# Patient Record
Sex: FEMALE | Race: WHITE | NOT HISPANIC OR LATINO | Employment: FULL TIME | URBAN - METROPOLITAN AREA
[De-identification: names, ages, dates, MRNs, and addresses within clinical notes are randomized per-mention and may not be internally consistent; named-entity substitution may affect disease eponyms.]

---

## 2019-01-08 ENCOUNTER — TREATMENT (OUTPATIENT)
Dept: PHYSICAL THERAPY | Facility: CLINIC | Age: 34
End: 2019-01-08

## 2019-01-08 DIAGNOSIS — M35.7 FAMILIAL LIGAMENTOUS LAXITY: ICD-10-CM

## 2019-01-08 DIAGNOSIS — M53.3 SACROILIAC JOINT DYSFUNCTION: Primary | ICD-10-CM

## 2019-01-08 DIAGNOSIS — S39.012D STRAIN OF LUMBAR REGION, SUBSEQUENT ENCOUNTER: ICD-10-CM

## 2019-01-08 DIAGNOSIS — R29.3 POSTURE IMBALANCE: ICD-10-CM

## 2019-01-08 PROCEDURE — 97530 THERAPEUTIC ACTIVITIES: CPT | Performed by: PHYSICAL THERAPIST

## 2019-01-08 PROCEDURE — 97140 MANUAL THERAPY 1/> REGIONS: CPT | Performed by: PHYSICAL THERAPIST

## 2019-01-08 PROCEDURE — 99070 SPECIAL SUPPLIES PHYS/QHP: CPT | Performed by: PHYSICAL THERAPIST

## 2019-01-08 PROCEDURE — 97162 PT EVAL MOD COMPLEX 30 MIN: CPT | Performed by: PHYSICAL THERAPIST

## 2019-01-08 NOTE — PROGRESS NOTES
"Initial Physical Therapy Evaluation and Plan of Care    Chief Complaint:  Low Back Pain    Background History:   Tete reports original insidious onset of low back pain 10 years ago.  Subsequent to this she had 1-2 episodes of low back pain spasming taking her to the ER, every year.  Over the last couple of years her pain has increased, she is not able to attribute this to anything in particular.  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      Past Medical History:  1.  AA 2013  Treated with PT for left shoulder labral tear, no low back treatment   2.  Hx of two falls when mopping in socks, over the last 2 years  3.  Hx of regular, heavy, cramping menses  4.  T& A, 2006  5.  Allergic to Wellbutrin and mild allergy to contrast dye      Prior Level of Function:  Independent in all ADLs  Prior Therapy for Same Condition:  At [facility], x couple months, \"helped some\"       Social/ADL:  Pt is in between jobs as a .  She starts her new job as a  at [employer], based out of [place], next week.  She will be working out of her home.  She enjoys walking for exercise when she is able.  She does enjoy Yoga, but stopped it last year secondary to her schedule.        SUBJECTIVE: Pt reports pain at a level of 5/10 to 10/10 localized in the lower lumbar spine and SI joints.  She also has pain in both gr trochanters.  Her pain is constant, but varies in intensity based on her position/acitivty.  She describes her pain as stabbing, aching and sharp.  Pain increases with bending to feed dog, driving, stairs and walking.  Pain decreases with massage, ice, stretching and medication.  Her best and worst  times of day vary.  She sleeps in a variety of positions using a Temperpedic Neck pillow and a Snoogle body pillow.  She sleeps on a Beauty Rest Hybrid mattress.  She denies numbness or tingling.  " "    Functional Limitations:  Sitting, standing, walking, stairs, driving, working, sleeping, squatting, housework and changing positions.     Patient Goals for Physical Therapy: \"Pain relief\"      OBJECTIVE:    Postural Assessment:   Yes No   Forward head  x   Increased AO extension  x         Presentation:    Cervical Spine unremarkable    Thoracic spine Slight flattened     Lumbar Spine flattened          Right Left   Shoulder elevated x    Scapula position               Winging               Protracted sl sl             Anterior Tilt               Elevated     Rib Hump               Upper               Lower     Lateral Shift of Pelvis sl    Gluteal Fold Elevated x    Atrophy of Gluteus Max  x   Standing Hip Position             External Rotation x Sl             Internal Rotation     Knee              Valgus             Varum              Hyperextended     Foot/Ankle            Supinated            Pronated            Pes Planus            Increased Long Arch     Calcaneous angulation:            Valgus            Varum     Hallux Valgus     Hammertoe            Monthly Objective Measurement/Functional Activity    Date: 01/08/2019          Oswestry Pain Score 52/100                                AROM Lumbar Spine: Degrees   Degrees      Degrees      Degrees      Degrees      Degrees      Degrees    Degrees      Flexion 106 pain                       Extension 3 pain                        Right Lat. Flexion 29 pain right trunk                       Left Lat. Flexion 31                       Right Lat Shift Pelvis inc pain                        Left Lat Shift Pelvis  less inc pain                                AROM Hips:  (degrees) Right      Left Right Left Right  Left Right  Left Right  Left Right  Left Right  Left Right  Left      Flexion 110       110                       Abduction 37       41                       Int. Rotation 32       22                       Ext. Rotation  34       42                  "              Flexibility:   (degrees) Right    Left Right  Left Right  Left Right  Left Right  Left Right  Left Right  Left Right  Left      Hamstrings -32       -28                      Quadriceps 39       42                      HipExt/Rot(piriformis) 30       28             Hip Int/Rotators 27       9             Hip Flexors (iliopsoas) -       -             IT Band -       -                     Reflexes: Right      Left Right  Left Right  Left Right  Left Right  Left Right  Left Right  Left Right  Left      L4 (Quad) +1       +1             S1 (Achilles) +1       +1                     Root Level  - Motor Right      Left Right  Left Right  Left Right  Left Right  Left Right  Left Right  Left Right  Left     T12             Rectus Abdominus 4+/5             L1              Paraspinals 5/5         5/5             L2              Hip Flexion 5/5          5/5             L3             Quads 5/5          5/5             L4              Anterior Tibialis 5/5         5/5             L5             Gr. Toe Extension 5/5           5/5             S1            Gastroc/Sol/Peroneals 5/5          5/5                     Functional Strength:             Single LegStanceTime (seconds) R:30   L:30   R:      L: R:      L: R:     L: R:      L: R:      L: R:      L: R:      L:     Pelvic Floor Isolation (seconds) -             Inner Unit  Isolation (seconds) -                     Functional Activities:              Sit w/o pain? Pain increases (minutes) No/ 30 min             Stand w/o pain? No/ 30 min             Walk w/o pain? varies             Steps w/o pain? 1 fl, avoids them             Drive w/o pain? No/ 10-15 min             Work w/o pain? No/ 30 min             # times wakes w/ pain? 4-5x/night, now taking meds at night she is able to sleep.                    01/08/2019                    SI Joint Positioning  Right  Left Right  Left Right  Left Right  Left Right  Left Right  Left Right  Left Right  Left Right  Left  Right  Left Right  Left Right  Left Right  Left Right Left Right Left Right Left Right  Left Right  Left       Innominate                            Anterior    x                            Posterior                x                            Sacrum                               Unilateral rotation    x                                            SI Joint Testing  Right  Left Right  Left Right  Left Right  Left Right  Left Right  Left Right  Left Right  Left Right  Left Right  Left Right  Left Right  Left Right  Left Right Left Right Left Right Left Right  Left Right  Left           Distraction     +          +                            Joanna's     +          +                            Compression     +          +                            Prone Press Up           +                                          Special Tests  Right   Left Right  Left Right  Left Right  Left Right  Left Right  Left Right Left  Right  Left Right  Left Right  Left Right  Left Right  Left Right  Left Right Left Right Left Right Left Right Left  Right  Left      Straight Leg Raise                                     Active   -         -                              Passive   -         -                             Hip Scour Test   ++       Sl                                                                        Palpation:         Muscle/Bone Irritation  Date 01/08/2019                     Right  Left Right  Left Right  Left Right  Left Right  Left Right  Left Right  Left Right  Left Right  Left Right  Left Right  Left Right  Left Right  Left Right Left Right Left Right Left Right  Left Right  Left   Piriformis  ++        +                    Gr. Trochanter  +          +                    IT Band  +         sl                    Quadratus Lumborum  ++        ++                    Ischial Tuberosity  ++         sl                    Sacrococcygeal Ligs  ++          +                    Paraspinals  +           +                    Spinous  Processes                                        T-spine rotated to   -               -                           L-spine rotated to  L2-5                          Adductor Tony                             Iliopsoas  ++         +                    Quad Origin  sl          sl                    SI Joint  ++         +                    Pubic Symphysis         ++                                                  Rectus Diastasis   1/2 finger                    sternocostals   +        +                      All 4s Positioning: Pt not able to assume full lumbar extension in this position  Leg Length:  The right  lower extremity is equal to the left      PLAN OF CARE:    ASSESSMENT:  Problem List:   1. SI joint dysfunction  2. Lack of spinal stabilization  3. Postural dysfunction     Discussion:    Tete arrives with significant SI joint dysfunction with secondary postural dysfunction and a lack of spinal stabilization.  She presents with familial ligamentous laxity which plays into her lack of spinal stabilization.  She does have a slight lateral shift of her pelvis to the right suggesting the presence of a slight bulging lumbar disc.  Tete indicates that the lateral shift of the pelvis can be worse at times.  Following manual SI joint correction and lumbar facet manual work, her pain decreased significantly.  When orthotics were placed in her shoes to decrease the ground rxn forces into the SI ligaments along with an SI belt placed on her to decrease the gravitational forces on the SI ligaments, her pain decreased.      She was instructed in how to self correct the SI joint positioning and lumbar facet positioning.  She demonstrated a good understanding of these techniques.  She was also instructed in how to sit, how to use lumbar pillows and cervical pillow.  Self management instruction using heat and ice was also given.      I anticipate that Tete will do well and be able to meet the goals below.     Short Term  Goals: (4-6 weeks)                               Date Met:                1.   pt independent in postural correction  2.   pt independent in SI joint self-corrections  3.   pt independent in exer to decrease post. disc pressures  4.   pt able to sleep through night without pain  5.   pt able to sit 15 minutes without pain  6.   pt able to walk 10minutes without pain  7.   Reduce pt pain to no greater than 7/10  8.   Decrease Oswestry Pain Score to no greater than 45/100    Long Term Goals:(3-5 months)      Date Met:      1.  pt independent in self-management of symptoms       2.  pt independent in home program      3.  pt able to work 6 hours without pain      4.  pt able to sit 60 minutes without pain      5.  pt able to walk 45 min without pain    Treatment Plan:   1.  Ultrasound to increase extensibility, decrease pain, if needed  2.  Electrical stimulation for muscle relaxation, decrease pain, if needed, iontophoresis  3.  Manual therapy to increase function, decrease pain  4.  Therapeutic exercise to increase function, decrease pain  5.  Home programming and d/c planning to increase function and decrease pain    Frequency & Duration:  Pt will be seen on a once/week basis for 12 more visits    Patient Participated in and Agrees With This Plan of Care and Goals:  YES  Rehab Potential:  jarret Marcial, PT, MS  Physical Therapist  [place]# [id number]            TREATMENT TODAY:    Total Treatment Time: (time in clinic) 120 min  Timed Code Treatment Minutes: 120  Evaluation:  55054    Self Care Home Management Training/ Patient Education:    Purpose of RX  Proper Body Mechanics  Postural Instruction    Compliance w/HEP    Orthotic/Equipmt Usage - instructed in use of cervical roll & body pillow for sleeping positions and in use of lumbar support when sitting.      Supplies given:   Lumbar support - on trial               1/2 cylinder lumbar support - on trial    Cervical roll - on trial   SI Belt - purchased   Pair  of orthotics - purchased                 Manual Therapy:  (MA)  RX min: 30  Manual posterior rotation of right innominate    Positional Isometric contraction (PIC) of left iliopsoas    Positional Isometric contraction of (PIC) right gluteus minimus    Distraction of both SI jts in open pack hip position  Piriformis stretching, bilaterally    Quadratus lumborum stretching, bilaterally    Anterior/Inferior Mobilization of  right hip    Positional Isometric Contraction of multifidus    Therapeutic Activities:  (TA)  RX min: 45    Neuromuscular Reeducation: (NMR)  RX min:     Ultrasound:  RX min:          1.6 vance/cm2       Location:     Iontophoresis:  6 hr patch                                Location:                               Procedures:      Key:  i=instructed        r=review        c=corrected        a=adapted   Code Procedure/Instruction 01/08/2019                  TA         Sleeping Positions instructed                                TA Sternum-Up Posture instructed                  TA SI jt. self-correction instructed                  TA Lumbar Facet PIC instructed                  TA   Order of Self-Corrections instructed                  TA Use of lumbar pillow instructed                  TA Use of cervical roll instructed                  TA Use of orthotics instructed                  TA Use of SI belt instructed                  TA Use of Nada chair                   TA Use of Ice instructed                  TA Use of [employer]are/ heat instructed                  TA Use of Theraworx Relief instructed                  TA Use of TENS unit                   TA Use of iontophoresis                   TA Decreasing Disc Pressures                                       NMR Pelvic Floor Isolation                   NMR Transverse Abdominus                   NMR Multifidus Isolation                   NMR InnerUnit against Gravity                   NMR Sit-stand w/ inner unit                   NMR Roll w/ inner  unit                   NMR Walk w/ inner unit                    NMR Up/down steps w/ inner unit                   NMR Water Exer Instruction                   NMR Hip Abd w/o piriformis                   NMR Hip Add w/o iliop/ham                    NMR Lower abs in supine                   NMR Abd obliques in supine                   NMR Prone Knee Flexion                   NMR Prone glut amrita                   NMR Retro Step                   NMR Retro Walk                   NMR Retro walk on treadmill                   NMR Forward walk w/ inner unit                   NMR Balance Instruction                   NMR Stability Ball A/P & Lateral                   NMR Ball Catch/Throw /Supine                   NMR Ball Catch/Throw /Stand                   NMR StabilityBall All 4's Arm Lift                   NMR StabilityBall Prone Walk Out                   NMR StabilityBall Supine Oblique                   NMR Stability Ball sit-supine-sit                   NMR Walking Prog Progression                   MNR Home program sequencing                                       TA Hamstring stretch                   TA Hip Ext Rot Stretch                   TA Hip Int Rot Stretch                   TA Hip Flex/IT Stretch                   TA Hip Add Stretch                   TA Lats Dorsi Stretch                   TA Gastroc/soleus stretch                   TA QuadratusLumborm Stretch                      Supine                      Stance                   TA Quad Stretch                                       NMR Wall Push Ups                   NMR Planking                   NMR BOSU Ball Exercises                   NMR Lateral Bridge Drop                   NMR [place]                   NMR O'Feldt Lat Pull Down                   NMR O'Feldt Hip Ext                   NMR O'Feldt Trunk Ext                                       TA CervDiscExtSup/stnd                   TA Cerv Stretches                   TA Self PIC Cervical  Spine                   TA Neural Gliding                   TA Ant/Mid Scalene Strch                   TA Biceps stretch                   TA Rotator Cuff Stretch                   TA Anterior Chest Stretch                   TA Wrist Ext Stretch                                       NMR Prone Arm Lifts                   NMR Scapula Stabilization                   NMR Occulomotor Isometrics                   NMR Cervical Isometrics                                       TA Shld AROM w/bar                   TA Shld Capsular Stretching                   TA Self PIC Thor Spine                   TA Rot Cuff Therabd, beginner                   TA Rot Cuff Therabd, intermed                                                                                                                                                                                                                                                 Miracle Marcial, PT, MS  Physical Therapist  [place]# [id number]

## 2019-02-07 ENCOUNTER — TREATMENT (OUTPATIENT)
Dept: PHYSICAL THERAPY | Facility: CLINIC | Age: 34
End: 2019-02-07

## 2019-02-07 DIAGNOSIS — R29.3 POSTURE IMBALANCE: ICD-10-CM

## 2019-02-07 DIAGNOSIS — M53.3 SACROILIAC JOINT DYSFUNCTION: Primary | ICD-10-CM

## 2019-02-07 DIAGNOSIS — M35.7 FAMILIAL LIGAMENTOUS LAXITY: ICD-10-CM

## 2019-02-07 PROCEDURE — 97530 THERAPEUTIC ACTIVITIES: CPT | Performed by: PHYSICAL THERAPIST

## 2019-02-07 PROCEDURE — A9999 DME SUPPLY OR ACCESSORY, NOS: HCPCS | Performed by: PHYSICAL THERAPIST

## 2019-02-07 PROCEDURE — 97140 MANUAL THERAPY 1/> REGIONS: CPT | Performed by: PHYSICAL THERAPIST

## 2019-02-07 NOTE — PROGRESS NOTES
"Physical Therapy Note      SUBJECTIVE:   Changes since last seen:  Tete reports her low back pain flared up some since last seen as she tarted a new job and has been traveling a lot.   She did some of the SI joint self corrections, only once a day.  She notes she has been stretching her hamstrings a lot as she felt like she needed to do this.   She has gone to massage therapist several times since she was last here.   She is trying to work on sleep positions, got the MY pillow, but appears she hasn't been using the cervical roll in it correctly.   The orthotics feel good and she likes wearing the SI belt.   She is having to set up a home office and she went and bought a desk chair that felt good to her, it does not have adjustable arms nor lumbar support.     Current level of pain:    3-4/10, across sacrum but seems to go up to lumbar area   Lowest level of pain since last seen:  2/10  Highest level of pain since last seen\"  8/10      Past Medical History:  1.  AA 2013  Treated with PT for left shoulder labral tear, no low back treatment   2.  Hx of two falls when mopping in socks, over the last 2 years  3.  Hx of regular, heavy, cramping menses  4.  T& A, 2006  5.  Allergic to Wellbutrin and mild allergy to contrast dye      Functional Limitations:  Sitting, standing, walking, stairs, driving, working, sleeping, squatting, housework and changing positions.   Patient Goals for Physical Therapy: \"Pain relief\"      OBJECTIVE:      Observation:  Right lateral shift of pelvis today      01/08/2019 02/07/19                   SI Joint Positioning  Right  Left Right  Left Right  Left Right  Left Right  Left Right  Left Right  Left Right  Left Right  Left Right  Left Right  Left Right  Left Right  Left Right Left Right Left Right Left Right  Left Right  Left       Innominate                            Anterior    x   x                           Posterior                x               x                           Sacrum      "                          Unilateral rotation    x   x                                           SI Joint Testing  Right  Left Right  Left Right  Left Right  Left Right  Left Right  Left Right  Left Right  Left Right  Left Right  Left Right  Left Right  Left Right  Left Right Left Right Left Right Left Right  Left Right  Left           Distraction     +          +   +         +                           Joanna's     +          +   Sl         sl                           Compression     +          +   Sl          sl                           Prone Press Up           +         +                                        Special Tests  Right   Left Right  Left Right  Left Right  Left Right  Left Right  Left Right Left  Right  Left Right  Left Right  Left Right  Left Right  Left Right  Left Right Left Right Left Right Left Right Left  Right  Left      Straight Leg Raise                                     Active   -         -                              Passive   -         -                             Hip Scour Test   ++       Sl    ++       Sl                                                                       Palpation:         Muscle/Bone Irritation  Date 01/08/2019 02/07/19                    Right  Left Right  Left Right  Left Right  Left Right  Left Right  Left Right  Left Right  Left Right  Left Right  Left Right  Left Right  Left Right  Left Right Left Right Left Right Left Right  Left Right  Left   Piriformis  ++        +   ++          +                   Gr. Trochanter  +          +  +          sl                   IT Band  +         sl   Sl        sl                   Quadratus Lumborum  ++        ++   ++        +                   Ischial Tuberosity  ++         sl   -          -                   Sacrococcygeal Ligs  ++          +   Sl        sl                   Paraspinals  +           +                    Spinous Processes                                        T-spine rotated to   -               -                            L-spine rotated to  L2-5        L1-5                   Adductor Tony    +        +       +         +                   Iliopsoas  ++         +   ++        +                   Quad Origin  sl          sl   Sl          sl                   SI Joint  ++         +  ++        +                    Pubic Symphysis         ++          Not tested                                        Rectus Diastasis   1/2 finger                    sternocostals   +        +   +          +                   All 4s Positioning: Pt not able to assume full lumbar extension in this position  Leg Length:  The right  lower extremity is equal to the left        Monthly Objective Measurement/Functional Activity  Date: 01/08/2019          Oswestry Pain Score 52/100                                AROM Lumbar Spine: Degrees   Degrees      Degrees      Degrees      Degrees      Degrees      Degrees    Degrees      Flexion 106 pain                       Extension 3 pain                        Right Lat. Flexion 29 pain right trunk                       Left Lat. Flexion 31                       Right Lat Shift Pelvis inc pain                        Left Lat Shift Pelvis  less inc pain                                AROM Hips:  (degrees) Right      Left Right Left Right  Left Right  Left Right  Left Right  Left Right  Left Right  Left      Flexion 110       110                       Abduction 37       41                       Int. Rotation 32       22                       Ext. Rotation  34       42                               Flexibility:   (degrees) Right    Left Right  Left Right  Left Right  Left Right  Left Right  Left Right  Left Right  Left      Hamstrings -32       -28                      Quadriceps 39       42                      HipExt/Rot(piriformis) 30       28             Hip Int/Rotators 27       9             Hip Flexors (iliopsoas) -       -             IT Band -       -                     Reflexes: Right       Left Right  Left Right  Left Right  Left Right  Left Right  Left Right  Left Right  Left      L4 (Quad) +1       +1             S1 (Achilles) +1       +1                     Root Level  - Motor Right      Left Right  Left Right  Left Right  Left Right  Left Right  Left Right  Left Right  Left     T12             Rectus Abdominus 4+/5             L1              Paraspinals 5/5         5/5             L2              Hip Flexion 5/5          5/5             L3             Quads 5/5          5/5             L4              Anterior Tibialis 5/5         5/5             L5             Gr. Toe Extension 5/5           5/5             S1            Gastroc/Sol/Peroneals 5/5          5/5                     Functional Strength:             Single LegStanceTime (seconds) R:30   L:30   R:      L: R:      L: R:     L: R:      L: R:      L: R:      L: R:      L:     Pelvic Floor Isolation (seconds) -             Inner Unit  Isolation (seconds) -                     Functional Activities:              Sit w/o pain? Pain increases (minutes) No/ 30 min             Stand w/o pain? No/ 30 min             Walk w/o pain? varies             Steps w/o pain? 1 fl, avoids them             Drive w/o pain? No/ 10-15 min             Work w/o pain? No/ 30 min             # times wakes w/ pain? 4-5x/night, now taking meds at night she is able to sleep.               PLAN OF CARE:    ASSESSMENT:  Problem List:   1. SI joint dysfunction  2. Lack of spinal stabilization  3. Postural dysfunction     Discussion:    Gave handouts for ergonomic chairs and sit stand desks to check out.  She brought one photo of her new chair, question if this will work for her as it is not adjustable and will not be able to get the arms of chair under or over the desk surface. She is using one lap top and 2 screens... Went over the proper placement of the screens.  She will bring more photos of how she is setting this up on her next visit.   I am concerned that the  right hip labrum may have dysfunction in it.  Her Scour test is  + and I am not able to stretch the piriformis in supine without pinching the capsule or causing pain consistent with labral irritation. She can't even sit on edge of chair with the right hip in flexion to 90 degrees and the left hip in ext without causing the same pain.     Spent a great deal of time on fine tuning how to do the SI joint self corrections without over engaging the QLs.. She was also over engaging the QLs on standing mini back bends to minimize lumbar disc pressures.  Any movement of the right hip causes her to guard with her hip flexors and hamstrings and piriformis.  She has not purchased the medicine balls to do the lumbar facet positional isometric contraction techniques.  We went over this again today and she will purchase these or use books and actually do the self corrections as she has not been doing lumbar facet self corrections since she was last seen.     After manual corrections of the SI joints and lumbar facets she was able to get in all 4s and when cued, able to get into full lumbar extension without pain.  She was very fearful of this at first but was able to do when guided to relax the QLs and hamstrings.      Tete seemed to have a good understanding of today's instruction of using extension principles to minimize lumbar disc pressures.     Tete is open to getting a sit stand desk and a new mattress.  She will begin looking into these things.       Short Term Goals: (4-6 weeks)                               Date Met:                1.   pt independent in postural correction     02/07/19  2.   pt independent in SI joint self-corrections  3.   pt independent in exer to decrease post. disc pressures  4.   pt able to sleep through night without pain  5.   pt able to sit 15 minutes without pain  6.   pt able to walk 10minutes without pain  7.   Reduce pt pain to no greater than 7/10  8.   Decrease Oswestry Pain Score to no  greater than 45/100    Long Term Goals:(3-5 months)      Date Met:      1.  pt independent in self-management of symptoms       2.  pt independent in home program      3.  pt able to work 6 hours without pain      4.  pt able to sit 60 minutes without pain      5.  pt able to walk 45 min without pain    Treatment Plan:   1.  Ultrasound to increase extensibility, decrease pain, if needed  2.  Electrical stimulation for muscle relaxation, decrease pain, if needed, iontophoresis  3.  Manual therapy to increase function, decrease pain  4.  Therapeutic exercise to increase function, decrease pain  5.  Home programming and d/c planning to increase function and decrease pain    Frequency & Duration:  Pt will be seen on a once/week basis for 11 more visits    Patient Participated in and Agrees With This Plan of Care and Goals:  YES  Rehab Potential:  jarret Marcial, PT, MS  Physical Therapist  [place]# [id number]      TREATMENT TODAY:    Total Treatment Time: (time in clinic)    70  min  Timed Code Treatment Minutes:    65      Supplies given:   Lumbar support - on trial  - purchased              1/2 cylinder lumbar support - on trial - purchased today    Cervical roll - on trial - purchased today                Manual Therapy:  (MA)  RX min: 30  Manual posterior rotation of right innominate    Positional Isometric contraction (PIC) of left iliopsoas    Positional Isometric contraction of (PIC) right gluteus minimus    Distraction of both SI jts in open pack hip position  Piriformis stretching, bilaterally    Quadratus lumborum stretching, bilaterally    Anterior/Inferior Mobilization of  right hip    Positional Isometric Contraction of multifidus  Myofascial work trunk   Sternocostal and rib mobs     Therapeutic Activities:  (TA)  RX min:     35    Neuromuscular Reeducation: (NMR)  RX min:     Ultrasound:  RX min:          1.6 vance/cm2       Location:     Iontophoresis:  6 hr patch                                 Location:                               Procedures:      Key:  i=instructed        r=review        c=corrected        a=adapted   Code Procedure/Instruction 01/08/2019 02/07/19                 TA         Sleeping Positions instructed               reviewed,correct                 TA Sternum-Up Posture instructed reviewed                 TA SI jt. self-correction instructed corrected                 TA Lumbar Facet PIC instructed corrected                 TA   Order of Self-Corrections instructed                  TA Use of lumbar pillow instructed                  TA Use of cervical roll instructed corrected                 TA Use of orthotics instructed                  TA Use of SI belt instructed                  TA Use of Nada chair                   TA Use of Ice instructed                  TA Use of [employer]/ heat instructed                  TA Use of Theraworx Relief instructed                  TA Use of TENS unit                   TA Use of iontophoresis                   TA Decreasing Disc Pressures  instructed                                     NMR Pelvic Floor Isolation                   NMR Transverse Abdominus                   NMR Multifidus Isolation                   NMR InnerUnit against Gravity                   NMR Sit-stand w/ inner unit                   NMR Roll w/ inner unit                   NMR Walk w/ inner unit                    NMR Up/down steps w/ inner unit                   NMR Water Exer Instruction                   NMR Hip Abd w/o piriformis                   NMR Hip Add w/o iliop/ham                    NMR Lower abs in supine                   NMR Abd obliques in supine                   NMR Prone Knee Flexion                   NMR Prone glut amrita                   NMR Retro Step                   NMR Retro Walk                   NMR Retro walk on treadmill                   NMR Forward walk w/ inner unit                   NMR Balance Instruction                   NMR  Stability Ball A/P & Lateral                   NMR Ball Catch/Throw /Supine                   NMR Ball Catch/Throw /Stand                   NMR StabilityBall All 4's Arm Lift                   NMR StabilityBall Prone Walk Out                   NMR StabilityBall Supine Oblique                   NMR Stability Ball sit-supine-sit                   NMR Walking Prog Progression                   MNR Home program sequencing                                       TA Hamstring stretch                   TA Hip Ext Rot Stretch                   TA Hip Int Rot Stretch                   TA Hip Flex/IT Stretch                   TA Hip Add Stretch                   TA Lats Dorsi Stretch                   TA Gastroc/soleus stretch                   TA QuadratusLumborm Stretch                      Supine                      Stance                   TA Quad Stretch                                       NMR Wall Push Ups                   NMR Planking                   NMR BOSU Ball Exercises                   NMR Lateral Bridge Drop                   NMR Waiters [place]                   NMR O'Feldt Lat Pull Down                   NMR O'Feldt Hip Ext                   NMR O'Feldt Trunk Ext                                       TA CervDiscExtSup/stnd                   TA Cerv Stretches                   TA Self PIC Cervical Spine                   TA Neural Gliding                   TA Ant/Mid Scalene Strch                   TA Biceps stretch                   TA Rotator Cuff Stretch                   TA Anterior Chest Stretch                   TA Wrist Ext Stretch                                       NMR Prone Arm Lifts                   NMR Scapula Stabilization                   NMR Occulomotor Isometrics                   NMR Cervical Isometrics                                       TA Shld AROM w/bar                   TA Shld Capsular Stretching                   TA Self PIC Thor Spine                   TA Rot Cuff Therabd,  beginner                   HERNAN Lopez Cuff Therabd, [facility]                                                                                                                                                                                                                                                 Miracle Marcial, PT, MS  Physical Therapist  [place]# [id number]

## 2019-03-21 ENCOUNTER — TREATMENT (OUTPATIENT)
Dept: PHYSICAL THERAPY | Facility: CLINIC | Age: 34
End: 2019-03-21

## 2019-03-21 DIAGNOSIS — M53.3 SACROILIAC JOINT DYSFUNCTION: Primary | ICD-10-CM

## 2019-03-21 DIAGNOSIS — R29.3 POSTURE IMBALANCE: ICD-10-CM

## 2019-03-21 DIAGNOSIS — S39.012D STRAIN OF LUMBAR REGION, SUBSEQUENT ENCOUNTER: ICD-10-CM

## 2019-03-21 DIAGNOSIS — M35.7 FAMILIAL LIGAMENTOUS LAXITY: ICD-10-CM

## 2019-03-21 PROCEDURE — 97530 THERAPEUTIC ACTIVITIES: CPT | Performed by: PHYSICAL THERAPIST

## 2019-03-21 PROCEDURE — 97140 MANUAL THERAPY 1/> REGIONS: CPT | Performed by: PHYSICAL THERAPIST

## 2019-03-21 PROCEDURE — 97112 NEUROMUSCULAR REEDUCATION: CPT | Performed by: PHYSICAL THERAPIST

## 2019-03-21 NOTE — PROGRESS NOTES
"Physical Therapy Note      SUBJECTIVE:   Changes since last seen:  \"My back is not great, but been sitting a lot with new job; I know I need to move a lot more during the day.\"  She states her hips and low back has been really tight, but got a massage this past week which helped.   She indicates the SI joint self corrections do help some; she is just doing the hip adduction PIC and lumbar facet PIC... Getting some relief not getting great relief.  She's not correcting the ilium because has concerns with labrum.  She is using Theraworx Relielf at night and using the SI belt at night, even though she knows it is not made for use at night.  \"It just feels more secure to roll in bed at night with the SI belt on.\"    She is using ice, couple times a week    Current level of pain:    6/10, across sacrum but seems to go up to lumbar area    Hips aren't as bad today  Lowest level of pain since last seen:  4/10  Highest level of pain since last seen\"  7/10      Past Medical History:  1.  AA 2013  Treated with PT for left shoulder labral tear, no low back treatment   2.  Hx of two falls when mopping in socks, over the last 2 years  3.  Hx of regular, heavy, cramping menses  4.  T& A, 2006  5.  Allergic to Wellbutrin and mild allergy to contrast dye      Functional Limitations:  Sitting, standing, walking, stairs, driving, working, sleeping, squatting, housework and changing positions.   Patient Goals for Physical Therapy: \"Pain relief\"      OBJECTIVE:  Observation:  Slight ight lateral shift of pelvis.      01/08/2019 02/07/19 03/21/19                  SI Joint Positioning  Right  Left Right  Left Right  Left Right  Left Right  Left Right  Left Right  Left Right  Left Right  Left Right  Left Right  Left Right  Left Right  Left Right Left Right Left Right Left Right  Left Right  Left       Innominate                            Anterior    x   x    x                          Posterior                x               x        x   "                        Sacrum                               Unilateral rotation    x   x   x                                          SI Joint Testing  Right  Left Right  Left Right  Left Right  Left Right  Left Right  Left Right  Left Right  Left Right  Left Right  Left Right  Left Right  Left Right  Left Right Left Right Left Right Left Right  Left Right  Left           Distraction     +          +   +         +   +          +                          Joanna's     +          +   Sl         sl    +         -                          Compression     +          +   Sl          sl   Sl          Sl                           Prone Press Up           +         +         Sl                                        Special Tests  Right   Left Right  Left Right  Left Right  Left Right  Left Right  Left Right Left  Right  Left Right  Left Right  Left Right  Left Right  Left Right  Left Right Left Right Left Right Left Right Left  Right  Left      Straight Leg Raise                                     Active   -         -                              Passive   -         -                             Hip Scour Test   ++       Sl    ++       Sl    +           sl                                                                     Palpation:       Muscle/Bone Irritation  Date 01/08/2019 02/07/19 03/21/19                   Right  Left Right  Left Right  Left Right  Left Right  Left Right  Left Right  Left Right  Left Right  Left Right  Left Right  Left Right  Left Right  Left Right Left Right Left Right Left Right  Left Right  Left   Piriformis  ++        +   ++          +   ++       +                  Gr. Trochanter  +          +  +          sl    +           +                  IT Band  +         sl   Sl        sl     -          -                  Quadratus Lumborum  ++        ++   ++        +    +        ++                  Ischial Tuberosity  ++         sl   -          -    -           -                  Sacrococcygeal Ligs  ++           +   Sl        sl   +          +                  Paraspinals  +           +     +         sl                  Spinous Processes                                        T-spine rotated to   -               -                           L-spine rotated to  L2-5        L1-5  L4-5                  Adductor Tony    +        +       +         +     -         +                  Iliopsoas  ++         +   ++        +   ++         +                  Quad Origin  sl          sl   Sl          sl    -          -                  SI Joint  ++         +  ++        +    ++        +                  Pubic Symphysis         ++          Not tested         ++                                       Rectus Diastasis   1/2 finger                    sternocostals   +        +   +          +    +         -                   All 4s Positioning: Pt not able to assume full lumbar extension in this position  Leg Length:  The right  lower extremity is equal to the left        Monthly Objective Measurement/Functional Activity  Date: 01/08/2019 03/21/19         Oswestry Pain Score 52/100  46/100                               AROM Lumbar Spine: Degrees   Degrees      Degrees      Degrees      Degrees      Degrees      Degrees    Degrees      Flexion 106 pain          99             Extension 3 pain           17             Right Lat. Flexion 29 pain right trunk          24            Left Lat. Flexion 31         19            Right Lat Shift Pelvis inc pain   Inc pain Left SI jt            Left Lat Shift Pelvis  less inc pain       No change                          AROM Hips:  (degrees) Right      Left Right Left Right  Left Right  Left Right  Left Right  Left Right  Left Right  Left      Flexion 110       110  125    120             Abduction 37       41   45      45             Int. Rotation 32       22  30      25               Ext. Rotation  34       42  35      40                        Flexibility:   (degrees) Right    Left Right  Left  Right  Left Right  Left Right  Left Right  Left Right  Left Right  Left      Hamstrings -32       -28  -30     -25             Quadriceps 39       42 40        45              HipExt/Rot(piriformis) 30       28   32      30            Hip Int/Rotators 27       9   25      10            Hip Flexors (iliopsoas) -       -             IT Band -       -                     Reflexes: Right      Left Right  Left Right  Left Right  Left Right  Left Right  Left Right  Left Right  Left      L4 (Quad) +1       +1             S1 (Achilles) +1       +1                     Root Level  - Motor Right      Left Right  Left Right  Left Right  Left Right  Left Right  Left Right  Left Right  Left     T12             Rectus Abdominus 4+/5             L1              Paraspinals 5/5         5/5             L2              Hip Flexion 5/5          5/5             L3             Quads 5/5          5/5             L4              Anterior Tibialis 5/5         5/5             L5             Gr. Toe Extension 5/5           5/5             S1            Gastroc/Sol/Peroneals 5/5          5/5                     Functional Strength:             Single LegStanceTime (seconds) R:30   L:30   R:      L: R:      L: R:     L: R:      L: R:      L: R:      L: R:      L:     Pelvic Floor Isolation (seconds) -             Inner Unit  Isolation (seconds) -                     Functional Activities:              Sit w/o pain? Pain increases (minutes) No/ 30 min No/ 30  min            Stand w/o pain? No/ 30 min No/ 30 min            Walk w/o pain? varies No/ 1 mile            Steps w/o pain? 1 fl, avoids them 1 fl avoids             Drive w/o pain? No/ 10-15 min No/ 30-45 min            Work w/o pain? No/ 30 min No/ 30 min            # times wakes w/ pain? 4-5x/night, now taking meds at night she is able to sleep.  2x             PLAN OF CARE:    ASSESSMENT:  Problem List:   1. SI joint dysfunction  2. Lack of spinal stabilization  3. Postural dysfunction  "    Discussion:    Tete brought photos of her workstation at home.  She has a sit-stand desk on top of her stationary desk at this time and has her monitor raised as recommended on last visit.  She does need to lower her keyboard as her shoulders are flexed to about 40 degrees to reach the keyboard.  She is also in need of an ergonomic chair.  She was given several possible chairs to go try out.      She is having a hard time doing the self corrections for the ilium as it seems to tweak her right labrum.  She will hold off on doing this in standing thus her only option is to do this in sitting with her right foot up on a stool.    Reviewed how to so the PIC to the lumbar facets and the PIC for the hip adductors for self correction.   She was instructed in how do isolate the pelvic floor in supine, transverse abdominus in all 4s and multifidus in sitting.  She was able to isolate all of these but had the hardest time with the pelvic floor.  To improve proprioception of the pelvis she was instructed in doing the \"pelvic clock\" movement in sitting.       Short Term Goals: (4-6 weeks)                               Date Met:                1.   pt independent in postural correction     02/07/19  2.   pt independent in SI joint self-corrections    03/21/19  3.   pt independent in exer to decrease post. disc pressures  03/21/19  4.   pt able to sleep through night without pain  5.   pt able to sit 15 minutes without pain  6.   pt able to walk 10minutes without pain  7.   Reduce pt pain to no greater than 7/10    03/21/19  8.   Decrease Oswestry Pain Score to no greater than 45/100  9.  Reduce pt pain to no greater than 6/10  10.  Pt able to isolate the pelvic floor in supine x10, 10 sec  11.  Pt able to isolate transverse abdom in all 4s, x10 sec, x10      12.  Pt able to isolate the multifidus in sitting x10 sec, x10                                                                                                            "                                                                                                                                                                                                                                                                                                                                                                                                                                            Long Term Goals:(3-5 months)      Date Met:      1.  pt independent in self-management of symptoms       2.  pt independent in home program      3.  pt able to work 6 hours without pain      4.  pt able to sit 60 minutes without pain      5.  pt able to walk 45 min without pain    Treatment Plan:   1.  Ultrasound to increase extensibility, decrease pain, if needed  2.  Electrical stimulation for muscle relaxation, decrease pain, if needed, iontophoresis  3.  Manual therapy to increase function, decrease pain  4.  Therapeutic exercise to increase function, decrease pain  5.  Home programming and d/c planning to increase function and decrease pain    Frequency & Duration:  Pt will be seen on a once/week basis for 10 more visits    Patient Participated in and Agrees With This Plan of Care and Goals:  YES  Rehab Potential:  jarret Marcial, PT, MS  Physical Therapist  [place]# [id number]      TREATMENT TODAY:    Total Treatment Time: (time in clinic)    60  min  Timed Code Treatment Minutes:    60      Supplies given:      Manual Therapy:  (MA)  RX min: 15  Manual posterior rotation of right innominate    Positional Isometric contraction (PIC) of left iliopsoas    Positional Isometric contraction of (PIC) right gluteus minimus    Distraction of both SI jts in open pack hip position  Piriformis stretching, bilaterally    Quadratus lumborum stretching, bilaterally    Anterior/Inferior Mobilization of  right hip    Positional Isometric Contraction of multifidus  Myofascial work trunk   Sternocostal and  rib mobs     Therapeutic Activities:  (TA)  RX min:     15    Neuromuscular Reeducation: (NMR)  RX min: 30    Ultrasound:  RX min:          1.6 vance/cm2       Location:     Iontophoresis:  6 hr patch                                Location:                               Procedures:      Key:  i=instructed        r=review        c=corrected        a=adapted   Code Procedure/Instruction 01/08/2019 02/07/19 03/21/19                TA         Sleeping Positions instructed               reviewed,correct                 TA Sternum-Up Posture instructed reviewed                 TA SI jt. self-correction instructed corrected corrected                TA Lumbar Facet PIC instructed corrected corrected                TA   Order of Self-Corrections instructed                  TA Use of lumbar pillow instructed                  TA Use of cervical roll instructed corrected                 TA Use of orthotics instructed                  TA Use of SI belt instructed                  TA Use of Nada chair                   TA Use of Ice instructed                  TA Use of [employer]/ heat instructed                  TA Use of Theraworx Relief instructed                  TA Use of TENS unit                   TA Use of iontophoresis                   TA Decreasing Disc Pressures  instructed                                     NMR Pelvic Floor Isolation   instructed                NMR Transverse Abdominus   instructed                NMR Multifidus Isolation   instructed                NMR Diaphragmtic breathe supine                   NMR  Diaphragmtic breath sit                   NMR Pelvic Clock in sitting   instructed                                    NMR InnerUnit against Gravity                   NMR Sit-stand w/ inner unit                   NMR Roll w/ inner unit                   NMR Walk w/ inner unit                    NMR Up/down steps w/ inner unit                   NMR Water Exer Instruction                   NMR Hip Abd  w/o piriformis                   NMR Hip Add w/o iliop/ham                    NMR Lower abs in supine                   NMR Abd obliques in supine                   NMR Prone Knee Flexion                   NMR Prone glut amrita                   NMR Retro Step                   NMR Retro Walk                   NMR Retro walk on treadmill                   NMR Forward walk w/ inner unit                   NMR Balance Instruction                   NMR Stability Ball A/P & Lateral                   NMR Ball Catch/Throw /Supine                   NMR Ball Catch/Throw /Stand                   NMR StabilityBall All 4's Arm Lift                   NMR StabilityBall Prone Walk Out                   NMR StabilityBall Supine Oblique                   NMR Stability Ball sit-supine-sit                   NMR Walking Prog Progression                   MNR Home program sequencing                                       TA Hamstring stretch                   TA Hip Ext Rot Stretch                   TA Hip Int Rot Stretch                   TA Hip Flex/IT Stretch                   TA Hip Add Stretch                   TA Lats Dorsi Stretch                   TA Gastroc/soleus stretch                   TA QuadratusLumborm Stretch                      Supine                      Stance                   TA Quad Stretch                                       NMR Wall Push Ups                   NMR Planking                   NMR BOSU Ball Exercises                   NMR Lateral Bridge Drop                   NMR Waiters [place]                   NMR O'Feldt Lat Pull Down                   NMR O'Feldt Hip Ext                   NMR O'Feldt Trunk Ext                                       TA CervDiscExtSup/stnd                   TA Cerv Stretches                   TA Self PIC Cervical Spine                   TA Neural Gliding                   TA Ant/Mid Scalene Strch                   TA Biceps stretch                   TA Rotator Cuff Stretch                    TA Anterior Chest Stretch                   TA Wrist Ext Stretch                                       NMR Prone Arm Lifts                   NMR Scapula Stabilization                   NMR Occulomotor Isometrics                   NMR Cervical Isometrics                                       TA Shld AROM w/bar                   TA Shld Capsular Stretching                   TA Self PIC Thor Spine                   TA Rot Cuff Therabd, beginner                   TA Rot Cuff Therabd, ruy Marcial, PT, MS  Physical Therapist  [place]# [id number]

## 2019-03-28 ENCOUNTER — TREATMENT (OUTPATIENT)
Dept: PHYSICAL THERAPY | Facility: CLINIC | Age: 34
End: 2019-03-28

## 2019-03-28 DIAGNOSIS — S39.012D STRAIN OF LUMBAR REGION, SUBSEQUENT ENCOUNTER: ICD-10-CM

## 2019-03-28 DIAGNOSIS — M53.3 SACROILIAC JOINT DYSFUNCTION: Primary | ICD-10-CM

## 2019-03-28 DIAGNOSIS — M35.7 FAMILIAL LIGAMENTOUS LAXITY: ICD-10-CM

## 2019-03-28 DIAGNOSIS — R29.3 POSTURE IMBALANCE: ICD-10-CM

## 2019-03-28 PROCEDURE — 97140 MANUAL THERAPY 1/> REGIONS: CPT | Performed by: PHYSICAL THERAPIST

## 2019-03-28 PROCEDURE — 97112 NEUROMUSCULAR REEDUCATION: CPT | Performed by: PHYSICAL THERAPIST

## 2019-03-28 NOTE — PROGRESS NOTES
"Physical Therapy Note      SUBJECTIVE:   Changes since last seen:  \"I'm better off and on but constant soreness in both quadratus lumborums.\"   \"The self corrections are a little better, but still having some trouble with the PIC for lumbar facets.\"  She indicates that its been reallly hard to do the \"pelvic clock\" exercise.   She thinks she is doing well with the inner unit and says she's getting a little stronger.    Current level of pain:    5/10 in QLs   Lowest level of pain since last seen:  4/10  Highest level of pain since last seen\"  6.5/10      Past Medical History:  1.  AA 2013  Treated with PT for left shoulder labral tear, no low back treatment   2.  Hx of two falls when mopping in socks, over the last 2 years  3.  Hx of regular, heavy, cramping menses  4.  T& A, 2006  5.  Allergic to Wellbutrin and mild allergy to contrast dye      Functional Limitations:  Sitting, standing, walking, stairs, driving, working, sleeping, squatting, housework and changing positions.   Patient Goals for Physical Therapy: \"Pain relief\"      OBJECTIVE:  Observation:  Slight ight lateral shift of pelvis.      01/08/2019 02/07/19 03/21/19 03/28/19                 SI Joint Positioning  Right  Left Right  Left Right  Left Right  Left Right  Left Right  Left Right  Left Right  Left Right  Left Right  Left Right  Left Right  Left Right  Left Right Left Right Left Right Left Right  Left Right  Left       Innominate                            Anterior    x   x    x  x                        Posterior                x               x        x              x                      Sacrum                               Unilateral rotation    x   x   x    x                                         SI Joint Testing  Right  Left Right  Left Right  Left Right  Left Right  Left Right  Left Right  Left Right  Left Right  Left Right  Left Right  Left Right  Left Right  Left Right Left Right Left Right Left Right  Left Right  Left           " Distraction     +          +   +         +   +          +    +        +                         Joanna's     +          +   Sl         sl    +         -    Sl         -                         Compression     +          +   Sl          sl   Sl          Sl     Sl        Sl                          Prone Press Up           +         +         Sl          -                                      Special Tests  Right   Left Right  Left Right  Left Right  Left Right  Left Right  Left Right Left  Right  Left Right  Left Right  Left Right  Left Right  Left Right  Left Right Left Right Left Right Left Right Left  Right  Left      Straight Leg Raise                                     Active   -         -                              Passive   -         -                             Hip Scour Test   ++       Sl    ++       Sl    +           sl   Sl         Sl                                                                Palpation:       Muscle/Bone Irritation  Date 01/08/2019 02/07/19 03/21/19 03/28/19                  Right  Left Right  Left Right  Left Right  Left Right  Left Right  Left Right  Left Right  Left Right  Left Right  Left Right  Left Right  Left Right  Left Right Left Right Left Right Left Right  Left Right  Left   Piriformis  ++        +   ++          +   ++       +   ++        +                  Gr. Trochanter  +          +  +          sl    +           +    +       ++                 IT Band  +         sl   Sl        sl     -          -   -          -                 Quadratus Lumborum  ++        ++   ++        +    +        ++   ++        ++                 Ischial Tuberosity  ++         sl   -          -    -           -     -           -                 Sacrococcygeal Ligs  ++          +   Sl        sl   +          +    +       +                  Paraspinals  +           +     +         sl    +                  Spinous Processes                                        T-spine rotated to   -               -                            L-spine rotated to  L2-5        L1-5  L4-5  L35                 Adductor Tony    +        +       +         +     -         +   -           Sl                  Iliopsoas  ++         +   ++        +   ++         +    +         ++                 Quad Origin  sl          sl   Sl          sl    -          -   -           -                 SI Joint  ++         +  ++        +    ++        +   +          +                 Pubic Symphysis         ++          Not tested         ++      ++                                      Rectus Diastasis   1/2 finger                    sternocostals   +        +   +          +    +         -    +           +                 All 4s Positioning: Pt not able to assume full lumbar extension in this position  Leg Length:  The right  lower extremity is equal to the left        Monthly Objective Measurement/Functional Activity  Date: 01/08/2019 03/21/19         Oswestry Pain Score 52/100  46/100                               AROM Lumbar Spine: Degrees   Degrees      Degrees      Degrees      Degrees      Degrees      Degrees    Degrees      Flexion 106 pain          99             Extension 3 pain           17             Right Lat. Flexion 29 pain right trunk          24            Left Lat. Flexion 31         19            Right Lat Shift Pelvis inc pain   Inc pain Left SI jt            Left Lat Shift Pelvis  less inc pain       No change                          AROM Hips:  (degrees) Right      Left Right Left Right  Left Right  Left Right  Left Right  Left Right  Left Right  Left      Flexion 110       110  125    120             Abduction 37       41   45      45             Int. Rotation 32       22  30      25               Ext. Rotation  34       42  35      40                        Flexibility:   (degrees) Right    Left Right  Left Right  Left Right  Left Right  Left Right  Left Right  Left Right  Left      Hamstrings -32       -28  -30     -25              Quadriceps 39       42 40        45              HipExt/Rot(piriformis) 30       28   32      30            Hip Int/Rotators 27       9   25      10            Hip Flexors (iliopsoas) -       -             IT Band -       -                     Reflexes: Right      Left Right  Left Right  Left Right  Left Right  Left Right  Left Right  Left Right  Left      L4 (Quad) +1       +1             S1 (Achilles) +1       +1                     Root Level  - Motor Right      Left Right  Left Right  Left Right  Left Right  Left Right  Left Right  Left Right  Left     T12             Rectus Abdominus 4+/5             L1              Paraspinals 5/5         5/5             L2              Hip Flexion 5/5          5/5             L3             Quads 5/5          5/5             L4              Anterior Tibialis 5/5         5/5             L5             Gr. Toe Extension 5/5           5/5             S1            Gastroc/Sol/Peroneals 5/5          5/5                     Functional Strength:             Single LegStanceTime (seconds) R:30   L:30   R:      L: R:      L: R:     L: R:      L: R:      L: R:      L: R:      L:     Pelvic Floor Isolation (seconds) -             Inner Unit  Isolation (seconds) -                     Functional Activities:              Sit w/o pain? Pain increases (minutes) No/ 30 min No/ 30  min            Stand w/o pain? No/ 30 min No/ 30 min            Walk w/o pain? varies No/ 1 mile            Steps w/o pain? 1 fl, avoids them 1 fl avoids             Drive w/o pain? No/ 10-15 min No/ 30-45 min            Work w/o pain? No/ 30 min No/ 30 min            # times wakes w/ pain? 4-5x/night, now taking meds at night she is able to sleep.  2x             PLAN OF CARE:    ASSESSMENT:  Problem List:   1. SI joint dysfunction  2. Lack of spinal stabilization  3. Postural dysfunction     Discussion:    Tete needed correction on how to do the transverse abdominus in all 4s.  She was not relaxing into full  lumbar extension.  Once she did this she was able to do the exercise without pain.  She was tending to overflow into her gluts and hamstrings on isolating the multifidus.   She was able to turn on the inner unit and move from sit to stand but this was difficult.  She also had trouble walking with the inner unit on and NOT inappropriately engaging the hamstrings and piriformis.  She understands and can discern when she does this correctly and incorrectly and thus will be able to work on this over the next week.  Progress to outer unit on next visit     Did do trial application of KT tape on both QLs. In effort to minimize soreness in the QLs.     Short Term Goals: (4-6 weeks)                                 Date Met:                1.   pt independent in postural correction       02/07/19  2.   pt independent in SI joint self-corrections      03/21/19  3.   pt independent in exer to decrease post. disc pressures    03/21/19  4.   pt able to sleep through night without pain  5.   pt able to sit 15 minutes without pain  6.   pt able to walk 10minutes without pain  7.   Reduce pt pain to no greater than 7/10      03/21/19  8.   Decrease Oswestry Pain Score to no greater than 45/100  9.  Reduce pt pain to no greater than 6/10  10.  Pt able to isolate the pelvic floor in supine x10, 10 sec    03/28/19  11.  Pt able to isolate transverse abdom in all 4s, x10 sec, x10       03/28/19  12.  Pt able to isolate the multifidus in sitting x10 sec, x10                          03/28/19  13.  Pt able to engage inner unit, move from sit to stand &back to sit                                                                                                                                                                                                                                                                                                                                                       14.  Pt able to engage inner unit and  walk 4 steps without overflow to hamstrings                                                                                                                                                        Long Term Goals:(3-5 months)      Date Met:      1.  pt independent in self-management of symptoms       2.  pt independent in home program      3.  pt able to work 6 hours without pain      4.  pt able to sit 60 minutes without pain      5.  pt able to walk 45 min without pain    Treatment Plan:   1.  Ultrasound to increase extensibility, decrease pain, if needed  2.  Electrical stimulation for muscle relaxation, decrease pain, if needed, iontophoresis  3.  Manual therapy to increase function, decrease pain  4.  Therapeutic exercise to increase function, decrease pain  5.  Home programming and d/c planning to increase function and decrease pain    Frequency & Duration:  Pt will be seen on a once/week basis for 9 more visits    Patient Participated in and Agrees With This Plan of Care and Goals:  YES  Rehab Potential:  jarret Marcial, PT, MS  Physical Therapist  [place]# [id number]      TREATMENT TODAY:    Total Treatment Time: (time in clinic)    60  min  Timed Code Treatment Minutes:    60      Supplies given:      Manual Therapy:  (MA)  RX min:   20  Manual posterior rotation of right innominate    Positional Isometric contraction (PIC) of left iliopsoas    Positional Isometric contraction of (PIC) right gluteus minimus    Distraction of both SI jts in open pack hip position  Piriformis stretching, bilaterally    Quadratus lumborum stretching, bilaterally    Anterior/Inferior Mobilization of  right hip    Positional Isometric Contraction of multifidus  Myofascial work trunk   Sternocostal and rib mobs     Therapeutic Activities:  (TA)  RX min:         Neuromuscular Reeducation: (NMR)  RX min:  40    Ultrasound:  RX min:          1.6 vance/cm2       Location:     Iontophoresis:  6 hr patch                                 Location:                               Procedures:      Key:  i=instructed        r=review        c=corrected        a=adapted   Code Procedure/Instruction 01/08/2019 02/07/19 03/21/19 03/28/19               TA         Sleeping Positions instructed               reviewed,correct                 TA Sternum-Up Posture instructed reviewed                 TA SI jt. self-correction instructed corrected corrected                TA Lumbar Facet PIC instructed corrected corrected                TA   Order of Self-Corrections instructed                  TA Use of lumbar pillow instructed                  TA Use of cervical roll instructed corrected                 TA Use of orthotics instructed                  TA Use of SI belt instructed                  TA Use of Nada chair                   TA Use of Ice instructed                  TA Use of [employer]/ heat instructed                  TA Use of Theraworx Relief instructed                  TA Use of TENS unit                   TA Use of iontophoresis                   TA Decreasing Disc Pressures  instructed                                     NMR Pelvic Floor Isolation   instructed corrected               NMR Transverse Abdominus   instructed corrected               NMR Multifidus Isolation   instructed                NMR Diaphragmtic breathe supine    instructed               NMR  Diaphragmtic breath sit                   NMR Pelvic Clock in sitting   instructed                NMR Kinesiotape    Applied to both QL               NMR InnerUnit against Gravity    instructed               NMR Sit-stand w/ inner unit    instructed               NMR Roll w/ inner unit    instructed               NMR Walk w/ inner unit     instructed               NMR Up/down steps w/ inner unit    instructed               NMR Water Exer Instruction                   NMR Hip Abd w/o piriformis                   NMR Hip Add w/o iliop/ham                    NMR Lower abs in supine                    NMR Abd obliques in supine                   NMR Prone Knee Flexion                   NMR Prone glut amrita                   NMR Retro Step                   NMR Retro Walk                   NMR Retro walk on treadmill                   NMR Forward walk w/ inner unit                   NMR Balance Instruction                   NMR Stability Ball A/P & Lateral                   NMR Ball Catch/Throw /Supine                   NMR Ball Catch/Throw /Stand                   NMR StabilityBall All 4's Arm Lift                   NMR StabilityBall Prone Walk Out                   NMR StabilityBall Supine Oblique                   NMR Stability Ball sit-supine-sit                   NMR Walking Prog Progression                   MNR Home program sequencing                                       TA Hamstring stretch                   TA Hip Ext Rot Stretch                   TA Hip Int Rot Stretch                   TA Hip Flex/IT Stretch                   TA Hip Add Stretch                   TA Lats Dorsi Stretch                   TA Gastroc/soleus stretch                   TA QuadratusLumborm Stretch                      Supine                      Stance                   TA Quad Stretch                                       NMR Wall Push Ups                   NMR Planking                   NMR BOSU Ball Exercises                   NMR Lateral Bridge Drop                   NMR [place]ers [place]                   NMR O'Feldt Lat Pull Down                   NMR O'Feldt Hip Ext                   NMR O'Feldt Trunk Ext                                       TA CervDiscExtSup/stnd                   TA Cerv Stretches                   TA Self PIC Cervical Spine                   TA Neural Gliding                   TA Ant/Mid Scalene Strch                   TA Biceps stretch                   TA Rotator Cuff Stretch                   TA Anterior Chest Stretch                   TA Wrist Ext Stretch                                        NMR Prone Arm Lifts                   NMR Scapula Stabilization                   NMR Occulomotor Isometrics                   NMR Cervical Isometrics                                       TA Shld AROM w/bar                   TA Shld Capsular Stretching                   TA Self PIC Thor Spine                   TA Rot Cuff Therabd, beginner                   TA Rot Cuff Therabd, ruy Marcial, PT, MS  Physical Therapist  [place]# [id number]

## 2019-04-04 ENCOUNTER — TREATMENT (OUTPATIENT)
Dept: PHYSICAL THERAPY | Facility: CLINIC | Age: 34
End: 2019-04-04

## 2019-04-04 DIAGNOSIS — M35.7 FAMILIAL LIGAMENTOUS LAXITY: ICD-10-CM

## 2019-04-04 DIAGNOSIS — R29.3 POSTURE IMBALANCE: ICD-10-CM

## 2019-04-04 DIAGNOSIS — M53.3 SACROILIAC JOINT DYSFUNCTION: Primary | ICD-10-CM

## 2019-04-04 DIAGNOSIS — S39.012D STRAIN OF LUMBAR REGION, SUBSEQUENT ENCOUNTER: ICD-10-CM

## 2019-04-04 PROCEDURE — 97140 MANUAL THERAPY 1/> REGIONS: CPT | Performed by: PHYSICAL THERAPIST

## 2019-04-04 PROCEDURE — 97530 THERAPEUTIC ACTIVITIES: CPT | Performed by: PHYSICAL THERAPIST

## 2019-04-04 PROCEDURE — 97112 NEUROMUSCULAR REEDUCATION: CPT | Performed by: PHYSICAL THERAPIST

## 2019-04-04 NOTE — PROGRESS NOTES
"Physical Therapy Note      SUBJECTIVE:   Changes since last seen:  Tete started her menses this week.  She had severe heavy flow, which she indicates is typical for her ever since she went off birth control a couple years ago.  She notes she has had ultasounds of her uterus and told everything is ok.  She notes that it has been hard to do the inner unit exercises this week as she has been in fetal position in pain all week.   She was pleased that she had low back pain relief for 3 days after her last physical therapy visit and then her period started and she had significant pain.     Current level of pain:    5-6/10 in SI jts now but before menses was in QLs   Lowest level of pain since last seen:  3/10  Highest level of pain since last seen\"  6.5/10      Past Medical History:  1.  AA 2013  Treated with PT for left shoulder labral tear, no low back treatment   2.  Hx of two falls when mopping in socks, over the last 2 years  3.  Hx of regular, heavy, cramping menses  4.  T& A, 2006  5.  Allergic to Wellbutrin and mild allergy to contrast dye      Functional Limitations:  Sitting, standing, walking, stairs, driving, working, sleeping, squatting, housework and changing positions.   Patient Goals for Physical Therapy: \"Pain relief\"      OBJECTIVE:  Observation:  Slight ight lateral shift of pelvis.      01/08/2019 02/07/19 03/21/19 03/28/19 04/04/19                SI Joint Positioning  Right  Left Right  Left Right  Left Right  Left Right  Left Right  Left Right  Left Right  Left Right  Left Right  Left Right  Left Right  Left Right  Left Right Left Right Left Right Left Right  Left Right  Left       Innominate                            Anterior    x   x    x  x                x                       Posterior                x               x        x              x     x                   Sacrum                               Unilateral rotation    x   x   x    x    x                                        SI Joint " Testing  Right  Left Right  Left Right  Left Right  Left Right  Left Right  Left Right  Left Right  Left Right  Left Right  Left Right  Left Right  Left Right  Left Right Left Right Left Right Left Right  Left Right  Left           Distraction     +          +   +         +   +          +    +        +  +           +                        Joanna's     +          +   Sl         sl    +         -    Sl         -   Sl         -                        Compression     +          +   Sl          sl   Sl          Sl     Sl        Sl    -          -                        Prone Press Up           +         +         Sl          -         -                                     Special Tests  Right   Left Right  Left Right  Left Right  Left Right  Left Right  Left Right Left  Right  Left Right  Left Right  Left Right  Left Right  Left Right  Left Right Left Right Left Right Left Right Left  Right  Left      Straight Leg Raise                                     Active   -         -                              Passive   -         -                             Hip Scour Test   ++       Sl    ++       Sl    +           sl   Sl         Sl    Sl         sl                                     Bakers Cyst       +         -                     Palpation:       Muscle/Bone Irritation  Date 01/08/2019 02/07/19 03/21/19 03/28/19 04/04/18                 Right  Left Right  Left Right  Left Right  Left Right  Left Right  Left Right  Left Right  Left Right  Left Right  Left Right  Left Right  Left Right  Left Right Left Right Left Right Left Right  Left Right  Left   Piriformis  ++        +   ++          +   ++       +   ++        +    ++        Sl                 Gr. Trochanter  +          +  +          sl    +           +    +       ++    +          -                IT Band  +         sl   Sl        sl     -          -   -          -    Sl         -                Quadratus Lumborum  ++        ++   ++        +    +        ++   ++         ++     +       ++                Ischial Tuberosity  ++         sl   -          -    -           -     -           -     -          -                Sacrococcygeal Ligs  ++          +   Sl        sl   +          +    +       +    Sl        Sl                 Paraspinals  +           +     +         sl    +    +          -                Spinous Processes                                        T-spine rotated to   -               -                           L-spine rotated to  L2-5        L1-5  L4-5  L3-5  L2-5                Adductor Tony    +        +       +         +     -         +   -           Sl     -        sl                Iliopsoas  ++         +   ++        +   ++         +    +         ++  sl        Sl                 Quad Origin  sl          sl   Sl          sl    -          -   -           -    -         -                SI Joint  ++         +  ++        +    ++        +   +          +   +         +                 Pubic Symphysis         ++          Not tested         ++      ++        ++                                     Rectus Diastasis   1/2 finger                    sternocostals   +        +   +          +    +         -    +           +   Sl        Sl                 All 4s Positioning: Pt not able to assume full lumbar extension in this position  Leg Length:  The right  lower extremity is equal to the left        Monthly Objective Measurement/Functional Activity  Date: 01/08/2019 03/21/19         Oswestry Pain Score 52/100  46/100                               AROM Lumbar Spine: Degrees   Degrees      Degrees      Degrees      Degrees      Degrees      Degrees    Degrees      Flexion 106 pain          99             Extension 3 pain           17             Right Lat. Flexion 29 pain right trunk          24            Left Lat. Flexion 31         19            Right Lat Shift Pelvis inc pain   Inc pain Left SI jt            Left Lat Shift Pelvis  less inc pain       No change                           AROM Hips:  (degrees) Right      Left Right Left Right  Left Right  Left Right  Left Right  Left Right  Left Right  Left      Flexion 110       110  125    120             Abduction 37       41   45      45             Int. Rotation 32       22  30      25               Ext. Rotation  34       42  35      40                        Flexibility:   (degrees) Right    Left Right  Left Right  Left Right  Left Right  Left Right  Left Right  Left Right  Left      Hamstrings -32       -28  -30     -25             Quadriceps 39       42 40        45              HipExt/Rot(piriformis) 30       28   32      30            Hip Int/Rotators 27       9   25      10            Hip Flexors (iliopsoas) -       -             IT Band -       -                     Reflexes: Right      Left Right  Left Right  Left Right  Left Right  Left Right  Left Right  Left Right  Left      L4 (Quad) +1       +1             S1 (Achilles) +1       +1                     Root Level  - Motor Right      Left Right  Left Right  Left Right  Left Right  Left Right  Left Right  Left Right  Left     T12             Rectus Abdominus 4+/5             L1              Paraspinals 5/5         5/5             L2              Hip Flexion 5/5          5/5             L3             Quads 5/5          5/5             L4              Anterior Tibialis 5/5         5/5             L5             Gr. Toe Extension 5/5           5/5             S1            Gastroc/Sol/Peroneals 5/5          5/5                     Functional Strength:             Single LegStanceTime (seconds) R:30   L:30   R:      L: R:      L: R:     L: R:      L: R:      L: R:      L: R:      L:     Pelvic Floor Isolation (seconds) -             Inner Unit  Isolation (seconds) -                     Functional Activities:              Sit w/o pain? Pain increases (minutes) No/ 30 min No/ 30  min            Stand w/o pain? No/ 30 min No/ 30 min            Walk w/o pain? varies No/ 1 mile             Steps w/o pain? 1 fl, avoids them 1 fl avoids             Drive w/o pain? No/ 10-15 min No/ 30-45 min            Work w/o pain? No/ 30 min No/ 30 min            # times wakes w/ pain? 4-5x/night, now taking meds at night she is able to sleep.  2x             PLAN OF CARE:    ASSESSMENT:  Problem List:   1. SI joint dysfunction  2. Lack of spinal stabilization  3. Postural dysfunction     Discussion:    The KT tape trial was hard to assess with the onset of her period and the increased pain.  However, she did have 3 days of decreased pain with the Tape on before her period started.  We will do another trial of the taping in the future.   Tete needed a review of the self correction techniques and how to determine which side to do the corrections on.  She is isolating the inner unit well thus progressed to the outer unit today.  Instructed in hip abduction which was very hard to do without engaging the piriformis.  She understands how to do this and can discern when she is doing this correctly and incorrectly.  She will work on this over the next week.   Attempted to instruct her in hip adduction however, she immediately substituted with the hamstrings.  So put her in prone and worked on terminal knee extension in prone using the quads which was very hard for her to do.  She has good body awareness and can recognize when she is inappropriately engaging the hamstrings.  Once she is able to do this correctly on her own over the next week, she will progress to doing the inner leg lift on her side.     Short Term Goals: (4-6 weeks)                                 Date Met:                1.   pt independent in postural correction       02/07/19  2.   pt independent in SI joint self-corrections      03/21/19  3.   pt independent in exer to decrease post. disc pressures    03/21/19  4.   pt able to sleep through night without pain  5.   pt able to sit 15 minutes without pain  6.   pt able to walk 10minutes without  pain  7.   Reduce pt pain to no greater than 7/10      03/21/19  8.   Decrease Oswestry Pain Score to no greater than 45/100  9.  Reduce pt pain to no greater than 6/10  10.  Pt able to isolate the pelvic floor in supine x10, 10 sec    03/28/19  11.  Pt able to isolate transverse abdom in all 4s, x10 sec, x10       03/28/19  12.  Pt able to isolate the multifidus in sitting x10 sec, x10                          03/28/19  13.  Pt able to engage inner unit, move from sit to stand &back to sit    04/04/19                                                                                                                                                                                                                                                                                                          14.  Pt able to engage inner unit and walk 4 steps without overflow to hamstrings  15.  Pt able to hip abduct x6 without engaging the piriformis  16.  Pt able to perform prone knee extension without engaging hamstrings x6                                                                                                                                                        Long Term Goals:(3-5 months)      Date Met:      1.  pt independent in self-management of symptoms       2.  pt independent in home program      3.  pt able to work 6 hours without pain      4.  pt able to sit 60 minutes without pain      5.  pt able to walk 45 min without pain    Treatment Plan:   1.  Ultrasound to increase extensibility, decrease pain, if needed  2.  Electrical stimulation for muscle relaxation, decrease pain, if needed, iontophoresis  3.  Manual therapy to increase function, decrease pain  4.  Therapeutic exercise to increase function, decrease pain  5.  Home programming and d/c planning to increase function and decrease pain    Frequency & Duration:  Pt will be seen on a once/week basis for 8 more visits    Patient Participated in and  Agrees With This Plan of Care and Goals:  YES  Rehab Potential:  jarret Marcial, PT, MS  Physical Therapist  [place]# [id number]      TREATMENT TODAY:    Total Treatment Time: (time in clinic)    60  min  Timed Code Treatment Minutes:    60      Supplies given:      Manual Therapy:  (MA)  RX min:   20  Manual posterior rotation of right innominate    Positional Isometric contraction (PIC) of left iliopsoas    Positional Isometric contraction of (PIC) right gluteus minimus    Distraction of both SI jts in open pack hip position  Piriformis stretching, bilaterally    Quadratus lumborum stretching, bilaterally    Anterior/Inferior Mobilization of  right hip    Positional Isometric Contraction of multifidus  Myofascial work trunk   Sternocostal and rib mobs     Therapeutic Activities:  (TA)  RX min:     10    Neuromuscular Reeducation: (NMR)  RX min:  30    Ultrasound:  RX min:          1.6 vance/cm2       Location:     Iontophoresis:  6 hr patch                                Location:                               Procedures:      Key:  i=instructed        r=review        c=corrected        a=adapted   Code Procedure/Instruction 01/08/2019 02/07/19 03/21/19 03/28/19 04/04/19              TA         Sleeping Positions instructed               reviewed,correct                 TA Sternum-Up Posture instructed reviewed                 TA SI jt. self-correction instructed corrected corrected  reviewed              TA Lumbar Facet PIC instructed corrected corrected  reviewed              TA   Order of Self-Corrections instructed    reviewed              TA Use of lumbar pillow instructed                  TA Use of cervical roll instructed corrected                 TA Use of orthotics instructed                  TA Use of SI belt instructed                  TA Use of Nada chair                   TA Use of Ice instructed                  TA Use of [employer]/ heat instructed                  TA Use of Theraworx Relief  instructed                  TA Use of TENS unit                   TA Use of iontophoresis                   TA Decreasing Disc Pressures  instructed                                     NMR Pelvic Floor Isolation   instructed corrected               NMR Transverse Abdominus   instructed corrected               NMR Multifidus Isolation   instructed                NMR Diaphragmtic breathe supine    instructed               NMR  Diaphragmtic breath sit                   NMR Pelvic Clock in sitting   instructed                NMR Kinesiotape    Applied to both QL               NMR InnerUnit against Gravity    instructed               NMR Sit-stand w/ inner unit    instructed               NMR Roll w/ inner unit    instructed               NMR Walk w/ inner unit     instructed               NMR Up/down steps w/ inner unit    instructed               NMR Water Exer Instruction                   NMR Hip Abd w/o piriformis     instructed              NMR Hip Add w/o iliop/ham      instructed              NMR Lower abs in supine                   NMR Abd obliques in supine                   NMR Prone Knee Flexion                   NMR Prone glut amrita                   NMR Retro Step                   NMR Retro Walk                   NMR Retro walk on treadmill                   NMR Forward walk w/ inner unit                   NMR Balance Instruction                   NMR Stability Ball A/P & Lateral                   NMR Ball Catch/Throw /Supine                   NMR Ball Catch/Throw /Stand                   NMR StabilityBall All 4's Arm Lift                   NMR StabilityBall Prone Walk Out                   NMR StabilityBall Supine Oblique                   NMR Stability Ball sit-supine-sit                   NMR Walking Prog Progression                   MNR Home program sequencing                                       TA Hamstring stretch                   TA Hip Ext Rot Stretch                   TA Hip Int Rot Stretch                    TA Hip Flex/IT Stretch                   TA Hip Add Stretch                   TA Lats Dorsi Stretch                   TA Gastroc/soleus stretch                   TA QuadratusLumborm Stretch                      Supine                      Stance                   TA Quad Stretch                                       NMR Wall Push Ups                   NMR Planking                   NMR BOSU Ball Exercises                   NMR Lateral Bridge Drop                   NMR [place]                   NMR O'Feldt Lat Pull Down                   NMR O'Feldt Hip Ext                   NMR O'Feldt Trunk Ext                                       TA CervDiscExtSup/stnd                   TA Cerv Stretches                   TA Self PIC Cervical Spine                   TA Neural Gliding                   TA Ant/Mid Scalene Strch                   TA Biceps stretch                   TA Rotator Cuff Stretch                   TA Anterior Chest Stretch                   TA Wrist Ext Stretch                                       NMR Prone Arm Lifts                   NMR Scapula Stabilization                   NMR Occulomotor Isometrics                   NMR Cervical Isometrics                                       TA Shld AROM w/bar                   TA Shld Capsular Stretching                   TA Self PIC Thor Spine                   TA Rot Cuff Therabd, beginner                   TA Rot Cuff Therabd, intermed                                                                                                                                                                                                                                                 Miracle Marcial, PT, MS  Physical Therapist  [place]# [id number]

## 2019-04-11 ENCOUNTER — TREATMENT (OUTPATIENT)
Dept: PHYSICAL THERAPY | Facility: CLINIC | Age: 34
End: 2019-04-11

## 2019-04-11 DIAGNOSIS — R29.3 POSTURE IMBALANCE: ICD-10-CM

## 2019-04-11 DIAGNOSIS — S39.012D STRAIN OF LUMBAR REGION, SUBSEQUENT ENCOUNTER: ICD-10-CM

## 2019-04-11 DIAGNOSIS — M35.7 FAMILIAL LIGAMENTOUS LAXITY: ICD-10-CM

## 2019-04-11 DIAGNOSIS — M53.3 SACROILIAC JOINT DYSFUNCTION: Primary | ICD-10-CM

## 2019-04-11 PROCEDURE — 97112 NEUROMUSCULAR REEDUCATION: CPT | Performed by: PHYSICAL THERAPIST

## 2019-04-11 PROCEDURE — 97140 MANUAL THERAPY 1/> REGIONS: CPT | Performed by: PHYSICAL THERAPIST

## 2019-04-11 NOTE — PROGRESS NOTES
"Physical Therapy Note      SUBJECTIVE:   Changes since last seen:  \"I got frustrated doing this past week's exercises; I couldn't do either the hip abduction nor the hip adduction without engaging the piriformis and causing pain!\"   She notes her period ended on 04/05/19 so doesn't feel quite as lax.   She has gotten her workspace improved, ergonomically, and has noticed improvement in her pain since using the sit/stand up desk =.  Still hasn't gotten a new mattress, yet and notes she has excessive pain when rolling in bed and when getting up in the am.     Current level of pain:    4/10  Lowest level of pain since last seen:  4/10  Highest level of pain since last seen\"  4-5/10      Past Medical History:  1.  AA 2013  Treated with PT for left shoulder labral tear, no low back treatment   2.  Hx of two falls when mopping in socks, over the last 2 years  3.  Hx of regular, heavy, cramping menses  4.  T& A, 2006  5.  Allergic to Wellbutrin and mild allergy to contrast dye      Functional Limitations:  Sitting, standing, walking, stairs, driving, working, sleeping, squatting, housework and changing positions.   Patient Goals for Physical Therapy: \"Pain relief\"      OBJECTIVE:  Observation:  Slight ight lateral shift of pelvis.      01/08/2019 02/07/19 03/21/19 03/28/19 04/04/19 04/11/19               SI Joint Positioning  Right  Left Right  Left Right  Left Right  Left Right  Left Right  Left Right  Left Right  Left Right  Left Right  Left Right  Left Right  Left Right  Left Right Left Right Left Right Left Right  Left Right  Left       Innominate                            Anterior    x   x    x  x                x                 x                      Posterior                x               x        x              x     x     x                  Sacrum                               Unilateral rotation    x   x   x    x    x     x                                       SI Joint Testing  Right  Left Right  Left Right  " Left Right  Left Right  Left Right  Left Right  Left Right  Left Right  Left Right  Left Right  Left Right  Left Right  Left Right Left Right Left Right Left Right  Left Right  Left           Distraction     +          +   +         +   +          +    +        +  +           +   +          +                       Joanna's     +          +   Sl         sl    +         -    Sl         -   Sl         -   Sl         -                       Compression     +          +   Sl          sl   Sl          Sl     Sl        Sl    -          -   -         -                       Prone Press Up           +         +         Sl          -         -        -                                    Special Tests  Right   Left Right  Left Right  Left Right  Left Right  Left Right  Left Right Left  Right  Left Right  Left Right  Left Right  Left Right  Left Right  Left Right Left Right Left Right Left Right Left  Right  Left      Straight Leg Raise                                     Active   -         -                              Passive   -         -                             Hip Scour Test   ++       Sl    ++       Sl    +           sl   Sl         Sl    Sl         sl Sl          Sl                                     Bakers Cyst       +         -                     Palpation:       Muscle/Bone Irritation  Date 01/08/2019 02/07/19 03/21/19 03/28/19 04/04/18 04/11/19                Right  Left Right  Left Right  Left Right  Left Right  Left Right  Left Right  Left Right  Left Right  Left Right  Left Right  Left Right  Left Right  Left Right Left Right Left Right Left Right  Left Right  Left   Piriformis  ++        +   ++          +   ++       +   ++        +    ++        Sl    ++       Sl                Gr. Trochanter  +          +  +          sl    +           +    +       ++    +          -   +         Sl                IT Band  +         sl   Sl        sl     -          -   -          -    Sl         -   Sl         -                Quadratus Lumborum  ++        ++   ++        +    +        ++   ++        ++     +       ++   +          +               Ischial Tuberosity  ++         sl   -          -    -           -     -           -     -          -   -          -               Sacrococcygeal Ligs  ++          +   Sl        sl   +          +    +       +    Sl        Sl    Sl       Sl                Paraspinals  +           +     +         sl    +    +          -   Sl         +               Spinous Processes                                        T-spine rotated to   -               -                           L-spine rotated to  L2-5        L1-5  L4-5  L3-5  L2-5            L3-5               Adductor Tony    +        +       +         +     -         +   -           Sl     -        sl   +          sl               Iliopsoas  ++         +   ++        +   ++         +    +         ++  sl        Sl    Sl       Sl                Quad Origin  sl          sl   Sl          sl    -          -   -           -    -         -   -          -               SI Joint  ++         +  ++        +    ++        +   +          +   +         +   +          ++               Pubic Symphysis         ++          Not tested         ++      ++        ++                                     Rectus Diastasis   1/2 finger                    sternocostals   +        +   +          +    +         -    +           +   Sl        Sl    +         Sl                All 4s Positioning: Pt not able to assume full lumbar extension in this position  Leg Length:  The right  lower extremity is equal to the left        Monthly Objective Measurement/Functional Activity  Date: 01/08/2019 03/21/19         Oswestry Pain Score 52/100  46/100                               AROM Lumbar Spine: Degrees   Degrees      Degrees      Degrees      Degrees      Degrees      Degrees    Degrees      Flexion 106 pain          99             Extension 3 pain           17             Right Lat. Flexion  29 pain right trunk          24            Left Lat. Flexion 31         19            Right Lat Shift Pelvis inc pain   Inc pain Left SI jt            Left Lat Shift Pelvis  less inc pain       No change                          AROM Hips:  (degrees) Right      Left Right Left Right  Left Right  Left Right  Left Right  Left Right  Left Right  Left      Flexion 110       110  125    120             Abduction 37       41   45      45             Int. Rotation 32       22  30      25               Ext. Rotation  34       42  35      40                        Flexibility:   (degrees) Right    Left Right  Left Right  Left Right  Left Right  Left Right  Left Right  Left Right  Left      Hamstrings -32       -28  -30     -25             Quadriceps 39       42 40        45              HipExt/Rot(piriformis) 30       28   32      30            Hip Int/Rotators 27       9   25      10            Hip Flexors (iliopsoas) -       -             IT Band -       -                     Reflexes: Right      Left Right  Left Right  Left Right  Left Right  Left Right  Left Right  Left Right  Left      L4 (Quad) +1       +1             S1 (Achilles) +1       +1                     Root Level  - Motor Right      Left Right  Left Right  Left Right  Left Right  Left Right  Left Right  Left Right  Left     T12             Rectus Abdominus 4+/5             L1              Paraspinals 5/5         5/5             L2              Hip Flexion 5/5          5/5             L3             Quads 5/5          5/5             L4              Anterior Tibialis 5/5         5/5             L5             Gr. Toe Extension 5/5           5/5             S1            Gastroc/Sol/Peroneals 5/5          5/5                     Functional Strength:             Single LegStanceTime (seconds) R:30   L:30   R:      L: R:      L: R:     L: R:      L: R:      L: R:      L: R:      L:     Pelvic Floor Isolation (seconds) -             Inner Unit  Isolation  "(seconds) -                     Functional Activities:              Sit w/o pain? Pain increases (minutes) No/ 30 min No/ 30  min            Stand w/o pain? No/ 30 min No/ 30 min            Walk w/o pain? varies No/ 1 mile            Steps w/o pain? 1 fl, avoids them 1 fl avoids             Drive w/o pain? No/ 10-15 min No/ 30-45 min            Work w/o pain? No/ 30 min No/ 30 min            # times wakes w/ pain? 4-5x/night, now taking meds at night she is able to sleep.  2x             PLAN OF CARE:    ASSESSMENT:  Problem List:   1. SI joint dysfunction  2. Lack of spinal stabilization  3. Postural dysfunction     Discussion:    Attempted to progress to lower abs and bent knee fall out for abdominal oblique work but she was not able to perform either one without over engagement of QLs and piriformis.    The good thing is Tete is now able to discern that she is immediately engaging the piriformis and QLs instead of stabilizing her spine with the inner unit.  The lever arm on the hip add and abd is just too much for her right now so stopped these two exercises and attempted to move on to the lower abs and the abdominal obliques.   Tete was not able to do either of these without over engaging the piriformis and QLs to adapted to remedial positions.  She was able to do the \"scissor arms\" with the inner unit on and not engage the piriformis or QLs.  She was able to initiate hip flexion in supine with the inner unit on without pain but she was not able to clear a foot off the floor without pain.  She will work on the initiation with the inner unit and hopefully will be able to progress on next visit.    We discussed at length the need for a new mattress.  Recommended the C2 Sleep Number Bed for its adjustability.  She is in need of a much firmer mattress as it appears she is re-aggravating any progress made in the day sleeping on her current, not firm mattress.  She understands and will begin looking at mattresses.  " "    There was not time at the end of today's visit to apply KT tape again as she needed to leave.  Will try the KT tape again on a future visit.       Short Term Goals: (4-6 weeks)                                 Date Met:                1.   pt independent in postural correction       02/07/19  2.   pt independent in SI joint self-corrections      03/21/19  3.   pt independent in exer to decrease post. disc pressures    03/21/19  4.   pt able to sleep through night without pain  5.   pt able to sit 15 minutes without pain  6.   pt able to walk 10minutes without pain  7.   Reduce pt pain to no greater than 7/10      03/21/19  8.   Decrease Oswestry Pain Score to no greater than 45/100  9.  Reduce pt pain to no greater than 6/10  10.  Pt able to isolate the pelvic floor in supine x10, 10 sec    03/28/19  11.  Pt able to isolate transverse abdom in all 4s, x10 sec, x10       03/28/19  12.  Pt able to isolate the multifidus in sitting x10 sec, x10                          03/28/19  13.  Pt able to engage inner unit, move from sit to stand &back to sit    04/04/19                                                                                                                                                                                                                                                                                                          14.  Pt able to engage inner unit and walk 4 steps without overflow to hamstrings  15.  Pt able to hip abduct x6 without engaging the piriformis  16.  Pt able to perform prone knee extension without engaging hamstrings x6              17.  Pt able to perform remedial lower abs with scissor arm work x6 w/o pain    18.  Pt able to perform \"Pre-cursor\" lower abs leg lift initiation x6 without pain                                                                                                                                              Long Term Goals:(3-5 " months)      Date Met:      1.  pt independent in self-management of symptoms       2.  pt independent in home program      3.  pt able to work 6 hours without pain      4.  pt able to sit 60 minutes without pain      5.  pt able to walk 45 min without pain    Progress Towards Goals:  As expected to a little slower than expected    Treatment Plan:   1.  Ultrasound to increase extensibility, decrease pain, if needed  2.  Electrical stimulation for muscle relaxation, decrease pain, if needed, iontophoresis  3.  Manual therapy to increase function, decrease pain  4.  Therapeutic exercise to increase function, decrease pain  5.  Home programming and d/c planning to increase function and decrease pain    Frequency & Duration:  Pt will be seen on a once/week basis for 7 more visits    Patient Participated in and Agrees With This Plan of Care and Goals:  YES  Rehab Potential:  jarret Marcial, PT, MS  Physical Therapist  [place]# [id number]      TREATMENT TODAY:    Total Treatment Time: (time in clinic)    50  min  Timed Code Treatment Minutes:    50      Supplies given:      Manual Therapy:  (MA)  RX min:   20  Manual posterior rotation of left innominate    Positional Isometric contraction (PIC) of right iliopsoas    Positional Isometric contraction of (PIC) right gluteus minimus    Distraction of both SI jts in open pack hip position  Piriformis stretching, bilaterally    Quadratus lumborum stretching, bilaterally    Anterior/Inferior Mobilization of  right hip    Positional Isometric Contraction of multifidus  Myofascial work trunk   Sternocostal and rib mobs   Manual spinal distraction    Therapeutic Activities:  (TA)  RX min:         Neuromuscular Reeducation: (NMR)  RX min:  30    Ultrasound:  RX min:          1.6 vance/cm2       Location:     Iontophoresis:  6 hr patch                                Location:                               Procedures:      Key:  i=instructed        r=review        c=corrected         a=adapted   Code Procedure/Instruction 01/08/2019 02/07/19 03/21/19 03/28/19 04/04/19 04/11/19             TA         Sleeping Positions instructed               reviewed,correct                 TA Sternum-Up Posture instructed reviewed                 TA SI jt. self-correction instructed corrected corrected  reviewed              TA Lumbar Facet PIC instructed corrected corrected  reviewed              TA   Order of Self-Corrections instructed    reviewed              TA Use of lumbar pillow instructed                  TA Use of cervical roll instructed corrected                 TA Use of orthotics instructed                  TA Use of SI belt instructed                  TA Use of Nada chair                   TA Use of Ice instructed                  TA Use of [employer]/ heat instructed                  TA Use of Theraworx Relief instructed                  TA Use of TENS unit                   TA Use of iontophoresis                   TA Decreasing Disc Pressures  instructed                                     NMR Pelvic Floor Isolation   instructed corrected               NMR Transverse Abdominus   instructed corrected               NMR Multifidus Isolation   instructed                NMR Diaphragmtic breathe supine    instructed               NMR  Diaphragmtic breath sit                   NMR Pelvic Clock in sitting   instructed                NMR Kinesiotape    Applied to both QL               NMR InnerUnit against Gravity    instructed               NMR Sit-stand w/ inner unit    instructed               NMR Roll w/ inner unit    instructed               NMR Walk w/ inner unit     instructed               NMR Up/down steps w/ inner unit    instructed               NMR Water Exer Instruction                   NMR Hip Abd w/o piriformis     instructed stopped             NMR Hip Add w/o iliop/ham      instructed stopped             NMR Lower abs in supine      Adapted to pre cursor, initiation             NMR  Inner unit challenge with scissor arm work       instructed             NMR Abd obliques in supine      attempted             NMR Prone Knee Flexion     instructed              NMR Prone glut amrita                   NMR Retro Step                   NMR Retro Walk                   NMR Retro walk on treadmill                   NMR Forward walk w/ inner unit                   NMR Balance Instruction                   NMR Stability Ball A/P & Lateral                   NMR Ball Catch/Throw /Supine                   NMR Ball Catch/Throw /Stand                   NMR StabilityBall All 4's Arm Lift                   NMR StabilityBall Prone Walk Out                   NMR StabilityBall Supine Oblique                   NMR Stability Ball sit-supine-sit                   NMR Walking Prog Progression                   MNR Home program sequencing                                       TA Hamstring stretch                   TA Hip Ext Rot Stretch                   TA Hip Int Rot Stretch                   TA Hip Flex/IT Stretch                   TA Hip Add Stretch                   TA Lats Dorsi Stretch                   TA Gastroc/soleus stretch                   TA QuadratusLumborm Stretch                      Supine                      Stance                   TA Quad Stretch                                       NMR Wall Push Ups                   NMR Planking                   NMR BOSU Ball Exercises                   NMR Lateral Bridge Drop                   NMR [place]                   NMR O'Feldt Lat Pull Down                   NMR O'Feldt Hip Ext                   NMR O'Feldt Trunk Ext                                       TA CervDiscExtSup/stnd                   TA Cerv Stretches                   TA Self PIC Cervical Spine                   TA Neural Gliding                   TA Ant/Mid Scalene Strch                   TA Biceps stretch                   TA Rotator Cuff Stretch                   TA Anterior Chest  Stretch                   TA Wrist Ext Stretch                                       NMR Prone Arm Lifts                   NMR Scapula Stabilization                   NMR Occulomotor Isometrics                   NMR Cervical Isometrics                                       TA Shld AROM w/bar                   TA Shld Capsular Stretching                   TA Self PIC Thor Spine                   TA Rot Cuff Therabd, beginner                   TA Rot Cuff Therabd, gibraned                                                                                                                                                                                                                                                 Miracle Marcial, PT, MS  Physical Therapist  [place]# [id number]

## 2019-04-18 ENCOUNTER — TREATMENT (OUTPATIENT)
Dept: PHYSICAL THERAPY | Facility: CLINIC | Age: 34
End: 2019-04-18

## 2019-04-18 DIAGNOSIS — S39.012D STRAIN OF LUMBAR REGION, SUBSEQUENT ENCOUNTER: ICD-10-CM

## 2019-04-18 DIAGNOSIS — R29.3 POSTURE IMBALANCE: ICD-10-CM

## 2019-04-18 DIAGNOSIS — M35.7 FAMILIAL LIGAMENTOUS LAXITY: ICD-10-CM

## 2019-04-18 DIAGNOSIS — M53.3 SACROILIAC JOINT DYSFUNCTION: Primary | ICD-10-CM

## 2019-04-18 PROCEDURE — 97112 NEUROMUSCULAR REEDUCATION: CPT | Performed by: PHYSICAL THERAPIST

## 2019-04-18 PROCEDURE — 97140 MANUAL THERAPY 1/> REGIONS: CPT | Performed by: PHYSICAL THERAPIST

## 2019-04-18 NOTE — PROGRESS NOTES
"Physical Therapy Note      SUBJECTIVE:   Changes since last seen:  \"I fell on my tail bone on Sunday when sat on folding chair in my back yard; I couldn't be in supine for a couple days.\"  She used ice and is now a little better.  \"It didn't send me into spasms.\"  \"I started to feel my abs on my ribs after doing the new abs exercises, it is the good feeling of having worked the muscles that haven't been used in a while.\"   She states she really liked doing the \"arm scissors\" exercise with her abs engaged, \"it felt good!\"  She notes she had a period for about 3 days, starting last Friday. She says a couple times a year,  when she's not on birth control, she has a break through bleed like this.  She returns to her GYN in June.       Current level of pain:    4/10  Lowest level of pain since last seen:  3.5 /10 last Thursday   Highest level of pain since last seen\"  6.5/10 after falling       Past Medical History:  1.  AA 2013  Treated with PT for left shoulder labral tear, no low back treatment   2.  Hx of two falls when mopping in socks, over the last 2 years  3.  Hx of regular, heavy, cramping menses  4.  T& A, 2006  5.  Allergic to Wellbutrin and mild allergy to contrast dye      Functional Limitations:  Sitting, standing, walking, stairs, driving, working, sleeping, squatting, housework and changing positions.   Patient Goals for Physical Therapy: \"Pain relief\"      OBJECTIVE:  Observation:  Slight ight lateral shift of pelvis.      01/08/2019 02/07/19 03/21/19 03/28/19 04/04/19 04/11/19 04/18/19              SI Joint Positioning  Right  Left Right  Left Right  Left Right  Left Right  Left Right  Left Right  Left Right  Left Right  Left Right  Left Right  Left Right  Left Right  Left Right Left Right Left Right Left Right  Left Right  Left       Innominate                            Anterior    x   x    x  x                x                 x              X                      Posterior                x           "     x        x              x     x     x    x                 Sacrum                               Unilateral rotation    x   x   x    x    x     x    x                                      SI Joint Testing  Right  Left Right  Left Right  Left Right  Left Right  Left Right  Left Right  Left Right  Left Right  Left Right  Left Right  Left Right  Left Right  Left Right Left Right Left Right Left Right  Left Right  Left           Distraction     +          +   +         +   +          +    +        +  +           +   +          +   +          +                      Joanna's     +          +   Sl         sl    +         -    Sl         -   Sl         -   Sl         -   -           -                      Compression     +          +   Sl          sl   Sl          Sl     Sl        Sl    -          -   -         -   -           -                      Prone Press Up           +         +         Sl          -         -        -         -                                   Special Tests  Right   Left Right  Left Right  Left Right  Left Right  Left Right  Left Right Left  Right  Left Right  Left Right  Left Right  Left Right  Left Right  Left Right Left Right Left Right Left Right Left  Right  Left      Straight Leg Raise                                     Active   -         -          -           -                      Passive   -         -             -             -                  Hip Scour Test   ++       Sl    ++       Sl    +           sl   Sl         Sl    Sl         sl Sl          Sl    Sl        sl                                   Bakers Cyst       +         -     +        -                  Palpation:       Muscle/Bone Irritation  Date 01/08/2019 02/07/19 03/21/19 03/28/19 04/04/18 04/11/19 04/18/19               Right  Left Right  Left Right  Left Right  Left Right  Left Right  Left Right  Left Right  Left Right  Left Right  Left Right  Left Right  Left Right  Left Right Left Right Left Right Left Right  Left  Right  Left   Piriformis  ++        +   ++          +   ++       +   ++        +    ++        Sl    ++       Sl   ++       +              Gr. Trochanter  +          +  +          sl    +           +    +       ++    +          -   +         Sl    +         sl              IT Band  +         sl   Sl        sl     -          -   -          -    Sl         -   Sl         -   Sl        -              Quadratus Lumborum  ++        ++   ++        +    +        ++   ++        ++     +       ++   +          +  ++        ++              Ischial Tuberosity  ++         sl   -          -    -           -     -           -     -          -   -          -   -          -              Sacrococcygeal Ligs  ++          +   Sl        sl   +          +    +       +    Sl        Sl    Sl       Sl    +         sl              Paraspinals  +           +     +         sl    +    +          -   Sl         +   +         sl              Spinous Processes                                        T-spine rotated to   -               -                           L-spine rotated to  L2-5        L1-5  L4-5  L3-5  L2-5            L3-5  L1-5              Adductor Tony    +        +       +         +     -         +   -           Sl     -        sl   +          sl   -          -              Iliopsoas  ++         +   ++        +   ++         +    +         ++  sl        Sl    Sl       Sl    Sl       Sl               Quad Origin  sl          sl   Sl          sl    -          -   -           -    -         -   -          -   -         -              SI Joint  ++         +  ++        +    ++        +   +          +   +         +   +          ++   +         sl              Pubic Symphysis         ++          Not tested         ++      ++        ++                                     Rectus Diastasis   1/2 finger                    sternocostals   +        +   +          +    +         -    +           +   Sl        Sl    +         Sl  Not tested                All 4s Positioning: Pt not able to assume full lumbar extension in this position  Leg Length:  The right  lower extremity is equal to the left        Monthly Objective Measurement/Functional Activity  Date: 01/08/2019 03/21/19         Oswestry Pain Score 52/100  46/100                               AROM Lumbar Spine: Degrees   Degrees      Degrees      Degrees      Degrees      Degrees      Degrees    Degrees      Flexion 106 pain          99             Extension 3 pain           17             Right Lat. Flexion 29 pain right trunk          24            Left Lat. Flexion 31         19            Right Lat Shift Pelvis inc pain   Inc pain Left SI jt            Left Lat Shift Pelvis  less inc pain       No change                          AROM Hips:  (degrees) Right      Left Right Left Right  Left Right  Left Right  Left Right  Left Right  Left Right  Left      Flexion 110       110  125    120             Abduction 37       41   45      45             Int. Rotation 32       22  30      25               Ext. Rotation  34       42  35      40                        Flexibility:   (degrees) Right    Left Right  Left Right  Left Right  Left Right  Left Right  Left Right  Left Right  Left      Hamstrings -32       -28  -30     -25             Quadriceps 39       42 40        45              HipExt/Rot(piriformis) 30       28   32      30            Hip Int/Rotators 27       9   25      10            Hip Flexors (iliopsoas) -       -             IT Band -       -                     Reflexes: Right      Left Right  Left Right  Left Right  Left Right  Left Right  Left Right  Left Right  Left      L4 (Quad) +1       +1             S1 (Achilles) +1       +1                     Root Level  - Motor Right      Left Right  Left Right  Left Right  Left Right  Left Right  Left Right  Left Right  Left     T12             Rectus Abdominus 4+/5     4+/5            L1              Paraspinals 5/5         5/5             L2        "       Hip Flexion 5/5          5/5             L3             Quads 5/5          5/5             L4              Anterior Tibialis 5/5         5/5             L5             Gr. Toe Extension 5/5           5/5             S1            Gastroc/Sol/Peroneals 5/5          5/5                     Functional Strength:             Single LegStanceTime (seconds) R:30   L:30   R:      L: R:      L: R:     L: R:      L: R:      L: R:      L: R:      L:     Pelvic Floor Isolation (seconds) -             Inner Unit  Isolation (seconds) -                     Functional Activities:              Sit w/o pain? Pain increases (minutes) No/ 30 min No/ 30  min            Stand w/o pain? No/ 30 min No/ 30 min            Walk w/o pain? varies No/ 1 mile            Steps w/o pain? 1 fl, avoids them 1 fl avoids             Drive w/o pain? No/ 10-15 min No/ 30-45 min            Work w/o pain? No/ 30 min No/ 30 min            # times wakes w/ pain? 4-5x/night, now taking meds at night she is able to sleep.  2x             PLAN OF CARE:    ASSESSMENT:  Problem List:   1. SI joint dysfunction  2. Lack of spinal stabilization  3. Postural dysfunction     Discussion:    The asymmetry in the innominates was easily corrected with manual techniques.  Reviewed how she does this at home, reminding her to do the PIC to the right iliopsoas.    Reviewed the \"arm scissors\" movement in supine with the abs engaged and she has improved significantly in this.  She is able to do without pain and properly engages the abs now.  Progressed this to touch the opposite knee, which was difficulty but she was able to do without pain.   Was not able to progress the lower abs knee lift.  She will continue to work on this.  She c/o neck tightness after doing this secondary to inappropriately engaging the upper quarter to stabilize her trunk instead of the inner unit.  She is able to correct this when brought to her attention.   Began neuro motor retraining of the " hamstrings in prone.  This was hard as she substituted the inner unit with the hamstrings and then inappropriately used the gastroc as a knee flexor.  When brought to her attention she was able to correct.   Progressed to engaging the glut max in supine.  She can only do this in bridging if she maintains a ball between her knees.  Attempted progressing this to retro step.  She was able to do this with the right LE, but not with the left LE as she weight shifted to the right LE and engaged the piriformis instead of glut medius.  She has a good understanding of how to do this correctly and will work on this over the next week.     Very pleased with Tete's progress.  She has good body awareness and is able to sense when she is correctly and incorrectly stabilizing her spine.     Ran out of time again on this visit and not able to apply the KT tape again, hopefully will be able to do on next visit.     Short Term Goals: (4-6 weeks)                                 Date Met:                1.   pt independent in postural correction       02/07/19  2.   pt independent in SI joint self-corrections      03/21/19  3.   pt independent in exer to decrease post. disc pressures    03/21/19  4.   pt able to sleep through night without pain  5.   pt able to sit 15 minutes without pain  6.   pt able to walk 10minutes without pain  7.   Reduce pt pain to no greater than 7/10      03/21/19  8.   Decrease Oswestry Pain Score to no greater than 45/100  9.  Reduce pt pain to no greater than 6/10  10.  Pt able to isolate the pelvic floor in supine x10, 10 sec    03/28/19  11.  Pt able to isolate transverse abdom in all 4s, x10 sec, x10       03/28/19  12.  Pt able to isolate the multifidus in sitting x10 sec, x10                          03/28/19  13.  Pt able to engage inner unit, move from sit to stand &back to sit    04/04/19                                                                                                                  "                                                                                                                                                                                         14.  Pt able to engage inner unit and walk 4 steps without overflow to hamstrings   15.  Pt able to hip abduct x6 without engaging the piriformis  16.  Pt able to perform prone knee extension without engaging hamstrings x6              17.  Pt able to perform remedial lower abs with scissor arm work x6 w/o pain  04/18/19    18.  Pt able to perform \"Pre-cursor\" lower abs leg lift initiation x6 without pain        04/18/19   19.  Pt able to perform retro step without engaging piriformis x6, bilaterally    20.  Pt able to perform bridging x6 without engaging piriformis                                                                                                                                           Long Term Goals:(3-5 months)      Date Met:      1.  pt independent in self-management of symptoms       2.  pt independent in home program      3.  pt able to work 6 hours without pain      4.  pt able to sit 60 minutes without pain      5.  pt able to walk 45 min without pain    Progress Towards Goals:  As expected to a little slower than expected    Treatment Plan:   1.  Ultrasound to increase extensibility, decrease pain, if needed  2.  Electrical stimulation for muscle relaxation, decrease pain, if needed, iontophoresis  3.  Manual therapy to increase function, decrease pain  4.  Therapeutic exercise to increase function, decrease pain  5.  Home programming and d/c planning to increase function and decrease pain    Frequency & Duration:  Pt will be seen on a once/week basis for 6 more visits    Patient Participated in and Agrees With This Plan of Care and Goals:  YES  Rehab Potential:  jarret Marcial, PT, MS  Physical Therapist  [place]# [id number]      TREATMENT TODAY:    Total Treatment Time: (time in clinic)    60  min  Timed " Code Treatment Minutes:    60      Supplies given:      Manual Therapy:  (MA)  RX min:    20  Manual posterior rotation of left innominate    Positional Isometric contraction (PIC) of right iliopsoas    Positional Isometric contraction of (PIC) right gluteus minimus    Distraction of both SI jts in open pack hip position  Piriformis stretching, bilaterally    Quadratus lumborum stretching, bilaterally    Anterior/Inferior Mobilization of  right hip    Positional Isometric Contraction of multifidus  Myofascial work trunk   Sternocostal and rib mobs   Manual spinal distraction    Therapeutic Activities:  (TA)  RX min:         Neuromuscular Reeducation: (NMR)  RX min:   40     Ultrasound:  RX min:          1.6 vance/cm2       Location:     Iontophoresis:  6 hr patch                                Location:                               Procedures:      Key:  i=instructed        r=review        c=corrected        a=adapted   Code Procedure/Instruction 01/08/2019 02/07/19 03/21/19 03/28/19 04/04/19 04/11/19 04/18/19            TA         Sleeping Positions instructed               reviewed,correct                 TA Sternum-Up Posture instructed reviewed                 TA SI jt. self-correction instructed corrected corrected  reviewed              TA Lumbar Facet PIC instructed corrected corrected  reviewed              TA   Order of Self-Corrections instructed    reviewed              TA Use of lumbar pillow instructed                  TA Use of cervical roll instructed corrected                 TA Use of orthotics instructed                  TA Use of SI belt instructed                  TA Use of Nada chair                   TA Use of Ice instructed                  TA Use of [employer]/ heat instructed                  TA Use of Theraworx Relief instructed                  TA Use of TENS unit                   TA Use of iontophoresis                   TA Decreasing Disc Pressures  instructed                                      NMR Pelvic Floor Isolation   instructed corrected               NMR Transverse Abdominus   instructed corrected               NMR Multifidus Isolation   instructed                NMR Diaphragmtic breathe supine    instructed               NMR  Diaphragmtic breath sit                   NMR Pelvic Clock in sitting   instructed                NMR Kinesiotape    Applied to both QL               NMR InnerUnit against Gravity    instructed               NMR Sit-stand w/ inner unit    instructed               NMR Roll w/ inner unit    instructed               NMR Walk w/ inner unit     instructed               NMR Up/down steps w/ inner unit    instructed               NMR Water Exer Instruction                   NMR Hip Abd w/o piriformis     instructed stopped             NMR Hip Add w/o iliop/ham      instructed stopped             NMR Lower abs in supine      Adapted to pre cursor, initiation Cont, no change             NMR Inner unit challenge with scissor arm work       instructed Added legs             NMR Abd obliques in supine      attempted             NMR Prone Knee Flexion     instructed  instructed            NMR Supine glute w/ ball btwn knees        instructed             NMR Prone glut amrita                   NMR Retro Step       instructed            NMR Retro Walk                   NMR Retro walk on treadmill                   NMR Forward walk w/ inner unit                   NMR Balance Instruction                   NMR Stability Ball A/P & Lateral                   NMR Ball Catch/Throw /Supine                   NMR Ball Catch/Throw /Stand                   NMR StabilityBall All 4's Arm Lift                   NMR StabilityBall Prone Walk Out                   NMR StabilityBall Supine Oblique                   NMR Stability Ball sit-supine-sit                   NMR Walking Prog Progression                   MNR Home program sequencing                                       TA Hamstring stretch                    TA Hip Ext Rot Stretch                   TA Hip Int Rot Stretch                   TA Hip Flex/IT Stretch                   TA Hip Add Stretch                   TA Lats Dorsi Stretch                   TA Gastroc/soleus stretch                   TA QuadratusLumborm Stretch                      Supine                      Stance                   TA Quad Stretch                                       NMR Wall Push Ups                   NMR Planking                   NMR BOSU Ball Exercises                   NMR Lateral Bridge Drop                   NMR Waiters Treynor                   NMR O'Feldt Lat Pull Down                   NMR O'Feldt Hip Ext                   NMR O'Feldt Trunk Ext                                       TA CervDiscExtSup/stnd                   TA Cerv Stretches                   TA Self PIC Cervical Spine                   TA Neural Gliding                   TA Ant/Mid Scalene Strch                   TA Biceps stretch                   TA Rotator Cuff Stretch                   TA Anterior Chest Stretch                   TA Wrist Ext Stretch                                       NMR Prone Arm Lifts                   NMR Scapula Stabilization                   NMR Occulomotor Isometrics                   NMR Cervical Isometrics                                       TA Shld AROM w/bar                   TA Shld Capsular Stretching                   TA Self PIC Thor Spine                   TA Rot Cuff Therabd, beginner                   TA Rot Cuff Therabd, intermed                                                                                                                                                                                                                                                 Miracle Marcial, PT, MS  Physical Therapist  [place]# [id number]

## 2019-04-25 ENCOUNTER — TREATMENT (OUTPATIENT)
Dept: PHYSICAL THERAPY | Facility: CLINIC | Age: 34
End: 2019-04-25

## 2019-04-25 DIAGNOSIS — M53.3 SACROILIAC JOINT DYSFUNCTION: Primary | ICD-10-CM

## 2019-04-25 DIAGNOSIS — M35.7 FAMILIAL LIGAMENTOUS LAXITY: ICD-10-CM

## 2019-04-25 DIAGNOSIS — S39.012D STRAIN OF LUMBAR REGION, SUBSEQUENT ENCOUNTER: ICD-10-CM

## 2019-04-25 DIAGNOSIS — R29.3 POSTURE IMBALANCE: ICD-10-CM

## 2019-04-25 PROCEDURE — 97112 NEUROMUSCULAR REEDUCATION: CPT | Performed by: PHYSICAL THERAPIST

## 2019-04-25 PROCEDURE — 97140 MANUAL THERAPY 1/> REGIONS: CPT | Performed by: PHYSICAL THERAPIST

## 2019-04-25 NOTE — PROGRESS NOTES
"Physical Therapy Note      SUBJECTIVE:   Changes since last seen:  Tete states its bee a frustrating week trying to do new exercises, \"I can tell when I'm substituting the wrong muscles and having hard time stopping it.\"  She states that she is still spotting, it hasn't stopped since her last period and she is scheduled to start period again in 4 days.   She indicates that she always wakes up stiff in the am, but she has not been able to purchase a new mattress yet.    She c/o  a stiff neck this am.  She likes the cervical stretches and they do help her.  She is pleased that she's had no spasms in her low back since starting physical therapy.     Current level of pain:    5.5/10  Lowest level of pain since last seen:  4 /10 last Thursday   Highest level of pain since last seen\"  5.5 /10 after falling       Past Medical History:  1.  AA 2013  Treated with PT for left shoulder labral tear, no low back treatment   2.  Hx of two falls when mopping in socks, over the last 2 years  3.  Hx of regular, heavy, cramping menses  4.  T& A, 2006  5.  Allergic to Wellbutrin and mild allergy to contrast dye      Functional Limitations:  Sitting, standing, walking, stairs, driving, working, sleeping, squatting, housework and changing positions.   Patient Goals for Physical Therapy: \"Pain relief\"      OBJECTIVE:  Observation:  Slight ight lateral shift of pelvis.      01/08/2019 02/07/19 03/21/19 03/28/19 04/04/19 04/11/19 04/18/19 04/25/19             SI Joint Positioning  Right  Left Right  Left Right  Left Right  Left Right  Left Right  Left Right  Left Right  Left Right  Left Right  Left Right  Left Right  Left Right  Left Right Left Right Left Right Left Right  Left Right  Left       Innominate                            Anterior    x   x    x  x                x                 x              X     x                    Posterior                x               x        x              x     x     x    x               X           "       Sacrum                               Unilateral rotation    x   x   x    x    x     x    x    x                                     SI Joint Testing  Right  Left Right  Left Right  Left Right  Left Right  Left Right  Left Right  Left Right  Left Right  Left Right  Left Right  Left Right  Left Right  Left Right Left Right Left Right Left Right  Left Right  Left           Distraction     +          +   +         +   +          +    +        +  +           +   +          +   +          +   +          +                     Joanna's     +          +   Sl         sl    +         -    Sl         -   Sl         -   Sl         -   -           -                      Compression     +          +   Sl          sl   Sl          Sl     Sl        Sl    -          -   -         -   -           -                      Prone Press Up           +         +         Sl          -         -        -         -                                   Special Tests  Right   Left Right  Left Right  Left Right  Left Right  Left Right  Left Right Left  Right  Left Right  Left Right  Left Right  Left Right  Left Right  Left Right Left Right Left Right Left Right Left  Right  Left      Straight Leg Raise                                     Active   -         -          -           -                      Passive   -         -             -             -                  Hip Scour Test   ++       Sl    ++       Sl    +           sl   Sl         Sl    Sl         sl Sl          Sl    Sl        sl   Sl       Sl                                   Bakers Cyst       +         -     +        -                  Palpation:       Muscle/Bone Irritation  Date 01/08/2019 02/07/19 03/21/19 03/28/19 04/04/18 04/11/19 04/18/19 04/25/19              Right  Left Right  Left Right  Left Right  Left Right  Left Right  Left Right  Left Right  Left Right  Left Right  Left Right  Left Right  Left Right  Left Right Left Right Left Right Left Right  Left Right  Left    Piriformis  ++        +   ++          +   ++       +   ++        +    ++        Sl    ++       Sl   ++       +    ++      +              Gr. Trochanter  +          +  +          sl    +           +    +       ++    +          -   +         Sl    +         sl   +         sl             IT Band  +         sl   Sl        sl     -          -   -          -    Sl         -   Sl         -   Sl        -   Sl         -             Quadratus Lumborum  ++        ++   ++        +    +        ++   ++        ++     +       ++   +          +  ++        ++   Sl        Sl              Ischial Tuberosity  ++         sl   -          -    -           -     -           -     -          -   -          -   -          -   -          -             Sacrococcygeal Ligs  ++          +   Sl        sl   +          +    +       +    Sl        Sl    Sl       Sl    +         sl  ++      +             Paraspinals  +           +     +         sl    +    +          -   Sl         +   +         sl   +        sl             Spinous Processes                                        T-spine rotated to   -               -                           L-spine rotated to  L2-5        L1-5  L4-5  L3-5  L2-5            L3-5  L1-5  L2-5             Adductor Tony    +        +       +         +     -         +   -           Sl     -        sl   +          sl   -          -   -           -             Iliopsoas  ++         +   ++        +   ++         +    +         ++  sl        Sl    Sl       Sl    Sl       Sl    Sl         -             Quad Origin  sl          sl   Sl          sl    -          -   -           -    -         -   -          -   -         -   -          -             SI Joint  ++         +  ++        +    ++        +   +          +   +         +   +          ++   +         sl    +         sl             Pubic Symphysis         ++          Not tested         ++      ++        ++            +             Obturator externus          -          +              Rectus Diastasis   1/2 finger                    sternocostals   +        +   +          +    +         -    +           +   Sl        Sl    +         Sl  Not tested               All 4s Positioning: Pt not able to assume full lumbar extension in this position  Leg Length:  The right  lower extremity is equal to the left        Monthly Objective Measurement/Functional Activity  Date: 01/08/2019 03/21/19 04/25/19        Oswestry Pain Score 52/100  46/100    48/100                              AROM Lumbar Spine: Degrees   Degrees      Degrees      Degrees      Degrees      Degrees      Degrees    Degrees      Flexion 106 pain          99          83           Extension 3 pain           17           19            Right Lat. Flexion 29 pain right trunk          24        25           Left Lat. Flexion 31         19         23           Right Lat Shift Pelvis inc pain   Inc pain Left SI jt       Slight increase           Left Lat Shift Pelvis  less inc pain       No change       No change/ slight cielo                    AROM Hips:  (degrees) Right      Left Right Left Right  Left Right  Left Right  Left Right  Left Right  Left Right  Left      Flexion 110       110  125    120    128      125           Abduction 37       41   45      45      42      44           Int. Rotation 32       22  30      25      31         28           Ext. Rotation  34       42  35      40       38        41                   Flexibility:   (degrees) Right    Left Right  Left Right  Left Right  Left Right  Left Right  Left Right  Left Right  Left      Hamstrings -32       -28  -30     -25   -25       -25           Quadriceps 39       42 40        45    not tested           HipExt/Rot(piriformis) 30       28   32      30  33         32           Hip Int/Rotators 27       9   25      10   28        15           Hip Flexors (iliopsoas) -       -             IT Band -       -                     Reflexes: Right      Left Right  Left Right   Left Right  Left Right  Left Right  Left Right  Left Right  Left      L4 (Quad) +1       +1             S1 (Achilles) +1       +1                     Root Level  - Motor Right      Left Right  Left Right  Left Right  Left Right  Left Right  Left Right  Left Right  Left     T12             Rectus Abdominus 4+/5     4+/5         5/5           L1              Paraspinals 5/5         5/5             L2              Hip Flexion 5/5          5/5             L3             Quads 5/5          5/5             L4              Anterior Tibialis 5/5         5/5             L5             Gr. Toe Extension 5/5           5/5             S1            Gastroc/Sol/Peroneals 5/5          5/5                     Functional Strength:             Single LegStanceTime (seconds) R:30   L:30   R:      L: R:      L: R:     L: R:      L: R:      L: R:      L: R:      L:     Pelvic Floor Isolation (seconds) -    10 sec           Inner Unit  Isolation (seconds) -    10 sec                   Functional Activities:              Sit w/o pain? Pain increases (minutes) No/ 30 min No/ 30  min No/ 30 min            Stand w/o pain? No/ 30 min No/ 30 min No/ 10 min            Walk w/o pain? varies No/ 1 mile No/ 1/2 mile           Steps w/o pain? 1 fl, avoids them 1 fl avoids  1 fl avoids            Drive w/o pain? No/ 10-15 min No/ 30-45 min No/ 30-45  Min            Work w/o pain? No/ 30 min No/ 30 min No/ 30            # times wakes w/ pain? 4-5x/night, now taking meds at night she is able to sleep.  2x   2-3x             PLAN OF CARE:    ASSESSMENT:  Problem List:   1. SI joint dysfunction  2. Lack of spinal stabilization  3. Postural dysfunction     Discussion:    Tete is over engaging both piriformis when recruiting the pelvic floor and multifidus.  Thus needed to adapt the bridge with the ball between her knees to only clear the buttocks from the floor. She was able to sense if she did this correctly and if did incorrectly could inhibit her  "piriformis with hip add on the ball between her knees followed by hip abduction in supine against belt around her thighs. .   Had to stop the \"scissor\" movement touching the opposite knee.  Worked on remedial work doing \"scissors\"  X only 2 reps to not engage inner unit, and to do with ball between knees and belt around thighs.  She will stop retro step this week until she has better neuro motor control of the glut max and glut medius.   Corrected the prone knee flexion to just lifting the ankle off a bolster with the inner unit on to NOT engage the piriformis.     Tete was so frustrated at over engaging the piriformis on all exercises, spent most of the visit on the neuro motor retraining of this.  Once again, not time to apply the KT tape.     'Her Oswestry Pain Score is a little elevated this week as she is about to start her period and thus she has more laxity and more pain.     Short Term Goals: (4-6 weeks)                                 Date Met:                1.   pt independent in postural correction       02/07/19  2.   pt independent in SI joint self-corrections      03/21/19  3.   pt independent in exer to decrease post. disc pressures    03/21/19  4.   pt able to sleep through night without pain  5.   pt able to sit 15 minutes without pain  6.   pt able to walk 10minutes without pain  7.   Reduce pt pain to no greater than 7/10      03/21/19  8.   Decrease Oswestry Pain Score to no greater than 45/100  9.  Reduce pt pain to no greater than 6/10  10.  Pt able to isolate the pelvic floor in supine x10, 10 sec    03/28/19  11.  Pt able to isolate transverse abdom in all 4s, x10 sec, x10       03/28/19  12.  Pt able to isolate the multifidus in sitting x10 sec, x10                          03/28/19  13.  Pt able to engage inner unit, move from sit to stand &back to sit    04/04/19                                                                                                                                 " "                                                                                                                                                                         14.  Pt able to engage inner unit and walk 4 steps without overflow to hamstrings   15.  Pt able to hip abduct x6 without engaging the piriformis  16.  Pt able to perform prone knee extension without engaging hamstrings x6              17.  Pt able to perform remedial lower abs with scissor arm work x6 w/o pain  04/18/19    18.  Pt able to perform \"Pre-cursor\" lower abs leg lift initiation x6 without pain        04/18/19   19.  Pt able to perform retro step without engaging piriformis x6, bilaterally    20.  Pt able to perform bridging x6 without engaging piriformis    04/25/19    21.  Pt able to perform \"scissors\" arm movement with inner unit w/o piriformis x6                                                                                                                                           Long Term Goals:(3-5 months)      Date Met:      1.  pt independent in self-management of symptoms       2.  pt independent in home program      3.  pt able to work 6 hours without pain      4.  pt able to sit 60 minutes without pain      5.  pt able to walk 45 min without pain    Progress Towards Goals:  As expected to a little slower than expected    Treatment Plan:   1.  Ultrasound to increase extensibility, decrease pain, if needed  2.  Electrical stimulation for muscle relaxation, decrease pain, if needed, iontophoresis  3.  Manual therapy to increase function, decrease pain  4.  Therapeutic exercise to increase function, decrease pain  5.  Home programming and d/c planning to increase function and decrease pain    Frequency & Duration:  Pt will be seen on a once/week basis for 5 more visits    Patient Participated in and Agrees With This Plan of Care and Goals:  YES  Rehab Potential:  jarret Marcial, PT, MS  Physical Therapist  [place]# " [id number]      TREATMENT TODAY:    Total Treatment Time: (time in clinic)   65   min  Timed Code Treatment Minutes:    65      Supplies given:      Manual Therapy:  (MA)  RX min:    20  Manual posterior rotation of left innominate    Positional Isometric contraction (PIC) of right iliopsoas    Positional Isometric contraction of (PIC) right gluteus minimus    Distraction of both SI jts in open pack hip position  Piriformis stretching, bilaterally    Quadratus lumborum stretching, bilaterally    Anterior/Inferior Mobilization of  right hip    Positional Isometric Contraction of multifidus  Myofascial work trunk   Sternocostal and rib mobs   Manual spinal distraction    Therapeutic Activities:  (TA)  RX min:         Neuromuscular Reeducation: (NMR)  RX min:   45    Ultrasound:  RX min:          1.6 vance/cm2       Location:     Iontophoresis:  6 hr patch                                Location:                               Procedures:      Key:  i=instructed        r=review        c=corrected        a=adapted   Code Procedure/Instruction 01/08/2019 02/07/19 03/21/19 03/28/19 04/04/19 04/11/19 04/18/19 04/25/19           TA         Sleeping Positions instructed               reviewed,correct                 TA Sternum-Up Posture instructed reviewed                 TA SI jt. self-correction instructed corrected corrected  reviewed              TA Lumbar Facet PIC instructed corrected corrected  reviewed              TA   Order of Self-Corrections instructed    reviewed              TA Use of lumbar pillow instructed                  TA Use of cervical roll instructed corrected                 TA Use of orthotics instructed                  TA Use of SI belt instructed                  TA Use of Nada chair                   TA Use of Ice instructed                  TA Use of [employer]/ heat instructed                  TA Use of Theraworx Relief instructed                  TA Use of TENS unit                   TA Use of  iontophoresis                   TA Decreasing Disc Pressures  instructed                                     NMR Pelvic Floor Isolation   instructed corrected               NMR Transverse Abdominus   instructed corrected               NMR Multifidus Isolation   instructed                NMR Diaphragmtic breathe supine    instructed               NMR  Diaphragmtic breath sit                   NMR Pelvic Clock in sitting   instructed                NMR Kinesiotape    Applied to both QL               NMR InnerUnit against Gravity    instructed               NMR Sit-stand w/ inner unit    instructed               NMR Roll w/ inner unit    instructed               NMR Walk w/ inner unit     instructed               NMR Up/down steps w/ inner unit    instructed               NMR Water Exer Instruction                   NMR Hip Abd w/o piriformis     instructed stopped             NMR Hip Add w/o iliop/ham      instructed stopped             NMR Lower abs in supine      Adapted to pre cursor, initiation Cont, no change             NMR Inner unit challenge with scissor arm work       instructed Added legs  Adapted w/ ball & belt           NMR Abd obliques in supine      attempted             NMR Prone Knee Flexion     instructed  instructed corrected           NMR Supine glute w/ ball btwn knees        instructed  Adapted to do w/ belt for abd           NMR Prone glut amrita                   NMR Retro Step       instructed stopped           NMR Retro Walk                   NMR Retro walk on treadmill                   NMR Forward walk w/ inner unit                   NMR Balance Instruction                   NMR Stability Ball A/P & Lateral                   NMR Ball Catch/Throw /Supine                   NMR Ball Catch/Throw /Stand                   NMR StabilityBall All 4's Arm Lift                   NMR StabilityBall Prone Walk Out                   NMR StabilityBall Supine Oblique                   NMR Stability Ball  sit-supine-sit                   NMR Walking Prog Progression                   MNR Home program sequencing                                       TA Hamstring stretch                   TA Hip Ext Rot Stretch                   TA Hip Int Rot Stretch                   TA Hip Flex/IT Stretch                   TA Hip Add Stretch                   TA Lats Dorsi Stretch                   TA Gastroc/soleus stretch                   TA QuadratusLumborm Stretch                      Supine                      Stance                   TA Quad Stretch                                       NMR Wall Push Ups                   NMR Planking                   NMR BOSU Ball Exercises                   NMR Lateral Bridge Drop                   NMR Waiters Independence                   NMR O'Feldt Lat Pull Down                   NMR O'Feldt Hip Ext                   NMR O'Feldt Trunk Ext                                       TA CervDiscExtSup/stnd                   TA Cerv Stretches                   TA Self PIC Cervical Spine                   TA Neural Gliding                   TA Ant/Mid Scalene Strch                   TA Biceps stretch                   TA Rotator Cuff Stretch                   TA Anterior Chest Stretch                   TA Wrist Ext Stretch                                       NMR Prone Arm Lifts                   NMR Scapula Stabilization                   NMR Occulomotor Isometrics                   NMR Cervical Isometrics                                       TA Shld AROM w/bar                   TA Shld Capsular Stretching                   TA Self PIC Thor Spine                   TA Rot Cuff Therabd, beginner                   TA Rot Cuff Therabd, intermed                                                                                                                                                                                                                                                 Miracle Marcial, PT,  MS  Physical Therapist  [place]# [id number]

## 2019-05-02 ENCOUNTER — TREATMENT (OUTPATIENT)
Dept: PHYSICAL THERAPY | Facility: CLINIC | Age: 34
End: 2019-05-02

## 2019-05-02 DIAGNOSIS — S39.012D STRAIN OF LUMBAR REGION, SUBSEQUENT ENCOUNTER: ICD-10-CM

## 2019-05-02 DIAGNOSIS — R29.3 POSTURE IMBALANCE: ICD-10-CM

## 2019-05-02 DIAGNOSIS — M35.7 FAMILIAL LIGAMENTOUS LAXITY: ICD-10-CM

## 2019-05-02 DIAGNOSIS — M53.3 SACROILIAC JOINT DYSFUNCTION: Primary | ICD-10-CM

## 2019-05-02 PROCEDURE — 97112 NEUROMUSCULAR REEDUCATION: CPT | Performed by: PHYSICAL THERAPIST

## 2019-05-02 PROCEDURE — 97140 MANUAL THERAPY 1/> REGIONS: CPT | Performed by: PHYSICAL THERAPIST

## 2019-05-02 NOTE — PROGRESS NOTES
"Physical Therapy Note      SUBJECTIVE:   Changes since last seen:  Tete states she's had a long and heavy menses; just starting to subside.  She's had increased piriformis pain this past week.   She slept on an inflatable air mattress; it has really made her back feel better in the am. She's been sleeping on it for one week now and realizes she needs a new mattress.   She feels like she has recovered from fall in patio chair last week.   She likes the \"arm scissor\" exercise with belt around knees, it helps to not use the piriformis  She is not sure if she is using the piriformis on bridging exercise.  She states the hardest exercise to do this past week has been the prone knee... to not use piriformis on knee flexion.   Thursday night she went to do the innominate self correction with the right knee up and it felt like the labrum got caught.  She had a lot of QL pain this week  She's having trouble doing the lumbar PIC for facets effectively    Current level of pain:    5.5/10  Lowest level of pain since last seen:  5/10 last Thursday   Highest level of pain since last seen:   7 /10        Past Medical History:  1.  AA 2013  Treated with PT for left shoulder labral tear, no low back treatment   2.  Hx of two falls when mopping in socks, over the last 2 years  3.  Hx of regular, heavy, cramping menses  4.  T& A, 2006  5.  Allergic to Wellbutrin and mild allergy to contrast dye      Functional Limitations:  Sitting, standing, walking, stairs, driving, working, sleeping, squatting, housework and changing positions.   Patient Goals for Physical Therapy: \"Pain relief\"      OBJECTIVE:  Observation:  Slight ight lateral shift of pelvis.      01/08/2019 02/07/19 03/21/19 03/28/19 04/04/19 04/11/19 04/18/19 04/25/19 05/02/19            SI Joint Positioning  Right  Left Right  Left Right  Left Right  Left Right  Left Right  Left Right  Left Right  Left Right  Left Right  Left Right  Left Right  Left Right  Left Right Left " Right Left Right Left Right  Left Right  Left       Innominate                            Anterior    x   x    x  x                x                 x              X     x    x                   Posterior                x               x        x              x     x     x    x               X                x               Sacrum                               Unilateral rotation    x   x   x    x    x     x    x    x   x                                    SI Joint Testing  Right  Left Right  Left Right  Left Right  Left Right  Left Right  Left Right  Left Right  Left Right  Left Right  Left Right  Left Right  Left Right  Left Right Left Right Left Right Left Right  Left Right  Left           Distraction     +          +   +         +   +          +    +        +  +           +   +          +   +          +   +          +     +        +                    Joanna's     +          +   Sl         sl    +         -    Sl         -   Sl         -   Sl         -   -           -                      Compression     +          +   Sl          sl   Sl          Sl     Sl        Sl    -          -   -         -   -           -                      Prone Press Up           +         +         Sl          -         -        -         -                                   Special Tests  Right   Left Right  Left Right  Left Right  Left Right  Left Right  Left Right Left  Right  Left Right  Left Right  Left Right  Left Right  Left Right  Left Right Left Right Left Right Left Right Left  Right  Left      Straight Leg Raise                                     Active   -         -          -           -                      Passive   -         -             -             -                  Hip Scour Test   ++       Sl    ++       Sl    +           sl   Sl         Sl    Sl         sl Sl          Sl    Sl        sl   Sl       Sl    Sl         Sl                                  Bakers Cyst       +         -     +        -    Sl        -                 Palpation:       Muscle/Bone Irritation  Date 01/08/2019 02/07/19 03/21/19 03/28/19 04/04/18 04/11/19 04/18/19 04/25/19 05/02/19             Right  Left Right  Left Right  Left Right  Left Right  Left Right  Left Right  Left Right  Left Right  Left Right  Left Right  Left Right  Left Right  Left Right Left Right Left Right Left Right  Left Right  Left   Piriformis  ++        +   ++          +   ++       +   ++        +    ++        Sl    ++       Sl   ++       +    ++      +   ++        +            Gr. Trochanter  +          +  +          sl    +           +    +       ++    +          -   +         Sl    +         sl   +         sl   Sl        Sl             IT Band  +         sl   Sl        sl     -          -   -          -    Sl         -   Sl         -   Sl        -   Sl         -  sl          -            Quadratus Lumborum  ++        ++   ++        +    +        ++   ++        ++     +       ++   +          +  ++        ++   Sl        Sl    +       ++            Ischial Tuberosity  ++         sl   -          -    -           -     -           -     -          -   -          -   -          -   -          -   -          -            Sacrococcygeal Ligs  ++          +   Sl        sl   +          +    +       +    Sl        Sl    Sl       Sl    +         sl  ++      +  +        Sl             Paraspinals  +           +     +         sl    +    +          -   Sl         +   +         sl   +        sl  +         -            Spinous Processes                                        T-spine rotated to   -               -                           L-spine rotated to  L2-5        L1-5  L4-5  L3-5  L2-5            L3-5  L1-5  L2-5  L2-5               Adductor Tony    +        +       +         +     -         +   -           Sl     -        sl   +          sl   -          -   -           -   -          -            Iliopsoas  ++         +   ++        +   ++         +    +         ++  sl        Sl    Sl        Sl    Sl       Sl    Sl         -   Sl        -            Quad Origin  sl          sl   Sl          sl    -          -   -           -    -         -   -          -   -         -   -          -   -          -            SI Joint  ++         +  ++        +    ++        +   +          +   +         +   +          ++   +         sl    +         sl   +        +            Pubic Symphysis         ++          Not tested         ++      ++        ++            + Not tested            Obturator externus          -          +   -          -            Rectus Diastasis   1/2 finger                    sternocostals   +        +   +          +    +         -    +           +   Sl        Sl    +         Sl  Not tested     +         +            All 4s Positioning: Pt not able to assume full lumbar extension in this position  Leg Length:  The right  lower extremity is equal to the left        Monthly Objective Measurement/Functional Activity  Date: 01/08/2019 03/21/19 04/25/19        Oswestry Pain Score 52/100  46/100    48/100                              AROM Lumbar Spine: Degrees   Degrees      Degrees      Degrees      Degrees      Degrees      Degrees    Degrees      Flexion 106 pain          99          83           Extension 3 pain           17           19            Right Lat. Flexion 29 pain right trunk          24        25           Left Lat. Flexion 31         19         23           Right Lat Shift Pelvis inc pain   Inc pain Left SI jt       Slight increase           Left Lat Shift Pelvis  less inc pain       No change       No change/ slight cielo                    AROM Hips:  (degrees) Right      Left Right Left Right  Left Right  Left Right  Left Right  Left Right  Left Right  Left      Flexion 110       110  125    120    128      125           Abduction 37       41   45      45      42      44           Int. Rotation 32       22  30      25      31         28           Ext. Rotation  34       42  35      40        38        41                   Flexibility:   (degrees) Right    Left Right  Left Right  Left Right  Left Right  Left Right  Left Right  Left Right  Left      Hamstrings -32       -28  -30     -25   -25       -25           Quadriceps 39       42 40        45    not tested           HipExt/Rot(piriformis) 30       28   32      30  33         32           Hip Int/Rotators 27       9   25      10   28        15           Hip Flexors (iliopsoas) -       -             IT Band -       -                     Reflexes: Right      Left Right  Left Right  Left Right  Left Right  Left Right  Left Right  Left Right  Left      L4 (Quad) +1       +1             S1 (Achilles) +1       +1                     Root Level  - Motor Right      Left Right  Left Right  Left Right  Left Right  Left Right  Left Right  Left Right  Left     T12             Rectus Abdominus 4+/5     4+/5         5/5           L1              Paraspinals 5/5         5/5             L2              Hip Flexion 5/5          5/5             L3             Quads 5/5          5/5             L4              Anterior Tibialis 5/5         5/5             L5             Gr. Toe Extension 5/5           5/5             S1            Gastroc/Sol/Peroneals 5/5          5/5                     Functional Strength:             Single LegStanceTime (seconds) R:30   L:30   R:      L: R:      L: R:     L: R:      L: R:      L: R:      L: R:      L:     Pelvic Floor Isolation (seconds) -    10 sec           Inner Unit  Isolation (seconds) -    10 sec                   Functional Activities:              Sit w/o pain? Pain increases (minutes) No/ 30 min No/ 30  min No/ 30 min            Stand w/o pain? No/ 30 min No/ 30 min No/ 10 min            Walk w/o pain? varies No/ 1 mile No/ 1/2 mile           Steps w/o pain? 1 fl, avoids them 1 fl avoids  1 fl avoids            Drive w/o pain? No/ 10-15 min No/ 30-45 min No/ 30-45  Min            Work w/o pain? No/ 30 min No/ 30 min  "No/ 30            # times wakes w/ pain? 4-5x/night, now taking meds at night she is able to sleep.  2x   2-3x             PLAN OF CARE:    ASSESSMENT:  Problem List:   1. SI joint dysfunction  2. Lack of spinal stabilization  3. Postural dysfunction     Discussion:    Tete needed fine tuning of hip adduction for PIC to NOT engage the piriformis and hamstrings.  If she places the belt more proximally around the thighs she is able to do without engaging piriformis.   Added 1 lbs weight to \"scissor\" arm exer with the transverse abdominus and rectus abdominus on.... She cannot engage the entire inner unit when doing this as she substitutes with the piriformis!    On the prone knee flexion exercise, she needed to make bolster taller under ankles to not engage piriformis on knee flexion.  She understood the need for all of these adaptations and will do on her home program.   I am optimistic that her improvement will move at a faster pace as she will no longer be sleeping on the soft mattress.     Short Term Goals: (4-6 weeks)                                 Date Met:                1.   pt independent in postural correction       02/07/19  2.   pt independent in SI joint self-corrections      03/21/19  3.   pt independent in exer to decrease post. disc pressures    03/21/19  4.   pt able to sleep through night without pain  5.   pt able to sit 15 minutes without pain  6.   pt able to walk 10minutes without pain  7.   Reduce pt pain to no greater than 7/10      03/21/19  8.   Decrease Oswestry Pain Score to no greater than 45/100  9.  Reduce pt pain to no greater than 6/10  10.  Pt able to isolate the pelvic floor in supine x10, 10 sec    03/28/19  11.  Pt able to isolate transverse abdom in all 4s, x10 sec, x10       03/28/19  12.  Pt able to isolate the multifidus in sitting x10 sec, x10                          03/28/19  13.  Pt able to engage inner unit, move from sit to stand &back to sit    04/04/19                  " "                                                                                                                                                                                                                                                                                        14.  Pt able to engage inner unit and walk 4 steps without overflow to hamstrings   15.  Pt able to hip abduct x6 without engaging the piriformis  16.  Pt able to perform prone knee flexion without engaging hamstrings x6               17.  Pt able to perform remedial lower abs with scissor arm work x6 w/o pain  04/18/19    18.  Pt able to perform \"Pre-cursor\" lower abs leg lift initiation x6 without pain        04/18/19   19.  Pt able to perform retro step without engaging piriformis x6, bilaterally    20.  Pt able to perform bridging x6 without engaging piriformis    04/25/19    21.  Pt able to perform \"scissors\" arm movement with inner unit w/o piriformis x6 05/02/19  22.  Pt able to perform PIC hip adductors without engaging the piriformis  x6  23.  Pt able to perform \"Scissors\" arm motion w/ inner unit with 1lb wts x6 w/o pain                                                                                                                                             Long Term Goals:(3-5 months)      Date Met:      1.  pt independent in self-management of symptoms       2.  pt independent in home program      3.  pt able to work 6 hours without pain      4.  pt able to sit 60 minutes without pain      5.  pt able to walk 45 min without pain    Progress Towards Goals:  As expected to a little slower than expected    Treatment Plan:   1.  Ultrasound to increase extensibility, decrease pain, if needed  2.  Electrical stimulation for muscle relaxation, decrease pain, if needed, iontophoresis  3.  Manual therapy to increase function, decrease pain  4.  Therapeutic exercise to increase function, decrease pain  5.  Home programming and d/c " planning to increase function and decrease pain    Frequency & Duration:  Pt will be seen on a once/week basis for 4 more visits    Patient Participated in and Agrees With This Plan of Care and Goals:  YES  Rehab Potential:  jarret Marcial, PT, MS  Physical Therapist  [place]# [id number]      TREATMENT TODAY:    Total Treatment Time: (time in clinic)    65  min  Timed Code Treatment Minutes:    60      Supplies given:      Manual Therapy:  (MA)  RX min:    20  Manual posterior rotation of left innominate    Positional Isometric contraction (PIC) of right iliopsoas    Positional Isometric contraction of (PIC) right gluteus minimus    Distraction of both SI jts in open pack hip position  Piriformis stretching, bilaterally    Quadratus lumborum stretching, bilaterally    Anterior/Inferior Mobilization of  right hip    Positional Isometric Contraction of multifidus  Myofascial work trunk   Sternocostal and rib mobs   Manual spinal distraction    Therapeutic Activities:  (TA)  RX min:         Neuromuscular Reeducation: (NMR)  RX min:   40    Ultrasound:  RX min:          1.6 vance/cm2       Location:     Iontophoresis:  6 hr patch                                Location:                               Procedures:      Key:  i=instructed        r=review        c=corrected        a=adapted   Code Procedure/Instruction 01/08/2019 02/07/19 03/21/19 03/28/19 04/04/19 04/11/19 04/18/19 04/25/19 05/02/19          TA         Sleeping Positions instructed               reviewed,correct                 TA Sternum-Up Posture instructed reviewed                 TA SI jt. self-correction instructed corrected corrected  reviewed              TA Lumbar Facet PIC instructed corrected corrected  reviewed              TA   Order of Self-Corrections instructed    reviewed              TA Use of lumbar pillow instructed                  TA Use of cervical roll instructed corrected                 TA Use of orthotics instructed                   TA Use of SI belt instructed                  TA Use of Nada chair                   TA Use of Ice instructed                  TA Use of [employer]/ heat instructed                  TA Use of Theraworx Relief instructed                  TA Use of TENS unit                   TA Use of iontophoresis                   TA Decreasing Disc Pressures  instructed                                     NMR Pelvic Floor Isolation   instructed corrected               NMR Transverse Abdominus   instructed corrected               NMR Multifidus Isolation   instructed                NMR Diaphragmtic breathe supine    instructed               NMR  Diaphragmtic breath sit                   NMR Pelvic Clock in sitting   instructed                NMR Kinesiotape    Applied to both QL               NMR InnerUnit against Gravity    instructed               NMR Sit-stand w/ inner unit    instructed               NMR Roll w/ inner unit    instructed               NMR Walk w/ inner unit     instructed               NMR Up/down steps w/ inner unit    instructed               NMR Water Exer Instruction                   NMR Hip Abd w/o piriformis     instructed stopped             NMR Hip Add w/o iliop/ham      instructed stopped             NMR Lower abs in supine      Adapted to pre cursor, initiation Cont, no change   Still not able to lift foot w/o engaging piriformis          NMR Inner unit challenge with scissor arm work       instructed Added legs  Adapted w/ ball & belt progressed to 1lb weights          NMR Abd obliques in supine      attempted             NMR Prone Knee Flexion     instructed  instructed corrected Made bolster higher.          NMR Supine glute w/ ball btwn knees        instructed  Adapted to do w/ belt for abd Moved belt more proximal on thighs          NMR Prone glut amrita                   NMR Retro Step       instructed stopped           NMR Retro Walk                   NMR Retro walk on treadmill                    NMR Forward walk w/ inner unit                   NMR Balance Instruction                   NMR Stability Ball A/P & Lateral                   NMR Ball Catch/Throw /Supine                   NMR Ball Catch/Throw /Stand                   NMR StabilityBall All 4's Arm Lift                   NMR StabilityBall Prone Walk Out                   NMR StabilityBall Supine Oblique                   NMR Stability Ball sit-supine-sit                   NMR Walking Prog Progression                   MNR Home program sequencing                                       TA Hamstring stretch                   TA Hip Ext Rot Stretch                   TA Hip Int Rot Stretch                   TA Hip Flex/IT Stretch                   TA Hip Add Stretch                   TA Lats Dorsi Stretch                   TA Gastroc/soleus stretch                   TA QuadratusLumborm Stretch                      Supine                      Stance                   TA Quad Stretch                                       NMR Wall Push Ups                   NMR Planking                   NMR BOSU Ball Exercises                   NMR Lateral Bridge Drop                   NMR [place]                   NMR O'Feldt Lat Pull Down                   NMR O'Feldt Hip Ext                   NMR O'Feldt Trunk Ext                                       TA CervDiscExtSup/stnd                   TA Cerv Stretches                   TA Self PIC Cervical Spine                   TA Neural Gliding                   TA Ant/Mid Scalene Strch                   TA Biceps stretch                   TA Rotator Cuff Stretch                   TA Anterior Chest Stretch                   TA Wrist Ext Stretch                                       NMR Prone Arm Lifts                   NMR Scapula Stabilization                   NMR Occulomotor Isometrics                   NMR Cervical Isometrics                                       TA Shld AROM w/bar                   TA Shld  Capsular Stretching                   TA Self PIC Thor Spine                   TA Rot Cuff Therabd, beginner                   TA Rot Cuff Therabd, ruy Marcial, PT, MS  Physical Therapist  [place]# [id number]

## 2019-05-07 ENCOUNTER — TREATMENT (OUTPATIENT)
Dept: PHYSICAL THERAPY | Facility: CLINIC | Age: 34
End: 2019-05-07

## 2019-05-07 DIAGNOSIS — M53.3 SACROILIAC JOINT DYSFUNCTION: Primary | ICD-10-CM

## 2019-05-07 DIAGNOSIS — S39.012D STRAIN OF LUMBAR REGION, SUBSEQUENT ENCOUNTER: ICD-10-CM

## 2019-05-07 DIAGNOSIS — R29.3 POSTURE IMBALANCE: ICD-10-CM

## 2019-05-07 DIAGNOSIS — M35.7 FAMILIAL LIGAMENTOUS LAXITY: ICD-10-CM

## 2019-05-07 PROCEDURE — 97140 MANUAL THERAPY 1/> REGIONS: CPT | Performed by: PHYSICAL THERAPIST

## 2019-05-07 PROCEDURE — 97112 NEUROMUSCULAR REEDUCATION: CPT | Performed by: PHYSICAL THERAPIST

## 2019-05-07 NOTE — PROGRESS NOTES
"Physical Therapy Note      SUBJECTIVE:   Changes since last seen:  Her menses finally stopped, last Friday.   She was in a lot of pain Thursday night, has progressively improved since then.  When went to bed last night her hips were hurting. She is alternating mattress, has found couch is a little better than the air mattress.  She is not sleeping on her old mattress, except occasionally.   \"The exercises are doing ok.... Adding weight to scissors arms is going well.\"  She's still not sure she's doing the prone hamstrings correctly.  \"Bridging is going ok.\"    Self corrections good for SI joints but hard to do the lumbar facet self corrections   Tete finds the PIC to the left iliopsoas gives her the most relief.    She's been releasing the iliopsoas at home with tennis ball, it seems to be helping.         Current level of pain:    4/10  Pain is constantly changing, jumping around from QLs, piriformis' gr trochanters.   Lowest level of pain since last seen:  4/10   Highest level of pain since last seen:   7 /10        Past Medical History:  1.  AA 2013  Treated with PT for left shoulder labral tear, no low back treatment   2.  Hx of two falls when mopping in socks, over the last 2 years  3.  Hx of regular, heavy, cramping menses  4.  T& A, 2006  5.  Allergic to Wellbutrin and mild allergy to contrast dye      Functional Limitations:  Sitting, standing, walking, stairs, driving, working, sleeping, squatting, housework and changing positions.   Patient Goals for Physical Therapy: \"Pain relief\"      OBJECTIVE:  Observation:  Slight ight lateral shift of pelvis.      01/08/2019 02/07/19 03/21/19 03/28/19 04/04/19 04/11/19 04/18/19 04/25/19 05/02/19 05/07/19           SI Joint Positioning  Right  Left Right  Left Right  Left Right  Left Right  Left Right  Left Right  Left Right  Left Right  Left Right  Left Right  Left Right  Left Right  Left Right Left Right Left Right Left Right  Left Right  Left       Innominate       "                      Anterior    x   x    x  x                x                 x              X     x    x   x                  Posterior                x               x        x              x     x     x    x               X                x               x              Sacrum                               Unilateral rotation    x   x   x    x    x     x    x    x   x   x                                   SI Joint Testing  Right  Left Right  Left Right  Left Right  Left Right  Left Right  Left Right  Left Right  Left Right  Left Right  Left Right  Left Right  Left Right  Left Right Left Right Left Right Left Right  Left Right  Left           Distraction     +          +   +         +   +          +    +        +  +           +   +          +   +          +   +          +     +        +   +         +                   Joanna's     +          +   Sl         sl    +         -    Sl         -   Sl         -   Sl         -   -           -                      Compression     +          +   Sl          sl   Sl          Sl     Sl        Sl    -          -   -         -   -           -                      Prone Press Up           +         +         Sl          -         -        -         -                                   Special Tests  Right   Left Right  Left Right  Left Right  Left Right  Left Right  Left Right Left  Right  Left Right  Left Right  Left Right  Left Right  Left Right  Left Right Left Right Left Right Left Right Left  Right  Left      Straight Leg Raise                                     Active   -         -          -           -                      Passive   -         -             -             -                  Hip Scour Test   ++       Sl    ++       Sl    +           sl   Sl         Sl    Sl         sl Sl          Sl    Sl        sl   Sl       Sl    Sl         Sl    Sl         Sl                                  Bakers Cyst       +         -     +        -    Sl        -   Sl         -                  Palpation:       Muscle/Bone Irritation  Date 01/08/2019 02/07/19 03/21/19 03/28/19 04/04/18 04/11/19 04/18/19 04/25/19 05/02/19 05/07/19            Right  Left Right  Left Right  Left Right  Left Right  Left Right  Left Right  Left Right  Left Right  Left Right  Left Right  Left Right  Left Right  Left Right Left Right Left Right Left Right  Left Right  Left   Piriformis  ++        +   ++          +   ++       +   ++        +    ++        Sl    ++       Sl   ++       +    ++      +   ++        +   +           +           Gr. Trochanter  +          +  +          sl    +           +    +       ++    +          -   +         Sl    +         sl   +         sl   Sl        Sl    +          +           IT Band  +         sl   Sl        sl     -          -   -          -    Sl         -   Sl         -   Sl        -   Sl         -  sl          -   Sl          Sl            Quadratus Lumborum  ++        ++   ++        +    +        ++   ++        ++     +       ++   +          +  ++        ++   Sl        Sl    +       ++   +          ++           Ischial Tuberosity  ++         sl   -          -    -           -     -           -     -          -   -          -   -          -   -          -   -          -  -            -            Sacrococcygeal Ligs  ++          +   Sl        sl   +          +    +       +    Sl        Sl    Sl       Sl    +         sl  ++      +  +        Sl               Paraspinals  +           +     +         sl    +    +          -   Sl         +   +         sl   +        sl  +         -   Sl          +           Spinous Processes                                        T-spine rotated to   -               -                           L-spine rotated to  L2-5        L1-5  L4-5  L3-5  L2-5            L3-5  L1-5  L2-5  L2-5              L1-5           Adductor Tony    +        +       +         +     -         +   -           Sl     -        sl   +          sl   -          -   -           -   -           -   -           -           Iliopsoas  ++         +   ++        +   ++         +    +         ++  sl        Sl    Sl       Sl    Sl       Sl    Sl         -   Sl        -   Sl         +           Quad Origin  sl          sl   Sl          sl    -          -   -           -    -         -   -          -   -         -   -          -   -          -   -           -           SI Joint  ++         +  ++        +    ++        +   +          +   +         +   +          ++   +         sl    +         sl   +        +  +          sl           Pubic Symphysis         ++          Not tested         ++      ++        ++            + Not tested Not tested           Obturator externus          -          +   -          -   -          -           Rectus Diastasis   1/2 finger                    sternocostals   +        +   +          +    +         -    +           +   Sl        Sl    +         Sl  Not tested     +         +            All 4s Positioning: Pt not able to assume full lumbar extension in this position  Leg Length:  The right  lower extremity is equal to the left        Monthly Objective Measurement/Functional Activity  Date: 01/08/2019 03/21/19 04/25/19        Oswestry Pain Score 52/100  46/100    48/100                              AROM Lumbar Spine: Degrees   Degrees      Degrees      Degrees      Degrees      Degrees      Degrees    Degrees      Flexion 106 pain          99          83           Extension 3 pain           17           19            Right Lat. Flexion 29 pain right trunk          24        25           Left Lat. Flexion 31         19         23           Right Lat Shift Pelvis inc pain   Inc pain Left SI jt       Slight increase           Left Lat Shift Pelvis  less inc pain       No change       No change/ slight cielo                    AROM Hips:  (degrees) Right      Left Right Left Right  Left Right  Left Right  Left Right  Left Right  Left Right  Left      Flexion 110       110  125     120    128      125           Abduction 37       41   45      45      42      44           Int. Rotation 32       22  30      25      31         28           Ext. Rotation  34       42  35      40       38        41                   Flexibility:   (degrees) Right    Left Right  Left Right  Left Right  Left Right  Left Right  Left Right  Left Right  Left      Hamstrings -32       -28  -30     -25   -25       -25           Quadriceps 39       42 40        45    not tested           HipExt/Rot(piriformis) 30       28   32      30  33         32           Hip Int/Rotators 27       9   25      10   28        15           Hip Flexors (iliopsoas) -       -             IT Band -       -                     Reflexes: Right      Left Right  Left Right  Left Right  Left Right  Left Right  Left Right  Left Right  Left      L4 (Quad) +1       +1             S1 (Achilles) +1       +1                     Root Level  - Motor Right      Left Right  Left Right  Left Right  Left Right  Left Right  Left Right  Left Right  Left     T12             Rectus Abdominus 4+/5     4+/5         5/5           L1              Paraspinals 5/5         5/5             L2              Hip Flexion 5/5          5/5             L3             Quads 5/5          5/5             L4              Anterior Tibialis 5/5         5/5             L5             Gr. Toe Extension 5/5           5/5             S1            Gastroc/Sol/Peroneals 5/5          5/5                     Functional Strength:             Single LegStanceTime (seconds) R:30   L:30   R:      L: R:      L: R:     L: R:      L: R:      L: R:      L: R:      L:     Pelvic Floor Isolation (seconds) -    10 sec           Inner Unit  Isolation (seconds) -    10 sec                   Functional Activities:              Sit w/o pain? Pain increases (minutes) No/ 30 min No/ 30  min No/ 30 min            Stand w/o pain? No/ 30 min No/ 30 min No/ 10 min            Walk w/o pain? varies No/ 1 mile No/  "1/2 mile           Steps w/o pain? 1 fl, avoids them 1 fl avoids  1 fl avoids            Drive w/o pain? No/ 10-15 min No/ 30-45 min No/ 30-45  Min            Work w/o pain? No/ 30 min No/ 30 min No/ 30            # times wakes w/ pain? 4-5x/night, now taking meds at night she is able to sleep.  2x   2-3x             PLAN OF CARE:    ASSESSMENT:  Problem List:   1. SI joint dysfunction  2. Lack of spinal stabilization  3. Postural dysfunction     Discussion:    Tete is definitely improved with the end of her period.  She will address the impact of her hormonal changes on her pain when she sees her GYN next month.   Was not able to increase the weights in her hands on the \"arm scissor\" exercise, she will increase the reps and still use the 1 lb weights.   When doing the prone knee flexion, she will dorsiflex the left foot more to put her in more knee flexion, this allowed her to engage the hamstrings to bend the knee and not engage the piriformis.   She did well with the prone glut max over the stability ball, but.... She had to concentrate.     Short Term Goals: (4-6 weeks)                                 Date Met:                1.   pt independent in postural correction       02/07/19  2.   pt independent in SI joint self-corrections      03/21/19  3.   pt independent in exer to decrease post. disc pressures    03/21/19  4.   pt able to sleep through night without pain  5.   pt able to sit 15 minutes without pain  6.   pt able to walk 10minutes without pain  7.   Reduce pt pain to no greater than 7/10      03/21/19  8.   Decrease Oswestry Pain Score to no greater than 45/100  9.  Reduce pt pain to no greater than 6/10  10.  Pt able to isolate the pelvic floor in supine x10, 10 sec    03/28/19  11.  Pt able to isolate transverse abdom in all 4s, x10 sec, x10       03/28/19  12.  Pt able to isolate the multifidus in sitting x10 sec, x10                          03/28/19  13.  Pt able to engage inner unit, move from " "sit to stand &back to sit    04/04/19                                                                                                                                                                                                                                                                                                          14.  Pt able to engage inner unit and walk 4 steps without overflow to hamstrings   15.  Pt able to hip abduct x6 without engaging the piriformis  16.  Pt able to perform prone knee flexion without engaging piriformis x6        05/07/19       17.  Pt able to perform remedial lower abs with scissor arm work x6 w/o pain  04/18/19    18.  Pt able to perform \"Pre-cursor\" lower abs leg lift initiation x6 without pain        04/18/19   19.  Pt able to perform retro step without engaging piriformis x6, bilaterally    20.  Pt able to perform bridging x6 without engaging piriformis    04/25/19    21.  Pt able to perform \"scissors\" arm movement with inner unit w/o piriformis x6 05/02/19  22.  Pt able to perform PIC hip adductors without engaging the piriformis  x6  05/07/19  23.  Pt able to perform \"Scissors\" arm motion w/ inner unit with 1lb wts x6 w/o pain 05/07/19  24.  Pt able to perform prone glut max over stability ball x6 w/o engaging piriformis  25.  Pt able to reduce pain w/ self PIC to left psoas  26.                                                                                                                                               Long Term Goals:(3-5 months)      Date Met:      1.  pt independent in self-management of symptoms       2.  pt independent in home program      3.  pt able to work 6 hours without pain      4.  pt able to sit 60 minutes without pain      5.  pt able to walk 45 min without pain    Progress Towards Goals:  As expected to a little slower than expected    Treatment Plan:   1.  Ultrasound to increase extensibility, decrease pain, if needed  2.  Electrical " stimulation for muscle relaxation, decrease pain, if needed, iontophoresis  3.  Manual therapy to increase function, decrease pain  4.  Therapeutic exercise to increase function, decrease pain  5.  Home programming and d/c planning to increase function and decrease pain    Frequency & Duration:  Pt will be seen on a once/week basis for 3) more visits    Patient Participated in and Agrees With This Plan of Care and Goals:  YES  Rehab Potential:  jarret Marcial, PT, MS  Physical Therapist  [place]# [id number]      TREATMENT TODAY:    Total Treatment Time: (time in clinic)    60  min  Timed Code Treatment Minutes:    60      Supplies given:      Manual Therapy:  (MA)  RX min:    25  Manual posterior rotation of left innominate    Positional Isometric contraction (PIC) of right iliopsoas    Positional Isometric contraction of (PIC) right gluteus minimus    Distraction of both SI jts in open pack hip position  Piriformis stretching, bilaterally    Quadratus lumborum stretching, bilaterally    Anterior/Inferior Mobilization of  right hip    Positional Isometric Contraction of multifidus  Myofascial work trunk   Sternocostal and rib mobs   Manual spinal distraction    Therapeutic Activities:  (TA)  RX min:         Neuromuscular Reeducation: (NMR)  RX min:   35    Ultrasound:  RX min:          1.6 vance/cm2       Location:     Iontophoresis:  6 hr patch                                Location:                               Procedures:      Key:  i=instructed        r=review        c=corrected        a=adapted   Code Procedure/Instruction 01/08/2019 02/07/19 03/21/19 03/28/19 04/04/19 04/11/19 04/18/19 04/25/19 05/02/19 05/07/19         TA         Sleeping Positions instructed               reviewed,correct                 TA Sternum-Up Posture instructed reviewed                 TA SI jt. self-correction instructed corrected corrected  reviewed              TA Lumbar Facet PIC instructed corrected corrected  reviewed               TA   Order of Self-Corrections instructed    reviewed              TA Use of lumbar pillow instructed                  TA Use of cervical roll instructed corrected                 TA Use of orthotics instructed                  TA Use of SI belt instructed                  TA Use of Nada chair                   TA Use of Ice instructed                  TA Use of [employer]/ heat instructed                  TA Use of Theraworx Relief instructed                  TA Use of TENS unit                   TA Use of iontophoresis                   TA Decreasing Disc Pressures  instructed                 TA Use of sacro wedge          attempted didnt work                             NMR Pelvic Floor Isolation   instructed corrected               NMR Transverse Abdominus   instructed corrected               NMR Multifidus Isolation   instructed                NMR Diaphragmtic breathe supine    instructed               NMR  Diaphragmtic breath sit                   NMR Pelvic Clock in sitting   instructed                NMR Kinesiotape    Applied to both QL               NMR InnerUnit against Gravity    instructed               NMR Sit-stand w/ inner unit    instructed               NMR Roll w/ inner unit    instructed               NMR Walk w/ inner unit     instructed               NMR Up/down steps w/ inner unit    instructed               NMR Water Exer Instruction                   NMR Hip Abd w/o piriformis     instructed stopped             NMR Hip Add w/o iliop/ham      instructed stopped             NMR Lower abs in supine      Adapted to pre cursor, initiation Cont, no change   Still not able to lift foot w/o engaging piriformis          NMR Inner unit challenge with scissor arm work       instructed Added legs  Adapted w/ ball & belt progressed to 1lb weights Inc reps to 10, still use 1lb         NMR Abd obliques in supine      attempted             NMR Prone Knee Flexion     instructed  instructed corrected  Made bolster higher. Dorsiflex left foot more         NMR Supine glute w/ ball btwn knees        instructed  Adapted to do w/ belt for abd Moved belt more proximal on thighs She will not bridge as high          NMR Prone glut amrita          instructed         NMR Retro Step       instructed stopped           NMR Retro Walk                   NMR Retro walk on treadmill                   NMR Forward walk w/ inner unit                   NMR Balance Instruction                   NMR Stability Ball A/P & Lateral                   NMR Ball Catch/Throw /Supine                   NMR Ball Catch/Throw /Stand                   NMR StabilityBall All 4's Arm Lift                   NMR StabilityBall Prone Walk Out                   NMR StabilityBall Supine Oblique                   NMR Stability Ball sit-supine-sit                   NMR Walking Prog Progression                   MNR Home program sequencing                                       TA Hamstring stretch                   TA Hip Ext Rot Stretch                   TA Hip Int Rot Stretch                   TA Hip Flex/IT Stretch                   TA Hip Add Stretch                   TA Lats Dorsi Stretch                   TA Gastroc/soleus stretch                   TA QuadratusLumborm Stretch                      Supine                      Stance                   TA Quad Stretch                                       NMR Wall Push Ups                   NMR Planking                   NMR BOSU Ball Exercises                   NMR Lateral Bridge Drop                   NMR [place]                   NMR O'Feldt Lat Pull Down                   NMR O'Feldt Hip Ext                   NMR O'Feldt Trunk Ext                                       TA CervDiscExtSup/stnd                   TA Cerv Stretches                   TA Self PIC Cervical Spine                   TA Neural Gliding                   TA Ant/Mid Scalene Strch                   TA Biceps stretch                    TA Rotator Cuff Stretch                   TA Anterior Chest Stretch                   TA Wrist Ext Stretch                                       NMR Prone Arm Lifts                   NMR Scapula Stabilization                   NMR Occulomotor Isometrics                   NMR Cervical Isometrics                                       TA Shld AROM w/bar                   TA Shld Capsular Stretching                   TA Self PIC Thor Spine                   TA Rot Cuff Therabd, beginner                   TA Rot Cuff Therabd, gibraned                                                                                                                                                                                                                                                 Miracle Marcial, PT, MS  Physical Therapist  [place]# [id number]

## 2019-05-14 ENCOUNTER — TREATMENT (OUTPATIENT)
Dept: PHYSICAL THERAPY | Facility: CLINIC | Age: 34
End: 2019-05-14

## 2019-05-14 DIAGNOSIS — S39.012D STRAIN OF LUMBAR REGION, SUBSEQUENT ENCOUNTER: ICD-10-CM

## 2019-05-14 DIAGNOSIS — M35.7 FAMILIAL LIGAMENTOUS LAXITY: ICD-10-CM

## 2019-05-14 DIAGNOSIS — M53.3 SACROILIAC JOINT DYSFUNCTION: Primary | ICD-10-CM

## 2019-05-14 DIAGNOSIS — R29.3 POSTURE IMBALANCE: ICD-10-CM

## 2019-05-14 PROCEDURE — 97112 NEUROMUSCULAR REEDUCATION: CPT | Performed by: PHYSICAL THERAPIST

## 2019-05-14 PROCEDURE — 97140 MANUAL THERAPY 1/> REGIONS: CPT | Performed by: PHYSICAL THERAPIST

## 2019-05-14 NOTE — PROGRESS NOTES
"Physical Therapy Note      SUBJECTIVE:   Changes since last seen:  She got a new puppy so she's bending over and picking it up which has bothered her back a little but not as bad as she thought it would.  She needed to put SI belt on last night and it gave her some relief.   Wed and Thursday, last week, noticed she was clenching her teeth and piriformis in an attempt to stabilize her trunk... She had not noticed this before and was surprised to realize she is doing this!  Subsequent to this she had a dull ache in upper back.  She indicates her right hip \"popped\" once in a good way and popped in lower left back when doing PIC to right psoas, which also felt good.    She is still spotting and is suppose to start period again in 10 days. She is anxious to see her GYN, Dr. Griffin on June 10th to discuss how the hormonal fluctuations are impacting her pain.   She hasn't done as much on the stability ball because she just got the ball.   She used the 2 lb wts on scissor arms at home, tried the 3lb wts but they were too much for her back.    Bridging \"went ok\", she worries she is kicking on piriforms.  Prone knee flexion is still hard, right side has better control.  She is sleeping on air mattress now instead of her mattress which has really helped to decrease her pain.       Current level of pain:    4/10  Across upper sacrum .   Lowest level of pain since last seen:  4/10   Highest level of pain since last seen:   7/10        Past Medical History:  1.  AA 2013  Treated with PT for left shoulder labral tear, no low back treatment   2.  Hx of two falls when mopping in socks, over the last 2 years  3.  Hx of regular, heavy, cramping menses  4.  T& A, 2006  5.  Allergic to Wellbutrin and mild allergy to contrast dye      Functional Limitations:  Sitting, standing, walking, stairs, driving, working, sleeping, squatting, housework and changing positions.   Patient Goals for Physical Therapy: \"Pain relief\"    "   OBJECTIVE:  Observation:  Slight ight lateral shift of pelvis.      01/08/2019 02/07/19 03/21/19 03/28/19 04/04/19 04/11/19 04/18/19 04/25/19 05/02/19 05/07/19 05/14/19          SI Joint Positioning  Right  Left Right  Left Right  Left Right  Left Right  Left Right  Left Right  Left Right  Left Right  Left Right  Left Right  Left Right  Left Right  Left Right Left Right Left Right Left Right  Left Right  Left       Innominate                            Anterior    x   x    x  x                x                 x              X     x    x   x   x                           Posterior                x               x        x              x     x     x    x               X                x               x              x             Sacrum                               Unilateral rotation    x   x   x    x    x     x    x    x   x   x    x                                  SI Joint Testing  Right  Left Right  Left Right  Left Right  Left Right  Left Right  Left Right  Left Right  Left Right  Left Right  Left Right  Left Right  Left Right  Left Right Left Right Left Right Left Right  Left Right  Left           Distraction     +          +   +         +   +          +    +        +  +           +   +          +   +          +   +          +     +        +   +         +   +         +                  Joanna's     +          +   Sl         sl    +         -    Sl         -   Sl         -   Sl         -   -           -                      Compression     +          +   Sl          sl   Sl          Sl     Sl        Sl    -          -   -         -   -           -                      Prone Press Up           +         +         Sl          -         -        -         -                                   Special Tests  Right   Left Right  Left Right  Left Right  Left Right  Left Right  Left Right Left  Right  Left Right  Left Right  Left Right  Left Right  Left Right  Left Right Left Right Left Right Left Right Left  Right   Left      Straight Leg Raise                                     Active   -         -          -           -                      Passive   -         -             -             -                  Hip Scour Test   ++       Sl    ++       Sl    +           sl   Sl         Sl    Sl         sl Sl          Sl    Sl        sl   Sl       Sl    Sl         Sl    Sl         Sl     Sl         sl                               Bakers Cyst       +         -     +        -    Sl        -   Sl         -     Sl        -              Palpation:       Muscle/Bone Irritation  Date 01/08/2019 02/07/19 03/21/19 03/28/19 04/04/18 04/11/19 04/18/19 04/25/19 05/02/19 05/07/19 05/14/19           Right  Left Right  Left Right  Left Right  Left Right  Left Right  Left Right  Left Right  Left Right  Left Right  Left Right  Left Right  Left Right  Left Right Left Right Left Right Left Right  Left Right  Left   Piriformis  ++        +   ++          +   ++       +   ++        +    ++        Sl    ++       Sl   ++       +    ++      +   ++        +   +           +    +        Sl           Gr. Trochanter  +          +  +          sl    +           +    +       ++    +          -   +         Sl    +         sl   +         sl   Sl        Sl    +          +    Sl        sl          IT Band  +         sl   Sl        sl     -          -   -          -    Sl         -   Sl         -   Sl        -   Sl         -  sl          -   Sl          Sl      -          -            Quadratus Lumborum  ++        ++   ++        +    +        ++   ++        ++     +       ++   +          +  ++        ++   Sl        Sl    +       ++   +          ++    Sl       +          Ischial Tuberosity  ++         sl   -          -    -           -     -           -     -          -   -          -   -          -   -          -   -          -  -            -     -           -          Sacrococcygeal Ligs  ++          +   Sl        sl   +          +    +       +    Sl        Sl    Sl        Sl    +         sl  ++      +  +        Sl       Sl         +          Paraspinals  +           +     +         sl    +    +          -   Sl         +   +         sl   +        sl  +         -   Sl          +   Sl          +          Spinous Processes                                        T-spine rotated to   -               -                           L-spine rotated to  L2-5        L1-5  L4-5  L3-5  L2-5            L3-5  L1-5  L2-5  L2-5              L1-5           L2-5          Adductor Tony    +        +       +         +     -         +   -           Sl     -        sl   +          sl   -          -   -           -   -          -   -           -   -          -          Iliopsoas  ++         +   ++        +   ++         +    +         ++  sl        Sl    Sl       Sl    Sl       Sl    Sl         -   Sl        -   Sl         +   +         S;           Quad Origin  sl          sl   Sl          sl    -          -   -           -    -         -   -          -   -         -   -          -   -          -   -           -    -         -          SI Joint  ++         +  ++        +    ++        +   +          +   +         +   +          ++   +         sl    +         sl   +        +  +          sl  +        Sl           Pubic Symphysis         ++          Not tested         ++      ++        ++            + Not tested Not tested           Obturator externus          -          +   -          -   -          -           Rectus Diastasis   1/2 finger                    sternocostals   +        +   +          +    +         -    +           +   Sl        Sl    +         Sl  Not tested     +         +            All 4s Positioning: Pt not able to assume full lumbar extension in this position  Leg Length:  The right  lower extremity is equal to the left        Monthly Objective Measurement/Functional Activity  Date: 01/08/2019 03/21/19 04/25/19        Oswestry Pain Score 52/100  46/100    48/100                               AROM Lumbar Spine: Degrees   Degrees      Degrees      Degrees      Degrees      Degrees      Degrees    Degrees      Flexion 106 pain          99          83           Extension 3 pain           17           19            Right Lat. Flexion 29 pain right trunk          24        25           Left Lat. Flexion 31         19         23           Right Lat Shift Pelvis inc pain   Inc pain Left SI jt       Slight increase           Left Lat Shift Pelvis  less inc pain       No change       No change/ slight cielo                    AROM Hips:  (degrees) Right      Left Right Left Right  Left Right  Left Right  Left Right  Left Right  Left Right  Left      Flexion 110       110  125    120    128      125           Abduction 37       41   45      45      42      44           Int. Rotation 32       22  30      25      31         28           Ext. Rotation  34       42  35      40       38        41                   Flexibility:   (degrees) Right    Left Right  Left Right  Left Right  Left Right  Left Right  Left Right  Left Right  Left      Hamstrings -32       -28  -30     -25   -25       -25           Quadriceps 39       42 40        45    not tested           HipExt/Rot(piriformis) 30       28   32      30  33         32           Hip Int/Rotators 27       9   25      10   28        15           Hip Flexors (iliopsoas) -       -             IT Band -       -                     Reflexes: Right      Left Right  Left Right  Left Right  Left Right  Left Right  Left Right  Left Right  Left      L4 (Quad) +1       +1             S1 (Achilles) +1       +1                     Root Level  - Motor Right      Left Right  Left Right  Left Right  Left Right  Left Right  Left Right  Left Right  Left     T12             Rectus Abdominus 4+/5     4+/5         5/5           L1              Paraspinals 5/5         5/5             L2              Hip Flexion 5/5          5/5             L3             Quads 5/5          5/5        "      L4              Anterior Tibialis 5/5         5/5             L5             Gr. Toe Extension 5/5           5/5             S1            Gastroc/Sol/Peroneals 5/5          5/5                     Functional Strength:             Single LegStanceTime (seconds) R:30   L:30   R:      L: R:      L: R:     L: R:      L: R:      L: R:      L: R:      L:     Pelvic Floor Isolation (seconds) -    10 sec           Inner Unit  Isolation (seconds) -    10 sec                   Functional Activities:              Sit w/o pain? Pain increases (minutes) No/ 30 min No/ 30  min No/ 30 min            Stand w/o pain? No/ 30 min No/ 30 min No/ 10 min            Walk w/o pain? varies No/ 1 mile No/ 1/2 mile           Steps w/o pain? 1 fl, avoids them 1 fl avoids  1 fl avoids            Drive w/o pain? No/ 10-15 min No/ 30-45 min No/ 30-45  Min            Work w/o pain? No/ 30 min No/ 30 min No/ 30            # times wakes w/ pain? 4-5x/night, now taking meds at night she is able to sleep.  2x   2-3x             PLAN OF CARE:    ASSESSMENT:  Problem List:   1. SI joint dysfunction  2. Lack of spinal stabilization  3. Postural dysfunction     Discussion:    Reviewed last week's exercises.... She need to use the rectus abdominus more to pull the ribs down when doing the \"scissor arm\" exercise.  She notes she like doing this one because she knows she can do it without substituting muscles.  She can also tell she is getting stronger with this and likes how her trunk is more stable from it.     The prone knee flexion exercise is going well... She can do the right leg without having to dorsiflex the foot, but she must continue to dorsiflex on the left in order to not inappropriately engage the gastroc.      Tete presents today with the least amount of pain since starting P.T.  She is beginning to sense how the trunk stability impacts her overall posture/ positioning and pain.    Continue progressing the trunk stability work on the " "stability ball on next visit.     Short Term Goals: (4-6 weeks)                                 Date Met:                1.   pt independent in postural correction       02/07/19  2.   pt independent in SI joint self-corrections      03/21/19  3.   pt independent in exer to decrease post. disc pressures    03/21/19  4.   pt able to sleep through night without pain  5.   pt able to sit 15 minutes without pain  6.   pt able to walk 10minutes without pain  7.   Reduce pt pain to no greater than 7/10      03/21/19  8.   Decrease Oswestry Pain Score to no greater than 45/100  9.  Reduce pt pain to no greater than 6/10  10.  Pt able to isolate the pelvic floor in supine x10, 10 sec    03/28/19  11.  Pt able to isolate transverse abdom in all 4s, x10 sec, x10       03/28/19  12.  Pt able to isolate the multifidus in sitting x10 sec, x10                          03/28/19  13.  Pt able to engage inner unit, move from sit to stand &back to sit    04/04/19                                                                                                                                                                                                                                                                                                          14.  Pt able to engage inner unit and walk 4 steps without overflow to hamstrings   15.  Pt able to hip abduct x6 without engaging the piriformis  16.  Pt able to perform prone knee flexion without engaging piriformis x6        05/07/19       17.  Pt able to perform remedial lower abs with scissor arm work x6 w/o pain  04/18/19    18.  Pt able to perform \"Pre-cursor\" lower abs leg lift initiation x6 without pain        04/18/19   19.  Pt able to perform retro step without engaging piriformis x6, bilaterally    20.  Pt able to perform bridging x6 without engaging piriformis    04/25/19    21.  Pt able to perform \"scissors\" arm movement with inner unit w/o piriformis x6 05/02/19  22.  " "Pt able to perform PIC hip adductors without engaging the piriformis  x6  05/07/19  23.  Pt able to perform \"Scissors\" arm motion w/ inner unit with 1lb wts x6 w/o pain 05/07/19  24.  Pt able to perform prone glut max over stability ball x6 w/o engaging piriformis   25.  Pt able to reduce pain w/ self PIC to left psoas     05/14/19  26.  Pt able to move laterally on stability ball in sitting without pain x6  27.  Pt able to move A/P movement sitting on stability ball without pain x6                                                                                                                                           Long Term Goals:(3-5 months)      Date Met:      1.  pt independent in self-management of symptoms       2.  pt independent in home program      3.  pt able to work 6 hours without pain      4.  pt able to sit 60 minutes without pain      5.  pt able to walk 45 min without pain    Progress Towards Goals:  As expected to a little slower than expected    Treatment Plan:   1.  Ultrasound to increase extensibility, decrease pain, if needed  2.  Electrical stimulation for muscle relaxation, decrease pain, if needed, iontophoresis  3.  Manual therapy to increase function, decrease pain  4.  Therapeutic exercise to increase function, decrease pain  5.  Home programming and d/c planning to increase function and decrease pain    Frequency & Duration:  Pt will be seen on a once/week basis for 2 more visits    Patient Participated in and Agrees With This Plan of Care and Goals:  YES  Rehab Potential:  jarret Marcial, PT, MS  Physical Therapist  [place]# [id number]      TREATMENT TODAY:    Total Treatment Time: (time in clinic)    65  min  Timed Code Treatment Minutes:      60      Supplies given:      Manual Therapy:  (MA)  RX min:     25 min  Manual posterior rotation of left innominate    Positional Isometric contraction (PIC) of right iliopsoas    Positional Isometric contraction of (PIC) right gluteus " minimus    Distraction of both SI jts in open pack hip position  Piriformis stretching, bilaterally    Quadratus lumborum stretching, bilaterally    Anterior/Inferior Mobilization of  right hip    Positional Isometric Contraction of multifidus  Myofascial work trunk   Sternocostal and rib mobs   Manual spinal distraction    Therapeutic Activities:  (TA)  RX min:         Neuromuscular Reeducation: (NMR)  RX min:   35    Ultrasound:  RX min:          1.6 vance/cm2       Location:     Iontophoresis:  6 hr patch                                Location:                               Procedures:      Key:  i=instructed        r=review        c=corrected        a=adapted   Code Procedure/Instruction 01/08/2019 02/07/19 03/21/19 03/28/19 04/04/19 04/11/19 04/18/19 04/25/19 05/02/19 05/07/19 05/14/19        TA         Sleeping Positions instructed               reviewed,correct                 TA Sternum-Up Posture instructed reviewed                 TA SI jt. self-correction instructed corrected corrected  reviewed              TA Lumbar Facet PIC instructed corrected corrected  reviewed              TA   Order of Self-Corrections instructed    reviewed              TA Use of lumbar pillow instructed                  TA Use of cervical roll instructed corrected                 TA Use of orthotics instructed                  TA Use of SI belt instructed                  TA Use of Nada chair                   TA Use of Ice instructed                  TA Use of [employer]/ heat instructed                  TA Use of Theraworx Relief instructed                  TA Use of TENS unit                   TA Use of iontophoresis                   TA Decreasing Disc Pressures  instructed                 TA Use of sacro wedge          attempted didnt work                             NMR Pelvic Floor Isolation   instructed corrected               NMR Transverse Abdominus   instructed corrected               NMR Multifidus Isolation    instructed                NMR Diaphragmtic breathe supine    instructed               NMR  Diaphragmtic breath sit                   NMR Pelvic Clock in sitting   instructed                NMR Kinesiotape    Applied to both QL               NMR InnerUnit against Gravity    instructed               NMR Sit-stand w/ inner unit    instructed               NMR Roll w/ inner unit    instructed               NMR Walk w/ inner unit     instructed               NMR Up/down steps w/ inner unit    instructed               NMR Water Exer Instruction                   NMR Hip Abd w/o piriformis     instructed stopped             NMR Hip Add w/o iliop/ham      instructed stopped             NMR Lower abs in supine      Adapted to pre cursor, initiation Cont, no change   Still not able to lift foot w/o engaging piriformis   adapted with bolster         NMR Inner unit challenge with scissor arm work       instructed Added legs  Adapted w/ ball & belt progressed to 1lb weights Inc reps to 10, still use 1lb Added rectus abdom to this        NMR Abd obliques in supine      attempted             NMR Prone Knee Flexion     instructed  instructed corrected Made bolster higher. Dorsiflex left foot more reviewedstopped dorsiflex on Right        NMR Supine glute w/ ball btwn knees        instructed  Adapted to do w/ belt for abd Moved belt more proximal on thighs She will not bridge as high  continue        NMR Prone glut amrita          instructed         NMR Retro Step       instructed stopped           NMR Retro Walk                   NMR Retro walk on treadmill                   NMR Forward walk w/ inner unit                   NMR Balance Instruction                   NMR Stability Ball A/P & Lateral           instructed        NMR Ball Catch/Throw /Supine                   NMR Ball Catch/Throw /Stand                   NMR StabilityBall All 4's Arm Lift                   NMR StabilityBall Prone Walk Out                   NMR  StabilityBall Supine Oblique                   NMR Stability Ball sit-supine-sit                   NMR Walking Prog Progression                   MNR Home program sequencing                                       TA Hamstring stretch                   TA Hip Ext Rot Stretch                   TA Hip Int Rot Stretch                   TA Hip Flex/IT Stretch                   TA Hip Add Stretch                   TA Lats Dorsi Stretch                   TA Gastroc/soleus stretch                   TA QuadratusLumborm Stretch                      Supine                      Stance                   TA Quad Stretch                                       NMR Wall Push Ups                   NMR Planking                   NMR BOSU Ball Exercises                   NMR Lateral Bridge Drop                   NMR [place]                   NMR O'Feldt Lat Pull Down                   NMR O'Feldt Hip Ext                   NMR O'Feldt Trunk Ext                                       TA CervDiscExtSup/stnd                   TA Cerv Stretches                   TA Self PIC Cervical Spine                   TA Neural Gliding                   TA Ant/Mid Scalene Strch                   TA Biceps stretch                   TA Rotator Cuff Stretch                   TA Anterior Chest Stretch                   TA Wrist Ext Stretch                                       NMR Prone Arm Lifts                   NMR Scapula Stabilization                   NMR Occulomotor Isometrics                   NMR Cervical Isometrics                                       TA Shld AROM w/bar                   TA Shld Capsular Stretching                   TA Self PIC Thor Spine                   TA Rot Cuff Therabd, beginner                   TA Rot Cuff Therabd, intermed                                                                                                                                                                                                                                                  Miracle Marcial, PT, MS  Physical Therapist  [place]# [id number]

## 2019-06-05 ENCOUNTER — TREATMENT (OUTPATIENT)
Dept: PHYSICAL THERAPY | Facility: CLINIC | Age: 34
End: 2019-06-05

## 2019-06-05 DIAGNOSIS — M53.3 SACROILIAC JOINT DYSFUNCTION: Primary | ICD-10-CM

## 2019-06-05 DIAGNOSIS — R29.3 POSTURE IMBALANCE: ICD-10-CM

## 2019-06-05 DIAGNOSIS — M35.7 FAMILIAL LIGAMENTOUS LAXITY: ICD-10-CM

## 2019-06-05 DIAGNOSIS — S39.012D STRAIN OF LUMBAR REGION, SUBSEQUENT ENCOUNTER: ICD-10-CM

## 2019-06-05 PROCEDURE — 97140 MANUAL THERAPY 1/> REGIONS: CPT | Performed by: PHYSICAL THERAPIST

## 2019-06-05 PROCEDURE — 97112 NEUROMUSCULAR REEDUCATION: CPT | Performed by: PHYSICAL THERAPIST

## 2019-06-11 ENCOUNTER — TREATMENT (OUTPATIENT)
Dept: PHYSICAL THERAPY | Facility: CLINIC | Age: 34
End: 2019-06-11

## 2019-06-11 DIAGNOSIS — M53.3 SACROILIAC JOINT DYSFUNCTION: Primary | ICD-10-CM

## 2019-06-11 DIAGNOSIS — M35.7 FAMILIAL LIGAMENTOUS LAXITY: ICD-10-CM

## 2019-06-11 DIAGNOSIS — R29.3 POSTURE IMBALANCE: ICD-10-CM

## 2019-06-11 DIAGNOSIS — S39.012D STRAIN OF LUMBAR REGION, SUBSEQUENT ENCOUNTER: ICD-10-CM

## 2019-06-11 PROCEDURE — 97140 MANUAL THERAPY 1/> REGIONS: CPT | Performed by: PHYSICAL THERAPIST

## 2019-06-11 PROCEDURE — 97530 THERAPEUTIC ACTIVITIES: CPT | Performed by: PHYSICAL THERAPIST

## 2019-06-11 PROCEDURE — 97035 APP MDLTY 1+ULTRASOUND EA 15: CPT | Performed by: PHYSICAL THERAPIST

## 2019-06-11 NOTE — PROGRESS NOTES
"Physical Therapy Note      SUBJECTIVE:   Changes since last seen:  Tete saw GYN, called in a script for new birth control pills and ordered an ultarasound of uterus and ovary to be done the 25th of June.  \"Not great since last week.\"  She has pain in bilateral QL insertions; \"I had pins and needles yesterday, in this area; this is a new symptom but it went away quickly.\"  She is currently alternating sleeping on an air mattress and back in her soft mattress.   She did the new stability ball work and was a little sore, wasn't able to get in the pool yet.       Current level of pain:    7/10  Across upper sacrum .   Lowest level of pain since last seen:  5/10   Highest level of pain since last seen:   7/10        Past Medical History:  1.  AA 2013  Treated with PT for left shoulder labral tear, no low back treatment   2.  Hx of two falls when mopping in socks, over the last 2 years  3.  Hx of regular, heavy, cramping menses  4.  T& A, 2006  5.  Allergic to Wellbutrin and mild allergy to contrast dye  6.  Chronic yeast infections  7.  Uterine fibroids removed, 2 yrs ago       Functional Limitations:  Sitting, standing, walking, stairs, driving, working, sleeping, squatting, housework and changing positions.   Patient Goals for Physical Therapy: \"Pain relief\"      OBJECTIVE:  Observation:  Slight ight lateral shift of pelvis.      01/08/2019 02/07/19 03/21/19 03/28/19 04/04/19 04/11/19 04/18/19 04/25/19 05/02/19 05/07/19 05/14/19 06/05/19 06/11/19        SI Joint Positioning  Right  Left Right  Left Right  Left Right  Left Right  Left Right  Left Right  Left Right  Left Right  Left Right  Left Right  Left Right  Left Right  Left Right Left Right Left Right Left Right  Left Right  Left       Innominate                            Anterior    x   x    x  x                x                 x              X     x    x   x   x               x   X                    Posterior                x               x        x           "    x     x     x    x               X                x               x              x                x               x           Sacrum                               Unilateral rotation    x   x   x    x    x     x    x    x   x   x    x    x   x                                SI Joint Testing  Right  Left Right  Left Right  Left Right  Left Right  Left Right  Left Right  Left Right  Left Right  Left Right  Left Right  Left Right  Left Right  Left Right Left Right Left Right Left Right  Left Right  Left           Distraction     +          +   +         +   +          +    +        +  +           +   +          +   +          +   +          +     +        +   +         +   +         +   +          +   +           +                Joanna's     +          +   Sl         sl    +         -    Sl         -   Sl         -   Sl         -   -           -                      Compression     +          +   Sl          sl   Sl          Sl     Sl        Sl    -          -   -         -   -           -                      Prone Press Up           +         +         Sl          -         -        -         -                                   Special Tests  Right   Left Right  Left Right  Left Right  Left Right  Left Right  Left Right Left  Right  Left Right  Left Right  Left Right  Left Right  Left Right  Left Right Left Right Left Right Left Right Left  Right  Left      Straight Leg Raise                                     Active   -         -          -           -                      Passive   -         -             -             -                  Hip Scour Test   ++       Sl    ++       Sl    +           sl   Sl         Sl    Sl         sl Sl          Sl    Sl        sl   Sl       Sl    Sl         Sl    Sl         Sl     Sl         sl   Sl        Sl    Sl        Sl                              Bakers Cyst       +         -     +        -    Sl        -   Sl         -     Sl        -   Sl        Sl   sl          sl             Palpation:       Muscle/Bone Irritation  Date 01/08/2019 02/07/19 03/21/19 03/28/19 04/04/18 04/11/19 04/18/19 04/25/19 05/02/19 05/07/19 05/14/19 06/05/19 06/11/19         Right  Left Right  Left Right  Left Right  Left Right  Left Right  Left Right  Left Right  Left Right  Left Right  Left Right  Left Right  Left Right  Left Right Left Right Left Right Left Right  Left Right  Left   Piriformis  ++        +   ++          +   ++       +   ++        +    ++        Sl    ++       Sl   ++       +    ++      +   ++        +   +           +    +        Sl      +         ++   +           ++        Gr. Trochanter  +          +  +          sl    +           +    +       ++    +          -   +         Sl    +         sl   +         sl   Sl        Sl    +          +    Sl        sl    Sl         +   Sl          sl        IT Band  +         sl   Sl        sl     -          -   -          -    Sl         -   Sl         -   Sl        -   Sl         -  sl          -   Sl          Sl      -          -     -           -   -            -        Quadratus Lumborum  ++        ++   ++        +    +        ++   ++        ++     +       ++   +          +  ++        ++   Sl        Sl    +       ++   +          ++    Sl       +     +         +   +          ++        Ischial Tuberosity  ++         sl   -          -    -           -     -           -     -          -   -          -   -          -   -          -   -          -  -            -     -           -   -          -   -             -        Sacrococcygeal Ligs  ++          +   Sl        sl   +          +    +       +    Sl        Sl    Sl       Sl    +         sl  ++      +  +        Sl       Sl         +   Sl         Sl    Sl         Sl         Paraspinals  +           +     +         sl    +    +          -   Sl         +   +         sl   +        sl  +         -   Sl          +   Sl          +   +          sl   ++         sl        Spinous Processes                                         T-spine rotated to   -               -                           L-spine rotated to  L2-5        L1-5  L4-5  L3-5  L2-5            L3-5  L1-5  L2-5  L2-5              L1-5           L2-5 L2-5   L1-5        Adductor Tony    +        +       +         +     -         +   -           Sl     -        sl   +          sl   -          -   -           -   -          -   -           -   -          -   -           sl  ++        +        Iliopsoas  ++         +   ++        +   ++         +    +         ++  sl        Sl    Sl       Sl    Sl       Sl    Sl         -   Sl        -   Sl         +   +         Sl    +          sl  +          ++        Quad Origin  sl          sl   Sl          sl    -          -   -           -    -         -   -          -   -         -   -          -   -          -   -           -    -         -   -            -   -         -        SI Joint  ++         +  ++        +    ++        +   +          +   +         +   +          ++   +         sl    +         sl   +        +  +          sl  +        Sl     +        Sl   +          ++        Pubic Symphysis         ++          Not tested         ++      ++        ++            + Not tested Not tested          +         Obturator externus          -          +   -          -   -          -    -           -   -          -        Rectus Diastasis   1/2 finger                    sternocostals   +        +   +          +    +         -    +           +   Sl        Sl    +         Sl  Not tested     +         +     +         +   +         +        All 4s Positioning: Pt not able to assume full lumbar extension in this position  Leg Length:  The right  lower extremity is equal to the left        Monthly Objective Measurement/Functional Activity  Date: 01/08/2019 03/21/19 04/25/19 06/05/19       Oswestry Pain Score 52/100  46/100    48/100      48/100                             AROM Lumbar Spine: Degrees   Degrees      Degrees      Degrees       Degrees      Degrees      Degrees    Degrees      Flexion 106 pain          99          83       75 pain          Extension 3 pain           17           19       11 pain          Right Lat. Flexion 29 pain right trunk          24        25      25          Left Lat. Flexion 31         19         23     14          Right Lat Shift Pelvis inc pain   Inc pain Left SI jt       Slight increase No change but can't do far          Left Lat Shift Pelvis  less inc pain       No change       No change/ slight cielo  No change but can't go far                   AROM Hips:  (degrees) Right      Left Right Left Right  Left Right  Left Right  Left Right  Left Right  Left Right  Left      Flexion 110       110  125    120    128      125 130       130          Abduction 37       41   45      45      42      44  43          45          Int. Rotation 32       22  30      25      31         28  32          30          Ext. Rotation  34       42  35      40       38        41   35         40                  Flexibility:   (degrees) Right    Left Right  Left Right  Left Right  Left Right  Left Right  Left Right  Left Right  Left      Hamstrings -32       -28  -30     -25   -25       -25   -28      -27          Quadriceps 39       42 40        45    not tested  40          42          HipExt/Rot(piriformis) 30       28   32      30  33         32   35         33          Hip Int/Rotators 27       9   25      10   28        15   27         16          Hip Flexors (iliopsoas) -       -             IT Band -       -                     Reflexes: Right      Left Right  Left Right  Left Right  Left Right  Left Right  Left Right  Left Right  Left      L4 (Quad) +1       +1             S1 (Achilles) +1       +1                     Root Level  - Motor Right      Left Right  Left Right  Left Right  Left Right  Left Right  Left Right  Left Right  Left     T12             Rectus Abdominus 4+/5     4+/5         5/5        5/5          L1               Paraspinals 5/5         5/5             L2              Hip Flexion 5/5          5/5             L3             Quads 5/5          5/5             L4              Anterior Tibialis 5/5         5/5             L5             Gr. Toe Extension 5/5           5/5             S1            Gastroc/Sol/Peroneals 5/5          5/5                     Functional Strength:             Single LegStanceTime (seconds) R:30   L:30   R:      L: R:      L: R: 30 pain     L:30 pain  R:      L: R:      L: R:      L: R:      L:     Pelvic Floor Isolation (seconds) -    10 sec 10 sec          Inner Unit  Isolation (seconds) -    10 sec 10 sec                   Functional Activities:              Sit w/o pain? Pain increases (minutes) No/ 30 min No/ 30  min No/ 30 min  No/  30 min          Stand w/o pain? No/ 30 min No/ 30 min No/ 10 min  No/ 10 min          Walk w/o pain? varies No/ 1 mile No/ 1/2 mile No/ 1 mile          Steps w/o pain? 1 fl, avoids them 1 fl avoids  1 fl avoids  No/ avoids          Drive w/o pain? No/ 10-15 min No/ 30-45 min No/ 30-45  Min  No/ 30-45 min          Work w/o pain? No/ 30 min No/ 30 min No/ 30  No/ 30 min          # times wakes w/ pain? 4-5x/night, now taking meds at night she is able to sleep.  2x   2-3x  2x           PLAN OF CARE:    ASSESSMENT:  Problem List:   1. SI joint dysfunction  2. Lack of spinal stabilization  3. Postural dysfunction     Discussion:  Spent a great deal of time today determining which ilium was anteriorly rotated and then had a hard time bringing it to symmetry.  Finally after much posterior rotation of left ilium and PIC to right psoas then stretching the right piriformis was able to get pain relief and symmetry of the innominates.  She was taught to self stretch the piriformis in supine and manually posteriorly rotate the right ilium in supine.     Following this, applied ice to L4-5 and both SI joints and then ultrasound to L4-5.  By the end of today's visit, Tete's pain  "was 1/10.     Will progress as she is able while she is adjusting to new hormones and their impact on ligamentous laxity.       Short Term Goals: (4-6 weeks)                                   Date Met:                1.   pt independent in postural correction         02/07/19  2.   pt independent in SI joint self-corrections        03/21/19  3.   pt independent in exer to decrease post. disc pressures      03/21/19  4.   pt able to sleep through night without pain  5.   pt able to sit 15 minutes without pain  6.   pt able to walk 10minutes without pain  7.   Reduce pt pain to no greater than 7/10        03/21/19  8.   Decrease Oswestry Pain Score to no greater than 45/100  9.  Reduce pt pain to no greater than 6/10  10.  Pt able to isolate the pelvic floor in supine x10, 10 sec      03/28/19  11.  Pt able to isolate transverse abdom in all 4s, x10 sec, x10         03/28/19  12.  Pt able to isolate the multifidus in sitting x10 sec, x10                            03/28/19  13.  Pt able to engage inner unit, move from sit to stand &back to sit      04/04/19                                                                                                                                                                                                                                           14.  Pt able to engage inner unit and walk 4 steps without overflow to hamstrings   15.  Pt able to hip abduct x6 without engaging the piriformis  16.  Pt able to perform prone knee flexion without engaging piriformis x6          05/07/19       17.  Pt able to perform remedial lower abs with scissor arm work x6 w/o pain    04/18/19    18.  Pt able to perform \"Pre-cursor\" lower abs leg lift initiation x6 without pain          04/18/19   19.  Pt able to perform retro step without engaging piriformis x6, bilaterally    20.  Pt able to perform bridging x6 without engaging piriformis      04/25/19    21.  Pt able to perform \"scissors\" arm " "movement with inner unit w/o piriformis x6   05/02/19  22.  Pt able to perform PIC hip adductors without engaging the piriformis  x6    05/07/19  23.  Pt able to perform \"Scissors\" arm motion w/ inner unit with 1lb wts x6 w/o pain   05/07/19  24.  Pt able to perform prone glut max over stability ball x6 w/o engaging piriformis   25.  Pt able to reduce pain w/ self PIC to left psoas       05/14/19  26.  Pt able to move laterally on stability ball in sitting without pain x6  27.  Pt able to move A/P movement sitting on stability ball without pain x6  28.  Pt independent in pool exercises, to do without increasing pain  29.  Pt able to throw and catch stability ball in supine with inner unit on x6    06/11/19  30.  Pt able to hold medicine ball in stance and move away from trunk with IU on and w/o pain x6 06/11/19                                                                                                                                           Long Term Goals:(3-5 months)      Date Met:      1.  pt independent in self-management of symptoms       2.  pt independent in home program      3.  pt able to work 6 hours without pain      4.  pt able to sit 60 minutes without pain      5.  pt able to walk 45 min without pain    Progress Towards Goals:  As expected to a little slower than expected    Treatment Plan:   1.  Ultrasound to increase extensibility, decrease pain, if needed  2.  Electrical stimulation for muscle relaxation, decrease pain, if needed, iontophoresis  3.  Manual therapy to increase function, decrease pain  4.  Therapeutic exercise to increase function, decrease pain  5.  Home programming and d/c planning to increase function and decrease pain    Frequency & Duration:  As noted above in \"Discussionn\", pt will be seen on a once/week basis for  3 more visits then she will attempt one month of self management then return for 2 more visits to complete instruction and advanced spinal stabilization. "     Patient Participated in and Agrees With This Plan of Care and Goals:  YES  Rehab Potential:  jarret Marcial, PT, MS  Physical Therapist  [place]# [id number]      TREATMENT TODAY:    Total Treatment Time: (time in clinic)   60  min  Timed Code Treatment Minutes:      60      Supplies given:      Manual Therapy:  (MA)  RX min:      Min  35  Manual posterior rotation of right innominate    Positional Isometric contraction (PIC) of left iliopsoas    Positional Isometric contraction of (PIC) right gluteus minimus    Distraction of both SI jts in open pack hip position  Piriformis stretching, bilaterally    Quadratus lumborum stretching, bilaterally    Anterior/Inferior Mobilization of  right hip    Positional Isometric Contraction of multifidus  Myofascial work trunk   Sternocostal and rib mobs   Manual spinal distraction    Therapeutic Activities:  (TA)  RX min:     15    Neuromuscular Reeducation: (NMR)  RX min:       Ultrasound:  RX min:  10        1.6 vance/cm2       Location:   L1-5    Iontophoresis:  6 hr patch                                Location:                               Procedures:      Key:  i=instructed        r=review        c=corrected        a=adapted   Code Procedure/Instruction 01/08/2019 02/07/19 03/21/19 03/28/19 04/04/19 04/11/19 04/18/19 04/25/19 05/02/19 05/07/19 05/14/19 06/05/19 06/11/19      TA         Sleeping Positions instructed               reviewed,correct                 TA Sternum-Up Posture instructed reviewed                 TA SI jt. self-correction instructed corrected corrected  reviewed        Adapted and corrected      TA Lumbar Facet PIC instructed corrected corrected  reviewed        reviewed      TA   Order of Self-Corrections instructed    reviewed              TA Use of lumbar pillow instructed                  TA Use of cervical roll instructed corrected                 TA Use of orthotics instructed                  TA Use of SI belt instructed                   TA Use of Nada chair                   TA Use of Ice instructed                  TA Use of [employer]/ heat instructed                  TA Use of Theraworx Relief instructed                  TA Use of TENS unit                   TA Use of iontophoresis                   TA Decreasing Disc Pressures  instructed                 TA Use of sacro wedge          attempted didnt work                             NMR Pelvic Floor Isolation   instructed corrected               NMR Transverse Abdominus   instructed corrected               NMR Multifidus Isolation   instructed                NMR Diaphragmtic breathe supine    instructed               NMR  Diaphragmtic breath sit                   NMR Pelvic Clock in sitting   instructed                NMR Kinesiotape    Applied to both QL               NMR InnerUnit against Gravity    instructed               NMR Sit-stand w/ inner unit    instructed               NMR Roll w/ inner unit    instructed               NMR Walk w/ inner unit     instructed               NMR Up/down steps w/ inner unit    instructed               NMR Water Exer Instruction            Instructed        NMR Hip Abd w/o piriformis     instructed stopped             NMR Hip Add w/o iliop/ham      instructed stopped             NMR Lower abs in supine      Adapted to pre cursor, initiation Cont, no change   Still not able to lift foot w/o engaging piriformis   adapted with bolster         NMR Inner unit challenge with scissor arm work       instructed Added legs  Adapted w/ ball & belt progressed to 1lb weights Inc reps to 10, still use 1lb Added rectus abdom to this        NMR Abd obliques in supine      attempted             NMR Prone Knee Flexion     instructed  instructed corrected Made bolster higher. Dorsiflex left foot more reviewedstopped dorsiflex on Right        NMR Supine glute w/ ball btwn knees        instructed  Adapted to do w/ belt for abd Moved belt more proximal on thighs She will not  bridge as high  continue        NMR Prone glut amrita          instructed         NMR Retro Step       instructed stopped           NMR Retro Walk                   NMR Retro walk on treadmill                   NMR Forward walk w/ inner unit                   NMR Balance Instruction                   NMR Stability Ball A/P & Lateral           instructed        NMR Ball Catch/Throw /Supine            instructed       NMR Ball movemt in /Stance            instructed       NMR StabilityBall All 4's Arm Lift                   NMR StabilityBall Prone Walk Out                   NMR StabilityBall Supine Oblique                   NMR Stability Ball sit-supine-sit                   NMR Walking Prog Progression                   MNR Home program sequencing                                       TA Hamstring stretch                   TA Hip Ext Rot Stretch             instructed      TA Hip Int Rot Stretch                   TA Hip Flex/IT Stretch                   TA Hip Add Stretch                   TA Lats Dorsi Stretch                   TA Gastroc/soleus stretch                   TA QuadratusLumborm Stretch                      Supine                      Stance                   TA Quad Stretch                                       NMR Wall Push Ups                   NMR Planking                   NMR BOSU Ball Exercises                   NMR Lateral Bridge Drop                   NMR Waiters [place]                   NMR O'Feldt Lat Pull Down                   NMR O'Feldt Hip Ext                   NMR O'Feldt Trunk Ext                                       TA CervDiscExtSup/stnd                   TA Cerv Stretches                   TA Self PIC Cervical Spine                   TA Neural Gliding                   TA Ant/Mid Scalene Strch                   TA Biceps stretch                   TA Rotator Cuff Stretch                   TA Anterior Chest Stretch                   TA Wrist Ext Stretch                                        NMR Prone Arm Lifts                   NMR Scapula Stabilization                   NMR Occulomotor Isometrics                   NMR Cervical Isometrics                                       TA Shld AROM w/bar                   TA Shld Capsular Stretching                   TA Self PIC Thor Spine                   TA Rot Cuff Therabd, beginner                   TA Rot Cuff Therabd, ruy Marcial, PT, MS  Physical Therapist  [place]# [id number]

## 2019-06-20 ENCOUNTER — TREATMENT (OUTPATIENT)
Dept: PHYSICAL THERAPY | Facility: CLINIC | Age: 34
End: 2019-06-20

## 2019-06-20 DIAGNOSIS — R29.3 POSTURE IMBALANCE: ICD-10-CM

## 2019-06-20 DIAGNOSIS — S39.012D STRAIN OF LUMBAR REGION, SUBSEQUENT ENCOUNTER: ICD-10-CM

## 2019-06-20 DIAGNOSIS — M53.3 SACROILIAC JOINT DYSFUNCTION: Primary | ICD-10-CM

## 2019-06-20 DIAGNOSIS — M35.7 FAMILIAL LIGAMENTOUS LAXITY: ICD-10-CM

## 2019-06-20 PROCEDURE — 97140 MANUAL THERAPY 1/> REGIONS: CPT | Performed by: PHYSICAL THERAPIST

## 2019-06-20 PROCEDURE — 97112 NEUROMUSCULAR REEDUCATION: CPT | Performed by: PHYSICAL THERAPIST

## 2019-06-20 NOTE — PROGRESS NOTES
"Physical Therapy Note      SUBJECTIVE:   Changes since last seen:  Tete states she was \"ok\" after last treatment during the day, but that night the pain got so bad she took ibuprofen then woke up the next morning without pain, so she's been taking ibuprofen every night since and waking with much less pain.  She hasn't been on the air mattress at all this past week, sleeping on her soft mattress.  She is realizing she needs to get a new firmer mattress.   Had one episode of pins and needles sensation in low back on Friday.  She states she got a second puppy and now the puppies are playing together, not bothering Elda and thus she's not bending as much.   She has not been doing many exercises this past week, \"I just wanted to give my body a rest.\"   She has done the self corrections, though. \"My go to correction is the PIC for the lumbar facets.\"  She is using the inner unit in all ADLs now.   She expects her period to start next week and then she will start the new birth control pills.       Current level of pain:    4/10  Across upper sacrum .   Lowest level of pain since last seen:  1-2/10   Highest level of pain since last seen:   8-9/10        Past Medical History:  1.  AA 2013  Treated with PT for left shoulder labral tear, no low back treatment   2.  Hx of two falls when mopping in socks, over the last 2 years  3.  Hx of regular, heavy, cramping menses  4.  T& A, 2006  5.  Allergic to Wellbutrin and mild allergy to contrast dye  6.  Chronic yeast infections  7.  Uterine fibroids removed, 2 yrs ago       Functional Limitations:  Sitting, standing, walking, stairs, driving, working, sleeping, squatting, housework and changing positions.   Patient Goals for Physical Therapy: \"Pain relief\"      OBJECTIVE:  Observation:  Slight ight lateral shift of pelvis.      01/08/2019 02/07/19 03/21/19 03/28/19 04/04/19 04/11/19 04/18/19 04/25/19 05/02/19 05/07/19 05/14/19 06/05/19 06/11/19 06/20/19       SI Joint Positioning  " Right  Left Right  Left Right  Left Right  Left Right  Left Right  Left Right  Left Right  Left Right  Left Right  Left Right  Left Right  Left Right  Left Right Left Right Left Right Left Right  Left Right  Left       Innominate                            Anterior    x   x    x  x                x                 x              X     x    x   x   x               x   X                    sl              Posterior                x               x        x              x     x     x    x               X                x               x              x                x               x   sl          Sacrum                               Unilateral rotation    x   x   x    x    x     x    x    x   x   x    x    x   x   sl                               SI Joint Testing  Right  Left Right  Left Right  Left Right  Left Right  Left Right  Left Right  Left Right  Left Right  Left Right  Left Right  Left Right  Left Right  Left Right Left Right Left Right Left Right  Left Right  Left           Distraction     +          +   +         +   +          +    +        +  +           +   +          +   +          +   +          +     +        +   +         +   +         +   +          +   +           + +           +               Joanna's     +          +   Sl         sl    +         -    Sl         -   Sl         -   Sl         -   -           -                      Compression     +          +   Sl          sl   Sl          Sl     Sl        Sl    -          -   -         -   -           -                      Prone Press Up           +         +         Sl          -         -        -         -                                   Special Tests  Right   Left Right  Left Right  Left Right  Left Right  Left Right  Left Right Left  Right  Left Right  Left Right  Left Right  Left Right  Left Right  Left Right Left Right Left Right Left Right Left  Right  Left      Straight Leg Raise                                     Active   -         -           -           -                      Passive   -         -             -             -                  Hip Scour Test   ++       Sl    ++       Sl    +           sl   Sl         Sl    Sl         sl Sl          Sl    Sl        sl   Sl       Sl    Sl         Sl    Sl         Sl     Sl         sl   Sl        Sl    Sl        Sl     -            -                            Bakers Cyst       +         -     +        -    Sl        -   Sl         -     Sl        -   Sl        Sl   sl          sl   Sl         -           Palpation:       Muscle/Bone Irritation  Date 01/08/2019 02/07/19 03/21/19 03/28/19 04/04/18 04/11/19 04/18/19 04/25/19 05/02/19 05/07/19 05/14/19 06/05/19 06/11/19 06/20/19        Right  Left Right  Left Right  Left Right  Left Right  Left Right  Left Right  Left Right  Left Right  Left Right  Left Right  Left Right  Left Right  Left Right Left Right Left Right Left Right  Left Right  Left   Piriformis  ++        +   ++          +   ++       +   ++        +    ++        Sl    ++       Sl   ++       +    ++      +   ++        +   +           +    +        Sl      +         ++   +           ++    +       -       Gr. Trochanter  +          +  +          sl    +           +    +       ++    +          -   +         Sl    +         sl   +         sl   Sl        Sl    +          +    Sl        sl    Sl         +   Sl          sl     +       -       IT Band  +         sl   Sl        sl     -          -   -          -    Sl         -   Sl         -   Sl        -   Sl         -  sl          -   Sl          Sl      -          -     -           -   -            -    Sl        Sl        Quadratus Lumborum  ++        ++   ++        +    +        ++   ++        ++     +       ++   +          +  ++        ++   Sl        Sl    +       ++   +          ++    Sl       +     +         +   +          ++     Sl       +       Ischial Tuberosity  ++         sl   -          -    -           -     -           -     -           -   -          -   -          -   -          -   -          -  -            -     -           -   -          -   -             -     Sl       -       Sacrococcygeal Ligs  ++          +   Sl        sl   +          +    +       +    Sl        Sl    Sl       Sl    +         sl  ++      +  +        Sl       Sl         +   Sl         Sl    Sl         Sl      -        Sl        Paraspinals  +           +     +         sl    +    +          -   Sl         +   +         sl   +        sl  +         -   Sl          +   Sl          +   +          sl   ++         sl   +          -       Spinous Processes                                        T-spine rotated to   -               -                           L-spine rotated to  L2-5        L1-5  L4-5  L3-5  L2-5            L3-5  L1-5  L2-5  L2-5              L1-5           L2-5 L2-5   L1-5 L3-5       Adductor Tony    +        +       +         +     -         +   -           Sl     -        sl   +          sl   -          -   -           -   -          -   -           -   -          -   -           sl  ++        +   +         sl       Iliopsoas  ++         +   ++        +   ++         +    +         ++  sl        Sl    Sl       Sl    Sl       Sl    Sl         -   Sl        -   Sl         +   +         Sl    +          sl  +          ++   +          +       Quad Origin  sl          sl   Sl          sl    -          -   -           -    -         -   -          -   -         -   -          -   -          -   -           -    -         -   -            -   -         -   -         -       SI Joint  ++         +  ++        +    ++        +   +          +   +         +   +          ++   +         sl    +         sl   +        +  +          sl  +        Sl     +        Sl   +          ++   Sl       sl       Pubic Symphysis         ++          Not tested         ++      ++        ++            + Not tested Not tested          +         Obturator externus          -          +   -           -   -          -    -           -   -          -   -          -       Rectus Diastasis   1/2 finger                    sternocostals   +        +   +          +    +         -    +           +   Sl        Sl    +         Sl  Not tested     +         +     +         +   +         +   Sl      sl       All 4s Positioning: Pt not able to assume full lumbar extension in this position  Leg Length:  The right  lower extremity is equal to the left        Monthly Objective Measurement/Functional Activity  Date: 01/08/2019 03/21/19 04/25/19 06/05/19       Oswestry Pain Score 52/100  46/100    48/100      48/100                             AROM Lumbar Spine: Degrees   Degrees      Degrees      Degrees      Degrees      Degrees      Degrees    Degrees      Flexion 106 pain          99          83       75 pain          Extension 3 pain           17           19       11 pain          Right Lat. Flexion 29 pain right trunk          24        25      25          Left Lat. Flexion 31         19         23     14          Right Lat Shift Pelvis inc pain   Inc pain Left SI jt       Slight increase No change but can't do far          Left Lat Shift Pelvis  less inc pain       No change       No change/ slight cielo  No change but can't go far                   AROM Hips:  (degrees) Right      Left Right Left Right  Left Right  Left Right  Left Right  Left Right  Left Right  Left      Flexion 110       110  125    120    128      125 130       130          Abduction 37       41   45      45      42      44  43          45          Int. Rotation 32       22  30      25      31         28  32          30          Ext. Rotation  34       42  35      40       38        41   35         40                  Flexibility:   (degrees) Right    Left Right  Left Right  Left Right  Left Right  Left Right  Left Right  Left Right  Left      Hamstrings -32       -28  -30     -25   -25       -25   -28      -27          Quadriceps 39       42 40         45    not tested  40          42          HipExt/Rot(piriformis) 30       28   32      30  33         32   35         33          Hip Int/Rotators 27       9   25      10   28        15   27         16          Hip Flexors (iliopsoas) -       -             IT Band -       -                     Reflexes: Right      Left Right  Left Right  Left Right  Left Right  Left Right  Left Right  Left Right  Left      L4 (Quad) +1       +1             S1 (Achilles) +1       +1                     Root Level  - Motor Right      Left Right  Left Right  Left Right  Left Right  Left Right  Left Right  Left Right  Left     T12             Rectus Abdominus 4+/5     4+/5         5/5        5/5          L1              Paraspinals 5/5         5/5             L2              Hip Flexion 5/5          5/5             L3             Quads 5/5          5/5             L4              Anterior Tibialis 5/5         5/5             L5             Gr. Toe Extension 5/5           5/5             S1            Gastroc/Sol/Peroneals 5/5          5/5                     Functional Strength:             Single LegStanceTime (seconds) R:30   L:30   R:      L: R:      L: R: 30 pain     L:30 pain  R:      L: R:      L: R:      L: R:      L:     Pelvic Floor Isolation (seconds) -    10 sec 10 sec          Inner Unit  Isolation (seconds) -    10 sec 10 sec                   Functional Activities:              Sit w/o pain? Pain increases (minutes) No/ 30 min No/ 30  min No/ 30 min  No/  30 min          Stand w/o pain? No/ 30 min No/ 30 min No/ 10 min  No/ 10 min          Walk w/o pain? varies No/ 1 mile No/ 1/2 mile No/ 1 mile          Steps w/o pain? 1 fl, avoids them 1 fl avoids  1 fl avoids  No/ avoids          Drive w/o pain? No/ 10-15 min No/ 30-45 min No/ 30-45  Min  No/ 30-45 min          Work w/o pain? No/ 30 min No/ 30 min No/ 30  No/ 30 min          # times wakes w/ pain? 4-5x/night, now taking meds at night she is able to sleep.  2x   2-3x   2x           PLAN OF CARE:    ASSESSMENT:  Problem List:   1. SI joint dysfunction  2. Lack of spinal stabilization  3. Postural dysfunction     Discussion:  Tete's range and flexibility has improved with exception of hip int rotation, she was instructed in how to focus on this.  Functionally she has less pain this week, possibly due to not bending with puppy as much, she is not in the middle of a period, and her spinal stabilization has improved.  She is getting more adept at using the inner unit in ADLs and doing the self corrections more often.       At first, Tete was hesitant to do the treadmill work and exercises today as she was feeling so much better when she arrived and didn't want to flare up her pain.  She started to guard with the QLs and both piriformis after walking on the treadmill.  She was instructed in how to do quick stretches in standing when this happens.  She was pleased she was able to turn off both muscles and avoid pain with these stretches.    She was able to be on the treadmill 7 min without pain, just tightness.     Will progress the spinal stabilization on the stability ball on next visit.       Short Term Goals: (4-6 weeks)                                   Date Met:                1.   pt independent in postural correction         02/07/19  2.   pt independent in SI joint self-corrections        03/21/19  3.   pt independent in exer to decrease post. disc pressures      03/21/19  4.   pt able to sleep through night without pain  5.   pt able to sit 15 minutes without pain  6.   pt able to walk 10 minutes without pain        06/20/19    7.   Reduce pt pain to no greater than 7/10        03/21/19  8.   Decrease Oswestry Pain Score to no greater than 45/100  9.  Reduce pt pain to no greater than 6/10  10.  Pt able to isolate the pelvic floor in supine x10, 10 sec      03/28/19  11.  Pt able to isolate transverse abdom in all 4s, x10 sec, x10         03/28/19  12.  Pt able to isolate the  "multifidus in sitting x10 sec, x10                            03/28/19  13.  Pt able to engage inner unit, move from sit to stand &back to sit      04/04/19                                                                                                                                                                                                                                           14.  Pt able to engage inner unit and walk 4 steps without overflow to hamstrings   06/20/19  15.  Pt able to hip abduct x6 without engaging the piriformis      06/20/19     16.  Pt able to perform prone knee flexion without engaging piriformis x6          05/07/19       17.  Pt able to perform remedial lower abs with scissor arm work x6 w/o pain    04/18/19    18.  Pt able to perform \"Pre-cursor\" lower abs leg lift initiation x6 without pain          04/18/19   19.  Pt able to perform retro step without engaging piriformis x6, bilaterally    20.  Pt able to perform bridging x6 without engaging piriformis      04/25/19    21.  Pt able to perform \"scissors\" arm movement with inner unit w/o piriformis x6   05/02/19  22.  Pt able to perform PIC hip adductors without engaging the piriformis  x6    05/07/19  23.  Pt able to perform \"Scissors\" arm motion w/ inner unit with 1lb wts x6 w/o pain   05/07/19  24.  Pt able to perform prone glut max over stability ball x6 w/o engaging piriformis   25.  Pt able to reduce pain w/ self PIC to left psoas       05/14/19  26.  Pt able to move laterally on stability ball in sitting without pain x6  27.  Pt able to move A/P movement sitting on stability ball without pain x6  28.  Pt independent in pool exercises, to do without increasing pain  29.  Pt able to throw and catch stability ball in supine with inner unit on x6    06/11/19  30.  Pt able to hold medicine ball in stance and move away from trunk with IU on and w/o pain x6 06/11/19                                                                    " "                                                                       Long Term Goals:(3-5 months)      Date Met:      1.  pt independent in self-management of symptoms       2.  pt independent in home program      3.  pt able to work 6 hours without pain      4.  pt able to sit 60 minutes without pain      5.  pt able to walk 45 min without pain    Progress Towards Goals:  As expected to a little slower than expected    Treatment Plan:   1.  Ultrasound to increase extensibility, decrease pain, if needed  2.  Electrical stimulation for muscle relaxation, decrease pain, if needed, iontophoresis  3.  Manual therapy to increase function, decrease pain  4.  Therapeutic exercise to increase function, decrease pain  5.  Home programming and d/c planning to increase function and decrease pain    Frequency & Duration:  As noted above in \"Discussionn\", pt will be seen on a once/week basis for  2) more visits then she will attempt one month of self management then return for 2 more visits to complete instruction and advanced spinal stabilization.     Patient Participated in and Agrees With This Plan of Care and Goals:  YES  Rehab Potential:  jarret Marcial, PT, MS  Physical Therapist  [place]# [id number]      TREATMENT TODAY:    Total Treatment Time: (time in clinic)   75  min  Timed Code Treatment Minutes:     70      Supplies given:      Manual Therapy:  (MA)  RX min:      30  Manual posterior rotation of left innominate    Positional Isometric contraction (PIC) of right iliopsoas    Positional Isometric contraction of (PIC) right gluteus minimus    Distraction of both SI jts in open pack hip position  Piriformis stretching, bilaterally    Quadratus lumborum stretching, bilaterally    Anterior/Inferior Mobilization of  right hip    Positional Isometric Contraction of multifidus  Myofascial work trunk   Sternocostal and rib mobs   Manual spinal distraction    Therapeutic Activities:  (TA)  RX min:     5    Neuromuscular " Reeducation: (NMR)  RX min:   35    Ultrasound:  RX min:         1.6 vance/cm2       Location:      Iontophoresis:  6 hr patch                                Location:                               Procedures:      Key:  i=instructed        r=review        c=corrected        a=adapted   Code Procedure/Instruction 01/08/2019 02/07/19 03/21/19 03/28/19 04/04/19 04/11/19 04/18/19 04/25/19 05/02/19 05/07/19 05/14/19 06/05/19 06/11/19 06/20/19     TA         Sleeping Positions instructed               reviewed,correct                 TA Sternum-Up Posture instructed reviewed                 TA SI jt. self-correction instructed corrected corrected  reviewed        Adapted and corrected      TA Lumbar Facet PIC instructed corrected corrected  reviewed        reviewed      TA   Order of Self-Corrections instructed    reviewed              TA Use of lumbar pillow instructed                  TA Use of cervical roll instructed corrected                 TA Use of orthotics instructed                  TA Use of SI belt instructed                  TA Use of Nada chair                   TA Use of Ice instructed                  TA Use of [employer]/ heat instructed                  TA Use of Theraworx Relief instructed                  TA Use of TENS unit                   TA Use of iontophoresis                   TA Decreasing Disc Pressures  instructed                 TA Use of sacro wedge          attempted didnt work                             NMR Pelvic Floor Isolation   instructed corrected               NMR Transverse Abdominus   instructed corrected               NMR Multifidus Isolation   instructed                NMR Diaphragmtic breathe supine    instructed               NMR  Diaphragmtic breath sit                   NMR Pelvic Clock in sitting   instructed                NMR Kinesiotape    Applied to both QL               NMR InnerUnit against Gravity    instructed               NMR Sit-stand w/ inner unit     instructed               NMR Roll w/ inner unit    instructed               NMR Walk w/ inner unit     instructed               NMR Up/down steps w/ inner unit    instructed               NMR Water Exer Instruction            Instructed        NMR Hip Abd w/o piriformis     instructed stopped             NMR Hip Add w/o iliop/ham      instructed stopped             NMR Lower abs in supine      Adapted to pre cursor, initiation Cont, no change   Still not able to lift foot w/o engaging piriformis   adapted with bolster         NMR Inner unit challenge with scissor arm work       instructed Added legs  Adapted w/ ball & belt progressed to 1lb weights Inc reps to 10, still use 1lb Added rectus abdom to this        NMR Abd obliques in supine      attempted             NMR Prone Knee Flexion     instructed  instructed corrected Made bolster higher. Dorsiflex left foot more reviewedstopped dorsiflex on Right        NMR Supine glute w/ ball btwn knees        instructed  Adapted to do w/ belt for abd Moved belt more proximal on thighs She will not bridge as high  continue        NMR Prone glut amrita          instructed         NMR Retro Step       instructed stopped           NMR Retro Walk                   NMR Retro walk on treadmill              instructed     NMR Forward walk w/ inner unit              instructed     NMR Stability ball multifidus bounce              instructed     NMR Stability Ball A/P & Lateral           instructed        NMR Ball Catch/Throw /Supine            instructed       NMR Ball movemt in /Stance            instructed       NMR StabilityBall All 4's Arm Lift                   NMR StabilityBall Prone Walk Out                   NMR StabilityBall Supine Oblique              Adapted to do w/ belt around knees     NMR Stability Ball sit-supine-sit                   NMR Walking Prog Progression                   MNR Home program sequencing                                       TA Hamstring stretch                    TA Hip Ext Rot Stretch              instructed     TA Hip Int Rot Stretch              Adapted to do in stance     TA Hip Flex/IT Stretch                   TA Hip Add Stretch                   TA Lats Dorsi Stretch                   TA Gastroc/soleus stretch                   TA QuadratusLumborm Stretch                      Supine                      Stance              instructed     TA Quad Stretch                                       NMR Wall Push Ups                   NMR Planking                   NMR BOSU Ball Exercises                   NMR Lateral Bridge Drop                   NMR [place]                   NMR O'Feldt Lat Pull Down                   NMR O'Feldt Hip Ext                   NMR O'Feldt Trunk Ext                                       TA CervDiscExtSup/stnd                   TA Cerv Stretches                   TA Self PIC Cervical Spine                   TA Neural Gliding                   TA Ant/Mid Scalene Strch                   TA Biceps stretch                   TA Rotator Cuff Stretch                   TA Anterior Chest Stretch                   TA Wrist Ext Stretch                                       NMR Prone Arm Lifts                   NMR Scapula Stabilization                   NMR Occulomotor Isometrics                   NMR Cervical Isometrics                                       TA Shld AROM w/bar                   TA Shld Capsular Stretching                   TA Self PIC Thor Spine                   TA Rot Cuff Therabd, beginner                   TA Rot Cuff Therabd, ruy Marcial, PT, MS  Physical Therapist  [place]# [id number]

## 2019-06-25 ENCOUNTER — TREATMENT (OUTPATIENT)
Dept: PHYSICAL THERAPY | Facility: CLINIC | Age: 34
End: 2019-06-25

## 2019-06-25 DIAGNOSIS — R29.3 POSTURE IMBALANCE: ICD-10-CM

## 2019-06-25 DIAGNOSIS — S39.012D STRAIN OF LUMBAR REGION, SUBSEQUENT ENCOUNTER: ICD-10-CM

## 2019-06-25 DIAGNOSIS — M53.3 SACROILIAC JOINT DYSFUNCTION: Primary | ICD-10-CM

## 2019-06-25 DIAGNOSIS — M35.7 FAMILIAL LIGAMENTOUS LAXITY: ICD-10-CM

## 2019-06-25 PROCEDURE — 97140 MANUAL THERAPY 1/> REGIONS: CPT | Performed by: PHYSICAL THERAPIST

## 2019-06-25 NOTE — PROGRESS NOTES
"Physical Therapy Note      SUBJECTIVE:   Changes since last seen:  Started period on Sunday, starting feeling bad, increased pain, on Sat.  She indicates she is suppose to have an ultrasound of the pelvis today, however she notes this menses is \"super heavy\".    She hurts all over.  She feels like she's been llimited in what she can do since here on Thurs because the flare up of pain is so much       Current level of pain:    6/10  Across upper sacrum .   Lowest level of pain since last seen:  3-4/10 Thursday   Highest level of pain since last seen:   7.5/10        Past Medical History:  1.  AA 2013  Treated with PT for left shoulder labral tear, no low back treatment   2.  Hx of two falls when mopping in socks, over the last 2 years  3.  Hx of regular, heavy, cramping menses  4.  T& A, 2006  5.  Allergic to Wellbutrin and mild allergy to contrast dye  6.  Chronic yeast infections  7.  Uterine fibroids removed, 2 yrs ago       Functional Limitations:  Sitting, standing, walking, stairs, driving, working, sleeping, squatting, housework and changing positions.   Patient Goals for Physical Therapy: \"Pain relief\"      OBJECTIVE:  Observation:  Slight ight lateral shift of pelvis.      01/08/2019 02/07/19 03/21/19 03/28/19 04/04/19 04/11/19 04/18/19 04/25/19 05/02/19 05/07/19 05/14/19 06/05/19 06/11/19 06/20/19 06/25/19      SI Joint Positioning  Right  Left Right  Left Right  Left Right  Left Right  Left Right  Left Right  Left Right  Left Right  Left Right  Left Right  Left Right  Left Right  Left Right Left Right Left Right Left Right  Left Right  Left       Innominate                            Anterior    x   x    x  x                x                 x              X     x    x   x   x               x   X                    sl    x             Posterior                x               x        x              x     x     x    x               X                x               x              x                x           "     x   sl               x         Sacrum                               Unilateral rotation    x   x   x    x    x     x    x    x   x   x    x    x   x   sl   x                              SI Joint Testing  Right  Left Right  Left Right  Left Right  Left Right  Left Right  Left Right  Left Right  Left Right  Left Right  Left Right  Left Right  Left Right  Left Right Left Right Left Right Left Right  Left Right  Left           Distraction     +          +   +         +   +          +    +        +  +           +   +          +   +          +   +          +     +        +   +         +   +         +   +          +   +           + +           +   +          +              Joanna's     +          +   Sl         sl    +         -    Sl         -   Sl         -   Sl         -   -           -                      Compression     +          +   Sl          sl   Sl          Sl     Sl        Sl    -          -   -         -   -           -                      Prone Press Up           +         +         Sl          -         -        -         -                                   Special Tests  Right   Left Right  Left Right  Left Right  Left Right  Left Right  Left Right Left  Right  Left Right  Left Right  Left Right  Left Right  Left Right  Left Right Left Right Left Right Left Right Left  Right  Left      Straight Leg Raise                                     Active   -         -          -           -                      Passive   -         -             -             -                  Hip Scour Test   ++       Sl    ++       Sl    +           sl   Sl         Sl    Sl         sl Sl          Sl    Sl        sl   Sl       Sl    Sl         Sl    Sl         Sl     Sl         sl   Sl        Sl    Sl        Sl     -            -   Sl         sl                           Bakers Cyst       +         -     +        -    Sl        -   Sl         -     Sl        -   Sl        Sl   sl          sl   Sl         -   -           -           Palpation:       Muscle/Bone Irritation  Date 01/08/2019 02/07/19 03/21/19 03/28/19 04/04/18 04/11/19 04/18/19 04/25/19 05/02/19 05/07/19 05/14/19 06/05/19 06/11/19 06/20/19 06/25/19       Right  Left Right  Left Right  Left Right  Left Right  Left Right  Left Right  Left Right  Left Right  Left Right  Left Right  Left Right  Left Right  Left Right Left Right Left Right Left Right  Left Right  Left   Piriformis  ++        +   ++          +   ++       +   ++        +    ++        Sl    ++       Sl   ++       +    ++      +   ++        +   +           +    +        Sl      +         ++   +           ++    +       -   ++      ++      Gr. Trochanter  +          +  +          sl    +           +    +       ++    +          -   +         Sl    +         sl   +         sl   Sl        Sl    +          +    Sl        sl    Sl         +   Sl          sl     +       -   +         +      IT Band  +         sl   Sl        sl     -          -   -          -    Sl         -   Sl         -   Sl        -   Sl         -  sl          -   Sl          Sl      -          -     -           -   -            -    Sl        Sl    Sl       Sl       Quadratus Lumborum  ++        ++   ++        +    +        ++   ++        ++     +       ++   +          +  ++        ++   Sl        Sl    +       ++   +          ++    Sl       +     +         +   +          ++     Sl       +   ++       +      Ischial Tuberosity  ++         sl   -          -    -           -     -           -     -          -   -          -   -          -   -          -   -          -  -            -     -           -   -          -   -             -     Sl       -   Sl        Sl       Sacrococcygeal Ligs  ++          +   Sl        sl   +          +    +       +    Sl        Sl    Sl       Sl    +         sl  ++      +  +        Sl       Sl         +   Sl         Sl    Sl         Sl      -        Sl   not  tested      Paraspinals  +           +     +         sl    +    +           -   Sl         +   +         sl   +        sl  +         -   Sl          +   Sl          +   +          sl   ++         sl   +          -   +         Sl       Spinous Processes                                        C-spine rotated to                           C3-5            T-spine rotated to   -               -              T8-9             L-spine rotated to  L2-5        L1-5  L4-5  L3-5  L2-5            L3-5  L1-5  L2-5  L2-5              L1-5           L2-5 L2-5   L1-5 L3-5 L3-5      Adductor Tony    +        +       +         +     -         +   -           Sl     -        sl   +          sl   -          -   -           -   -          -   -           -   -          -   -           sl  ++        +   +         sl   +         +      Iliopsoas  ++         +   ++        +   ++         +    +         ++  sl        Sl    Sl       Sl    Sl       Sl    Sl         -   Sl        -   Sl         +   +         Sl    +          sl  +          ++   +          +   +          +      Quad Origin  sl          sl   Sl          sl    -          -   -           -    -         -   -          -   -         -   -          -   -          -   -           -    -         -   -            -   -         -   -         -   -         -      SI Joint  ++         +  ++        +    ++        +   +          +   +         +   +          ++   +         sl    +         sl   +        +  +          sl  +        Sl     +        Sl   +          ++   Sl       sl   +        +      Pubic Symphysis         ++          Not tested         ++      ++        ++            + Not tested Not tested          +         Obturator externus          -          +   -          -   -          -    -           -   -          -   -          -   -         -       Rectus Diastasis   1/2 finger                    sternocostals   +        +   +          +    +         -    +           +   Sl        Sl    +         Sl  Not tested     +         +     +         +   +          +   Sl      sl   +          +      All 4s Positioning: Pt not able to assume full lumbar extension in this position  Leg Length:  The right  lower extremity is equal to the left        Monthly Objective Measurement/Functional Activity  Date: 01/08/2019 03/21/19 04/25/19 06/05/19       Oswestry Pain Score 52/100  46/100    48/100      48/100                             AROM Lumbar Spine: Degrees   Degrees      Degrees      Degrees      Degrees      Degrees      Degrees    Degrees      Flexion 106 pain          99          83       75 pain          Extension 3 pain           17           19       11 pain          Right Lat. Flexion 29 pain right trunk          24        25      25          Left Lat. Flexion 31         19         23     14          Right Lat Shift Pelvis inc pain   Inc pain Left SI jt       Slight increase No change but can't do far          Left Lat Shift Pelvis  less inc pain       No change       No change/ slight cielo  No change but can't go far                   AROM Hips:  (degrees) Right      Left Right Left Right  Left Right  Left Right  Left Right  Left Right  Left Right  Left      Flexion 110       110  125    120    128      125 130       130          Abduction 37       41   45      45      42      44  43          45          Int. Rotation 32       22  30      25      31         28  32          30          Ext. Rotation  34       42  35      40       38        41   35         40                  Flexibility:   (degrees) Right    Left Right  Left Right  Left Right  Left Right  Left Right  Left Right  Left Right  Left      Hamstrings -32       -28  -30     -25   -25       -25   -28      -27          Quadriceps 39       42 40        45    not tested  40          42          HipExt/Rot(piriformis) 30       28   32      30  33         32   35         33          Hip Int/Rotators 27       9   25      10   28        15   27         16          Hip Flexors (iliopsoas) -       -             IT Band -        -                     Reflexes: Right      Left Right  Left Right  Left Right  Left Right  Left Right  Left Right  Left Right  Left      L4 (Quad) +1       +1             S1 (Achilles) +1       +1                     Root Level  - Motor Right      Left Right  Left Right  Left Right  Left Right  Left Right  Left Right  Left Right  Left     T12             Rectus Abdominus 4+/5     4+/5         5/5        5/5          L1              Paraspinals 5/5         5/5             L2              Hip Flexion 5/5          5/5             L3             Quads 5/5          5/5             L4              Anterior Tibialis 5/5         5/5             L5             Gr. Toe Extension 5/5           5/5             S1            Gastroc/Sol/Peroneals 5/5          5/5                     Functional Strength:             Single LegStanceTime (seconds) R:30   L:30   R:      L: R:      L: R: 30 pain     L:30 pain  R:      L: R:      L: R:      L: R:      L:     Pelvic Floor Isolation (seconds) -    10 sec 10 sec          Inner Unit  Isolation (seconds) -    10 sec 10 sec                   Functional Activities:              Sit w/o pain? Pain increases (minutes) No/ 30 min No/ 30  min No/ 30 min  No/  30 min          Stand w/o pain? No/ 30 min No/ 30 min No/ 10 min  No/ 10 min          Walk w/o pain? varies No/ 1 mile No/ 1/2 mile No/ 1 mile          Steps w/o pain? 1 fl, avoids them 1 fl avoids  1 fl avoids  No/ avoids          Drive w/o pain? No/ 10-15 min No/ 30-45 min No/ 30-45  Min  No/ 30-45 min          Work w/o pain? No/ 30 min No/ 30 min No/ 30  No/ 30 min          # times wakes w/ pain? 4-5x/night, now taking meds at night she is able to sleep.  2x   2-3x  2x           PLAN OF CARE:    ASSESSMENT:  Problem List:   1. SI joint dysfunction  2. Lack of spinal stabilization  3. Postural dysfunction     Discussion:  Spent entire visit on manual work for pain relief.  Tete is frustrated as she is correlating the increased pain  with her periods. She feels like it takes her 2 weeks to get over the pain correlating the the menses, has one week of minimal pain and then the pain begins to ramp up again.  She is to see her GYN after the pelvic ultrasound today.    I was not able to progress her today, secondary to the flare up of pain.  The manual work was able to decrease her pain, followed this up with ice application and was able to bring the pain down to 3/10.      Will proceed with spinal stabilization progression on next visit.     Short Term Goals: (4-6 weeks)                                   Date Met:                1.   pt independent in postural correction         02/07/19  2.   pt independent in SI joint self-corrections        03/21/19  3.   pt independent in exer to decrease post. disc pressures      03/21/19  4.   pt able to sleep through night without pain  5.   pt able to sit 15 minutes without pain  6.   pt able to walk 10 minutes without pain        06/20/19    7.   Reduce pt pain to no greater than 7/10        03/21/19  8.   Decrease Oswestry Pain Score to no greater than 45/100  9.  Reduce pt pain to no greater than 6/10  10.  Pt able to isolate the pelvic floor in supine x10, 10 sec      03/28/19  11.  Pt able to isolate transverse abdom in all 4s, x10 sec, x10         03/28/19  12.  Pt able to isolate the multifidus in sitting x10 sec, x10                            03/28/19  13.  Pt able to engage inner unit, move from sit to stand &back to sit      04/04/19                                                                                                                                                                                                                                           14.  Pt able to engage inner unit and walk 4 steps without overflow to hamstrings   06/20/19  15.  Pt able to hip abduct x6 without engaging the piriformis      06/20/19     16.  Pt able to perform prone knee flexion without engaging  "piriformis x6          05/07/19       17.  Pt able to perform remedial lower abs with scissor arm work x6 w/o pain    04/18/19    18.  Pt able to perform \"Pre-cursor\" lower abs leg lift initiation x6 without pain          04/18/19   19.  Pt able to perform retro step without engaging piriformis x6, bilaterally    20.  Pt able to perform bridging x6 without engaging piriformis      04/25/19    21.  Pt able to perform \"scissors\" arm movement with inner unit w/o piriformis x6   05/02/19  22.  Pt able to perform PIC hip adductors without engaging the piriformis  x6    05/07/19  23.  Pt able to perform \"Scissors\" arm motion w/ inner unit with 1lb wts x6 w/o pain   05/07/19  24.  Pt able to perform prone glut max over stability ball x6 w/o engaging piriformis   25.  Pt able to reduce pain w/ self PIC to left psoas       05/14/19  26.  Pt able to move laterally on stability ball in sitting without pain x6  27.  Pt able to move A/P movement sitting on stability ball without pain x6  28.  Pt independent in pool exercises, to do without increasing pain  29.  Pt able to throw and catch stability ball in supine with inner unit on x6    06/11/19  30.  Pt able to hold medicine ball in stance and move away from trunk with IU on and w/o pain x6 06/11/19                                                                                                                                           Long Term Goals:(3-5 months)      Date Met:      1.  pt independent in self-management of symptoms       2.  pt independent in home program      3.  pt able to work 6 hours without pain      4.  pt able to sit 60 minutes without pain      5.  pt able to walk 45 min without pain    Progress Towards Goals:  As expected to a little slower than expected    Treatment Plan:   1.  Ultrasound to increase extensibility, decrease pain, if needed  2.  Electrical stimulation for muscle relaxation, decrease pain, if needed, iontophoresis  3.  Manual therapy to " "increase function, decrease pain  4.  Therapeutic exercise to increase function, decrease pain  5.  Home programming and d/c planning to increase function and decrease pain    Frequency & Duration:  As noted above in \"Discussionn\", pt will be seen on a once/week basis for  1  more visits then she will attempt one month of self management then return for 2 more visits to complete instruction and advanced spinal stabilization.     Patient Participated in and Agrees With This Plan of Care and Goals:  YES  Rehab Potential:  jarret Marcial, PT, MS  Physical Therapist  [place]# [id number]      TREATMENT TODAY:    Total Treatment Time: (time in clinic)   65  min  Timed Code Treatment Minutes:     60      Supplies given:      Manual Therapy:  (MA)  RX min:      60  Manual posterior rotation of right innominate    Positional Isometric contraction (PIC) of left iliopsoas    Positional Isometric contraction of (PIC) right gluteus minimus    Distraction of both SI jts in open pack hip position  Piriformis stretching, bilaterally    Quadratus lumborum stretching, bilaterally    Anterior/Inferior Mobilization of  right hip    Positional Isometric Contraction of multifidus  Myofascial work trunk   Sternocostal and rib mobs   Manual spinal distraction      Therapeutic Activities:  (TA)  RX min:        Neuromuscular Reeducation: (NMR)  RX min:       Ultrasound:  RX min:         1.6 vance/cm2       Location:      Iontophoresis:  6 hr patch                                Location:                           Ice:  Lumbar spine/SI joints    Procedures:      Key:  i=instructed        r=review        c=corrected        a=adapted   Code Procedure/Instruction 01/08/2019 02/07/19 03/21/19 03/28/19 04/04/19 04/11/19 04/18/19 04/25/19 05/02/19 05/07/19 05/14/19 06/05/19 06/11/19 06/20/19 06/25/19       TA         Sleeping Positions instructed               reviewed,correct                    TA Sternum-Up Posture instructed reviewed            "         TA SI jt. self-correction instructed corrected corrected  reviewed        Adapted and corrected         TA Lumbar Facet PIC instructed corrected corrected  reviewed        reviewed         TA   Order of Self-Corrections instructed    reviewed                 TA Use of lumbar pillow instructed                     TA Use of cervical roll instructed corrected                    TA Use of orthotics instructed                     TA Use of SI belt instructed                     TA Use of Nada chair                      TA Use of Ice instructed                     TA Use of [employer]/ heat instructed                     TA Use of Theraworx Relief instructed                     TA Use of TENS unit                      TA Use of iontophoresis                      TA Decreasing Disc Pressures  instructed                    TA Use of sacro wedge          attempted didnt work                                   NMR Pelvic Floor Isolation   instructed corrected                  NMR Transverse Abdominus   instructed corrected                  NMR Multifidus Isolation   instructed                   NMR Diaphragmtic breathe supine    instructed                  NMR  Diaphragmtic breath sit                      NMR Pelvic Clock in sitting   instructed                   NMR Kinesiotape    Applied to both QL                  NMR InnerUnit against Gravity    instructed                  NMR Sit-stand w/ inner unit    instructed                  NMR Roll w/ inner unit    instructed                  NMR Walk w/ inner unit     instructed                  NMR Up/down steps w/ inner unit    instructed                  NMR Water Exer Instruction            Instructed           NMR Hip Abd w/o piriformis     instructed stopped                NMR Hip Add w/o iliop/ham      instructed stopped                NMR Lower abs in supine      Adapted to pre cursor, initiation Cont, no change   Still not able to lift foot w/o engaging piriformis    adapted with bolster            NMR Inner unit challenge with scissor arm work       instructed Added legs  Adapted w/ ball & belt progressed to 1lb weights Inc reps to 10, still use 1lb Added rectus abdom to this           NMR Abd obliques in supine      attempted                NMR Prone Knee Flexion     instructed  instructed corrected Made bolster higher. Dorsiflex left foot more reviewedstopped dorsiflex on Right           NMR Supine glute w/ ball btwn knees        instructed  Adapted to do w/ belt for abd Moved belt more proximal on thighs She will not bridge as high  continue           NMR Prone glut amrita          instructed            NMR Retro Step       instructed stopped              NMR Retro Walk                      NMR Retro walk on treadmill              instructed        NMR Forward walk w/ inner unit              instructed        NMR Stability ball multifidus bounce              instructed        NMR Stability Ball A/P & Lateral           instructed           NMR Ball Catch/Throw /Supine            instructed          NMR Ball movemt in /Stance            instructed          NMR StabilityBall All 4's Arm Lift                      NMR StabilityBall Prone Walk Out                      NMR StabilityBall Supine Oblique              Adapted to do w/ belt around knees        NMR Stability Ball sit-supine-sit                      NMR Walking Prog Progression                      MNR Home program sequencing                                             TA Hamstring stretch                      TA Hip Ext Rot Stretch              instructed        TA Hip Int Rot Stretch              Adapted to do in stance        TA Hip Flex/IT Stretch                      TA Hip Add Stretch                      TA Lats Dorsi Stretch                      TA Gastroc/soleus stretch                      TA QuadratusLumborm Stretch                         Supine                         Stance              instructed         TA Quad Stretch                                             NMR Wall Push Ups                      NMR Planking                      NMR BOSU Ball Exercises                      NMR Lateral Bridge Drop                      NMR Waiters [place]                      NMR O'Feldt Lat Pull Down                      NMR O'Feldt Hip Ext                      NMR O'Feldt Trunk Ext                                             TA CervDiscExtSup/stnd                      TA Cerv Stretches                      TA Self PIC Cervical Spine                      TA Neural Gliding                      TA Ant/Mid Scalene Strch                      TA Biceps stretch                      TA Rotator Cuff Stretch                      TA Anterior Chest Stretch                      TA Wrist Ext Stretch                                             NMR Prone Arm Lifts                      NMR Scapula Stabilization                      NMR Occulomotor Isometrics                      NMR Cervical Isometrics                                             TA Shld AROM w/bar                      TA Shld Capsular Stretching                      TA Self PIC Thor Spine                      TA Rot Cuff Therabd, beginner                      TA Rot Cuff Therabd, intermed                                                                                                                                                                                                                                                                                     Miracle Marcial, PT, MS  Physical Therapist  [place]# [id number]

## 2019-07-09 ENCOUNTER — TREATMENT (OUTPATIENT)
Dept: PHYSICAL THERAPY | Facility: CLINIC | Age: 34
End: 2019-07-09

## 2019-07-09 DIAGNOSIS — R29.3 POSTURE IMBALANCE: ICD-10-CM

## 2019-07-09 DIAGNOSIS — M35.7 FAMILIAL LIGAMENTOUS LAXITY: ICD-10-CM

## 2019-07-09 DIAGNOSIS — M53.3 SACROILIAC JOINT DYSFUNCTION: Primary | ICD-10-CM

## 2019-07-09 DIAGNOSIS — S39.012D STRAIN OF LUMBAR REGION, SUBSEQUENT ENCOUNTER: ICD-10-CM

## 2019-07-09 PROCEDURE — 97530 THERAPEUTIC ACTIVITIES: CPT | Performed by: PHYSICAL THERAPIST

## 2019-07-09 PROCEDURE — 97140 MANUAL THERAPY 1/> REGIONS: CPT | Performed by: PHYSICAL THERAPIST

## 2019-07-09 NOTE — PROGRESS NOTES
"Physical Therapy Note      SUBJECTIVE:   Changes since last seen:  She had the abdominal ultrasound 2, weeks ago.  Tete indicates that they found same uterine fibroid as seen previously, maybe slightly larger.  She says they couldn't confirm endometriosis but her doctor is hoping the new birth control pills will control it.  She started on the new birth control pills a few weeks ago. Her next period should be in 2 weeks.   \"I feel ok today.\"  She just returned from a beach vacation, last night.  She states the flight to [place] significantly increased her pain.   However over the week of the vacation she had minimal pain, not sure if this improvement from sleeping on a firmer mattress, no stress, the new hormones, no bending over for puppies or no computer work.      The only time she had any pain on vacation was after squatting on beach looking for shark teeth. She did stretching and was able to get rid of her pain.   She used a better lumbar support on the flight back and her pain was less than to trip to the beach. .   This morning she woke up with her back is hurting again.     Current level of pain:    5/10   Lowest level of pain since last seen:  2-3 /10  Highest level of pain since last seen:   7/10  Day flight to [place]       Past Medical History:  1.  AA 2013  Treated with PT for left shoulder labral tear, no low back treatment   2.  Hx of two falls when mopping in socks, over the last 2 years  3.  Hx of regular, heavy, cramping menses  4.  T& A, 2006  5.  Allergic to Wellbutrin and mild allergy to contrast dye  6.  Chronic yeast infections  7.  Uterine fibroids removed, 2 yrs ago       Functional Limitations:  Sitting, standing, walking, stairs, driving, working, sleeping, squatting, housework and changing positions.   Patient Goals for Physical Therapy: \"Pain relief\"      OBJECTIVE:  Observation:  Slight ight lateral shift of pelvis.      01/08/2019 02/07/19 03/21/19 03/28/19 04/04/19 04/11/19 04/18/19 04/25/19 " 05/02/19 05/07/19 05/14/19 06/05/19 06/11/19 06/20/19 06/25/19 07/09/19     SI Joint Positioning  Right  Left Right  Left Right  Left Right  Left Right  Left Right  Left Right  Left Right  Left Right  Left Right  Left Right  Left Right  Left Right  Left Right Left Right Left Right Left Right  Left Right  Left       Innominate                            Anterior    x   x    x  x                x                 x              X     x    x   x   x               x   X                    sl    x                x            Posterior                x               x        x              x     x     x    x               X                x               x              x                x               x   sl               x    x        Sacrum                               Unilateral rotation    x   x   x    x    x     x    x    x   x   x    x    x   x   sl   x    x                             SI Joint Testing  Right  Left Right  Left Right  Left Right  Left Right  Left Right  Left Right  Left Right  Left Right  Left Right  Left Right  Left Right  Left Right  Left Right Left Right Left Right Left Right  Left Right  Left           Distraction     +          +   +         +   +          +    +        +  +           +   +          +   +          +   +          +     +        +   +         +   +         +   +          +   +           + +           +   +          +    +          +             Joanna's     +          +   Sl         sl    +         -    Sl         -   Sl         -   Sl         -   -           -                      Compression     +          +   Sl          sl   Sl          Sl     Sl        Sl    -          -   -         -   -           -                      Prone Press Up           +         +         Sl          -         -        -         -                                   Special Tests  Right   Left Right  Left Right  Left Right  Left Right  Left Right  Left Right Left  Right  Left Right  Left Right  Left  Right  Left Right  Left Right  Left Right Left Right Left Right Left Right Left  Right  Left      Straight Leg Raise                                     Active   -         -          -           -                      Passive   -         -             -             -                  Hip Scour Test   ++       Sl    ++       Sl    +           sl   Sl         Sl    Sl         sl Sl          Sl    Sl        sl   Sl       Sl    Sl         Sl    Sl         Sl     Sl         sl   Sl        Sl    Sl        Sl     -            -   Sl         sl   -           -                          Bakers Cyst       +         -     +        -    Sl        -   Sl         -     Sl        -   Sl        Sl   sl          sl   Sl         -   -           -   -          -         Palpation:       Muscle/Bone Irritation  Date 01/08/2019 02/07/19 03/21/19 03/28/19 04/04/18 04/11/19 04/18/19 04/25/19 05/02/19 05/07/19 05/14/19 06/05/19 06/11/19 06/20/19 06/25/19 07/09/19      Right  Left Right  Left Right  Left Right  Left Right  Left Right  Left Right  Left Right  Left Right  Left Right  Left Right  Left Right  Left Right  Left Right Left Right Left Right Left Right  Left Right  Left   Piriformis  ++        +   ++          +   ++       +   ++        +    ++        Sl    ++       Sl   ++       +    ++      +   ++        +   +           +    +        Sl      +         ++   +           ++    +       -   ++      ++   +          +     Gr. Trochanter  +          +  +          sl    +           +    +       ++    +          -   +         Sl    +         sl   +         sl   Sl        Sl    +          +    Sl        sl    Sl         +   Sl          sl     +       -   +         +    +         +     IT Band  +         sl   Sl        sl     -          -   -          -    Sl         -   Sl         -   Sl        -   Sl         -  sl          -   Sl          Sl      -          -     -           -   -            -    Sl        Sl    Sl       Sl     Sl       sl      Quadratus Lumborum  ++        ++   ++        +    +        ++   ++        ++     +       ++   +          +  ++        ++   Sl        Sl    +       ++   +          ++    Sl       +     +         +   +          ++     Sl       +   ++       +    ++       +     Ischial Tuberosity  ++         sl   -          -    -           -     -           -     -          -   -          -   -          -   -          -   -          -  -            -     -           -   -          -   -             -     Sl       -   Sl        Sl     -          -     Sacrococcygeal Ligs  ++          +   Sl        sl   +          +    +       +    Sl        Sl    Sl       Sl    +         sl  ++      +  +        Sl       Sl         +   Sl         Sl    Sl         Sl      -        Sl   not  tested    +         +     Paraspinals  +           +     +         sl    +    +          -   Sl         +   +         sl   +        sl  +         -   Sl          +   Sl          +   +          sl   ++         sl   +          -   +         Sl    ++        +     Spinous Processes                                        C-spine rotated to                           C3-5            T-spine rotated to   -               -              T8-9  T4-5            L-spine rotated to  L2-5        L1-5  L4-5  L3-5  L2-5            L3-5  L1-5  L2-5  L2-5              L1-5           L2-5 L2-5   L1-5 L3-5 L3-5  L2-5     Adductor Tony    +        +       +         +     -         +   -           Sl     -        sl   +          sl   -          -   -           -   -          -   -           -   -          -   -           sl  ++        +   +         sl   +         +   +         ++     Iliopsoas  ++         +   ++        +   ++         +    +         ++  sl        Sl    Sl       Sl    Sl       Sl    Sl         -   Sl        -   Sl         +   +         Sl    +          sl  +          ++   +          +   +          +   +         ++     Quad Origin  sl          sl   Sl          sl    -           -   -           -    -         -   -          -   -         -   -          -   -          -   -           -    -         -   -            -   -         -   -         -   -         -   -            -     SI Joint  ++         +  ++        +    ++        +   +          +   +         +   +          ++   +         sl    +         sl   +        +  +          sl  +        Sl     +        Sl   +          ++   Sl       sl   +        +    + +        +     Pubic Symphysis         ++          Not tested         ++      ++        ++            + Not tested Not tested          +             ++     Obturator externus          -          +   -          -   -          -    -           -   -          -   -          -   -         -    -            -     Rectus Diastasis   1/2 finger                    sternocostals   +        +   +          +    +         -    +           +   Sl        Sl    +         Sl  Not tested     +         +     +         +   +         +   Sl      sl   +          +    +        +     All 4s Positioning: Pt not able to assume full lumbar extension in this position  Leg Length:  The right  lower extremity is equal to the left        Monthly Objective Measurement/Functional Activity  Date: 01/08/2019 03/21/19 04/25/19 06/05/19 07/09/19      Oswestry Pain Score 52/100  46/100    48/100      48/100       50/100                            AROM Lumbar Spine: Degrees   Degrees      Degrees      Degrees      Degrees      Degrees      Degrees    Degrees      Flexion 106 pain          99          83       75 pain          63 onset of pain          Extension 3 pain           17           19       11 pain           11  Pinches          Right Lat. Flexion 29 pain right trunk          24        25      25    23 pinch          Left Lat. Flexion 31         19         23     14  15 pinch          Right Lat Shift Pelvis inc pain   Inc pain Left SI jt       Slight increase No change but can't do far Feels caught          Left Lat  Shift Pelvis  less inc pain       No change       No change/ slight cielo  No change but can't go far  Easier but not natural                  AROM Hips:  (degrees) Right      Left Right Left Right  Left Right  Left Right  Left Right  Left Right  Left Right  Left      Flexion 110       110  125    120    128      125 130       130 130      130         Abduction 37       41   45      45      42      44  43          45   45       45         Int. Rotation 32       22  30      25      31         28  32          30   34        32         Ext. Rotation  34       42  35      40       38        41   35         40   40        42                 Flexibility:   (degrees) Right    Left Right  Left Right  Left Right  Left Right  Left Right  Left Right  Left Right  Left      Hamstrings -32       -28  -30     -25   -25       -25   -28      -27  -30      -26         Quadriceps 39       42 40        45    not tested  40          42  45        47         HipExt/Rot(piriformis) 30       28   32      30  33         32   35         33   37        35         Hip Int/Rotators 27       9   25      10   28        15   27         16   25         18         Hip Flexors (iliopsoas) -       -             IT Band -       -                     Reflexes: Right      Left Right  Left Right  Left Right  Left Right  Left Right  Left Right  Left Right  Left      L4 (Quad) +1       +1             S1 (Achilles) +1       +1                     Root Level  - Motor Right      Left Right  Left Right  Left Right  Left Right  Left Right  Left Right  Left Right  Left     T12             Rectus Abdominus 4+/5     4+/5         5/5        5/5          L1              Paraspinals 5/5         5/5             L2              Hip Flexion 5/5          5/5             L3             Quads 5/5          5/5             L4              Anterior Tibialis 5/5         5/5             L5             Gr. Toe Extension 5/5           5/5             S1             Gastroc/Sol/Peroneals 5/5          5/5                     Functional Strength:             Single LegStanceTime (seconds) R:30   L:30   R:      L: R:      L: R: 30 pain     L:30 pain  R:      L: R:      L: R:      L: R:      L:     Pelvic Floor Isolation (seconds) -    10 sec 10 sec          Inner Unit  Isolation (seconds) -    10 sec 10 sec                   Functional Activities:              Sit w/o pain? Pain increases (minutes) No/ 30 min No/ 30  min No/ 30 min  No/  30 min No/ 30 min          Stand w/o pain? No/ 30 min No/ 30 min No/ 10 min  No/ 10 min No/ 10 min          Walk w/o pain? varies No/ 1 mile No/ 1/2 mile No/ 1 mile No/ 1mile, piriformis, B         Steps w/o pain? 1 fl, avoids them 1 fl avoids  1 fl avoids  No/ avoids No/ doesn't do         Drive w/o pain? No/ 10-15 min No/ 30-45 min No/ 30-45  Min  No/ 30-45 min No/ 35-45         Work w/o pain? No/ 30 min No/ 30 min No/ 30  No/ 30 min No/30 mi           # times wakes w/ pain? 4-5x/night, now taking meds at night she is able to sleep.  2x   2-3x  2x         1x           PLAN OF CARE:    ASSESSMENT:  Problem List:   1. SI joint dysfunction  2. Lack of spinal stabilization  3. Postural dysfunction     Discussion:  It was encouraging for Tete to experience minimal pain while on vacation.  Discussed the possible reasons for this, the firmer mattress (which she is looking into getting a new mattress for home), the new hormones, her improvement in spinal stabilization and self management of the SI joints, not bending over for her puppies and not sitting at computer for extended periods of time.  Having been home less than 24 hrs and waking with pain this morning, it seems to suggest the mattress may be the biggest culprit contributing to her pain.      Was able to progress to instruction in the LE stretches with the inner unit on as needed.  She had a good understanding of today's instruction.    She is in need of completing the instruction on the  "stability ball, progressing the walking program and advanced spinal stabilization.          Short Term Goals: (4-6 weeks)                                   Date Met:                1.   pt independent in postural correction         02/07/19  2.   pt independent in SI joint self-corrections        03/21/19  3.   pt independent in exer to decrease post. disc pressures      03/21/19  4.   pt able to sleep through night without pain  5.   pt able to sit 15 minutes without pain  6.   pt able to walk 10 minutes without pain        06/20/19    7.   Reduce pt pain to no greater than 7/10        03/21/19  8.   Decrease Oswestry Pain Score to no greater than 45/100  9.  Reduce pt pain to no greater than 6/10  10.  Pt able to isolate the pelvic floor in supine x10, 10 sec      03/28/19  11.  Pt able to isolate transverse abdom in all 4s, x10 sec, x10         03/28/19  12.  Pt able to isolate the multifidus in sitting x10 sec, x10                            03/28/19  13.  Pt able to engage inner unit, move from sit to stand &back to sit      04/04/19                                                                                                                                                                                                                                           14.  Pt able to engage inner unit and walk 4 steps without overflow to hamstrings   06/20/19  15.  Pt able to hip abduct x6 without engaging the piriformis      06/20/19     16.  Pt able to perform prone knee flexion without engaging piriformis x6          05/07/19       17.  Pt able to perform remedial lower abs with scissor arm work x6 w/o pain    04/18/19    18.  Pt able to perform \"Pre-cursor\" lower abs leg lift initiation x6 without pain          04/18/19   19.  Pt able to perform retro step without engaging piriformis x6, bilaterally    20.  Pt able to perform bridging x6 without engaging piriformis      04/25/19    21.  Pt able to perform " "\"scissors\" arm movement with inner unit w/o piriformis x6   05/02/19  22.  Pt able to perform PIC hip adductors without engaging the piriformis  x6    05/07/19  23.  Pt able to perform \"Scissors\" arm motion w/ inner unit with 1lb wts x6 w/o pain   05/07/19  24.  Pt able to perform prone glut max over stability ball x6 w/o engaging piriformis   25.  Pt able to reduce pain w/ self PIC to left psoas       05/14/19  26.  Pt able to move laterally on stability ball in sitting without pain x6  27.  Pt able to move A/P movement sitting on stability ball without pain x6  28.  Pt independent in pool exercises, to do without increasing pain     07/09/19  29.  Pt able to throw and catch stability ball in supine with inner unit on x6    06/11/19  30.  Pt able to hold medicine ball in stance and move away from trunk with IU on and w/o pain x6 06/11/19  31.  Pt independent in supine hamstring stretch, able to do 2x without pain.  32.  Pt independent in hip adductor stretching, supine and stance  33.  Pt independent in quad stretching, in stance, without pain.  34. Pt independent in hip internal rotator stretch in supine, without pain                                                                                                                                              Long Term Goals:(3-5 months)      Date Met:      1.  pt independent in self-management of symptoms       2.  pt independent in home program      3.  pt able to work 6 hours without pain      4.  pt able to sit 60 minutes without pain      5.  pt able to walk 45 min without pain    Progress Towards Goals:  As expected to a little slower than expected    Treatment Plan:   1.  Ultrasound to increase extensibility, decrease pain, if needed  2.  Electrical stimulation for muscle relaxation, decrease pain, if needed, iontophoresis  3.  Manual therapy to increase function, decrease pain  4.  Therapeutic exercise to increase function, decrease pain  5.  Home programming " and d/c planning to increase function and decrease pain,     Frequency & Duration:  The progression of Tete's instruction has been slow secondary to the flare ups of pain associated with her menses.  Therefore, she is in need of 4-6  more visits, once/wk to once every other week to complete the spinal stabilization instruction and be independent in her home program.      Patient Participated in and Agrees With This Plan of Care and Goals:  YES  Rehab Potential:  jarret Marcial, PT, MS  Physical Therapist  [place]# [id number]      TREATMENT TODAY:    Total Treatment Time: (time in clinic)   60  min  Timed Code Treatment Minutes:     60      Supplies given:      Manual Therapy:  (MA)  RX min:      35  Manual posterior rotation of left innominate    Positional Isometric contraction (PIC) of rightt iliopsoas    Positional Isometric contraction of (PIC) right gluteus minimus    Distraction of both SI jts in open pack hip position  Piriformis stretching, bilaterally    Quadratus lumborum stretching, bilaterally    Anterior/Inferior Mobilization of  right hip    Positional Isometric Contraction of multifidus  Myofascial work trunk   Sternocostal and rib mobs   Manual spinal distraction      Therapeutic Activities:  (TA)  RX min:    25    Neuromuscular Reeducation: (NMR)  RX min:       Ultrasound:  RX min:         1.6 vance/cm2       Location:      Iontophoresis:  6 hr patch                                Location:                           Ice:  Lumbar spine/SI joints    Procedures:      Key:  i=instructed        r=review        c=corrected        a=adapted   Code Procedure/Instruction 01/08/2019 02/07/19 03/21/19 03/28/19 04/04/19 04/11/19 04/18/19 04/25/19 05/02/19 05/07/19 05/14/19 06/05/19 06/11/19 06/20/19 06/25/19 07/09/19      TA         Sleeping Positions instructed               reviewed,correct                    TA Sternum-Up Posture instructed reviewed                    TA SI jt. self-correction instructed  corrected corrected  reviewed        Adapted and corrected         TA Lumbar Facet PIC instructed corrected corrected  reviewed        reviewed         TA   Order of Self-Corrections instructed    reviewed                 TA Use of lumbar pillow instructed                     TA Use of cervical roll instructed corrected                    TA Use of orthotics instructed                     TA Use of SI belt instructed                     TA Use of Nada chair                      TA Use of Ice instructed                     TA Use of [employer]/ heat instructed                     TA Use of Theraworx Relief instructed                     TA Use of TENS unit                      TA Use of iontophoresis                      TA Decreasing Disc Pressures  instructed                    TA Use of sacro wedge          attempted didnt work                                   NMR Pelvic Floor Isolation   instructed corrected                  NMR Transverse Abdominus   instructed corrected                  NMR Multifidus Isolation   instructed                   NMR Diaphragmtic breathe supine    instructed                  NMR  Diaphragmtic breath sit                      NMR Pelvic Clock in sitting   instructed                   NMR Kinesiotape    Applied to both QL                  NMR InnerUnit against Gravity    instructed                  NMR Sit-stand w/ inner unit    instructed                  NMR Roll w/ inner unit    instructed                  NMR Walk w/ inner unit     instructed                  NMR Up/down steps w/ inner unit    instructed                  NMR Water Exer Instruction            Instructed           NMR Hip Abd w/o piriformis     instructed stopped                NMR Hip Add w/o iliop/ham      instructed stopped                NMR Lower abs in supine      Adapted to pre cursor, initiation Cont, no change   Still not able to lift foot w/o engaging piriformis   adapted with bhavana            NMR Inner  unit challenge with scissor arm work       instructed Added legs  Adapted w/ ball & belt progressed to 1lb weights Inc reps to 10, still use 1lb Added rectus abdom to this           NMR Abd obliques in supine      attempted                NMR Prone Knee Flexion     instructed  instructed corrected Made bolster higher. Dorsiflex left foot more reviewedstopped dorsiflex on Right           NMR Supine glute w/ ball btwn knees        instructed  Adapted to do w/ belt for abd Moved belt more proximal on thighs She will not bridge as high  continue           NMR Prone glut amrita          instructed            NMR Retro Step       instructed stopped              NMR Retro Walk                      NMR Retro walk on treadmill              instructed        NMR Forward walk w/ inner unit              instructed        NMR Stability ball multifidus bounce              instructed        NMR Stability Ball A/P & Lateral           instructed           NMR Ball Catch/Throw /Supine            instructed          NMR Ball movemt in /Stance            instructed          NMR StabilityBall All 4's Arm Lift                      NMR StabilityBall Prone Walk Out                      NMR StabilityBall Supine Oblique              Adapted to do w/ belt around knees        NMR Stability Ball sit-supine-sit                      NMR Walking Prog Progression                      MNR Home program sequencing                                             TA Hamstring stretch                instructed      TA Hip Ext Rot Stretch              Instructed  Adapted to do in stance  instructed in supine      TA Hip Int Rot Stretch                instructed      TA Hip Flex/IT Stretch                      TA Hip Add Stretch                instructed      TA Lats Dorsi Stretch                attempted      TA Gastroc/soleus stretch                instructed      TA QuadratusLumborm Stretch                         Supine                         Stance               instructed        TA Quad Stretch                instructed                             NMR Wall Push Ups                      NMR Planking                      NMR BOSU Ball Exercises                      NMR Lateral Bridge Drop                      NMR [place]                      NMR O'Feldt Lat Pull Down                      NMR O'Feldt Hip Ext                      NMR O'Feldt Trunk Ext                                             TA CervDiscExtSup/stnd                      TA Cerv Stretches                      TA Self PIC Cervical Spine                      TA Neural Gliding                      TA Ant/Mid Scalene Strch                      TA Biceps stretch                      TA Rotator Cuff Stretch                      TA Anterior Chest Stretch                      TA Wrist Ext Stretch                                             NMR Prone Arm Lifts                      NMR Scapula Stabilization                      NMR Occulomotor Isometrics                      NMR Cervical Isometrics                                             TA Shld AROM w/bar                      TA Shld Capsular Stretching                      TA Self PIC Thor Spine                      TA Rot Cuff Therabd, beginner                      TA Rot Cuff Therabd, intermed                                                                                                                                                                                                                                                                                     Miracle Marcial, PT, MS  Physical Therapist  [place]# [id number]

## 2019-07-16 ENCOUNTER — TREATMENT (OUTPATIENT)
Dept: PHYSICAL THERAPY | Facility: CLINIC | Age: 34
End: 2019-07-16

## 2019-07-16 DIAGNOSIS — R29.3 POSTURE IMBALANCE: ICD-10-CM

## 2019-07-16 DIAGNOSIS — S39.012D STRAIN OF LUMBAR REGION, SUBSEQUENT ENCOUNTER: ICD-10-CM

## 2019-07-16 DIAGNOSIS — M53.3 SACROILIAC JOINT DYSFUNCTION: Primary | ICD-10-CM

## 2019-07-16 DIAGNOSIS — M35.7 FAMILIAL LIGAMENTOUS LAXITY: ICD-10-CM

## 2019-07-16 PROCEDURE — 97140 MANUAL THERAPY 1/> REGIONS: CPT | Performed by: PHYSICAL THERAPIST

## 2019-07-16 PROCEDURE — 97112 NEUROMUSCULAR REEDUCATION: CPT | Performed by: PHYSICAL THERAPIST

## 2019-07-16 NOTE — PROGRESS NOTES
"Physical Therapy Note      SUBJECTIVE:   Changes since last seen:  She started the new birth control pills, but talked with her doctor yesterday, will probably change to a different pill as it was negatively impacting her mood.   She liked the piriformis stretch the most and the hip adductor stretch from last visit.   She is c/o feeling tight in her entire spine, like she is guarding with her neck, upper back and low back.     Current level of pain:    5-6/10   Lowest level of pain since last seen:  3.5/10  Highest level of pain since last seen:   6/10       Past Medical History:  1.  AA 2013  Treated with PT for left shoulder labral tear, no low back treatment   2.  Hx of two falls when mopping in socks, over the last 2 years  3.  Hx of regular, heavy, cramping menses  4.  T& A, 2006  5.  Allergic to Wellbutrin and mild allergy to contrast dye  6.  Chronic yeast infections  7.  Uterine fibroids removed, 2 yrs ago       Functional Limitations:  Sitting, standing, walking, stairs, driving, working, sleeping, squatting, housework and changing positions.   Patient Goals for Physical Therapy: \"Pain relief\"      OBJECTIVE:  Observation:  Slight ight lateral shift of pelvis.      01/08/2019 02/07/19 03/21/19 03/28/19 04/04/19 04/11/19 04/18/19 04/25/19 05/02/19 05/07/19 05/14/19 06/05/19 06/11/19 06/20/19 06/25/19 07/09/19 07/16/19    SI Joint Positioning  Right  Left Right  Left Right  Left Right  Left Right  Left Right  Left Right  Left Right  Left Right  Left Right  Left Right  Left Right  Left Right  Left Right Left Right Left Right Left Right  Left Right  Left       Innominate                            Anterior    x   x    x  x                x                 x              X     x    x   x   x               x   X                    sl    x                x               x           Posterior                x               x        x              x     x     x    x               X                x               x   "            x                x               x   sl               x    x    x       Sacrum                               Unilateral rotation    x   x   x    x    x     x    x    x   x   x    x    x   x   sl   x    x                             SI Joint Testing  Right  Left Right  Left Right  Left Right  Left Right  Left Right  Left Right  Left Right  Left Right  Left Right  Left Right  Left Right  Left Right  Left Right Left Right Left Right Left Right  Left Right  Left           Distraction     +          +   +         +   +          +    +        +  +           +   +          +   +          +   +          +     +        +   +         +   +         +   +          +   +           + +           +   +          +    +          +   +          +            Joanna's     +          +   Sl         sl    +         -    Sl         -   Sl         -   Sl         -   -           -                      Compression     +          +   Sl          sl   Sl          Sl     Sl        Sl    -          -   -         -   -           -                      Prone Press Up           +         +         Sl          -         -        -         -                                   Special Tests  Right   Left Right  Left Right  Left Right  Left Right  Left Right  Left Right Left  Right  Left Right  Left Right  Left Right  Left Right  Left Right  Left Right Left Right Left Right Left Right Left  Right  Left      Straight Leg Raise                                     Active   -         -          -           -                      Passive   -         -             -             -                  Hip Scour Test   ++       Sl    ++       Sl    +           sl   Sl         Sl    Sl         sl Sl          Sl    Sl        sl   Sl       Sl    Sl         Sl    Sl         Sl     Sl         sl   Sl        Sl    Sl        Sl     -            -   Sl         sl   -           -   -          -                         Bakers Cyst       +         -     +        -     Sl        -   Sl         -     Sl        -   Sl        Sl   sl          sl   Sl         -   -           -   -          -   +           -        Palpation:       Muscle/Bone Irritation  Date 01/08/2019 02/07/19 03/21/19 03/28/19 04/04/18 04/11/19 04/18/19 04/25/19 05/02/19 05/07/19 05/14/19 06/05/19 06/11/19 06/20/19 06/25/19 07/09/19 07/16/19     Right  Left Right  Left Right  Left Right  Left Right  Left Right  Left Right  Left Right  Left Right  Left Right  Left Right  Left Right  Left Right  Left Right Left Right Left Right Left Right  Left Right  Left   Piriformis  ++        +   ++          +   ++       +   ++        +    ++        Sl    ++       Sl   ++       +    ++      +   ++        +   +           +    +        Sl      +         ++   +           ++    +       -   ++      ++   +          +   ++        +    Gr. Trochanter  +          +  +          sl    +           +    +       ++    +          -   +         Sl    +         sl   +         sl   Sl        Sl    +          +    Sl        sl    Sl         +   Sl          sl     +       -   +         +    +         +   +         sl    IT Band  +         sl   Sl        sl     -          -   -          -    Sl         -   Sl         -   Sl        -   Sl         -  sl          -   Sl          Sl      -          -     -           -   -            -    Sl        Sl    Sl       Sl     Sl       sl   +          Sl     Quadratus Lumborum  ++        ++   ++        +    +        ++   ++        ++     +       ++   +          +  ++        ++   Sl        Sl    +       ++   +          ++    Sl       +     +         +   +          ++     Sl       +   ++       +    ++       +    +         +    Ischial Tuberosity  ++         sl   -          -    -           -     -           -     -          -   -          -   -          -   -          -   -          -  -            -     -           -   -          -   -             -     Sl       -   Sl        Sl     -          -     -         -     Sacrococcygeal Ligs  ++          +   Sl        sl   +          +    +       +    Sl        Sl    Sl       Sl    +         sl  ++      +  +        Sl       Sl         +   Sl         Sl    Sl         Sl      -        Sl   not  tested    +         +    Sl       Sl     Paraspinals  +           +     +         sl    +    +          -   Sl         +   +         sl   +        sl  +         -   Sl          +   Sl          +   +          sl   ++         sl   +          -   +         Sl    ++        +     +       S;     Spinous Processes                                        C-spine rotated to                           C3-5  C2-5          T-spine rotated to   -               -              T8-9  T4-5              L-spine rotated to  L2-5        L1-5  L4-5  L3-5  L2-5            L3-5  L1-5  L2-5  L2-5              L1-5           L2-5 L2-5   L1-5 L3-5 L3-5  L2-5 L4-5    Adductor Tony    +        +       +         +     -         +   -           Sl     -        sl   +          sl   -          -   -           -   -          -   -           -   -          -   -           sl  ++        +   +         sl   +         +   +         ++   ++        +    Iliopsoas  ++         +   ++        +   ++         +    +         ++  sl        Sl    Sl       Sl    Sl       Sl    Sl         -   Sl        -   Sl         +   +         Sl    +          sl  +          ++   +          +   +          +   +         ++   ++        +    Quad Origin  sl          sl   Sl          sl    -          -   -           -    -         -   -          -   -         -   -          -   -          -   -           -    -         -   -            -   -         -   -         -   -         -   -            -   -          -    SI Joint  ++         +  ++        +    ++        +   +          +   +         +   +          ++   +         sl    +         sl   +        +  +          sl  +        Sl     +        Sl   +          ++   Sl       sl   +        +    + +        +  ++           +    Pubic Symphysis         ++          Not tested         ++      ++        ++            + Not tested Not tested          +             ++         +    Obturator externus          -          +   -          -   -          -    -           -   -          -   -          -   -         -    -            -   -          -    Rectus Diastasis   1/2 finger                                         Ant/middle scalenes                   ++        +    Upper traps                  ++         +    Levator scap                   ++          +    sternocleidomastoid                   Sl        Sl                          sternocostals   +        +   +          +    +         -    +           +   Sl        Sl    +         Sl  Not tested     +         +     +         +   +         +   Sl      sl   +          +    +        +   +          +    All 4s Positioning: Pt not able to assume full lumbar extension in this position  Leg Length:  The right  lower extremity is equal to the left        Monthly Objective Measurement/Functional Activity  Date: 01/08/2019 03/21/19 04/25/19 06/05/19 07/09/19      Oswestry Pain Score 52/100  46/100    48/100      48/100       50/100                            AROM Lumbar Spine: Degrees   Degrees      Degrees      Degrees      Degrees      Degrees      Degrees    Degrees      Flexion 106 pain          99          83       75 pain          63 onset of pain          Extension 3 pain           17           19       11 pain           11  Pinches          Right Lat. Flexion 29 pain right trunk          24        25      25    23 pinch          Left Lat. Flexion 31         19         23     14  15 pinch          Right Lat Shift Pelvis inc pain   Inc pain Left SI jt       Slight increase No change but can't do far Feels caught          Left Lat Shift Pelvis  less inc pain       No change       No change/ slight cielo  No change but can't go far  Easier but not natural                  AROM Hips:   (degrees) Right      Left Right Left Right  Left Right  Left Right  Left Right  Left Right  Left Right  Left      Flexion 110       110  125    120    128      125 130       130 130      130         Abduction 37       41   45      45      42      44  43          45   45       45         Int. Rotation 32       22  30      25      31         28  32          30   34        32         Ext. Rotation  34       42  35      40       38        41   35         40   40        42                 Flexibility:   (degrees) Right    Left Right  Left Right  Left Right  Left Right  Left Right  Left Right  Left Right  Left      Hamstrings -32       -28  -30     -25   -25       -25   -28      -27  -30      -26         Quadriceps 39       42 40        45    not tested  40          42  45        47         HipExt/Rot(piriformis) 30       28   32      30  33         32   35         33   37        35         Hip Int/Rotators 27       9   25      10   28        15   27         16   25         18         Hip Flexors (iliopsoas) -       -             IT Band -       -                     Reflexes: Right      Left Right  Left Right  Left Right  Left Right  Left Right  Left Right  Left Right  Left      L4 (Quad) +1       +1             S1 (Achilles) +1       +1                     Root Level  - Motor Right      Left Right  Left Right  Left Right  Left Right  Left Right  Left Right  Left Right  Left     T12             Rectus Abdominus 4+/5     4+/5         5/5        5/5          L1              Paraspinals 5/5         5/5             L2              Hip Flexion 5/5          5/5             L3             Quads 5/5          5/5             L4              Anterior Tibialis 5/5         5/5             L5             Gr. Toe Extension 5/5           5/5             S1            Gastroc/Sol/Peroneals 5/5          5/5                     Functional Strength:             Single LegStanceTime (seconds) R:30   L:30   R:      L: R:      L: R: 30 pain      L:30 pain  R:      L: R:      L: R:      L: R:      L:     Pelvic Floor Isolation (seconds) -    10 sec 10 sec          Inner Unit  Isolation (seconds) -    10 sec 10 sec                   Functional Activities:              Sit w/o pain? Pain increases (minutes) No/ 30 min No/ 30  min No/ 30 min  No/  30 min No/ 30 min          Stand w/o pain? No/ 30 min No/ 30 min No/ 10 min  No/ 10 min No/ 10 min          Walk w/o pain? varies No/ 1 mile No/ 1/2 mile No/ 1 mile No/ 1mile, piriformis, B         Steps w/o pain? 1 fl, avoids them 1 fl avoids  1 fl avoids  No/ avoids No/ doesn't do         Drive w/o pain? No/ 10-15 min No/ 30-45 min No/ 30-45  Min  No/ 30-45 min No/ 35-45         Work w/o pain? No/ 30 min No/ 30 min No/ 30  No/ 30 min No/30 mi           # times wakes w/ pain? 4-5x/night, now taking meds at night she is able to sleep.  2x   2-3x  2x         1x           PLAN OF CARE:    ASSESSMENT:  Problem List:   1. SI joint dysfunction  2. Lack of spinal stabilization  3. Postural dysfunction     Discussion:  Tete arrived with some guarding in the quadratus lumborums, not as much as usually, but more guarding than typically seen in the upper quarter musculature.  Manual work was done on the SI joints, lumbar spine, thoracic spine and cervical spine. However she continued to have guarding of the upper quarter. Thus used cover roll and leukotape for stabilizing both scapula.  Once this was on, Tete was surprised at how she was able to relax the QLs in upright.  She will leave this on for 2-3 days or less, taking it off if she begins to get any skin irritation form the cover roll tape.  This should position her well at the computer if she is attentive to previous instruction for positioning of the pelvis in her chair.  She will continue with her exercises at home with the cover roll and leukotape on facilitating functional spinal strengthening in sternum up position.     This was a little detour from the planned  treatment today.  Will assess effectiveness of the taping and proceed with completion of stability ball stabilization exercises, progression of walking program and advanced spinal stabilization.      Tete notes she did talk with Julissa Mathew, PT, pelvic  and is holding off on treatment with her until she gets her hormones straightened out with her GYN.       Short Term Goals: (4-6 weeks)                                   Date Met:                1.   pt independent in postural correction         02/07/19  2.   pt independent in SI joint self-corrections        03/21/19  3.   pt independent in exer to decrease post. disc pressures      03/21/19  4.   pt able to sleep through night without pain  5.   pt able to sit 15 minutes without pain  6.   pt able to walk 10 minutes without pain        06/20/19    7.   Reduce pt pain to no greater than 7/10        03/21/19  8.   Decrease Oswestry Pain Score to no greater than 45/100  9.  Reduce pt pain to no greater than 6/10        07/16/19  10.  Pt able to isolate the pelvic floor in supine x10, 10 sec      03/28/19  11.  Pt able to isolate transverse abdom in all 4s, x10 sec, x10         03/28/19  12.  Pt able to isolate the multifidus in sitting x10 sec, x10                            03/28/19  13.  Pt able to engage inner unit, move from sit to stand &back to sit      04/04/19                                                                                                                                                                                                                                           14.  Pt able to engage inner unit and walk 4 steps without overflow to hamstrings   06/20/19  15.  Pt able to hip abduct x6 without engaging the piriformis      06/20/19     16.  Pt able to perform prone knee flexion without engaging piriformis x6          05/07/19       17.  Pt able to perform remedial lower abs with scissor arm work x6 w/o  "pain    04/18/19    18.  Pt able to perform \"Pre-cursor\" lower abs leg lift initiation x6 without pain          04/18/19   19.  Pt able to perform retro step without engaging piriformis x6, bilaterally    20.  Pt able to perform bridging x6 without engaging piriformis      04/25/19    21.  Pt able to perform \"scissors\" arm movement with inner unit w/o piriformis x6   05/02/19  22.  Pt able to perform PIC hip adductors without engaging the piriformis  x6    05/07/19  23.  Pt able to perform \"Scissors\" arm motion w/ inner unit with 1lb wts x6 w/o pain   05/07/19  24.  Pt able to perform prone glut max over stability ball x6 w/o engaging piriformis   25.  Pt able to reduce pain w/ self PIC to left psoas       05/14/19  26.  Pt able to move laterally on stability ball in sitting without pain x6  27.  Pt able to move A/P movement sitting on stability ball without pain x6  28.  Pt independent in pool exercises, to do without increasing pain     07/09/19  29.  Pt able to throw and catch stability ball in supine with inner unit on x6    06/11/19  30.  Pt able to hold medicine ball in stance and move away from trunk with IU on and w/o pain x6 06/11/19  31.  Pt independent in supine hamstring stretch, able to do 2x without pain.    07/16/19  32.  Pt independent in hip adductor stretching, supine and stance     07/16/19  33.  Pt independent in quad stretching, in stance, without pain.  34. Pt independent in hip internal rotator stretch in supine, without pain   35.  Pt able to tolerate ADLs with leukotape on scapula for enhanced spinal stabilization x 1 day                                                                                                                                             Long Term Goals:(3-5 months)      Date Met:      1.  pt independent in self-management of symptoms       2.  pt independent in home program      3.  pt able to work 6 hours without pain      4.  pt able to sit 60 minutes without pain    "   5.  pt able to walk 45 min without pain    Progress Towards Goals:  As expected to a little slower than expected    Treatment Plan:   1.  Ultrasound to increase extensibility, decrease pain, if needed  2.  Electrical stimulation for muscle relaxation, decrease pain, if needed, iontophoresis  3.  Manual therapy to increase function, decrease pain  4.  Therapeutic exercise to increase function, decrease pain  5.  Home programming and d/c planning to increase function and decrease pain,     Frequency & Duration:  The progression of Tete's instruction has been slow secondary to the flare ups of pain associated with her menses.  Therefore, she is in need of 5 more visits, once/wk to once every other week to complete the spinal stabilization instruction and be independent in her home program.      Patient Participated in and Agrees With This Plan of Care and Goals:  YES  Rehab Potential:  jarret Marcial, PT, MS  Physical Therapist  [place]# [id number]      TREATMENT TODAY:    Total Treatment Time: (time in clinic)   60  min  Timed Code Treatment Minutes:     60      Supplies given:      Manual Therapy:  (MA)  RX min:       40  Manual posterior rotation of left innominate    Positional Isometric contraction (PIC) of rightt iliopsoas    Positional Isometric contraction of (PIC) right gluteus minimus    Distraction of both SI jts in open pack hip position  Piriformis stretching, bilaterally    Quadratus lumborum stretching, bilaterally    Anterior/Inferior Mobilization of  right hip    Positional Isometric Contraction of multifidus  Myofascial work trunk   Sternocostal and rib mobs   Manual spinal distraction  PIC C-spine  PIC T-spine  Stretching scalenes, upper traps, levator scap      Therapeutic Activities:  (TA)  RX min:        Neuromuscular Reeducation: (NMR)  RX min:   20    Ultrasound:  RX min:         1.6 vance/cm2       Location:      Iontophoresis:  6 hr patch                                Location:                            Ice:  Lumbar spine/SI joints    Procedures:      Key:  i=instructed        r=review        c=corrected        a=adapted   Code Procedure/Instruction 01/08/2019 02/07/19 03/21/19 03/28/19 04/04/19 04/11/19 04/18/19 04/25/19 05/02/19 05/07/19 05/14/19 06/05/19 06/11/19 06/20/19 06/25/19 07/09/19 07/16/19     TA         Sleeping Positions instructed               reviewed,correct                    TA Sternum-Up Posture instructed reviewed                    TA SI jt. self-correction instructed corrected corrected  reviewed        Adapted and corrected         TA Lumbar Facet PIC instructed corrected corrected  reviewed        reviewed         TA   Order of Self-Corrections instructed    reviewed                 TA Use of lumbar pillow instructed                     TA Use of cervical roll instructed corrected                    TA Use of orthotics instructed                     TA Use of SI belt instructed                     TA Use of Nada chair                      TA Use of Ice instructed                     TA Use of [employer]/ heat instructed                     TA Use of Theraworx Relief instructed                     TA Use of TENS unit                      TA Use of iontophoresis                      TA Decreasing Disc Pressures  instructed                    TA Use of sacro wedge          attempted didnt work            NMR Leukotaping upper quarter                 instructed&  applied     NMR Pelvic Floor Isolation   instructed corrected                  NMR Transverse Abdominus   instructed corrected                  NMR Multifidus Isolation   instructed                   NMR Diaphragmtic breathe supine    instructed                  NMR  Diaphragmtic breath sit                      NMR Pelvic Clock in sitting   instructed                   NMR Kinesiotape    Applied to both QL                  NMR InnerUnit against Gravity    instructed                  NMR Sit-stand w/ inner unit     instructed                  NMR Roll w/ inner unit    instructed                  NMR Walk w/ inner unit     instructed                  NMR Up/down steps w/ inner unit    instructed                  NMR Water Exer Instruction            Instructed           NMR Hip Abd w/o piriformis     instructed stopped                NMR Hip Add w/o iliop/ham      instructed stopped                NMR Lower abs in supine      Adapted to pre cursor, initiation Cont, no change   Still not able to lift foot w/o engaging piriformis   adapted with bolster            NMR Inner unit challenge with scissor arm work       instructed Added legs  Adapted w/ ball & belt progressed to 1lb weights Inc reps to 10, still use 1lb Added rectus abdom to this           NMR Abd obliques in supine      attempted                NMR Prone Knee Flexion     instructed  instructed corrected Made bolster higher. Dorsiflex left foot more reviewedstopped dorsiflex on Right           NMR Supine glute w/ ball btwn knees        instructed  Adapted to do w/ belt for abd Moved belt more proximal on thighs She will not bridge as high  continue           NMR Prone glut amrita          instructed            NMR Retro Step       instructed stopped              NMR Retro Walk                      NMR Retro walk on treadmill              instructed        NMR Forward walk w/ inner unit              instructed        NMR Stability ball multifidus bounce              instructed        NMR Stability Ball A/P & Lateral           instructed           NMR Ball Catch/Throw /Supine            instructed          NMR Ball movemt in /Stance            instructed          NMR StabilityBall All 4's Arm Lift                      NMR StabilityBall Prone Walk Out                      NMR StabilityBall Supine Oblique              Adapted to do w/ belt around knees        NMR Stability Ball sit-supine-sit                      NMR Walking Prog Progression                      MNR Home  program sequencing                                             TA Hamstring stretch                instructed      TA Hip Ext Rot Stretch              Instructed  Adapted to do in stance  instructed in supine      TA Hip Int Rot Stretch                instructed      TA Hip Flex/IT Stretch                      TA Hip Add Stretch                instructed      TA Lats Dorsi Stretch                attempted      TA Gastroc/soleus stretch                instructed      TA QuadratusLumborm Stretch                         Supine                         Stance              instructed        TA Quad Stretch                instructed                             NMR Wall Push Ups                      NMR Planking                      NMR BOSU Ball Exercises                      NMR Lateral Bridge Drop                      NMR [place]ers [place]                      NMR O'Feldt Lat Pull Down                      NMR O'Feldt Hip Ext                      NMR O'Feldt Trunk Ext                                             TA CervDiscExtSup/stnd                      TA Cerv Stretches                      TA Self PIC Cervical Spine                      TA Neural Gliding                      TA Ant/Mid Scalene Strch                      TA Biceps stretch                      TA Rotator Cuff Stretch                      TA Anterior Chest Stretch                      TA Wrist Ext Stretch                                             NMR Prone Arm Lifts                      NMR Scapula Stabilization                      NMR Occulomotor Isometrics                      NMR Cervical Isometrics                                             TA Shld AROM w/bar                      TA Shld Capsular Stretching                      TA Self PIC Thor Spine                      TA Rot Cuff Therabd, beginner                      TA Rot Cuff Therabd, intermed                                                                                                                                                                                                                                                                                      Miracle Marcial, PT, MS  Physical Therapist  [place]# [id number]

## 2019-07-25 ENCOUNTER — TREATMENT (OUTPATIENT)
Dept: PHYSICAL THERAPY | Facility: CLINIC | Age: 34
End: 2019-07-25

## 2019-07-25 DIAGNOSIS — R29.3 POSTURE IMBALANCE: ICD-10-CM

## 2019-07-25 DIAGNOSIS — M35.7 FAMILIAL LIGAMENTOUS LAXITY: ICD-10-CM

## 2019-07-25 DIAGNOSIS — S39.012D STRAIN OF LUMBAR REGION, SUBSEQUENT ENCOUNTER: ICD-10-CM

## 2019-07-25 DIAGNOSIS — M53.3 SACROILIAC JOINT DYSFUNCTION: Primary | ICD-10-CM

## 2019-07-25 PROCEDURE — 97112 NEUROMUSCULAR REEDUCATION: CPT | Performed by: PHYSICAL THERAPIST

## 2019-07-25 PROCEDURE — 97140 MANUAL THERAPY 1/> REGIONS: CPT | Performed by: PHYSICAL THERAPIST

## 2019-07-25 NOTE — PROGRESS NOTES
"Physical Therapy Note      SUBJECTIVE:   Changes since last seen:  The leukotape felt good for the rest of the day after last visit, but she had to take it off in order to sleep as it was pulling too much on her skin.  The last few days she's had pain in both piriformis tendons.   Her GYN called in another new birth control script so she's at end of first week of pack.     Current level of pain:    5/10   Lowest level of pain since last seen:  5/10  Highest level of pain since last seen:   7/10 couldn't sleep because of lateral piriformis pain       Past Medical History:  1.  AA 2013  Treated with PT for left shoulder labral tear, no low back treatment   2.  Hx of two falls when mopping in socks, over the last 2 years  3.  Hx of regular, heavy, cramping menses  4.  T& A, 2006  5.  Allergic to Wellbutrin and mild allergy to contrast dye  6.  Chronic yeast infections  7.  Uterine fibroids removed, 2 yrs ago       Functional Limitations:  Sitting, standing, walking, stairs, driving, working, sleeping, squatting, housework and changing positions.   Patient Goals for Physical Therapy: \"Pain relief\"      OBJECTIVE:  Observation:  Slight ight lateral shift of pelvis.      01/08/2019 02/07/19 03/21/19 03/28/19 04/04/19 04/11/19 04/18/19 04/25/19 05/02/19 05/07/19 05/14/19 06/05/19 06/11/19 06/20/19 06/25/19 07/09/19 07/16/19 07/23/19   SI Joint Positioning  Right  Left Right  Left Right  Left Right  Left Right  Left Right  Left Right  Left Right  Left Right  Left Right  Left Right  Left Right  Left Right  Left Right Left Right Left Right Left Right  Left Right  Left       Innominate                            Anterior    x   x    x  x                x                 x              X     x    x   x   x               x   X                    sl    x                x               x                x          Posterior                x               x        x              x     x     x    x               X                x    "            x              x                x               x   sl               x    x    x   x      Sacrum                               Unilateral rotation    x   x   x    x    x     x    x    x   x   x    x    x   x   sl   x    x     x                           SI Joint Testing  Right  Left Right  Left Right  Left Right  Left Right  Left Right  Left Right  Left Right  Left Right  Left Right  Left Right  Left Right  Left Right  Left Right Left Right Left Right Left Right  Left Right  Left           Distraction     +          +   +         +   +          +    +        +  +           +   +          +   +          +   +          +     +        +   +         +   +         +   +          +   +           + +           +   +          +    +          +   +          +   +           +           Joanna's     +          +   Sl         sl    +         -    Sl         -   Sl         -   Sl         -   -           -                      Compression     +          +   Sl          sl   Sl          Sl     Sl        Sl    -          -   -         -   -           -                      Prone Press Up           +         +         Sl          -         -        -         -                                   Special Tests  Right   Left Right  Left Right  Left Right  Left Right  Left Right  Left Right Left  Right  Left Right  Left Right  Left Right  Left Right  Left Right  Left Right Left Right Left Right Left Right Left  Right  Left      Straight Leg Raise                                     Active   -         -          -           -                      Passive   -         -             -             -                  Hip Scour Test   ++       Sl    ++       Sl    +           sl   Sl         Sl    Sl         sl Sl          Sl    Sl        sl   Sl       Sl    Sl         Sl    Sl         Sl     Sl         sl   Sl        Sl    Sl        Sl     -            -   Sl         sl   -           -   -          -   -            -                         Bakers Cyst       +         -     +        -    Sl        -   Sl         -     Sl        -   Sl        Sl   sl          sl   Sl         -   -           -   -          -   +           -   Sl      sl       Palpation:       Muscle/Bone Irritation  Date 01/08/2019 02/07/19 03/21/19 03/28/19 04/04/18 04/11/19 04/18/19 04/25/19 05/02/19 05/07/19 05/14/19 06/05/19 06/11/19 06/20/19 06/25/19 07/09/19 07/16/19 07/23/19    Right  Left Right  Left Right  Left Right  Left Right  Left Right  Left Right  Left Right  Left Right  Left Right  Left Right  Left Right  Left Right  Left Right Left Right Left Right Left Right  Left Right  Left   Piriformis  ++        +   ++          +   ++       +   ++        +    ++        Sl    ++       Sl   ++       +    ++      +   ++        +   +           +    +        Sl      +         ++   +           ++    +       -   ++      ++   +          +   ++        +   ++         ++   Gr. Trochanter  +          +  +          sl    +           +    +       ++    +          -   +         Sl    +         sl   +         sl   Sl        Sl    +          +    Sl        sl    Sl         +   Sl          sl     +       -   +         +    +         +   +         sl   ++           +   IT Band  +         sl   Sl        sl     -          -   -          -    Sl         -   Sl         -   Sl        -   Sl         -  sl          -   Sl          Sl      -          -     -           -   -            -    Sl        Sl    Sl       Sl     Sl       sl   +          Sl     +          ++   Quadratus Lumborum  ++        ++   ++        +    +        ++   ++        ++     +       ++   +          +  ++        ++   Sl        Sl    +       ++   +          ++    Sl       +     +         +   +          ++     Sl       +   ++       +    ++       +    +         +    ++       +   Ischial Tuberosity  ++         sl   -          -    -           -     -           -     -          -   -          -   -          -   -          -   -           -  -            -     -           -   -          -   -             -     Sl       -   Sl        Sl     -          -     -         -    Sl        Sl    Sacrococcygeal Ligs  ++          +   Sl        sl   +          +    +       +    Sl        Sl    Sl       Sl    +         sl  ++      +  +        Sl       Sl         +   Sl         Sl    Sl         Sl      -        Sl   not  tested    +         +    Sl       Sl    +          +   Paraspinals  +           +     +         sl    +    +          -   Sl         +   +         sl   +        sl  +         -   Sl          +   Sl          +   +          sl   ++         sl   +          -   +         Sl    ++        +     +       Sl    +         sl   Spinous Processes                                        C-spine rotated to                           C3-5  C2-5 C2-6         T-spine rotated to   -               -              T8-9  T4-5    T3-8          L-spine rotated to  L2-5        L1-5  L4-5  L3-5  L2-5            L3-5  L1-5  L2-5  L2-5              L1-5           L2-5 L2-5   L1-5 L3-5 L3-5  L2-5 L4-5 L1-5   Adductor Tony    +        +       +         +     -         +   -           Sl     -        sl   +          sl   -          -   -           -   -          -   -           -   -          -   -           sl  ++        +   +         sl   +         +   +         ++   ++        +  +            +   Iliopsoas  ++         +   ++        +   ++         +    +         ++  sl        Sl    Sl       Sl    Sl       Sl    Sl         -   Sl        -   Sl         +   +         Sl    +          sl  +          ++   +          +   +          +   +         ++   ++        +   ++         +   Quad Origin  sl          sl   Sl          sl    -          -   -           -    -         -   -          -   -         -   -          -   -          -   -           -    -         -   -            -   -         -   -         -   -         -   -            -   -          -   -         -   SI Joint  ++          +  ++        +    ++        +   +          +   +         +   +          ++   +         sl    +         sl   +        +  +          sl  +        Sl     +        Sl   +          ++   Sl       sl   +        +    + +        +  ++          +    ++        ++   Pubic Symphysis         ++          Not tested         ++      ++        ++            + Not tested Not tested          +             ++         +          +   Obturator externus          -          +   -          -   -          -    -           -   -          -   -          -   -         -    -            -   -          -   -            -   Rectus Diastasis   1/2 finger                                         Ant/middle scalenes                   ++        +   ++       +   Upper traps                  ++         +   +          +   Levator scap                   ++          +     +           sternocleidomastoid                   Sl        Sl      -         -                        sternocostals   +        +   +          +    +         -    +           +   Sl        Sl    +         Sl  Not tested     +         +     +         +   +         +   Sl      sl   +          +    +        +   +          +   +          +   All 4s Positioning: Pt not able to assume full lumbar extension in this position  Leg Length:  The right  lower extremity is equal to the left        Monthly Objective Measurement/Functional Activity  Date: 01/08/2019 03/21/19 04/25/19 06/05/19 07/09/19      Oswestry Pain Score 52/100  46/100    48/100      48/100       50/100                            AROM Lumbar Spine: Degrees   Degrees      Degrees      Degrees      Degrees      Degrees      Degrees    Degrees      Flexion 106 pain          99          83       75 pain          63 onset of pain          Extension 3 pain           17           19       11 pain           11  Pinches          Right Lat. Flexion 29 pain right trunk          24        25      25    23 pinch          Left Lat. Flexion 31          19         23     14  15 pinch          Right Lat Shift Pelvis inc pain   Inc pain Left SI jt       Slight increase No change but can't do far Feels caught          Left Lat Shift Pelvis  less inc pain       No change       No change/ slight cielo  No change but can't go far  Easier but not natural                  AROM Hips:  (degrees) Right      Left Right Left Right  Left Right  Left Right  Left Right  Left Right  Left Right  Left      Flexion 110       110  125    120    128      125 130       130 130      130         Abduction 37       41   45      45      42      44  43          45   45       45         Int. Rotation 32       22  30      25      31         28  32          30   34        32         Ext. Rotation  34       42  35      40       38        41   35         40   40        42                 Flexibility:   (degrees) Right    Left Right  Left Right  Left Right  Left Right  Left Right  Left Right  Left Right  Left      Hamstrings -32       -28  -30     -25   -25       -25   -28      -27  -30      -26         Quadriceps 39       42 40        45    not tested  40          42  45        47         HipExt/Rot(piriformis) 30       28   32      30  33         32   35         33   37        35         Hip Int/Rotators 27       9   25      10   28        15   27         16   25         18         Hip Flexors (iliopsoas) -       -             IT Band -       -                     Reflexes: Right      Left Right  Left Right  Left Right  Left Right  Left Right  Left Right  Left Right  Left      L4 (Quad) +1       +1             S1 (Achilles) +1       +1                     Root Level  - Motor Right      Left Right  Left Right  Left Right  Left Right  Left Right  Left Right  Left Right  Left     T12             Rectus Abdominus 4+/5     4+/5         5/5        5/5          L1              Paraspinals 5/5         5/5             L2              Hip Flexion 5/5          5/5             L3             Quads 5/5           5/5             L4              Anterior Tibialis 5/5         5/5             L5             Gr. Toe Extension 5/5           5/5             S1            Gastroc/Sol/Peroneals 5/5          5/5                     Functional Strength:             Single LegStanceTime (seconds) R:30   L:30   R:      L: R:      L: R: 30 pain     L:30 pain  R:      L: R:      L: R:      L: R:      L:     Pelvic Floor Isolation (seconds) -    10 sec 10 sec          Inner Unit  Isolation (seconds) -    10 sec 10 sec                   Functional Activities:              Sit w/o pain? Pain increases (minutes) No/ 30 min No/ 30  min No/ 30 min  No/  30 min No/ 30 min          Stand w/o pain? No/ 30 min No/ 30 min No/ 10 min  No/ 10 min No/ 10 min          Walk w/o pain? varies No/ 1 mile No/ 1/2 mile No/ 1 mile No/ 1mile, piriformis, B         Steps w/o pain? 1 fl, avoids them 1 fl avoids  1 fl avoids  No/ avoids No/ doesn't do         Drive w/o pain? No/ 10-15 min No/ 30-45 min No/ 30-45  Min  No/ 30-45 min No/ 35-45         Work w/o pain? No/ 30 min No/ 30 min No/ 30  No/ 30 min No/30 mi           # times wakes w/ pain? 4-5x/night, now taking meds at night she is able to sleep.  2x   2-3x  2x         1x           PLAN OF CARE:    ASSESSMENT:  Problem List:   1. SI joint dysfunction  2. Lack of spinal stabilization  3. Postural dysfunction     Discussion:  Tete was surprised how the Leukotape of the scapula helped her posturing and thus decreased her low back pain.  She was not able to tolerate the tape more than one day.  So applied KT tape today to both QLs and both piriformis. Began scapula stabilization with the prone arm lift, phase 1.  She understood this instruction although very hard at first to not inappropriately engage the upper traps.    Was able to progress stability ball exercises to supine obliques and prone walk out.  She was able to do both of these although again, she had to concentrate to make sure and stabilize the  trunk with the inner unit.    She was not able to do the all 4s arm lift on the stability ball because she kept inappropriately engaging the piriformis in all 4s with her arms on the ball.  She could keep the piriformis off in a regular all 4s position.  Will return to this later when her inner unit is stronger.      Tete is exhibiting significant ligamentous laxity today.  Question if this is related to her hormonal changes/ birth control pill changes.  She will record her pain symptoms on a daily basis to be able to see if correlation with hormone fluctuation.       Short Term Goals: (4-6 weeks)                                   Date Met:                1.   pt independent in postural correction         02/07/19  2.   pt independent in SI joint self-corrections        03/21/19  3.   pt independent in exer to decrease post. disc pressures      03/21/19  4.   pt able to sleep through night without pain  5.   pt able to sit 15 minutes without pain  6.   pt able to walk 10 minutes without pain        06/20/19    7.   Reduce pt pain to no greater than 7/10        03/21/19  8.   Decrease Oswestry Pain Score to no greater than 45/100  9.  Reduce pt pain to no greater than 6/10        07/16/19  10.  Pt able to isolate the pelvic floor in supine x10, 10 sec      03/28/19  11.  Pt able to isolate transverse abdom in all 4s, x10 sec, x10         03/28/19  12.  Pt able to isolate the multifidus in sitting x10 sec, x10                            03/28/19  13.  Pt able to engage inner unit, move from sit to stand &back to sit      04/04/19                                                                                                                                                                                                                                           14.  Pt able to engage inner unit and walk 4 steps without overflow to hamstrings   06/20/19  15.  Pt able to hip abduct x6 without engaging the  "piriformis      06/20/19     16.  Pt able to perform prone knee flexion without engaging piriformis x6          05/07/19       17.  Pt able to perform remedial lower abs with scissor arm work x6 w/o pain    04/18/19    18.  Pt able to perform \"Pre-cursor\" lower abs leg lift initiation x6 without pain          04/18/19   19.  Pt able to perform retro step without engaging piriformis x6, bilaterally    20.  Pt able to perform bridging x6 without engaging piriformis      04/25/19    21.  Pt able to perform \"scissors\" arm movement with inner unit w/o piriformis x6   05/02/19  22.  Pt able to perform PIC hip adductors without engaging the piriformis  x6    05/07/19  23.  Pt able to perform \"Scissors\" arm motion w/ inner unit with 1lb wts x6 w/o pain   05/07/19  24.  Pt able to perform prone glut max over stability ball x6 w/o engaging piriformis   25.  Pt able to reduce pain w/ self PIC to left psoas       05/14/19  26.  Pt able to move laterally on stability ball in sitting without pain x6  27.  Pt able to move A/P movement sitting on stability ball without pain x6  28.  Pt independent in pool exercises, to do without increasing pain     07/09/19  29.  Pt able to throw and catch stability ball in supine with inner unit on x6    06/11/19  30.  Pt able to hold medicine ball in stance and move away from trunk with IU on and w/o pain x6 06/11/19  31.  Pt independent in supine hamstring stretch, able to do 2x without pain.    07/16/19  32.  Pt independent in hip adductor stretching, supine and stance     07/16/19  33.  Pt independent in quad stretching, in stance, without pain.  34. Pt independent in hip internal rotator stretch in supine, without pain   35.  Pt able to tolerate ADLs with leukotape on scapula for enhanced spinal stabilization x 1 day 07/25/19  36.  Pt able to perform prone walk out x6 without losing inner unit engagement  37. Pt able to perform prone arm lift x6, phase 1, without engaging the upper trap  38. "  Pt able to perform supine obliques over stability ball x6 without losing inner unit engagement                                                                                                                                             Long Term Goals:(3-5 months)      Date Met:      1.  pt independent in self-management of symptoms       2.  pt independent in home program      3.  pt able to work 6 hours without pain      4.  pt able to sit 60 minutes without pain      5.  pt able to walk 45 min without pain    Progress Towards Goals:  As expected to a little slower than expected    Treatment Plan:   1.  Ultrasound to increase extensibility, decrease pain, if needed  2.  Electrical stimulation for muscle relaxation, decrease pain, if needed, iontophoresis  3.  Manual therapy to increase function, decrease pain  4.  Therapeutic exercise to increase function, decrease pain  5.  Home programming and d/c planning to increase function and decrease pain,     Frequency & Duration:  The progression of Tete's instruction has been slow secondary to the flare ups of pain associated with her menses.  Therefore, she is in need of 4 more visits, once/wk to once every other week to complete the spinal stabilization instruction and be independent in her home program.      Patient Participated in and Agrees With This Plan of Care and Goals:  YES  Rehab Potential:  jarret Marcial, PT, MS  Physical Therapist  [place]# [id number]      TREATMENT TODAY:    Total Treatment Time: (time in clinic)   70  min  Timed Code Treatment Minutes:     70      Supplies given:      Manual Therapy:  (MA)  RX min:       30  Manual posterior rotation of left innominate    Positional Isometric contraction (PIC) of rightt iliopsoas    Positional Isometric contraction of (PIC) right gluteus minimus    Distraction of both SI jts in open pack hip position  Piriformis stretching, bilaterally    Quadratus lumborum stretching, bilaterally    Anterior/Inferior  Mobilization of  right hip    Positional Isometric Contraction of multifidus  Myofascial work trunk   Sternocostal and rib mobs   Manual spinal distraction  PIC C-spine  PIC T-spine  Stretching scalenes, upper traps, levator scap      Therapeutic Activities:  (TA)  RX min:        Neuromuscular Reeducation: (NMR)  RX min:   40    Ultrasound:  RX min:         1.6 vance/cm2       Location:      Iontophoresis:  6 hr patch                                Location:                           Ice:  Lumbar spine/SI joints    Procedures:      Key:  i=instructed        r=review        c=corrected        a=adapted   Code Procedure/Instruction 01/08/2019 02/07/19 03/21/19 03/28/19 04/04/19 04/11/19 04/18/19 04/25/19 05/02/19 05/07/19 05/14/19 06/05/19 06/11/19 06/20/19 06/25/19 07/09/19 07/16/19 07/25/19    TA         Sleeping Positions instructed               reviewed,correct                    TA Sternum-Up Posture instructed reviewed                    TA SI jt. self-correction instructed corrected corrected  reviewed        Adapted and corrected         TA Lumbar Facet PIC instructed corrected corrected  reviewed        reviewed         TA   Order of Self-Corrections instructed    reviewed                 TA Use of lumbar pillow instructed                     TA Use of cervical roll instructed corrected                    TA Use of orthotics instructed                     TA Use of SI belt instructed                     TA Use of Nada chair                      TA Use of Ice instructed                     TA Use of [employer]/ heat instructed                     TA Use of Theraworx Relief instructed                     TA Use of TENS unit                      TA Use of iontophoresis                      TA Decreasing Disc Pressures  instructed                    TA Use of sacro wedge          attempted didnt work            NMR Leukotaping upper quarter                 instructed&  applied     NMR Pelvic Floor Isolation    instructed corrected                  NMR Transverse Abdominus   instructed corrected                  NMR Multifidus Isolation   instructed                   NMR Diaphragmtic breathe supine    instructed                  NMR  Diaphragmtic breath sit                      NMR Pelvic Clock in sitting   instructed                   NMR Kinesiotape    Applied to both QL              Applied to both piriformis & QLs for inhibition    NMR InnerUnit against Gravity    instructed                  NMR Sit-stand w/ inner unit    instructed                  NMR Roll w/ inner unit    instructed                  NMR Walk w/ inner unit     instructed                  NMR Up/down steps w/ inner unit    instructed                  NMR Water Exer Instruction            Instructed           NMR Hip Abd w/o piriformis     instructed stopped                NMR Hip Add w/o iliop/ham      instructed stopped                NMR Lower abs in supine      Adapted to pre cursor, initiation Cont, no change   Still not able to lift foot w/o engaging piriformis   adapted with bolster            NMR Inner unit challenge with scissor arm work       instructed Added legs  Adapted w/ ball & belt progressed to 1lb weights Inc reps to 10, still use 1lb Added rectus abdom to this           NMR Abd obliques in supine      attempted                NMR Prone Knee Flexion     instructed  instructed corrected Made bolster higher. Dorsiflex left foot more reviewedstopped dorsiflex on Right           NMR Supine glute w/ ball btwn knees        instructed  Adapted to do w/ belt for abd Moved belt more proximal on thighs She will not bridge as high  continue           NMR Prone glut amrita          instructed            NMR Retro Step       instructed stopped              NMR Retro Walk                      NMR Retro walk on treadmill              instructed        NMR Forward walk w/ inner unit              instructed        NMR Stability ball multifidus bounce               instructed        NMR Stability Ball A/P & Lateral           instructed           NMR Ball Catch/Throw /Supine            instructed          NMR Ball movemt in /Stance            instructed          NMR StabilityBall All 4's Arm Lift                  attempted    NMR StabilityBall Prone Walk Out                   instructed    NMR StabilityBall Supine Oblique              Adapted to do w/ belt around knees    instructed    NMR Stability Ball sit-supine-sit                      NMR Walking Prog Progression                      [facility] Home program sequencing                                             TA Hamstring stretch                instructed      TA Hip Ext Rot Stretch              Instructed  Adapted to do in stance  instructed in supine      TA Hip Int Rot Stretch                instructed      TA Hip Flex/IT Stretch                      TA Hip Add Stretch                instructed      TA Lats Dorsi Stretch                attempted      TA Gastroc/soleus stretch                instructed      TA QuadratusLumborm Stretch                         Supine                         Stance              instructed        TA Quad Stretch                instructed                             NMR Wall Push Ups                      NMR Planking                      NMR BOSU Ball Exercises                      NMR Lateral Bridge Drop                      NMR [place]                      NMR O'Feldt Lat Pull Down                      NMR O'Feldt Hip Ext                      NMR O'Feldt Trunk Ext                                             TA CervDiscExtSup/stnd                      TA Cerv Stretches                      TA Self PIC Cervical Spine                      TA Neural Gliding                      TA Ant/Mid Scalene Strch                      TA Biceps stretch                      TA Rotator Cuff Stretch                      TA Anterior Chest Stretch                      TA Wrist Ext Stretch                                              NMR Prone Arm Lifts                  instructed    NMR Scapula Stabilization                      NMR Occulomotor Isometrics                      NMR Cervical Isometrics                                             TA Shld AROM w/bar                      TA Shld Capsular Stretching                      TA Self PIC Thor Spine                      TA Rot Cuff Therabd, beginner                      TA Rot Cuff Therabd, ruy Marcial, PT, MS  Physical Therapist  [place]# [id number]

## 2019-08-06 ENCOUNTER — TREATMENT (OUTPATIENT)
Dept: PHYSICAL THERAPY | Facility: CLINIC | Age: 34
End: 2019-08-06

## 2019-08-06 DIAGNOSIS — R29.3 POSTURE IMBALANCE: ICD-10-CM

## 2019-08-06 DIAGNOSIS — M35.7 FAMILIAL LIGAMENTOUS LAXITY: ICD-10-CM

## 2019-08-06 DIAGNOSIS — M53.3 SACROILIAC JOINT DYSFUNCTION: Primary | ICD-10-CM

## 2019-08-06 DIAGNOSIS — S39.012D STRAIN OF LUMBAR REGION, SUBSEQUENT ENCOUNTER: ICD-10-CM

## 2019-08-06 PROCEDURE — 97112 NEUROMUSCULAR REEDUCATION: CPT | Performed by: PHYSICAL THERAPIST

## 2019-08-06 PROCEDURE — 97530 THERAPEUTIC ACTIVITIES: CPT | Performed by: PHYSICAL THERAPIST

## 2019-08-06 PROCEDURE — 97140 MANUAL THERAPY 1/> REGIONS: CPT | Performed by: PHYSICAL THERAPIST

## 2019-08-06 NOTE — PROGRESS NOTES
"Physical Therapy Note      SUBJECTIVE:   Changes since last seen:  Tete got new shoes, [employer]; \"they feel really good!\"   She is still feeling stiff, especially in am, waking with soreness and stiffness.  The new prone walk out exercise seems to irritate her low back.  The new prone arm lift also seems to tighten up her low back.  The supine obliques on stability ball was ok, but she's not sure she's doing it correctly.   She c/o a lot of soreness in both piriformis, \"they're tight\", wakes her in middle of night.  No new mattress yet.   \"The KT tape wasn't as good as the Leukotape.\"  She's pn a different birth control but still spotting every day. Its hard for her to tell if she feels any tighter in terms of ligament laxity.   She's struggling to engage the inner unit without engaging both piriformis.   She's self correcting \"almost daily\", but she notes she may go a couple days without correcting.       Current level of pain:    6/10   Lowest level of pain since last seen:  4-5/10  Highest level of pain since last seen:   7/10 because still interfering with sleep       Past Medical History:  1.  AA 2013  Treated with PT for left shoulder labral tear, no low back treatment   2.  Hx of two falls when mopping in socks, over the last 2 years  3.  Hx of regular, heavy, cramping menses  4.  T& A, 2006  5.  Allergic to Wellbutrin and mild allergy to contrast dye  6.  Chronic yeast infections  7.  Uterine fibroids removed, 2 yrs ago       Functional Limitations:  Sitting, standing, walking, stairs, driving, working, sleeping, squatting, housework and changing positions.   Patient Goals for Physical Therapy: \"Pain relief\"      OBJECTIVE:  Observation:  Slight ight lateral shift of pelvis.        08/06/19                    SI Joint Positioning  Right  Left Right  Left Right  Left Right  Left Right  Left Right  Left Right  Left Right  Left Right  Left Right  Left Right  Left Right  Left Right  Left Right Left Right " Left Right Left Right  Left Right  Left       Innominate                            Anterior    x                           Posterior               x                       Sacrum                          Unilateral rotation    x                                            SI Joint Testing  Right  Left Right  Left Right  Left Right  Left Right  Left Right  Left Right  Left Right  Left Right  Left Right  Left Right  Left Right  Left Right  Left Right Left Right Left Right Left Right  Left Right  Left           Distraction   +          +                            Joanna's   +          +                            Compression   Sl        Sl                             Prone Press Up   -          -                                         Special Tests  Right   Left Right  Left Right  Left Right  Left Right  Left Right  Left Right Left  Right  Left Right  Left Right  Left Right  Left Right  Left Right  Left Right Left Right Left Right Left Right Left  Right  Left      Straight Leg Raise                             Active   -            -                            Passive   -           -                        Hip Scour Test   sl           sl                                         Bakers Cyst   -            -                         01/08/2019 02/07/19 03/21/19 03/28/19 04/04/19 04/11/19 04/18/19 04/25/19 05/02/19 05/07/19 05/14/19 06/05/19 06/11/19 06/20/19 06/25/19 07/09/19 07/16/19 07/23/19   SI Joint Positioning  Right  Left Right  Left Right  Left Right  Left Right  Left Right  Left Right  Left Right  Left Right  Left Right  Left Right  Left Right  Left Right  Left Right Left Right Left Right Left Right  Left Right  Left       Innominate                            Anterior    x   x    x  x                x                 x              X     x    x   x   x               x   X                    sl    x                x               x                x          Posterior                x               x        x               x     x     x    x               X                x               x              x                x               x   sl               x    x    x   x      Sacrum                               Unilateral rotation    x   x   x    x    x     x    x    x   x   x    x    x   x   sl   x    x     x                           SI Joint Testing  Right  Left Right  Left Right  Left Right  Left Right  Left Right  Left Right  Left Right  Left Right  Left Right  Left Right  Left Right  Left Right  Left Right Left Right Left Right Left Right  Left Right  Left           Distraction     +          +   +         +   +          +    +        +  +           +   +          +   +          +   +          +     +        +   +         +   +         +   +          +   +           + +           +   +          +    +          +   +          +   +           +           Joanna's     +          +   Sl         sl    +         -    Sl         -   Sl         -   Sl         -   -           -                      Compression     +          +   Sl          sl   Sl          Sl     Sl        Sl    -          -   -         -   -           -                      Prone Press Up           +         +         Sl          -         -        -         -                                   Special Tests  Right   Left Right  Left Right  Left Right  Left Right  Left Right  Left Right Left  Right  Left Right  Left Right  Left Right  Left Right  Left Right  Left Right Left Right Left Right Left Right Left  Right  Left      Straight Leg Raise                                     Active   -         -          -           -                      Passive   -         -             -             -                  Hip Scour Test   ++       Sl    ++       Sl    +           sl   Sl         Sl    Sl         sl Sl          Sl    Sl        sl   Sl       Sl    Sl         Sl    Sl         Sl     Sl         sl   Sl        Sl    Sl        Sl     -            -   Sl         sl    -           -   -          -   -            -                        Bakers Cyst       +         -     +        -    Sl        -   Sl         -     Sl        -   Sl        Sl   sl          sl   Sl         -   -           -   -          -   +           -   Sl      sl       Palpation:   Date 08/06/19                     Right  Left Right  Left Right  Left Right  Left Right  Left Right  Left Right  Left Right  Left Right  Left Right  Left Right  Left Right  Left Right  Left Right Left Right Left Right Left Right  Left Right  Left   Piriformis   ++        ++                    Gr. Trochanter    +        -                    IT Band    Sl         Sl                     Quadratus Lumborum   ++         +                    Ischial Tuberosity    -         -                    Sacrococcygeal Ligs    +          +                    Paraspinals                     Spinous Processes   +           sl                          C-spine rotated to    C2-5                          T-spine rotated to  T4-6                           L-spine rotated to  L2-5                    Adductor Tony   +           +                    Iliopsoas                     Quad Origin                     SI Joint   +        +                    Pubic Symphysis         +                    Obturator externus   +         +                    Rectus Diastasis                                          Ant/middle scalenes   +        sl                    Upper traps   +         Sl                     Levator scap   +        Sl                     sternocleidomastoid   -         -                                         sternocostals   +          +                              Muscle/Bone Irritation  Date 01/08/2019 02/07/19 03/21/19 03/28/19 04/04/18 04/11/19 04/18/19 04/25/19 05/02/19 05/07/19 05/14/19 06/05/19 06/11/19 06/20/19 06/25/19 07/09/19 07/16/19 07/23/19    Right  Left Right  Left Right  Left Right  Left Right  Left Right  Left Right  Left Right   Left Right  Left Right  Left Right  Left Right  Left Right  Left Right Left Right Left Right Left Right  Left Right  Left   Piriformis  ++        +   ++          +   ++       +   ++        +    ++        Sl    ++       Sl   ++       +    ++      +   ++        +   +           +    +        Sl      +         ++   +           ++    +       -   ++      ++   +          +   ++        +   ++         ++   Gr. Trochanter  +          +  +          sl    +           +    +       ++    +          -   +         Sl    +         sl   +         sl   Sl        Sl    +          +    Sl        sl    Sl         +   Sl          sl     +       -   +         +    +         +   +         sl   ++           +   IT Band  +         sl   Sl        sl     -          -   -          -    Sl         -   Sl         -   Sl        -   Sl         -  sl          -   Sl          Sl      -          -     -           -   -            -    Sl        Sl    Sl       Sl     Sl       sl   +          Sl     +          ++   Quadratus Lumborum  ++        ++   ++        +    +        ++   ++        ++     +       ++   +          +  ++        ++   Sl        Sl    +       ++   +          ++    Sl       +     +         +   +          ++     Sl       +   ++       +    ++       +    +         +    ++       +   Ischial Tuberosity  ++         sl   -          -    -           -     -           -     -          -   -          -   -          -   -          -   -          -  -            -     -           -   -          -   -             -     Sl       -   Sl        Sl     -          -     -         -    Sl        Sl    Sacrococcygeal Ligs  ++          +   Sl        sl   +          +    +       +    Sl        Sl    Sl       Sl    +         sl  ++      +  +        Sl       Sl         +   Sl         Sl    Sl         Sl      -        Sl   not  tested    +         +    Sl       Sl    +          +   Paraspinals  +           +     +         sl    +    +          -   Sl         +    +         sl   +        sl  +         -   Sl          +   Sl          +   +          sl   ++         sl   +          -   +         Sl    ++        +     +       Sl    +         sl   Spinous Processes                                        C-spine rotated to                           C3-5  C2-5 C2-6         T-spine rotated to   -               -              T8-9  T4-5    T3-8          L-spine rotated to  L2-5        L1-5  L4-5  L3-5  L2-5            L3-5  L1-5  L2-5  L2-5              L1-5           L2-5 L2-5   L1-5 L3-5 L3-5  L2-5 L4-5 L1-5   Adductor Tony    +        +       +         +     -         +   -           Sl     -        sl   +          sl   -          -   -           -   -          -   -           -   -          -   -           sl  ++        +   +         sl   +         +   +         ++   ++        +  +            +   Iliopsoas  ++         +   ++        +   ++         +    +         ++  sl        Sl    Sl       Sl    Sl       Sl    Sl         -   Sl        -   Sl         +   +         Sl    +          sl  +          ++   +          +   +          +   +         ++   ++        +   ++         +   Quad Origin  sl          sl   Sl          sl    -          -   -           -    -         -   -          -   -         -   -          -   -          -   -           -    -         -   -            -   -         -   -         -   -         -   -            -   -          -   -         -   SI Joint  ++         +  ++        +    ++        +   +          +   +         +   +          ++   +         sl    +         sl   +        +  +          sl  +        Sl     +        Sl   +          ++   Sl       sl   +        +    + +        +  ++          +    ++        ++   Pubic Symphysis         ++          Not tested         ++      ++        ++            + Not tested Not tested          +             ++         +          +   Obturator externus          -          +   -          -   -          -    -           -   -           -   -          -   -         -    -            -   -          -   -            -   Rectus Diastasis   1/2 finger                                         Ant/middle scalenes                   ++        +   ++       +   Upper traps                  ++         +   +          +   Levator scap                   ++          +     +           sternocleidomastoid                   Sl        Sl      -         -                        sternocostals   +        +   +          +    +         -    +           +   Sl        Sl    +         Sl  Not tested     +         +     +         +   +         +   Sl      sl   +          +    +        +   +          +   +          +   All 4s Positioning: Pt not able to assume full lumbar extension in this position  Leg Length:  The right  lower extremity is equal to the left        Monthly Objective Measurement/Functional Activity  Date: 01/08/2019 03/21/19 04/25/19 06/05/19 07/09/19      Oswestry Pain Score 52/100  46/100    48/100      48/100       50/100                            AROM Lumbar Spine: Degrees   Degrees      Degrees      Degrees      Degrees      Degrees      Degrees    Degrees      Flexion 106 pain          99          83       75 pain          63 onset of pain          Extension 3 pain           17           19       11 pain           11  Pinches          Right Lat. Flexion 29 pain right trunk          24        25      25    23 pinch          Left Lat. Flexion 31         19         23     14  15 pinch          Right Lat Shift Pelvis inc pain   Inc pain Left SI jt       Slight increase No change but can't do far Feels caught          Left Lat Shift Pelvis  less inc pain       No change       No change/ slight cielo  No change but can't go far  Easier but not natural                  AROM Hips:  (degrees) Right      Left Right Left Right  Left Right  Left Right  Left Right  Left Right  Left Right  Left      Flexion 110       110  125    120    128      125 130       130 130       130         Abduction 37       41   45      45      42      44  43          45   45       45         Int. Rotation 32       22  30      25      31         28  32          30   34        32         Ext. Rotation  34       42  35      40       38        41   35         40   40        42                 Flexibility:   (degrees) Right    Left Right  Left Right  Left Right  Left Right  Left Right  Left Right  Left Right  Left      Hamstrings -32       -28  -30     -25   -25       -25   -28      -27  -30      -26         Quadriceps 39       42 40        45    not tested  40          42  45        47         HipExt/Rot(piriformis) 30       28   32      30  33         32   35         33   37        35         Hip Int/Rotators 27       9   25      10   28        15   27         16   25         18         Hip Flexors (iliopsoas) -       -             IT Band -       -                     Reflexes: Right      Left Right  Left Right  Left Right  Left Right  Left Right  Left Right  Left Right  Left      L4 (Quad) +1       +1             S1 (Achilles) +1       +1                     Root Level  - Motor Right      Left Right  Left Right  Left Right  Left Right  Left Right  Left Right  Left Right  Left     T12             Rectus Abdominus 4+/5     4+/5         5/5        5/5          L1              Paraspinals 5/5         5/5             L2              Hip Flexion 5/5          5/5             L3             Quads 5/5          5/5             L4              Anterior Tibialis 5/5         5/5             L5             Gr. Toe Extension 5/5           5/5             S1            Gastroc/Sol/Peroneals 5/5          5/5                     Functional Strength:             Single LegStanceTime (seconds) R:30   L:30   R:      L: R:      L: R: 30 pain     L:30 pain  R:      L: R:      L: R:      L: R:      L:     Pelvic Floor Isolation (seconds) -    10 sec 10 sec          Inner Unit  Isolation (seconds) -    10 sec 10 sec                    Functional Activities:              Sit w/o pain? Pain increases (minutes) No/ 30 min No/ 30  min No/ 30 min  No/  30 min No/ 30 min          Stand w/o pain? No/ 30 min No/ 30 min No/ 10 min  No/ 10 min No/ 10 min          Walk w/o pain? varies No/ 1 mile No/ 1/2 mile No/ 1 mile No/ 1mile, piriformis, B         Steps w/o pain? 1 fl, avoids them 1 fl avoids  1 fl avoids  No/ avoids No/ doesn't do         Drive w/o pain? No/ 10-15 min No/ 30-45 min No/ 30-45  Min  No/ 30-45 min No/ 35-45         Work w/o pain? No/ 30 min No/ 30 min No/ 30  No/ 30 min No/30 mi           # times wakes w/ pain? 4-5x/night, now taking meds at night she is able to sleep.  2x   2-3x  2x         1x           PLAN OF CARE:    ASSESSMENT:  Problem List:   1. SI joint dysfunction  2. Lack of spinal stabilization  3. Postural dysfunction     Discussion:  Tete will be more intentional about self correcting at least 3x/day.    She will use a cervical roll as a lumbar support when she is sleeping, recommended the [employer] Night Roll Lumbar Support.  She will purchase one and use as this may help until she can get a firmer mattress.   Was able to progress the prone arm lift to lifting the elbow.  If she does this slowly and carefully she is able to do without tightening up her low back.  She is only going to engage the transverse abdominus when doing this instead of the entire inner unit.   Instructed in upper trap, ant/middle scalene stretching and levator scap stretching to do throughout the day at her desk.   Reviewed home program given thus far and an exercise grid to follow and keep track of her program.    She is making very slow but steady improvement, the ligamentous laxity influenced by hormones appears to be impacting her progress.  Continue with outer unit instruction progressing to advanced spinal stabilization.         Short Term Goals: (4-6 weeks)                                   Date Met:                1.   pt independent  "in postural correction         02/07/19  2.   pt independent in SI joint self-corrections        03/21/19  3.   pt independent in exer to decrease post. disc pressures      03/21/19  4.   pt able to sleep through night without pain  5.   pt able to sit 15 minutes without pain  6.   pt able to walk 10 minutes without pain        06/20/19    7.   Reduce pt pain to no greater than 7/10        03/21/19  8.   Decrease Oswestry Pain Score to no greater than 45/100  9.  Reduce pt pain to no greater than 6/10        07/16/19  10.  Pt able to isolate the pelvic floor in supine x10, 10 sec      03/28/19  11.  Pt able to isolate transverse abdom in all 4s, x10 sec, x10         03/28/19  12.  Pt able to isolate the multifidus in sitting x10 sec, x10                            03/28/19  13.  Pt able to engage inner unit, move from sit to stand &back to sit      04/04/19                                                                                                                                                                                                                                           14.  Pt able to engage inner unit and walk 4 steps without overflow to hamstrings   06/20/19  15.  Pt able to hip abduct x6 without engaging the piriformis      06/20/19     16.  Pt able to perform prone knee flexion without engaging piriformis x6          05/07/19       17.  Pt able to perform remedial lower abs with scissor arm work x6 w/o pain    04/18/19    18.  Pt able to perform \"Pre-cursor\" lower abs leg lift initiation x6 without pain          04/18/19   19.  Pt able to perform retro step without engaging piriformis x6, bilaterally    20.  Pt able to perform bridging x6 without engaging piriformis      04/25/19    21.  Pt able to perform \"scissors\" arm movement with inner unit w/o piriformis x6   05/02/19  22.  Pt able to perform PIC hip adductors without engaging the piriformis  x6    05/07/19  23.  Pt able to perform " "\"Scissors\" arm motion w/ inner unit with 1lb wts x6 w/o pain   05/07/19  24.  Pt able to perform prone glut max over stability ball x6 w/o engaging piriformis   25.  Pt able to reduce pain w/ self PIC to left psoas       05/14/19  26.  Pt able to move laterally on stability ball in sitting without pain x6  27.  Pt able to move A/P movement sitting on stability ball without pain x6  28.  Pt independent in pool exercises, to do without increasing pain     07/09/19  29.  Pt able to throw and catch stability ball in supine with inner unit on x6    06/11/19  30.  Pt able to hold medicine ball in stance and move away from trunk with IU on and w/o pain x6 06/11/19  31.  Pt independent in supine hamstring stretch, able to do 2x without pain.    07/16/19  32.  Pt independent in hip adductor stretching, supine and stance     07/16/19  33.  Pt independent in quad stretching, in stance, without pain.      08/06/19  34. Pt independent in hip internal rotator stretch in supine, without pain      08/06/19  35.  Pt able to tolerate ADLs with leukotape on scapula for enhanced spinal stabilization x 1 day 07/25/19  36.  Pt able to perform prone walk out x6 without losing inner unit engagement  37. Pt able to perform prone arm lift x6, phase 1, without engaging the upper trap   08/06/19  38.  Pt able to perform supine obliques over stability ball x6 without losing inner unit engagement 08/06/19  39.  Pt able to perform prone arm lift, lifting elbow without engaging upper traps x6  40.  Pt able to perform upper quarter stretches without increasing pain                                                                                                                                             Long Term Goals:(3-5 months)      Date Met:      1.  pt independent in self-management of symptoms       2.  pt independent in home program      3.  pt able to work 6 hours without pain      4.  pt able to sit 60 minutes without pain      5.  pt able " to walk 45 min without pain    Progress Towards Goals:  As expected to a little slower than expected    Treatment Plan:   1.  Ultrasound to increase extensibility, decrease pain, if needed  2.  Electrical stimulation for muscle relaxation, decrease pain, if needed, iontophoresis  3.  Manual therapy to increase function, decrease pain  4.  Therapeutic exercise to increase function, decrease pain  5.  Home programming and d/c planning to increase function and decrease pain,     Frequency & Duration:  The progression of Tete's instruction has been slow secondary to the flare ups of pain associated with her menses.  Therefore, she is in need of 3 more visits, once/wk to once every other week to complete the spinal stabilization instruction and be independent in her home program.      Patient Participated in and Agrees With This Plan of Care and Goals:  YES  Rehab Potential:  jarret Marcial PT, MS  Physical Therapist  [place]# [id number]      TREATMENT TODAY:    Total Treatment Time: (time in clinic)   70  min  Timed Code Treatment Minutes:     70      Supplies given:      Manual Therapy:  (MA)  RX min:       35  Manual posterior rotation of left innominate    Positional Isometric contraction (PIC) of rightt iliopsoas    Positional Isometric contraction of (PIC) right gluteus minimus    Distraction of both SI jts in open pack hip position  Piriformis stretching, bilaterally    Quadratus lumborum stretching, bilaterally    Anterior/Inferior Mobilization of  right hip    Positional Isometric Contraction of multifidus  Myofascial work trunk   Sternocostal and rib mobs   Manual spinal distraction  PIC C-spine  PIC T-spine  Stretching scalenes, upper traps, levator scap      Therapeutic Activities:  (TA)  RX min:    15    Neuromuscular Reeducation: (NMR)  RX min:   20    Ultrasound:  RX min:         1.6 vance/cm2       Location:      Iontophoresis:  6 hr patch                                Location:                            Ice:  Lumbar spine/SI joints    Procedures:      Key:  i=instructed        r=review        c=corrected        a=adapted   Code Procedure/Instruction 01/08/2019 02/07/19 03/21/19 03/28/19 04/04/19 04/11/19 04/18/19 04/25/19 05/02/19 05/07/19 05/14/19 06/05/19 06/11/19 06/20/19 06/25/19 07/09/19 07/16/19 07/25/19 08/06/19   TA         Sleeping Positions instructed               reviewed,correct                    TA Sternum-Up Posture instructed reviewed                    TA SI jt. self-correction instructed corrected corrected  reviewed        Adapted and corrected         TA Lumbar Facet PIC instructed corrected corrected  reviewed        reviewed         TA   Order of Self-Corrections instructed    reviewed                 TA Use of lumbar pillow instructed                     TA Use of cervical roll instructed corrected                    TA Use of orthotics instructed                     TA Use of SI belt instructed                     TA Use of Nada chair                      TA Use of Ice instructed                     TA Use of [employer]/ heat instructed                     TA Use of Theraworx Relief instructed                     TA Use of TENS unit                      TA Use of iontophoresis                      TA Decreasing Disc Pressures  instructed                    TA Use of sacro wedge          attempted didnt work            NMR Leukotaping upper quarter                 instructed&  applied     NMR Pelvic Floor Isolation   instructed corrected                  NMR Transverse Abdominus   instructed corrected                  NMR Multifidus Isolation   instructed                   NMR Diaphragmtic breathe supine    instructed                  NMR  Diaphragmtic breath sit                      NMR Pelvic Clock in sitting   instructed                   NMR Kinesiotape    Applied to both QL              Applied to both piriformis & QLs for inhibition    NMR InnerUnit against Gravity    instructed                   NMR Sit-stand w/ inner unit    instructed                  NMR Roll w/ inner unit    instructed                  NMR Walk w/ inner unit     instructed                  NMR Up/down steps w/ inner unit    instructed                  NMR Water Exer Instruction            Instructed           NMR Hip Abd w/o piriformis     instructed stopped                NMR Hip Add w/o iliop/ham      instructed stopped                NMR Lower abs in supine      Adapted to pre cursor, initiation Cont, no change   Still not able to lift foot w/o engaging piriformis   adapted with bolster            NMR Inner unit challenge with scissor arm work       instructed Added legs  Adapted w/ ball & belt progressed to 1lb weights [employer] reps to 10, still use 1lb Added rectus abdom to this           NMR Abd obliques in supine      attempted                NMR Prone Knee Flexion     instructed  instructed corrected Made bolster higher. Dorsiflex left foot more reviewedstopped dorsiflex on Right           NMR Supine glute w/ ball btwn knees        instructed  Adapted to do w/ belt for abd Moved belt more proximal on thighs She will not bridge as high  continue           NMR Prone glut amrita          instructed            NMR Retro Step       instructed stopped              NMR Retro Walk                      NMR Retro walk on treadmill              instructed        NMR Forward walk w/ inner unit              instructed        NMR Stability ball multifidus bounce              instructed        NMR Stability Ball A/P & Lateral           instructed           NMR Ball Catch/Throw /Supine            instructed          NMR Ball movemt in /Stance            instructed          NMR StabilityBall All 4's Arm Lift                  attempted    NMR StabilityBall Prone Walk Out                   instructed stopped   NMR StabilityBall Supine Oblique              Adapted to do w/ belt around knees    instructed corrected   NMR Stability Ball  sit-supine-sit                      NMR Walking Prog Progression                      MNR Home program sequencing                   instructed                          TA Hamstring stretch                instructed      TA Hip Ext Rot Stretch              Instructed  Adapted to do in stance  instructed in supine      TA Hip Int Rot Stretch                instructed      TA Hip Flex/IT Stretch                      TA Hip Add Stretch                instructed      TA Lats Dorsi Stretch                attempted      TA Gastroc/soleus stretch                instructed      TA QuadratusLumborm Stretch                         Supine                         Stance              instructed        TA Quad Stretch                instructed                             NMR Wall Push Ups                      NMR Planking                      NMR BOSU Ball Exercises                      NMR Lateral Bridge Drop                      NMR Waiters [place]                      NMR O'Feldt Lat Pull Down                      NMR O'Feldt Hip Ext                      NMR O'Feldt Trunk Ext                                             TA CervDiscExtSup/stnd                      TA Cerv Stretches                      TA Self PIC Cervical Spine                      TA Neural Gliding                      TA Upper trap stretching                   instructed   TA Ant/Mid Scalene Strch                   instructed   TA Levator Scap strch                   instructed   TA Biceps stretch                      TA Rotator Cuff Stretch                      TA Anterior Chest Stretch                      TA Wrist Ext Stretch                                             NMR Prone Arm Lifts                  instructed progressed   NMR Scapula Stabilization                      NMR Occulomotor Isometrics                      NMR Cervical Isometrics                                             TA Shld AROM w/bar                      TA Shld Capsular Stretching                       TA Self PIC Thor Spine                      TA Rot Cuff Therabd, beginner                      TA Rot Cuff Therabd, ruy Marcial, PT, MS  Physical Therapist  [place]# [id number]

## 2019-08-13 ENCOUNTER — TREATMENT (OUTPATIENT)
Dept: PHYSICAL THERAPY | Facility: CLINIC | Age: 34
End: 2019-08-13

## 2019-08-13 DIAGNOSIS — M53.3 SACROILIAC JOINT DYSFUNCTION: Primary | ICD-10-CM

## 2019-08-13 DIAGNOSIS — M35.7 FAMILIAL LIGAMENTOUS LAXITY: ICD-10-CM

## 2019-08-13 DIAGNOSIS — S39.012D STRAIN OF LUMBAR REGION, SUBSEQUENT ENCOUNTER: ICD-10-CM

## 2019-08-13 DIAGNOSIS — R29.3 POSTURE IMBALANCE: ICD-10-CM

## 2019-08-13 PROCEDURE — 97140 MANUAL THERAPY 1/> REGIONS: CPT | Performed by: PHYSICAL THERAPIST

## 2019-08-13 NOTE — PROGRESS NOTES
"Physical Therapy Note      SUBJECTIVE:   Changes since last seen:  \"Not great.\"  This should be the week of her menses. She has been spotting more than usual.   Last week similar pain hips/piriformis, used ice to self manage.   \"This week is not quite as bad as previous weeks right before onset of menses.\"  She fell a couple nights ago on left knee grabbing dog who got out of harness.  This caused a bruise on the left medial knee.   Last night she gave dogs baths in stand up shower and had to bend over more than usual.   She's used the [employer] night lumbar roll, likes it when she is sitting on couch but she says it feels awkward when she is sleeping.   Biggest pain has been in both piriformis, \"sharp pain\".   She also notes her neck is tight, wonders if from bathing dogs.       Current level of pain:    7/10   Lowest level of pain since last seen:  4-5/10  Highest level of pain since last seen:   7/10       Past Medical History:  1.  AA 2013  Treated with PT for left shoulder labral tear, no low back treatment   2.  Hx of two falls when mopping in socks, over the last 2 years  3.  Hx of regular, heavy, cramping menses  4.  T& A, 2006  5.  Allergic to Wellbutrin and mild allergy to contrast dye  6.  Chronic yeast infections  7.  Uterine fibroids removed, 2 yrs ago       Functional Limitations:  Sitting, standing, walking, stairs, driving, working, sleeping, squatting, housework and changing positions.   Patient Goals for Physical Therapy: \"Pain relief\"      OBJECTIVE:  Observation:  Slight ight lateral shift of pelvis.        08/06/19 08/13/19                   SI Joint Positioning  Right  Left Right  Left Right  Left Right  Left Right  Left Right  Left Right  Left Right  Left Right  Left Right  Left Right  Left Right  Left Right  Left Right Left Right Left Right Left Right  Left Right  Left       Innominate                            Anterior    x               x                          Posterior               x   "   x                      Sacrum                          Unilateral rotation    x      x                                           SI Joint Testing  Right  Left Right  Left Right  Left Right  Left Right  Left Right  Left Right  Left Right  Left Right  Left Right  Left Right  Left Right  Left Right  Left Right Left Right Left Right Left Right  Left Right  Left           Distraction   +          +   +           +                           Joanna's   +          +   +         +                           Compression   Sl        Sl    Sl         Sl                            Prone Press Up   -          -   -           -                                        Special Tests  Right   Left Right  Left Right  Left Right  Left Right  Left Right  Left Right Left  Right  Left Right  Left Right  Left Right  Left Right  Left Right  Left Right Left Right Left Right Left Right Left  Right  Left      Straight Leg Raise                             Active   -            -                            Passive   -           -                        Hip Scour Test   sl           sl   Sl         Sl                                         Bakers Cyst   -            -                         01/08/2019 02/07/19 03/21/19 03/28/19 04/04/19 04/11/19 04/18/19 04/25/19 05/02/19 05/07/19 05/14/19 06/05/19 06/11/19 06/20/19 06/25/19 07/09/19 07/16/19 07/23/19   SI Joint Positioning  Right  Left Right  Left Right  Left Right  Left Right  Left Right  Left Right  Left Right  Left Right  Left Right  Left Right  Left Right  Left Right  Left Right Left Right Left Right Left Right  Left Right  Left       Innominate                            Anterior    x   x    x  x                x                 x              X     x    x   x   x               x   X                    sl    x                x               x                x          Posterior                x               x        x              x     x     x    x               X                x                x              x                x               x   sl               x    x    x   x      Sacrum                               Unilateral rotation    x   x   x    x    x     x    x    x   x   x    x    x   x   sl   x    x     x                           SI Joint Testing  Right  Left Right  Left Right  Left Right  Left Right  Left Right  Left Right  Left Right  Left Right  Left Right  Left Right  Left Right  Left Right  Left Right Left Right Left Right Left Right  Left Right  Left           Distraction     +          +   +         +   +          +    +        +  +           +   +          +   +          +   +          +     +        +   +         +   +         +   +          +   +           + +           +   +          +    +          +   +          +   +           +           Joanna's     +          +   Sl         sl    +         -    Sl         -   Sl         -   Sl         -   -           -                      Compression     +          +   Sl          sl   Sl          Sl     Sl        Sl    -          -   -         -   -           -                      Prone Press Up           +         +         Sl          -         -        -         -                                   Special Tests  Right   Left Right  Left Right  Left Right  Left Right  Left Right  Left Right Left  Right  Left Right  Left Right  Left Right  Left Right  Left Right  Left Right Left Right Left Right Left Right Left  Right  Left      Straight Leg Raise                                     Active   -         -          -           -                      Passive   -         -             -             -                  Hip Scour Test   ++       Sl    ++       Sl    +           sl   Sl         Sl    Sl         sl Sl          Sl    Sl        sl   Sl       Sl    Sl         Sl    Sl         Sl     Sl         sl   Sl        Sl    Sl        Sl     -            -   Sl         sl   -           -   -          -   -            -                         Bakers Cyst       +         -     +        -    Sl        -   Sl         -     Sl        -   Sl        Sl   sl          sl   Sl         -   -           -   -          -   +           -   Sl      sl       Palpation:   Date 08/06/19 08/13/19                    Right  Left Right  Left Right  Left Right  Left Right  Left Right  Left Right  Left Right  Left Right  Left Right  Left Right  Left Right  Left Right  Left Right Left Right Left Right Left Right  Left Right  Left   Piriformis   ++        ++   ++       ++                   Gr. Trochanter    +        -   ++          +                   IT Band    Sl         Sl    ++        +                   Quadratus Lumborum   ++         +   +         +                   Ischial Tuberosity    -         -   -          -                   Sacrococcygeal Ligs    +          +   +       +                   Paraspinals                     Spinous Processes   +           sl   ++        sl                         C-spine rotated to    C2-5 C2-6                         T-spine rotated to  T4-6 T4-7                          L-spine rotated to  L2-5 L2-5                    Adductor Tony   +           +   ++      ++                   Iliopsoas   ++          +                   Quad Origin    -          -                   SI Joint   +        +   ++        ++                   Pubic Symphysis         +        +                   Obturator externus   +         +   +         +                   Rectus Diastasis                                          Ant/middle scalenes   +        sl   +         +                   Upper traps   +         Sl    +         +                   Levator scap   +        Sl    +        +                   sternocleidomastoid   -         -   -            -                                        sternocostals   +          +   +          +                             Muscle/Bone Irritation  Date 01/08/2019 02/07/19 03/21/19 03/28/19 04/04/18 04/11/19 04/18/19  04/25/19 05/02/19 05/07/19 05/14/19 06/05/19 06/11/19 06/20/19 06/25/19 07/09/19 07/16/19 07/23/19    Right  Left Right  Left Right  Left Right  Left Right  Left Right  Left Right  Left Right  Left Right  Left Right  Left Right  Left Right  Left Right  Left Right Left Right Left Right Left Right  Left Right  Left   Piriformis  ++        +   ++          +   ++       +   ++        +    ++        Sl    ++       Sl   ++       +    ++      +   ++        +   +           +    +        Sl      +         ++   +           ++    +       -   ++      ++   +          +   ++        +   ++         ++   Gr. Trochanter  +          +  +          sl    +           +    +       ++    +          -   +         Sl    +         sl   +         sl   Sl        Sl    +          +    Sl        sl    Sl         +   Sl          sl     +       -   +         +    +         +   +         sl   ++           +   IT Band  +         sl   Sl        sl     -          -   -          -    Sl         -   Sl         -   Sl        -   Sl         -  sl          -   Sl          Sl      -          -     -           -   -            -    Sl        Sl    Sl       Sl     Sl       sl   +          Sl     +          ++   Quadratus Lumborum  ++        ++   ++        +    +        ++   ++        ++     +       ++   +          +  ++        ++   Sl        Sl    +       ++   +          ++    Sl       +     +         +   +          ++     Sl       +   ++       +    ++       +    +         +    ++       +   Ischial Tuberosity  ++         sl   -          -    -           -     -           -     -          -   -          -   -          -   -          -   -          -  -            -     -           -   -          -   -             -     Sl       -   Sl        Sl     -          -     -         -    Sl        Sl    Sacrococcygeal Ligs  ++          +   Sl        sl   +          +    +       +    Sl        Sl    Sl       Sl    +         sl  ++      +  +        Sl       Sl         +    Sl         Sl    Sl         Sl      -        Sl   not  tested    +         +    Sl       Sl    +          +   Paraspinals  +           +     +         sl    +    +          -   Sl         +   +         sl   +        sl  +         -   Sl          +   Sl          +   +          sl   ++         sl   +          -   +         Sl    ++        +     +       Sl    +         sl   Spinous Processes                                        C-spine rotated to                           C3-5  C2-5 C2-6         T-spine rotated to   -               -              T8-9  T4-5    T3-8          L-spine rotated to  L2-5        L1-5  L4-5  L3-5  L2-5            L3-5  L1-5  L2-5  L2-5              L1-5           L2-5 L2-5   L1-5 L3-5 L3-5  L2-5 L4-5 L1-5   Adductor Tony    +        +       +         +     -         +   -           Sl     -        sl   +          sl   -          -   -           -   -          -   -           -   -          -   -           sl  ++        +   +         sl   +         +   +         ++   ++        +  +            +   Iliopsoas  ++         +   ++        +   ++         +    +         ++  sl        Sl    Sl       Sl    Sl       Sl    Sl         -   Sl        -   Sl         +   +         Sl    +          sl  +          ++   +          +   +          +   +         ++   ++        +   ++         +   Quad Origin  sl          sl   Sl          sl    -          -   -           -    -         -   -          -   -         -   -          -   -          -   -           -    -         -   -            -   -         -   -         -   -         -   -            -   -          -   -         -   SI Joint  ++         +  ++        +    ++        +   +          +   +         +   +          ++   +         sl    +         sl   +        +  +          sl  +        Sl     +        Sl   +          ++   Sl       sl   +        +    + +        +  ++          +    ++        ++   Pubic Symphysis         ++          Not tested         ++      ++         ++            + Not tested Not tested          +             ++         +          +   Obturator externus          -          +   -          -   -          -    -           -   -          -   -          -   -         -    -            -   -          -   -            -   Rectus Diastasis   1/2 finger                                         Ant/middle scalenes                   ++        +   ++       +   Upper traps                  ++         +   +          +   Levator scap                   ++          +     +           sternocleidomastoid                   Sl        Sl      -         -                        sternocostals   +        +   +          +    +         -    +           +   Sl        Sl    +         Sl  Not tested     +         +     +         +   +         +   Sl      sl   +          +    +        +   +          +   +          +   All 4s Positioning: Pt not able to assume full lumbar extension in this position  Leg Length:  The right  lower extremity is equal to the left        Monthly Objective Measurement/Functional Activity  Date: 01/08/2019 03/21/19 04/25/19 06/05/19 07/09/19      Oswestry Pain Score 52/100  46/100    48/100      48/100       50/100                            AROM Lumbar Spine: Degrees   Degrees      Degrees      Degrees      Degrees      Degrees      Degrees    Degrees      Flexion 106 pain          99          83       75 pain          63 onset of pain          Extension 3 pain           17           19       11 pain           11  Pinches          Right Lat. Flexion 29 pain right trunk          24        25      25    23 pinch          Left Lat. Flexion 31         19         23     14  15 pinch          Right Lat Shift Pelvis inc pain   Inc pain Left SI jt       Slight increase No change but can't do far Feels caught          Left Lat Shift Pelvis  less inc pain       No change       No change/ slight cielo  No change but can't go far  Easier but not natural                   AROM Hips:  (degrees) Right      Left Right Left Right  Left Right  Left Right  Left Right  Left Right  Left Right  Left      Flexion 110       110  125    120    128      125 130       130 130      130         Abduction 37       41   45      45      42      44  43          45   45       45         Int. Rotation 32       22  30      25      31         28  32          30   34        32         Ext. Rotation  34       42  35      40       38        41   35         40   40        42                 Flexibility:   (degrees) Right    Left Right  Left Right  Left Right  Left Right  Left Right  Left Right  Left Right  Left      Hamstrings -32       -28  -30     -25   -25       -25   -28      -27  -30      -26         Quadriceps 39       42 40        45    not tested  40          42  45        47         HipExt/Rot(piriformis) 30       28   32      30  33         32   35         33   37        35         Hip Int/Rotators 27       9   25      10   28        15   27         16   25         18         Hip Flexors (iliopsoas) -       -             IT Band -       -                     Reflexes: Right      Left Right  Left Right  Left Right  Left Right  Left Right  Left Right  Left Right  Left      L4 (Quad) +1       +1             S1 (Achilles) +1       +1                     Root Level  - Motor Right      Left Right  Left Right  Left Right  Left Right  Left Right  Left Right  Left Right  Left     T12             Rectus Abdominus 4+/5     4+/5         5/5        5/5          L1              Paraspinals 5/5         5/5             L2              Hip Flexion 5/5          5/5             L3             Quads 5/5          5/5             L4              Anterior Tibialis 5/5         5/5             L5             Gr. Toe Extension 5/5           5/5             S1            Gastroc/Sol/Peroneals 5/5          5/5                     Functional Strength:             Single LegStanceTime (seconds) R:30   L:30   R:      L: R:       L: R: 30 pain     L:30 pain  R:      L: R:      L: R:      L: R:      L:     Pelvic Floor Isolation (seconds) -    10 sec 10 sec          Inner Unit  Isolation (seconds) -    10 sec 10 sec                   Functional Activities:              Sit w/o pain? Pain increases (minutes) No/ 30 min No/ 30  min No/ 30 min  No/  30 min No/ 30 min          Stand w/o pain? No/ 30 min No/ 30 min No/ 10 min  No/ 10 min No/ 10 min          Walk w/o pain? varies No/ 1 mile No/ 1/2 mile No/ 1 mile No/ 1mile, piriformis, B         Steps w/o pain? 1 fl, avoids them 1 fl avoids  1 fl avoids  No/ avoids No/ doesn't do         Drive w/o pain? No/ 10-15 min No/ 30-45 min No/ 30-45  Min  No/ 30-45 min No/ 35-45         Work w/o pain? No/ 30 min No/ 30 min No/ 30  No/ 30 min No/30 mi           # times wakes w/ pain? 4-5x/night, now taking meds at night she is able to sleep.  2x   2-3x  2x         1x           PLAN OF CARE:    ASSESSMENT:  Problem List:   1. SI joint dysfunction  2. Lack of spinal stabilization  3. Postural dysfunction     Discussion:  Due to a scheduling error, was only able to work with Tete for 30 min today.   Spent the entire visit on manual work.  Her flare up could be related to her hormonal changes and the fall on her knee trying to catch her dog.    Despite this, she indicates that she doesn't feel quite as bad as she did right before her period prior to the new hormones prescribed by her [facility].   Continue progressing the spinal stabilization on her next visit.       Short Term Goals: (4-6 weeks)                                   Date Met:                1.   pt independent in postural correction         02/07/19  2.   pt independent in SI joint self-corrections        03/21/19  3.   pt independent in exer to decrease post. disc pressures      03/21/19  4.   pt able to sleep through night without pain  5.   pt able to sit 15 minutes without pain  6.   pt able to walk 10 minutes without pain        06/20/19    7.    "Reduce pt pain to no greater than 7/10        03/21/19  8.   Decrease Oswestry Pain Score to no greater than 45/100  9.  Reduce pt pain to no greater than 6/10        07/16/19  10.  Pt able to isolate the pelvic floor in supine x10, 10 sec      03/28/19  11.  Pt able to isolate transverse abdom in all 4s, x10 sec, x10         03/28/19  12.  Pt able to isolate the multifidus in sitting x10 sec, x10                            03/28/19  13.  Pt able to engage inner unit, move from sit to stand &back to sit      04/04/19                                                                                                                                                                                                                                           14.  Pt able to engage inner unit and walk 4 steps without overflow to hamstrings   06/20/19  15.  Pt able to hip abduct x6 without engaging the piriformis      06/20/19     16.  Pt able to perform prone knee flexion without engaging piriformis x6          05/07/19       17.  Pt able to perform remedial lower abs with scissor arm work x6 w/o pain    04/18/19    18.  Pt able to perform \"Pre-cursor\" lower abs leg lift initiation x6 without pain          04/18/19   19.  Pt able to perform retro step without engaging piriformis x6, bilaterally    20.  Pt able to perform bridging x6 without engaging piriformis      04/25/19    21.  Pt able to perform \"scissors\" arm movement with inner unit w/o piriformis x6   05/02/19  22.  Pt able to perform PIC hip adductors without engaging the piriformis  x6    05/07/19  23.  Pt able to perform \"Scissors\" arm motion w/ inner unit with 1lb wts x6 w/o pain   05/07/19  24.  Pt able to perform prone glut max over stability ball x6 w/o engaging piriformis   25.  Pt able to reduce pain w/ self PIC to left psoas       05/14/19  26.  Pt able to move laterally on stability ball in sitting without pain x6  27.  Pt able to move A/P movement sitting on " stability ball without pain x6  28.  Pt independent in pool exercises, to do without increasing pain     07/09/19  29.  Pt able to throw and catch stability ball in supine with inner unit on x6    06/11/19  30.  Pt able to hold medicine ball in stance and move away from trunk with IU on and w/o pain x6 06/11/19  31.  Pt independent in supine hamstring stretch, able to do 2x without pain.    07/16/19  32.  Pt independent in hip adductor stretching, supine and stance     07/16/19  33.  Pt independent in quad stretching, in stance, without pain.      08/06/19  34. Pt independent in hip internal rotator stretch in supine, without pain      08/06/19  35.  Pt able to tolerate ADLs with leukotape on scapula for enhanced spinal stabilization x 1 day 07/25/19  36.  Pt able to perform prone walk out x6 without losing inner unit engagement  37. Pt able to perform prone arm lift x6, phase 1, without engaging the upper trap   08/06/19  38.  Pt able to perform supine obliques over stability ball x6 without losing inner unit engagement 08/06/19  39.  Pt able to perform prone arm lift, lifting elbow without engaging upper traps x6  40.  Pt able to perform upper quarter stretches without increasing pain                                                                                                                                             Long Term Goals:(3-5 months)      Date Met:      1.  pt independent in self-management of symptoms       2.  pt independent in home program      3.  pt able to work 6 hours without pain      4.  pt able to sit 60 minutes without pain      5.  pt able to walk 45 min without pain    Progress Towards Goals:  As expected to a little slower than expected    Treatment Plan:   1.  Ultrasound to increase extensibility, decrease pain, if needed  2.  Electrical stimulation for muscle relaxation, decrease pain, if needed, iontophoresis  3.  Manual therapy to increase function, decrease pain  4.  Therapeutic  exercise to increase function, decrease pain  5.  Home programming and d/c planning to increase function and decrease pain,     Frequency & Duration:  The progression of Tete's instruction has been slow secondary to the flare ups of pain associated with her menses.  Therefore, she is in need of 1-2) more visits, once/wk to once every other week to complete the spinal stabilization instruction and be independent in her home program.      Patient Participated in and Agrees With This Plan of Care and Goals:  YES  Rehab Potential:  jarret Marcial PT, MS  Physical Therapist  [place]# [id number]      TREATMENT TODAY:    Total Treatment Time: (time in clinic)   35  min  Timed Code Treatment Minutes:     30      Supplies given:      Manual Therapy:  (MA)  RX min:       30  Manual posterior rotation of left innominate    Positional Isometric contraction (PIC) of rightt iliopsoas    Positional Isometric contraction of (PIC) right gluteus minimus    Distraction of both SI jts in open pack hip position  Piriformis stretching, bilaterally    Quadratus lumborum stretching, bilaterally    Anterior/Inferior Mobilization of  right hip    Positional Isometric Contraction of multifidus  Myofascial work trunk   Sternocostal and rib mobs   Manual spinal distraction  PIC C-spine  PIC T-spine  Stretching scalenes, upper traps, levator scap      Therapeutic Activities:  (TA)  RX min:        Neuromuscular Reeducation: (NMR)  RX min:       Ultrasound:  RX min:         1.6 vance/cm2       Location:      Iontophoresis:  6 hr patch                                Location:                           Ice:  Lumbar spine/SI joints    Procedures:      Key:  i=instructed        r=review        c=corrected        a=adapted   Code Procedure/Instruction 01/08/2019 02/07/19 03/21/19 03/28/19 04/04/19 04/11/19 04/18/19 04/25/19 05/02/19 05/07/19 05/14/19 06/05/19 06/11/19 06/20/19 06/25/19 07/09/19 07/16/19 07/25/19 08/06/19   TA         Sleeping  Positions instructed               reviewed,correct                    TA Sternum-Up Posture instructed reviewed                    TA SI jt. self-correction instructed corrected corrected  reviewed        Adapted and corrected         TA Lumbar Facet PIC instructed corrected corrected  reviewed        reviewed         TA   Order of Self-Corrections instructed    reviewed                 TA Use of lumbar pillow instructed                     TA Use of cervical roll instructed corrected                    TA Use of orthotics instructed                     TA Use of SI belt instructed                     TA Use of Nada chair                      TA Use of Ice instructed                     TA Use of Thermacare/ heat instructed                     TA Use of Theraworx Relief instructed                     TA Use of TENS unit                      TA Use of iontophoresis                      TA Decreasing Disc Pressures  instructed                    TA Use of sacro wedge          attempted didnt work            NMR Leukotaping upper quarter                 instructed&  applied     NMR Pelvic Floor Isolation   instructed corrected                  NMR Transverse Abdominus   instructed corrected                  NMR Multifidus Isolation   instructed                   NMR Diaphragmtic breathe supine    instructed                  NMR  Diaphragmtic breath sit                      NMR Pelvic Clock in sitting   instructed                   NMR Kinesiotape    Applied to both QL              Applied to both piriformis & QLs for inhibition    NMR InnerUnit against Gravity    instructed                  NMR Sit-stand w/ inner unit    instructed                  NMR Roll w/ inner unit    instructed                  NMR Walk w/ inner unit     instructed                  NMR Up/down steps w/ inner unit    instructed                  NMR Water Exer Instruction            Instructed           NMR Hip Abd w/o piriformis     instructed  stopped                NMR Hip Add w/o iliop/ham      instructed stopped                NMR Lower abs in supine      Adapted to pre cursor, initiation Cont, no change   Still not able to lift foot w/o engaging piriformis   adapted with bolster            NMR Inner unit challenge with scissor arm work       instructed Added legs  Adapted w/ ball & belt progressed to 1lb weights Inc reps to 10, still use 1lb Added rectus abdom to this           NMR Abd obliques in supine      attempted                NMR Prone Knee Flexion     instructed  instructed corrected Made bolster higher. Dorsiflex left foot more reviewedstopped dorsiflex on Right           NMR Supine glute w/ ball btwn knees        instructed  Adapted to do w/ belt for abd Moved belt more proximal on thighs She will not bridge as high  continue           NMR Prone glut amrita          instructed            NMR Retro Step       instructed stopped              NMR Retro Walk                      NMR Retro walk on treadmill              instructed        NMR Forward walk w/ inner unit              instructed        NMR Stability ball multifidus bounce              instructed        NMR Stability Ball A/P & Lateral           instructed           NMR Ball Catch/Throw /Supine            instructed          NMR Ball movemt in /Stance            instructed          NMR StabilityBall All 4's Arm Lift                  attempted    NMR StabilityBall Prone Walk Out                   instructed stopped   NMR StabilityBall Supine Oblique              Adapted to do w/ belt around knees    instructed corrected   NMR Stability Ball sit-supine-sit                      NMR Walking Prog Progression                      MNR Home program sequencing                   instructed                          TA Hamstring stretch                instructed      TA Hip Ext Rot Stretch              Instructed  Adapted to do in stance  instructed in supine      TA Hip Int Rot Stretch                 instructed      TA Hip Flex/IT Stretch                      TA Hip Add Stretch                instructed      TA Lats Dorsi Stretch                attempted      TA Gastroc/soleus stretch                instructed      TA QuadratusLumborm Stretch                         Supine                         Stance              instructed        TA Quad Stretch                instructed                             NMR Wall Push Ups                      NMR Planking                      NMR BOSU Ball Exercises                      NMR Lateral Bridge Drop                      NMR Waiters Victor                      NMR O'Feldt Lat Pull Down                      NMR O'Feldt Hip Ext                      NMR O'Feldt Trunk Ext                                             TA CervDiscExtSup/stnd                      TA Cerv Stretches                      TA Self PIC Cervical Spine                      TA Neural Gliding                      TA Upper trap stretching                   instructed   TA Ant/Mid Scalene Strch                   instructed   TA Levator Scap strch                   instructed   TA Biceps stretch                      TA Rotator Cuff Stretch                      TA Anterior Chest Stretch                      TA Wrist Ext Stretch                                             NMR Prone Arm Lifts                  instructed progressed   NMR Scapula Stabilization                      NMR Occulomotor Isometrics                      NMR Cervical Isometrics                                             TA Shld AROM w/bar                      TA Shld Capsular Stretching                      TA Self PIC Thor Spine                      TA Rot Cuff Therabd, beginner                      TA Rot Cuff Therabd, intermed                                                                                                                                                                                                                                                                                      Miracle Marcial, PT, MS  Physical Therapist  [place]# [id number]

## 2019-09-17 ENCOUNTER — TREATMENT (OUTPATIENT)
Dept: PHYSICAL THERAPY | Facility: CLINIC | Age: 34
End: 2019-09-17

## 2019-09-17 DIAGNOSIS — M53.3 SACROILIAC JOINT DYSFUNCTION: Primary | ICD-10-CM

## 2019-09-17 DIAGNOSIS — M35.7 FAMILIAL LIGAMENTOUS LAXITY: ICD-10-CM

## 2019-09-17 DIAGNOSIS — S39.012D STRAIN OF LUMBAR REGION, SUBSEQUENT ENCOUNTER: ICD-10-CM

## 2019-09-17 DIAGNOSIS — R29.3 POSTURE IMBALANCE: ICD-10-CM

## 2019-09-17 PROCEDURE — 97112 NEUROMUSCULAR REEDUCATION: CPT | Performed by: PHYSICAL THERAPIST

## 2019-09-17 PROCEDURE — 97140 MANUAL THERAPY 1/> REGIONS: CPT | Performed by: PHYSICAL THERAPIST

## 2019-09-17 NOTE — PROGRESS NOTES
"Physical Therapy Note      SUBJECTIVE:   Changes since last seen:  Tete went to [facility] last week, so arrives in pain today.  She states she was in pain the whole time she was there but was pleased she was able to minimize the pain with the self correction exercises, stretching and icing. However, by the end of the trip she started to get tingling in her low back.  She only had one low back spasm, which was better than she would have been previously. \"The stretching really helped a lot.\"   \"I felt like I could manage it, but took a lot of Advil, did stretching, self corrections and did a lot of resting. \"   \"It seemed like pain was more on the right side of my low back while I was there.\"   She is still skipping her periods per her GYN's placement on this particular hormone, \"for one more month then I go back to monthly periods.\"  She points to most pain in the right glut medius and c/o right hip popping a lot when doing the ilium self correction     Current level of pain:    6.5/10   Lowest level of pain since last seen:  4/10  Highest level of pain since last seen:   7/10       Past Medical History:  1.  AA 2013  Treated with PT for left shoulder labral tear, no low back treatment   2.  Hx of two falls when mopping in socks, over the last 2 years  3.  Hx of regular, heavy, cramping menses  4.  T& A, 2006  5.  Allergic to Wellbutrin and mild allergy to contrast dye  6.  Chronic yeast infections  7.  Uterine fibroids removed, 2 yrs ago       Functional Limitations:  Sitting, standing, walking, stairs, driving, working, sleeping, squatting, housework and changing positions.   Patient Goals for Physical Therapy: \"Pain relief\"      OBJECTIVE:  Observation:  Slight ight lateral shift of pelvis.        08/06/19 08/13/19 09/17/19                  SI Joint Positioning  Right  Left Right  Left Right  Left Right  Left Right  Left Right  Left Right  Left Right  Left Right  Left Right  Left Right  Left Right  Left Right  " Left Right Left Right Left Right Left Right  Left Right  Left       Innominate                            Anterior    x               x     x                         Posterior               x     x                x                     Sacrum                          Unilateral rotation    x      x    x                                          SI Joint Testing  Right  Left Right  Left Right  Left Right  Left Right  Left Right  Left Right  Left Right  Left Right  Left Right  Left Right  Left Right  Left Right  Left Right Left Right Left Right Left Right  Left Right  Left           Distraction   +          +   +           +   +         +                          Joanna's   +          +   +         +   +          +                          Compression   Sl        Sl    Sl         Sl    -          -                          Prone Press Up   -          -   -           -   -            -                                       Special Tests  Right   Left Right  Left Right  Left Right  Left Right  Left Right  Left Right Left  Right  Left Right  Left Right  Left Right  Left Right  Left Right  Left Right Left Right Left Right Left Right Left  Right  Left      Straight Leg Raise                             Active   -            -    -         -                          Passive   -           -    -          -                      Hip Scour Test   sl           sl   Sl         Sl    +         sl                                       Bakers Cyst   -            -     +          -                       01/08/2019 02/07/19 03/21/19 03/28/19 04/04/19 04/11/19 04/18/19 04/25/19 05/02/19 05/07/19 05/14/19 06/05/19 06/11/19 06/20/19 06/25/19 07/09/19 07/16/19 07/23/19   SI Joint Positioning  Right  Left Right  Left Right  Left Right  Left Right  Left Right  Left Right  Left Right  Left Right  Left Right  Left Right  Left Right  Left Right  Left Right Left Right Left Right Left Right  Left Right  Left       Innominate                             Anterior    x   x    x  x                x                 x              X     x    x   x   x               x   X                    sl    x                x               x                x          Posterior                x               x        x              x     x     x    x               X                x               x              x                x               x   sl               x    x    x   x      Sacrum                               Unilateral rotation    x   x   x    x    x     x    x    x   x   x    x    x   x   sl   x    x     x                           SI Joint Testing  Right  Left Right  Left Right  Left Right  Left Right  Left Right  Left Right  Left Right  Left Right  Left Right  Left Right  Left Right  Left Right  Left Right Left Right Left Right Left Right  Left Right  Left           Distraction     +          +   +         +   +          +    +        +  +           +   +          +   +          +   +          +     +        +   +         +   +         +   +          +   +           + +           +   +          +    +          +   +          +   +           +           Joanna's     +          +   Sl         sl    +         -    Sl         -   Sl         -   Sl         -   -           -                      Compression     +          +   Sl          sl   Sl          Sl     Sl        Sl    -          -   -         -   -           -                      Prone Press Up           +         +         Sl          -         -        -         -                                   Special Tests  Right   Left Right  Left Right  Left Right  Left Right  Left Right  Left Right Left  Right  Left Right  Left Right  Left Right  Left Right  Left Right  Left Right Left Right Left Right Left Right Left  Right  Left      Straight Leg Raise                                     Active   -         -          -           -                      Passive   -         -             -             -                   Hip Scour Test   ++       Sl    ++       Sl    +           sl   Sl         Sl    Sl         sl Sl          Sl    Sl        sl   Sl       Sl    Sl         Sl    Sl         Sl     Sl         sl   Sl        Sl    Sl        Sl     -            -   Sl         sl   -           -   -          -   -            -                        Bakers Cyst       +         -     +        -    Sl        -   Sl         -     Sl        -   Sl        Sl   sl          sl   Sl         -   -           -   -          -   +           -   Sl      sl       Palpation:   Date 08/06/19 08/13/19 09/17/19                   Right  Left Right  Left Right  Left Right  Left Right  Left Right  Left Right  Left Right  Left Right  Left Right  Left Right  Left Right  Left Right  Left Right Left Right Left Right Left Right  Left Right  Left   Piriformis   ++        ++   ++       ++   ++        ++                  Gr. Trochanter    +        -   ++          +   ++      +                  IT Band    Sl         Sl    ++        +   +         sl                  Quadratus Lumborum   ++         +   +         +   ++        +                  Ischial Tuberosity    -         -   -          -    -        -                  Sacrococcygeal Ligs    +          +   +       +   +         sl                  Paraspinals                     Spinous Processes   +           sl   ++        sl                         C-spine rotated to    C2-5 C2-6 C2-5                        T-spine rotated to  T4-6 T4-7 T3-8                          L-spine rotated to  L2-5 L2-5   L1-5                  Adductor Tony   +           +   ++      ++   +         ++                  Iliopsoas   ++          +   +          sl                  Quad Origin    -          -   -          -                  SI Joint   +        +   ++        ++  ++        +                  Pubic Symphysis         +        +        +                  Obturator externus   +         +   +         +   Sl       sl                   Rectus Diastasis                                          Ant/middle scalenes   +        sl   +         +   +         +                  Upper traps   +         Sl    +         +   +         +                  Levator scap   +        Sl    +        +   +        sl                  sternocleidomastoid   -         -   -            -   -          -                                       sternocostals   +          +   +          +   +        Sl                            Muscle/Bone Irritation  Date 01/08/2019 02/07/19 03/21/19 03/28/19 04/04/18 04/11/19 04/18/19 04/25/19 05/02/19 05/07/19 05/14/19 06/05/19 06/11/19 06/20/19 06/25/19 07/09/19 07/16/19 07/23/19    Right  Left Right  Left Right  Left Right  Left Right  Left Right  Left Right  Left Right  Left Right  Left Right  Left Right  Left Right  Left Right  Left Right Left Right Left Right Left Right  Left Right  Left   Piriformis  ++        +   ++          +   ++       +   ++        +    ++        Sl    ++       Sl   ++       +    ++      +   ++        +   +           +    +        Sl      +         ++   +           ++    +       -   ++      ++   +          +   ++        +   ++         ++   Gr. Trochanter  +          +  +          sl    +           +    +       ++    +          -   +         Sl    +         sl   +         sl   Sl        Sl    +          +    Sl        sl    Sl         +   Sl          sl     +       -   +         +    +         +   +         sl   ++           +   IT Band  +         sl   Sl        sl     -          -   -          -    Sl         -   Sl         -   Sl        -   Sl         -  sl          -   Sl          Sl      -          -     -           -   -            -    Sl        Sl    Sl       Sl     Sl       sl   +          Sl     +          ++   Quadratus Lumborum  ++        ++   ++        +    +        ++   ++        ++     +       ++   +          +  ++        ++   Sl        Sl    +       ++   +          ++    Sl       +     +         +    +          ++     Sl       +   ++       +    ++       +    +         +    ++       +   Ischial Tuberosity  ++         sl   -          -    -           -     -           -     -          -   -          -   -          -   -          -   -          -  -            -     -           -   -          -   -             -     Sl       -   Sl        Sl     -          -     -         -    Sl        Sl    Sacrococcygeal Ligs  ++          +   Sl        sl   +          +    +       +    Sl        Sl    Sl       Sl    +         sl  ++      +  +        Sl       Sl         +   Sl         Sl    Sl         Sl      -        Sl   not  tested    +         +    Sl       Sl    +          +   Paraspinals  +           +     +         sl    +    +          -   Sl         +   +         sl   +        sl  +         -   Sl          +   Sl          +   +          sl   ++         sl   +          -   +         Sl    ++        +     +       Sl    +         sl   Spinous Processes                                        C-spine rotated to                           C3-5  C2-5 C2-6         T-spine rotated to   -               -              T8-9  T4-5    T3-8          L-spine rotated to  L2-5        L1-5  L4-5  L3-5  L2-5            L3-5  L1-5  L2-5  L2-5              L1-5           L2-5 L2-5   L1-5 L3-5 L3-5  L2-5 L4-5 L1-5   Adductor Tony    +        +       +         +     -         +   -           Sl     -        sl   +          sl   -          -   -           -   -          -   -           -   -          -   -           sl  ++        +   +         sl   +         +   +         ++   ++        +  +            +   Iliopsoas  ++         +   ++        +   ++         +    +         ++  sl        Sl    Sl       Sl    Sl       Sl    Sl         -   Sl        -   Sl         +   +         Sl    +          sl  +          ++   +          +   +          +   +         ++   ++        +   ++         +   Quad Origin  sl          sl   Sl          sl    -          -    -           -    -         -   -          -   -         -   -          -   -          -   -           -    -         -   -            -   -         -   -         -   -         -   -            -   -          -   -         -   SI Joint  ++         +  ++        +    ++        +   +          +   +         +   +          ++   +         sl    +         sl   +        +  +          sl  +        Sl     +        Sl   +          ++   Sl       sl   +        +    + +        +  ++          +    ++        ++   Pubic Symphysis         ++          Not tested         ++      ++        ++            + Not tested Not tested          +             ++         +          +   Obturator externus          -          +   -          -   -          -    -           -   -          -   -          -   -         -    -            -   -          -   -            -   Rectus Diastasis   1/2 finger                                         Ant/middle scalenes                   ++        +   ++       +   Upper traps                  ++         +   +          +   Levator scap                   ++          +     +           sternocleidomastoid                   Sl        Sl      -         -                        sternocostals   +        +   +          +    +         -    +           +   Sl        Sl    +         Sl  Not tested     +         +     +         +   +         +   Sl      sl   +          +    +        +   +          +   +          +   All 4s Positioning: Pt not able to assume full lumbar extension in this position  Leg Length:  The right  lower extremity is equal to the left        Monthly Objective Measurement/Functional Activity  Date: 01/08/2019 03/21/19 04/25/19 06/05/19 07/09/19 09/17/19     Oswestry Pain Score 52/100  46/100    48/100      48/100       50/100 48/100                           AROM Lumbar Spine: Degrees   Degrees      Degrees      Degrees      Degrees      Degrees      Degrees    Degrees      Flexion 106 pain          99           83       75 pain          63 onset of pain         69 pain increased        Extension 3 pain           17           19       11 pain           11  Pinches       16 pain increased        Right Lat. Flexion 29 pain right trunk          24        25      25    23 pinch       19 pinch         Left Lat. Flexion 31         19         23     14  15 pinch     11 pinch         Right Lat Shift Pelvis inc pain   Inc pain Left SI jt       Slight increase No change but can't do far Feels caught  Increased pain         Left Lat Shift Pelvis  less inc pain       No change       No change/ slight cielo  No change but can't go far  Easier but not natural  Increased pain                 AROM Hips:  (degrees) Right      Left Right Left Right  Left Right  Left Right  Left Right  Left Right  Left Right  Left      Flexion 110       110  125    120    128      125 130       130 130      130 125      120        Abduction 37       41   45      45      42      44  43          45   45       45  45          45        Int. Rotation 32       22  30      25      31         28  32          30   34        32   30         28        Ext. Rotation  34       42  35      40       38        41   35         40   40        42   35         37                Flexibility:   (degrees) Right    Left Right  Left Right  Left Right  Left Right  Left Right  Left Right  Left Right  Left      Hamstrings -32       -28  -30     -25   -25       -25   -28      -27  -30      -26   -28       -29        Quadriceps 39       42 40        45    not tested  40          42  45        47   50         52        HipExt/Rot(piriformis) 30       28   32      30  33         32   35         33   37        35   35        35        Hip Int/Rotators 27       9   25      10   28        15   27         16   25         18   20        20        Hip Flexors (iliopsoas) -       -             IT Band -       -                     Reflexes: Right      Left Right  Left Right  Left Right  Left  Right  Left Right  Left Right  Left Right  Left      L4 (Quad) +1       +1             S1 (Achilles) +1       +1                     Root Level  - Motor Right      Left Right  Left Right  Left Right  Left Right  Left Right  Left Right  Left Right  Left     T12             Rectus Abdominus 4+/5     4+/5         5/5        5/5          L1              Paraspinals 5/5         5/5             L2              Hip Flexion 5/5          5/5             L3             Quads 5/5          5/5             L4              Anterior Tibialis 5/5         5/5             L5             Gr. Toe Extension 5/5           5/5             S1            Gastroc/Sol/Peroneals 5/5          5/5                     Functional Strength:             Single LegStanceTime (seconds) R:30   L:30   R:      L: R:      L: R: 30 pain     L:30 pain  R:      L: R:      L: R:      L: R:      L:     Pelvic Floor Isolation (seconds) -    10 sec 10 sec   > 10 sec        Inner Unit  Isolation (seconds) -    10 sec 10 sec     > 10 sec                Functional Activities:              Sit w/o pain? Pain increases (minutes) No/ 30 min No/ 30  min No/ 30 min  No/  30 min No/ 30 min  No/ 30min         Stand w/o pain? No/ 30 min No/ 30 min No/ 10 min  No/ 10 min No/ 10 min  No/ 15-20 min        Walk w/o pain? varies No/ 1 mile No/ 1/2 mile No/ 1 mile No/ 1mile, piriformis, B No/ a couple miles         Steps w/o pain? 1 fl, avoids them 1 fl avoids  1 fl avoids  No/ avoids No/ doesn't do No/ 1 flight         Drive w/o pain? No/ 10-15 min No/ 30-45 min No/ 30-45  Min  No/ 30-45 min No/ 35-45 No/ 35-45        Work w/o pain? No/ 30 min No/ 30 min No/ 30  No/ 30 min No/30 mi   No/ 30 min         # times wakes w/ pain? 4-5x/night, now taking meds at night she is able to sleep.  2x   2-3x  2x         1x         Several times          PLAN OF CARE:    ASSESSMENT:  Problem List:   1. SI joint dysfunction  2. Lack of spinal stabilization  3. Postural dysfunction      Discussion:  Tete did surprisingly well having walked at [facility] for 6 days!  She wore her Tahir sandals and her [employer] running shoes most of the time.  She incorporated all instruction for self correction, stretching, posturing into her day.  She ended up not using her SI belt as it was just too hot.      She responded well to all manual work today.  Overall pain decreased from 6.5/10 to 3-4/10 by end of visit.    Attempted progression of spinal stabilization to planking however, she was not able to secondary to increased lumbar pain when doing it, despite engagement of the inner unit.  So, did remedial work of wall push ups with the inner unit engaged.  She was able to do this without increased pain. She was also instructed in O'Feldt lat pull down with [employer].  She was able to do this without increasing pain but needed to concentrate on having the inner unit on.     Continue progressing spinal stabilization as she can tolerate.  In the meantime, she will work on getting a firmer mattress and continue to note hormonal fluctuations and her level of pain to confirm correlation of hormones and increased ligamentous laxity.     Short Term Goals: (4-6 weeks)                                   Date Met:                1.   pt independent in postural correction         02/07/19  2.   pt independent in SI joint self-corrections        03/21/19  3.   pt independent in exer to decrease post. disc pressures      03/21/19  4.   pt able to sleep through night without pain  5.   pt able to sit 15 minutes without pain  6.   pt able to walk 10 minutes without pain        06/20/19    7.   Reduce pt pain to no greater than 7/10        03/21/19  8.   Decrease Oswestry Pain Score to no greater than 45/100  9.  Reduce pt pain to no greater than 6/10        07/16/19  10.  Pt able to isolate the pelvic floor in supine x10, 10 sec      03/28/19  11.  Pt able to isolate transverse abdom in all 4s, x10 sec, x10     "     03/28/19  12.  Pt able to isolate the multifidus in sitting x10 sec, x10                            03/28/19  13.  Pt able to engage inner unit, move from sit to stand &back to sit      04/04/19                                                                                                                                                                                                                                           14.  Pt able to engage inner unit and walk 4 steps without overflow to hamstrings   06/20/19  15.  Pt able to hip abduct x6 without engaging the piriformis      06/20/19     16.  Pt able to perform prone knee flexion without engaging piriformis x6          05/07/19       17.  Pt able to perform remedial lower abs with scissor arm work x6 w/o pain    04/18/19    18.  Pt able to perform \"Pre-cursor\" lower abs leg lift initiation x6 without pain          04/18/19   19.  Pt able to perform retro step without engaging piriformis x6, bilaterally    20.  Pt able to perform bridging x6 without engaging piriformis      04/25/19    21.  Pt able to perform \"scissors\" arm movement with inner unit w/o piriformis x6   05/02/19  22.  Pt able to perform PIC hip adductors without engaging the piriformis  x6    05/07/19  23.  Pt able to perform \"Scissors\" arm motion w/ inner unit with 1lb wts x6 w/o pain   05/07/19  24.  Pt able to perform prone glut max over stability ball x6 w/o engaging piriformis   25.  Pt able to reduce pain w/ self PIC to left psoas       05/14/19  26.  Pt able to move laterally on stability ball in sitting without pain x6  27.  Pt able to move A/P movement sitting on stability ball without pain x6  28.  Pt independent in pool exercises, to do without increasing pain     07/09/19  29.  Pt able to throw and catch stability ball in supine with inner unit on x6    06/11/19  30.  Pt able to hold medicine ball in stance and move away from trunk with IU on and w/o pain x6 06/11/19  31.  Pt " independent in supine hamstring stretch, able to do 2x without pain.    07/16/19  32.  Pt independent in hip adductor stretching, supine and stance     07/16/19  33.  Pt independent in quad stretching, in stance, without pain.      08/06/19  34. Pt independent in hip internal rotator stretch in supine, without pain      08/06/19  35.  Pt able to tolerate ADLs with leukotape on scapula for enhanced spinal stabilization x 1 day 07/25/19  36.  Pt able to perform prone walk out x6 without losing inner unit engagement  37. Pt able to perform prone arm lift x6, phase 1, without engaging the upper trap   08/06/19  38.  Pt able to perform supine obliques over stability ball x6 without losing inner unit engagement 08/06/19  39.  Pt able to perform prone arm lift, lifting elbow without engaging upper traps x6  40.  Pt able to perform upper quarter stretches without increasing pain     09/17/19                                                                                                                                             Long Term Goals:(3-5 months)      Date Met:      1.  pt independent in self-management of symptoms       2.  pt independent in home program      3.  pt able to work 6 hours without pain      4.  pt able to sit 60 minutes without pain      5.  pt able to walk 45 min without pain    Progress Towards Goals:  As expected to a little slower than expected    Treatment Plan:   1.  Ultrasound to increase extensibility, decrease pain, if needed  2.  Electrical stimulation for muscle relaxation, decrease pain, if needed, iontophoresis  3.  Manual therapy to increase function, decrease pain  4.  Therapeutic exercise to increase function, decrease pain  5.  Home programming and d/c planning to increase function and decrease pain,     Frequency & Duration:  The progression of Tete's instruction has been slow secondary to the flare ups of pain associated with her menses.  Therefore, she is in need of  1 more  visits, once/wk to once every other week to complete the spinal stabilization instruction and be independent in her home program.      Patient Participated in and Agrees With This Plan of Care and Goals:  YES  Rehab Potential:  jarret Marcial PT, MS  Physical Therapist  [place]# [id number]      TREATMENT TODAY:    Total Treatment Time: (time in clinic)   65  min  Timed Code Treatment Minutes:     65      Supplies given:      Manual Therapy:  (MA)  RX min:       45  Manual posterior rotation of left innominate    Positional Isometric contraction (PIC) of rightt iliopsoas    Positional Isometric contraction of (PIC) right gluteus minimus    Distraction of both SI jts in open pack hip position  Piriformis stretching, bilaterally    Quadratus lumborum stretching, bilaterally    Anterior/Inferior Mobilization of  right hip    Positional Isometric Contraction of multifidus  Myofascial work trunk   Sternocostal and rib mobs   Manual spinal distraction  PIC C-spine  PIC T-spine  Stretching scalenes, upper traps, levator scap      Therapeutic Activities:  (TA)  RX min:        Neuromuscular Reeducation: (NMR)  RX min:   20    Ultrasound:  RX min:         1.6 vance/cm2       Location:      Iontophoresis:  6 hr patch                                Location:                           Ice:  Lumbar spine/SI joints    Procedures:      Key:  i=instructed        r=review        c=corrected        a=adapted   Code Procedure/Instruction 01/08/2019 02/07/19 03/21/19 03/28/19 04/04/19 04/11/19 04/18/19 04/25/19 05/02/19 05/07/19 05/14/19 06/05/19 06/11/19 06/20/19 06/25/19 07/09/19 07/16/19 07/25/19 08/06/19 09/17/19     TA         Sleeping Positions instructed               reviewed,correct                       TA Sternum-Up Posture instructed reviewed                       TA SI jt. self-correction instructed corrected corrected  reviewed        Adapted and corrected            TA Lumbar Facet PIC instructed corrected corrected   reviewed        reviewed            TA   Order of Self-Corrections instructed    reviewed                    TA Use of lumbar pillow instructed                        TA Use of cervical roll instructed corrected                       TA Use of orthotics instructed                        TA Use of SI belt instructed                        TA Use of Nada chair                         TA Use of Ice instructed                        TA Use of [employer]/ heat instructed                        TA Use of Theraworx Relief instructed                        TA Use of TENS unit                         TA Use of iontophoresis                         TA Decreasing Disc Pressures  instructed                       TA Use of sacro wedge          attempted didnt work               NMR Leukotaping upper quarter                 instructed&  applied        NMR Pelvic Floor Isolation   instructed corrected                     NMR Transverse Abdominus   instructed corrected                     NMR Multifidus Isolation   instructed                      NMR Diaphragmtic breathe supine    instructed                     NMR  Diaphragmtic breath sit                         NMR Pelvic Clock in sitting   instructed                      NMR Kinesiotape    Applied to both QL              Applied to both piriformis & QLs for inhibition       NMR InnerUnit against Gravity    instructed                     NMR Sit-stand w/ inner unit    instructed                     NMR Roll w/ inner unit    instructed                     NMR Walk w/ inner unit     instructed                     NMR Up/down steps w/ inner unit    instructed                     NMR Water Exer Instruction            Instructed              NMR Hip Abd w/o piriformis     instructed stopped                   NMR Hip Add w/o iliop/ham      instructed stopped                   NMR Lower abs in supine      Adapted to pre cursor, initiation Cont, no change   Still not able to lift foot  w/o engaging piriformis   adapted with bolster               NMR Inner unit challenge with scissor arm work       instructed Added legs  Adapted w/ ball & belt progressed to 1lb weights [employer] reps to 10, still use 1lb Added rectus abdom to this              NMR Abd obliques in supine      attempted                   NMR Prone Knee Flexion     instructed  instructed corrected Made bolster higher. Dorsiflex left foot more reviewedstopped dorsiflex on Right              NMR Supine glute w/ ball btwn knees        instructed  Adapted to do w/ belt for abd Moved belt more proximal on thighs She will not bridge as high  continue              NMR Prone glut amrita          instructed               NMR Retro Step       instructed stopped                 NMR Retro Walk                         NMR Retro walk on treadmill              instructed           NMR Forward walk w/ inner unit              instructed           NMR Stability ball multifidus bounce              instructed           NMR Stability Ball A/P & Lateral           instructed              NMR Ball Catch/Throw /Supine            instructed             NMR Ball movemt in /Stance            instructed             NMR StabilityBall All 4's Arm Lift                  attempted       NMR StabilityBall Prone Walk Out                  instructed stopped      NMR StabilityBall Supine Oblique              Adapted to do w/ belt around knees    instructed corrected      NMR Stability Ball sit-supine-sit                         NMR Walking Prog Progression                         MNR Home program sequencing                   instructed                                TA Hamstring stretch                instructed         TA Hip Ext Rot Stretch              Instructed  Adapted to do in stance  instructed in supine         TA Hip Int Rot Stretch                instructed         TA Hip Flex/IT Stretch                         TA Hip Add Stretch                instructed         TA  Lats Dorsi Stretch                attempted         TA Gastroc/soleus stretch                instructed         TA QuadratusLumborm Stretch                            Supine                            Stance              instructed           TA Quad Stretch                instructed                                   NMR Wall Push Ups                    instructed     NMR Planking                         NMR BOSU Ball Exercises                         NMR Lateral Bridge Drop                         NMR [place]                         NMR O'Feldt Lat Pull Down                    instructed     NMR O'Feldt Hip Ext                         NMR O'Feldt Trunk Ext                                                   TA CervDiscExtSup/stnd                         TA Cerv Stretches                         TA Self PIC Cervical Spine                         TA Neural Gliding                         TA Upper trap stretching                   instructed      TA Ant/Mid Scalene Strch                   instructed      TA Levator Scap strch                   instructed      TA Biceps stretch                         TA Rotator Cuff Stretch                         TA Anterior Chest Stretch                         TA Wrist Ext Stretch                                                   NMR Prone Arm Lifts                  instructed progress      NMR Scapula Stabilization                         NMR Occulomotor Isometrics                         NMR Cervical Isometrics                                                   TA Shld AROM w/bar                         TA Shld Capsular Stretching                         TA Self PIC Thor Spine                         TA Rot Cuff Therabd, beginner                         TA Rot Cuff Therabd, intermed                                                                                                                                                                                                                                                                                                                          Miracle Marcial, PT, MS  Physical Therapist  [place]# [id number]

## 2019-09-24 ENCOUNTER — TREATMENT (OUTPATIENT)
Dept: PHYSICAL THERAPY | Facility: CLINIC | Age: 34
End: 2019-09-24

## 2019-09-24 DIAGNOSIS — R29.3 POSTURE IMBALANCE: ICD-10-CM

## 2019-09-24 DIAGNOSIS — M53.3 SACROILIAC JOINT DYSFUNCTION: Primary | ICD-10-CM

## 2019-09-24 DIAGNOSIS — S39.012D STRAIN OF LUMBAR REGION, SUBSEQUENT ENCOUNTER: ICD-10-CM

## 2019-09-24 DIAGNOSIS — M35.7 FAMILIAL LIGAMENTOUS LAXITY: ICD-10-CM

## 2019-09-24 PROCEDURE — 97140 MANUAL THERAPY 1/> REGIONS: CPT | Performed by: PHYSICAL THERAPIST

## 2019-09-24 PROCEDURE — 97530 THERAPEUTIC ACTIVITIES: CPT | Performed by: PHYSICAL THERAPIST

## 2019-09-24 PROCEDURE — 97112 NEUROMUSCULAR REEDUCATION: CPT | Performed by: PHYSICAL THERAPIST

## 2019-09-24 NOTE — PROGRESS NOTES
"Physical Therapy Note      SUBJECTIVE:   Changes since last seen:  Tete feels like she has recovered from her [place] trip.   She'll l be getting a new mattress when she moves to a new house in 30 days.   She asked about using shld straps to keep sternum up but concerned she will hang on the anterior straps and not strengthen.  Instead will continue with the cover roll and leukotape.   This is the week her menses should start and notes she has been pretty good so far.   She points to the right gluteus medius and states its been hurting more than typically.    Tete has questions about several exercises...the wall push up and the lat pull down on her knees.       Current level of pain:    5.5/10   Lowest level of pain since last seen:  5.5/10  Highest level of pain since last seen:   7/10       Past Medical History:  1.  AA 2013  Treated with PT for left shoulder labral tear, no low back treatment   2.  Hx of two falls when mopping in socks, over the last 2 years  3.  Hx of regular, heavy, cramping menses  4.  T& A, 2006  5.  Allergic to Wellbutrin and mild allergy to contrast dye  6.  Chronic yeast infections  7.  Uterine fibroids removed, 2 yrs ago       Functional Limitations:  Sitting, standing, walking, stairs, driving, working, sleeping, squatting, housework and changing positions.   Patient Goals for Physical Therapy: \"Pain relief\"      OBJECTIVE:  Observation:  Slight ight lateral shift of pelvis.        08/06/19 08/13/19 09/17/19 09/24/19                 SI Joint Positioning  Right  Left Right  Left Right  Left Right  Left Right  Left Right  Left Right  Left Right  Left Right  Left Right  Left Right  Left Right  Left Right  Left Right Left Right Left Right Left Right  Left Right  Left       Innominate                            Anterior    x               x     x               x                        Posterior               x     x                x    x                    Sacrum                          " Unilateral rotation    x      x    x   x                                         SI Joint Testing  Right  Left Right  Left Right  Left Right  Left Right  Left Right  Left Right  Left Right  Left Right  Left Right  Left Right  Left Right  Left Right  Left Right Left Right Left Right Left Right  Left Right  Left           Distraction   +          +   +           +   +         +   +           +                         Joanna's   +          +   +         +   +          +   +         +                         Compression   Sl        Sl    Sl         Sl    -          -                            Prone Press Up   -          -   -           -   -            -   -          -                                      Special Tests  Right   Left Right  Left Right  Left Right  Left Right  Left Right  Left Right Left  Right  Left Right  Left Right  Left Right  Left Right  Left Right  Left Right Left Right Left Right Left Right Left  Right  Left      Straight Leg Raise                             Active   -            -    -         -                          Passive   -           -    -          -                      Hip Scour Test   sl           sl   Sl         Sl    +         sl    Sl        Sl                                       Bakers Cyst   -            -     +          -     Sl        -                      01/08/2019 02/07/19 03/21/19 03/28/19 04/04/19 04/11/19 04/18/19 04/25/19 05/02/19 05/07/19 05/14/19 06/05/19 06/11/19 06/20/19 06/25/19 07/09/19 07/16/19 07/23/19   SI Joint Positioning  Right  Left Right  Left Right  Left Right  Left Right  Left Right  Left Right  Left Right  Left Right  Left Right  Left Right  Left Right  Left Right  Left Right Left Right Left Right Left Right  Left Right  Left       Innominate                            Anterior    x   x    x  x                x                 x              X     x    x   x   x               x   X                    sl    x                x               x                 x          Posterior                x               x        x              x     x     x    x               X                x               x              x                x               x   sl               x    x    x   x      Sacrum                               Unilateral rotation    x   x   x    x    x     x    x    x   x   x    x    x   x   sl   x    x     x                           SI Joint Testing  Right  Left Right  Left Right  Left Right  Left Right  Left Right  Left Right  Left Right  Left Right  Left Right  Left Right  Left Right  Left Right  Left Right Left Right Left Right Left Right  Left Right  Left           Distraction     +          +   +         +   +          +    +        +  +           +   +          +   +          +   +          +     +        +   +         +   +         +   +          +   +           + +           +   +          +    +          +   +          +   +           +           Joanna's     +          +   Sl         sl    +         -    Sl         -   Sl         -   Sl         -   -           -                      Compression     +          +   Sl          sl   Sl          Sl     Sl        Sl    -          -   -         -   -           -                      Prone Press Up           +         +         Sl          -         -        -         -                                   Special Tests  Right   Left Right  Left Right  Left Right  Left Right  Left Right  Left Right Left  Right  Left Right  Left Right  Left Right  Left Right  Left Right  Left Right Left Right Left Right Left Right Left  Right  Left      Straight Leg Raise                                     Active   -         -          -           -                      Passive   -         -             -             -                  Hip Scour Test   ++       Sl    ++       Sl    +           sl   Sl         Sl    Sl         sl Sl          Sl    Sl        sl   Sl       Sl    Sl         Sl    Sl         Sl     Sl          sl   Sl        Sl    Sl        Sl     -            -   Sl         sl   -           -   -          -   -            -                        Bakers Cyst       +         -     +        -    Sl        -   Sl         -     Sl        -   Sl        Sl   sl          sl   Sl         -   -           -   -          -   +           -   Sl      sl       Palpation:   Date 08/06/19 08/13/19 09/17/19 09/24/19                  Right  Left Right  Left Right  Left Right  Left Right  Left Right  Left Right  Left Right  Left Right  Left Right  Left Right  Left Right  Left Right  Left Right Left Right Left Right Left Right  Left Right  Left   Piriformis   ++        ++   ++       ++   ++        ++   +         +                 Gr. Trochanter    +        -   ++          +   ++      +   ++         +                 IT Band    Sl         Sl    ++        +   +         sl   Sl        Sl                  Quadratus Lumborum   ++         +   +         +   ++        +   +         Sl                 Ischial Tuberosity    -         -   -          -    -        -   -            -                 Sacrococcygeal Ligs    +          +   +       +   +         sl    +         +                 Paraspinals                     Spinous Processes   +           sl   ++        sl    +           -                       C-spine rotated to    C2-5 C2-6 C2-5   -             -                       T-spine rotated to  T4-6 T4-7 T3-8   T3-7                        L-spine rotated to  L2-5 L2-5   L1-5  L3-5                 Adductor Tony   +           +   ++      ++   +         ++   +        +                 Iliopsoas   ++          +   +          sl   +         sl                 Quad Origin    -          -   -          -   -        -                 SI Joint   +        +   ++        ++  ++        +   ++       +                 Pubic Symphysis         +        +        +       +                 Obturator externus   +         +   +         +   Sl       sl   -            -                 Rectus Diastasis                                          Ant/middle scalenes   +        sl   +         +   +         +   +          +                 Upper traps   +         Sl    +         +   +         +   +         sl                 Levator scap   +        Sl    +        +   +        sl   +         sl                 sternocleidomastoid   -         -   -            -   -          -    -          -                                      sternocostals   +          +   +          +   +        Sl   ++          +                           Muscle/Bone Irritation  Date 01/08/2019 02/07/19 03/21/19 03/28/19 04/04/18 04/11/19 04/18/19 04/25/19 05/02/19 05/07/19 05/14/19 06/05/19 06/11/19 06/20/19 06/25/19 07/09/19 07/16/19 07/23/19    Right  Left Right  Left Right  Left Right  Left Right  Left Right  Left Right  Left Right  Left Right  Left Right  Left Right  Left Right  Left Right  Left Right Left Right Left Right Left Right  Left Right  Left   Piriformis  ++        +   ++          +   ++       +   ++        +    ++        Sl    ++       Sl   ++       +    ++      +   ++        +   +           +    +        Sl      +         ++   +           ++    +       -   ++      ++   +          +   ++        +   ++         ++   Gr. Trochanter  +          +  +          sl    +           +    +       ++    +          -   +         Sl    +         sl   +         sl   Sl        Sl    +          +    Sl        sl    Sl         +   Sl          sl     +       -   +         +    +         +   +         sl   ++           +   IT Band  +         sl   Sl        sl     -          -   -          -    Sl         -   Sl         -   Sl        -   Sl         -  sl          -   Sl          Sl      -          -     -           -   -            -    Sl        Sl    Sl       Sl     Sl       sl   +          Sl     +          ++   Quadratus Lumborum  ++        ++   ++        +    +        ++   ++        ++     +       ++   +          +   ++        ++   Sl        Sl    +       ++   +          ++    Sl       +     +         +   +          ++     Sl       +   ++       +    ++       +    +         +    ++       +   Ischial Tuberosity  ++         sl   -          -    -           -     -           -     -          -   -          -   -          -   -          -   -          -  -            -     -           -   -          -   -             -     Sl       -   Sl        Sl     -          -     -         -    Sl        Sl    Sacrococcygeal Ligs  ++          +   Sl        sl   +          +    +       +    Sl        Sl    Sl       Sl    +         sl  ++      +  +        Sl       Sl         +   Sl         Sl    Sl         Sl      -        Sl   not  tested    +         +    Sl       Sl    +          +   Paraspinals  +           +     +         sl    +    +          -   Sl         +   +         sl   +        sl  +         -   Sl          +   Sl          +   +          sl   ++         sl   +          -   +         Sl    ++        +     +       Sl    +         sl   Spinous Processes                                        C-spine rotated to                           C3-5  C2-5 C2-6         T-spine rotated to   -               -              T8-9  T4-5    T3-8          L-spine rotated to  L2-5        L1-5  L4-5  L3-5  L2-5            L3-5  L1-5  L2-5  L2-5              L1-5           L2-5 L2-5   L1-5 L3-5 L3-5  L2-5 L4-5 L1-5   Adductor Tony    +        +       +         +     -         +   -           Sl     -        sl   +          sl   -          -   -           -   -          -   -           -   -          -   -           sl  ++        +   +         sl   +         +   +         ++   ++        +  +            +   Iliopsoas  ++         +   ++        +   ++         +    +         ++  sl        Sl    Sl       Sl    Sl       Sl    Sl         -   Sl        -   Sl         +   +         Sl    +          sl  +          ++   +          +   +          +   +         ++   ++         +   ++         +   Quad Origin  sl          sl   Sl          sl    -          -   -           -    -         -   -          -   -         -   -          -   -          -   -           -    -         -   -            -   -         -   -         -   -         -   -            -   -          -   -         -   SI Joint  ++         +  ++        +    ++        +   +          +   +         +   +          ++   +         sl    +         sl   +        +  +          sl  +        Sl     +        Sl   +          ++   Sl       sl   +        +    + +        +  ++          +    ++        ++   Pubic Symphysis         ++          Not tested         ++      ++        ++            + Not tested Not tested          +             ++         +          +   Obturator externus          -          +   -          -   -          -    -           -   -          -   -          -   -         -    -            -   -          -   -            -   Rectus Diastasis   1/2 finger                                         Ant/middle scalenes                   ++        +   ++       +   Upper traps                  ++         +   +          +   Levator scap                   ++          +     +           sternocleidomastoid                   Sl        Sl      -         -                        sternocostals   +        +   +          +    +         -    +           +   Sl        Sl    +         Sl  Not tested     +         +     +         +   +         +   Sl      sl   +          +    +        +   +          +   +          +   All 4s Positioning: Pt not able to assume full lumbar extension in this position  Leg Length:  The right  lower extremity is equal to the left        Monthly Objective Measurement/Functional Activity  Date: 01/08/2019 03/21/19 04/25/19 06/05/19 07/09/19 09/17/19     Oswestry Pain Score 52/100  46/100    48/100      48/100       50/100 48/100                           AROM Lumbar Spine: Degrees   Degrees      Degrees       Degrees      Degrees      Degrees      Degrees    Degrees      Flexion 106 pain          99          83       75 pain          63 onset of pain         69 pain increased        Extension 3 pain           17           19       11 pain           11  Pinches       16 pain increased        Right Lat. Flexion 29 pain right trunk          24        25      25    23 pinch       19 pinch         Left Lat. Flexion 31         19         23     14  15 pinch     11 pinch         Right Lat Shift Pelvis inc pain   Inc pain Left SI jt       Slight increase No change but can't do far Feels caught  Increased pain         Left Lat Shift Pelvis  less inc pain       No change       No change/ slight cielo  No change but can't go far  Easier but not natural  Increased pain                 AROM Hips:  (degrees) Right      Left Right Left Right  Left Right  Left Right  Left Right  Left Right  Left Right  Left      Flexion 110       110  125    120    128      125 130       130 130      130 125      120        Abduction 37       41   45      45      42      44  43          45   45       45  45          45        Int. Rotation 32       22  30      25      31         28  32          30   34        32   30         28        Ext. Rotation  34       42  35      40       38        41   35         40   40        42   35         37                Flexibility:   (degrees) Right    Left Right  Left Right  Left Right  Left Right  Left Right  Left Right  Left Right  Left      Hamstrings -32       -28  -30     -25   -25       -25   -28      -27  -30      -26   -28       -29        Quadriceps 39       42 40        45    not tested  40          42  45        47   50         52        HipExt/Rot(piriformis) 30       28   32      30  33         32   35         33   37        35   35        35        Hip Int/Rotators 27       9   25      10   28        15   27         16   25         18   20        20        Hip Flexors (iliopsoas) -       -             IT  Band -       -                     Reflexes: Right      Left Right  Left Right  Left Right  Left Right  Left Right  Left Right  Left Right  Left      L4 (Quad) +1       +1             S1 (Achilles) +1       +1                     Root Level  - Motor Right      Left Right  Left Right  Left Right  Left Right  Left Right  Left Right  Left Right  Left     T12             Rectus Abdominus 4+/5     4+/5         5/5        5/5          L1              Paraspinals 5/5         5/5             L2              Hip Flexion 5/5          5/5             L3             Quads 5/5          5/5             L4              Anterior Tibialis 5/5         5/5             L5             Gr. Toe Extension 5/5           5/5             S1            Gastroc/Sol/Peroneals 5/5          5/5                     Functional Strength:             Single LegStanceTime (seconds) R:30   L:30   R:      L: R:      L: R: 30 pain     L:30 pain  R:      L: R:      L: R:      L: R:      L:     Pelvic Floor Isolation (seconds) -    10 sec 10 sec   > 10 sec        Inner Unit  Isolation (seconds) -    10 sec 10 sec     > 10 sec                Functional Activities:              Sit w/o pain? Pain increases (minutes) No/ 30 min No/ 30  min No/ 30 min  No/  30 min No/ 30 min  No/ 30min         Stand w/o pain? No/ 30 min No/ 30 min No/ 10 min  No/ 10 min No/ 10 min  No/ 15-20 min        Walk w/o pain? varies No/ 1 mile No/ 1/2 mile No/ 1 mile No/ 1mile, piriformis, B No/ a couple miles         Steps w/o pain? 1 fl, avoids them 1 fl avoids  1 fl avoids  No/ avoids No/ doesn't do No/ 1 flight         Drive w/o pain? No/ 10-15 min No/ 30-45 min No/ 30-45  Min  No/ 30-45 min No/ 35-45 No/ 35-45        Work w/o pain? No/ 30 min No/ 30 min No/ 30  No/ 30 min No/30 mi   No/ 30 min         # times wakes w/ pain? 4-5x/night, now taking meds at night she is able to sleep.  2x   2-3x  2x         1x         Several times          PLAN OF CARE:    ASSESSMENT:  Problem List:    1. SI joint dysfunction  2. Lack of spinal stabilization  3. Postural dysfunction     Discussion:  Reviewed the wall push ups and the O[place]feldt Lat Pull Down.  She needed to get a little further away from the wall on the push up and a little closer to the door on the lat pull down.   She was causing spasming of the QLs when she engages the pelvic floor.  She had difficulty with this so instructed in diaphragmatic breathing to facilitate isolating the pelvic floor on recoil of the diaphram/pelvic floor on exhale.    Attempted to progress to the lower abs from the scissor arm exercise however she was not able to do without pain so adapted to doing with a lumbar support under her lumbar spine and only lifting one knee, putting it down and then lifting the other knee and putting it down, then relaxing the inner unit.  She was able to do this correctly only if she concentrated.     Spent the rest of the visit on gait work.  She is walking in slight flexion of both hips.  When attempted to correct this with sternum up, she had pain in the right SI joint.  Once self corrected the ilium positioning, she was able to do without pain.  She need to concentrate on pushing off with each step.  She also needs to allow both arms to swing on a stable trunk and not just bend the arms at the elbows.  She was able to retro walk on the treadmill without pain today!    Continue progressing the spinal stabilization and abdominal strengthening.       Short Term Goals: (4-6 weeks)                                   Date Met:                1.   pt independent in postural correction         02/07/19  2.   pt independent in SI joint self-corrections        03/21/19  3.   pt independent in exer to decrease post. disc pressures      03/21/19  4.   pt able to sleep through night without pain  5.   pt able to sit 15 minutes without pain  6.   pt able to walk 10 minutes without pain        06/20/19    7.   Reduce pt pain to no greater than  "7/10        03/21/19  8.   Decrease Oswestry Pain Score to no greater than 45/100  9.  Reduce pt pain to no greater than 6/10        07/16/19  10.  Pt able to isolate the pelvic floor in supine x10, 10 sec      03/28/19  11.  Pt able to isolate transverse abdom in all 4s, x10 sec, x10         03/28/19  12.  Pt able to isolate the multifidus in sitting x10 sec, x10                            03/28/19  13.  Pt able to engage inner unit, move from sit to stand &back to sit      04/04/19                                                                                                                                                                                                                                           14.  Pt able to engage inner unit and walk 4 steps without overflow to hamstrings     06/20/19  15.  Pt able to hip abduct x6 without engaging the piriformis      06/20/19     16.  Pt able to perform prone knee flexion without engaging piriformis x6          05/07/19       17.  Pt able to perform remedial lower abs with scissor arm work x6 w/o pain    04/18/19    18.  Pt able to perform \"Pre-cursor\" lower abs leg lift initiation x6 without pain          04/18/19   19.  Pt able to perform retro step without engaging piriformis x6, bilaterally    09/24/19    20.  Pt able to perform bridging x6 without engaging piriformis      04/25/19    21.  Pt able to perform \"scissors\" arm movement with inner unit w/o piriformis x6   05/02/19  22.  Pt able to perform PIC hip adductors without engaging the piriformis  x6    05/07/19  23.  Pt able to perform \"Scissors\" arm motion w/ inner unit with 1lb wts x6 w/o pain   05/07/19  24.  Pt able to perform prone glut max over stability ball x6 w/o engaging piriformis   25.  Pt able to reduce pain w/ self PIC to left psoas       05/14/19  26.  Pt able to move laterally on stability ball in sitting without pain x6  27.  Pt able to move A/P movement sitting on stability ball " without pain x6  28.  Pt independent in pool exercises, to do without increasing pain     07/09/19  29.  Pt able to throw and catch stability ball in supine with inner unit on x6    06/11/19  30.  Pt able to hold medicine ball in stance and move away from trunk with IU on and w/o pain x6 06/11/19  31.  Pt independent in supine hamstring stretch, able to do 2x without pain.    07/16/19  32.  Pt independent in hip adductor stretching, supine and stance     07/16/19  33.  Pt independent in quad stretching, in stance, without pain.      08/06/19  34. Pt independent in hip internal rotator stretch in supine, without pain      08/06/19  35.  Pt able to tolerate ADLs with leukotape on scapula for enhanced spinal stabilization x 1 day 07/25/19  36.  Pt able to perform prone walk out x6 without losing inner unit engagement  37. Pt able to perform prone arm lift x6, phase 1, without engaging the upper trap   08/06/19  38.  Pt able to perform supine obliques over stability ball x6 without losing inner unit engagement 08/06/19  39.  Pt able to perform prone arm lift, lifting elbow without engaging upper traps x6  40.  Pt able to perform upper quarter stretches without increasing pain     09/17/19                                                                                                                                             Long Term Goals:(3-5 months)      Date Met:      1.  pt independent in self-management of symptoms       2.  pt independent in home program      3.  pt able to work 6 hours without pain      4.  pt able to sit 60 minutes without pain      5.  pt able to walk 45 min without pain    Progress Towards Goals:  As expected to a little slower than expected    Treatment Plan:   1.  Ultrasound to increase extensibility, decrease pain, if needed  2.  Electrical stimulation for muscle relaxation, decrease pain, if needed, iontophoresis  3.  Manual therapy to increase function, decrease pain  4.  Therapeutic  exercise to increase function, decrease pain  5.  Home programming and d/c planning to increase function and decrease pain,     Frequency & Duration:  The progression of Tete's instruction has been slow secondary to the flare ups of pain associated with her menses.  Therefore, she is in need of  1 more visits, once/wk to once every other week to complete the spinal stabilization instruction and be independent in her home program.      Patient Participated in and Agrees With This Plan of Care and Goals:  YES  Rehab Potential:  jarret Marcial, PT, MS  Physical Therapist  [place]# [id number]      TREATMENT TODAY:    Total Treatment Time: (time in clinic)   60  min  Timed Code Treatment Minutes:     60      Supplies given:      Manual Therapy:  (MA)  RX min:     20   Manual posterior rotation of left innominate    Positional Isometric contraction (PIC) of rightt iliopsoas    Positional Isometric contraction of (PIC) right gluteus minimus    Distraction of both SI jts in open pack hip position  Piriformis stretching, bilaterally    Quadratus lumborum stretching, bilaterally    Anterior/Inferior Mobilization of  right hip    Positional Isometric Contraction of multifidus  Myofascial work trunk   Sternocostal and rib mobs   Manual spinal distraction  PIC C-spine  PIC T-spine  Stretching scalenes, upper traps, levator scap      Therapeutic Activities:  (TA)  RX min:    30    Neuromuscular Reeducation: (NMR)  RX min:   10    Ultrasound:  RX min:         1.6 vance/cm2       Location:      Iontophoresis:  6 hr patch                                Location:                           Ice:      Procedures:      Key:  i=instructed        r=review        c=corrected        a=adapted   Code Procedure/Instruction 01/08/2019 02/07/19 03/21/19 03/28/19 04/04/19 04/11/19 04/18/19 04/25/19 05/02/19 05/07/19 05/14/19 06/05/19 06/11/19 06/20/19 06/25/19 07/09/19 07/16/19 07/25/19 08/06/19 09/17/19 09/24/19    TA         Sleeping Positions  instructed               reviewed,correct                       TA Sternum-Up Posture instructed reviewed                   In gait    TA SI jt. self-correction instructed corrected corrected  reviewed        Adapted and corrected            TA Lumbar Facet PIC instructed corrected corrected  reviewed        reviewed            TA   Order of Self-Corrections instructed    reviewed                    TA Use of lumbar pillow instructed                        TA Use of cervical roll instructed corrected                       TA Use of orthotics instructed                        TA Use of SI belt instructed                        TA Use of Nada chair                         TA Use of Ice instructed                        TA Use of [employer]/ heat instructed                        TA Use of Theraworx Relief instructed                        TA Use of TENS unit                         TA Use of iontophoresis                         TA Decreasing Disc Pressures  instructed                       TA Use of sacro wedge          attempted didnt work               NMR Leukotaping upper quarter                 instructed&  applied        NMR Pelvic Floor Isolation   instructed corrected                 Remedial work     NMR Transverse Abdominus   instructed corrected                     NMR Multifidus Isolation   instructed                      NMR Diaphragmtic breathe supine    instructed                 corected    NMR  Diaphragmtic breath sit                         NMR Pelvic Clock in sitting   instructed                      NMR Kinesiotape    Applied to both QL              Applied to both piriformis & QLs for inhibition       NMR InnerUnit against Gravity    instructed                     NMR Sit-stand w/ inner unit    instructed                     NMR Roll w/ inner unit    instructed                     NMR Walk w/ inner unit     instructed                     NMR Up/down steps w/ inner unit    instructed                      NMR Water Exer Instruction            Instructed              NMR Hip Abd w/o piriformis     instructed stopped                   NMR Hip Add w/o iliop/ham      instructed stopped                   NMR Lower abs in supine      Adapted to pre cursor, initiation Cont, no change   Still not able to lift foot w/o engaging piriformis   adapted with bolster           Adapted again to one leg    NMR Inner unit challenge with scissor arm work       instructed Added legs  Adapted w/ ball & belt progressed to 1lb weights [employer] reps to 10, still use 1lb Added rectus abdom to this              NMR Abd obliques in supine      attempted                   NMR Prone Knee Flexion     instructed  instructed corrected Made bolster higher. Dorsiflex left foot more reviewedstopped dorsiflex on Right              NMR Supine glute w/ ball btwn knees        instructed  Adapted to do w/ belt for abd Moved belt more proximal on thighs She will not bridge as high  continue              NMR Prone glut amrita          instructed               NMR Retro Step       instructed stopped                 NMR Retro Walk                         NMR Retro walk on treadmill              instructed           NMR Forward walk w/ inner unit              instructed           NMR Stability ball multifidus bounce              instructed           NMR Stability Ball A/P & Lateral           instructed              NMR Ball Catch/Throw /Supine            instructed             NMR Ball movemt in /Stance            instructed             NMR StabilityBall All 4's Arm Lift                  attempted       NMR StabilityBall Prone Walk Out                  instructed stopped      NMR StabilityBall Supine Oblique              Adapted to do w/ belt around knees    instructed corrected      NMR Stability Ball sit-supine-sit                         NMR Walking Prog Progression                     instructed    MNR Home program sequencing                   instructed   reviewed                              TA Hamstring stretch                instructed         TA Hip Ext Rot Stretch              Instructed  Adapted to do in stance  instructed in supine         TA Hip Int Rot Stretch                instructed         TA Hip Flex/IT Stretch                         TA Hip Add Stretch                instructed         TA Lats Dorsi Stretch                attempted         TA Gastroc/soleus stretch                instructed         TA QuadratusLumborm Stretch                            Supine                            Stance              instructed           TA Quad Stretch                instructed                                   NMR Wall Push Ups                    instructed     NMR Planking                         NMR BOSU Ball Exercises                         NMR Lateral Bridge Drop                         NMR Waiters [place]                         NMR O'Feldt Lat Pull Down                    instructed     NMR O'Feldt Hip Ext                         NMR O'Feldt Trunk Ext                                                   TA CervDiscExtSup/stnd                         TA Cerv Stretches                         TA Self PIC Cervical Spine                         TA Neural Gliding                         TA Upper trap stretching                   instructed      TA Ant/Mid Scalene Strch                   instructed      TA Levator Scap strch                   instructed      TA Biceps stretch                         TA Rotator Cuff Stretch                         TA Anterior Chest Stretch                         TA Wrist Ext Stretch                                                   NMR Prone Arm Lifts                  instructed progress      NMR Scapula Stabilization                         NMR Occulomotor Isometrics                         NMR Cervical Isometrics                                                   TA Shld AROM w/bar                         TA Shld Capsular Stretching                          TA Self PIC Thor Spine                         TA Rot Cuff Therabd, beginner                         TA Rot Cuff Therabd, ruy Marcial, PT, MS  Physical Therapist  [place]# [id number]

## 2019-10-01 ENCOUNTER — TREATMENT (OUTPATIENT)
Dept: PHYSICAL THERAPY | Facility: CLINIC | Age: 34
End: 2019-10-01

## 2019-10-01 DIAGNOSIS — M35.7 FAMILIAL LIGAMENTOUS LAXITY: ICD-10-CM

## 2019-10-01 DIAGNOSIS — R29.3 POSTURE IMBALANCE: ICD-10-CM

## 2019-10-01 DIAGNOSIS — M53.3 SACROILIAC JOINT DYSFUNCTION: Primary | ICD-10-CM

## 2019-10-01 DIAGNOSIS — S39.012D STRAIN OF LUMBAR REGION, SUBSEQUENT ENCOUNTER: ICD-10-CM

## 2019-10-01 PROCEDURE — 97530 THERAPEUTIC ACTIVITIES: CPT | Performed by: PHYSICAL THERAPIST

## 2019-10-01 PROCEDURE — 97140 MANUAL THERAPY 1/> REGIONS: CPT | Performed by: PHYSICAL THERAPIST

## 2019-10-01 PROCEDURE — 97112 NEUROMUSCULAR REEDUCATION: CPT | Performed by: PHYSICAL THERAPIST

## 2019-10-01 NOTE — PROGRESS NOTES
"Physical Therapy Note      SUBJECTIVE:   Changes since last seen:  Tete had a period last week; was a little shorter than typical and her pain did not flare up during this time as it has previously.  The lower abs at home went well!  The lat pull down seemed engage the piriformis at home, she is still struggling with the piriformis going on whenever the trunk is challenged.    Still having some difficulty with diaphragmatic breathing as a means to isolate the pelvic floor without engaging the piriformis.    She notes she has been working on her gait pattern at home and is more attentive to keeping her sternum up, now.     Current level of pain:    5-6/10   Lowest level of pain since last seen:  4/10  Highest level of pain since last seen:   7/10       Past Medical History:  1.  AA 2013  Treated with PT for left shoulder labral tear, no low back treatment   2.  Hx of two falls when mopping in socks, over the last 2 years  3.  Hx of regular, heavy, cramping menses  4.  T& A, 2006  5.  Allergic to Wellbutrin and mild allergy to contrast dye  6.  Chronic yeast infections  7.  Uterine fibroids removed, 2 yrs ago       Functional Limitations:  Sitting, standing, walking, stairs, driving, working, sleeping, squatting, housework and changing positions.   Patient Goals for Physical Therapy: \"Pain relief\"      OBJECTIVE:  Observation:  Slight ight lateral shift of pelvis.        08/06/19 08/13/19 09/17/19 09/24/19 10/01/19                SI Joint Positioning  Right  Left Right  Left Right  Left Right  Left Right  Left Right  Left Right  Left Right  Left Right  Left Right  Left Right  Left Right  Left Right  Left Right Left Right Left Right Left Right  Left Right  Left       Innominate                            Anterior    x               x     x               x   X                        Posterior               x     x                x    x               x                   Sacrum                          Unilateral rotation "    x      x    x   x   x                                        SI Joint Testing  Right  Left Right  Left Right  Left Right  Left Right  Left Right  Left Right  Left Right  Left Right  Left Right  Left Right  Left Right  Left Right  Left Right Left Right Left Right Left Right  Left Right  Left           Distraction   +          +   +           +   +         +   +           +   +           +                        Joanna's   +          +   +         +   +          +   +         +   -           +                        Compression   Sl        Sl    Sl         Sl    -          -      -          -                        Prone Press Up   -          -   -           -   -            -   -          -   Sl        Sl                                      Special Tests  Right   Left Right  Left Right  Left Right  Left Right  Left Right  Left Right Left  Right  Left Right  Left Right  Left Right  Left Right  Left Right  Left Right Left Right Left Right Left Right Left  Right  Left      Straight Leg Raise                             Active   -            -    -         -    -           -                        Passive   -           -    -          -   -          -                    Hip Scour Test   sl           sl   Sl         Sl    +         sl    Sl        Sl    Sl       sl                                     Bakers Cyst   -            -     +          -     Sl        -   -           -                     01/08/2019 02/07/19 03/21/19 03/28/19 04/04/19 04/11/19 04/18/19 04/25/19 05/02/19 05/07/19 05/14/19 06/05/19 06/11/19 06/20/19 06/25/19 07/09/19 07/16/19 07/23/19   SI Joint Positioning  Right  Left Right  Left Right  Left Right  Left Right  Left Right  Left Right  Left Right  Left Right  Left Right  Left Right  Left Right  Left Right  Left Right Left Right Left Right Left Right  Left Right  Left       Innominate                            Anterior    x   x    x  x                x                 x              X     x    x    x   x               x   X                    sl    x                x               x                x          Posterior                x               x        x              x     x     x    x               X                x               x              x                x               x   sl               x    x    x   x      Sacrum                               Unilateral rotation    x   x   x    x    x     x    x    x   x   x    x    x   x   sl   x    x     x                           SI Joint Testing  Right  Left Right  Left Right  Left Right  Left Right  Left Right  Left Right  Left Right  Left Right  Left Right  Left Right  Left Right  Left Right  Left Right Left Right Left Right Left Right  Left Right  Left           Distraction     +          +   +         +   +          +    +        +  +           +   +          +   +          +   +          +     +        +   +         +   +         +   +          +   +           + +           +   +          +    +          +   +          +   +           +           Joanna's     +          +   Sl         sl    +         -    Sl         -   Sl         -   Sl         -   -           -                      Compression     +          +   Sl          sl   Sl          Sl     Sl        Sl    -          -   -         -   -           -                      Prone Press Up           +         +         Sl          -         -        -         -                                   Special Tests  Right   Left Right  Left Right  Left Right  Left Right  Left Right  Left Right Left  Right  Left Right  Left Right  Left Right  Left Right  Left Right  Left Right Left Right Left Right Left Right Left  Right  Left      Straight Leg Raise                                     Active   -         -          -           -                      Passive   -         -             -             -                  Hip Scour Test   ++       Sl    ++       Sl    +           sl   Sl         Sl    Sl          sl Sl          Sl    Sl        sl   Sl       Sl    Sl         Sl    Sl         Sl     Sl         sl   Sl        Sl    Sl        Sl     -            -   Sl         sl   -           -   -          -   -            -                        Bakers Cyst       +         -     +        -    Sl        -   Sl         -     Sl        -   Sl        Sl   sl          sl   Sl         -   -           -   -          -   +           -   Sl      sl       Palpation:   Date 08/06/19 08/13/19 09/17/19 09/24/19 10/01/19                 Right  Left Right  Left Right  Left Right  Left Right  Left Right  Left Right  Left Right  Left Right  Left Right  Left Right  Left Right  Left Right  Left Right Left Right Left Right Left Right  Left Right  Left   Piriformis   ++        ++   ++       ++   ++        ++   +         +    ++       +                Gr. Trochanter    +        -   ++          +   ++      +   ++         +     Sl        -                IT Band    Sl         Sl    ++        +   +         sl   Sl        Sl     +          Sl                 Quadratus Lumborum   ++         +   +         +   ++        +   +         Sl    ++        +                Ischial Tuberosity    -         -   -          -    -        -   -            -     Sl       -                Sacrococcygeal Ligs    +          +   +       +   +         sl    +         +     -          -                Paraspinals                     Spinous Processes   +           sl   ++        sl    +           -                       C-spine rotated to    C2-5 C2-6 C2-5   -             - C2-3                      T-spine rotated to  T4-6 T4-7 T3-8   T3-7  T4-6                       L-spine rotated to  L2-5 L2-5   L1-5  L3-5  L3-5                Adductor Tony   +           +   ++      ++   +         ++   +        +   +           +                Iliopsoas   ++          +   +          sl   +         sl   +          Sl                 Quad Origin    -          -   -          -   -         -   -          -                SI Joint   +        +   ++        ++  ++        +   ++       +   +            +                Pubic Symphysis         +        +        +       +  not tested                Obturator externus   +         +   +         +   Sl       sl   -           -   -          -                Rectus Diastasis                                          Ant/middle scalenes   +        sl   +         +   +         +   +          +   +        sl                Upper traps   +         Sl    +         +   +         +   +         sl   +        Sl                 Levator scap   +        Sl    +        +   +        sl   +         sl   +         sl                sternocleidomastoid   -         -   -            -   -          -    -          -   -          -                                     sternocostals   +          +   +          +   +        Sl   ++          +   +          +                          Muscle/Bone Irritation  Date 01/08/2019 02/07/19 03/21/19 03/28/19 04/04/18 04/11/19 04/18/19 04/25/19 05/02/19 05/07/19 05/14/19 06/05/19 06/11/19 06/20/19 06/25/19 07/09/19 07/16/19 07/23/19    Right  Left Right  Left Right  Left Right  Left Right  Left Right  Left Right  Left Right  Left Right  Left Right  Left Right  Left Right  Left Right  Left Right Left Right Left Right Left Right  Left Right  Left   Piriformis  ++        +   ++          +   ++       +   ++        +    ++        Sl    ++       Sl   ++       +    ++      +   ++        +   +           +    +        Sl      +         ++   +           ++    +       -   ++      ++   +          +   ++        +   ++         ++   Gr. Trochanter  +          +  +          sl    +           +    +       ++    +          -   +         Sl    +         sl   +         sl   Sl        Sl    +          +    Sl        sl    Sl         +   Sl          sl     +       -   +         +    +         +   +         sl   ++           +   IT Band  +         sl   Sl        sl      -          -   -          -    Sl         -   Sl         -   Sl        -   Sl         -  sl          -   Sl          Sl      -          -     -           -   -            -    Sl        Sl    Sl       Sl     Sl       sl   +          Sl     +          ++   Quadratus Lumborum  ++        ++   ++        +    +        ++   ++        ++     +       ++   +          +  ++        ++   Sl        Sl    +       ++   +          ++    Sl       +     +         +   +          ++     Sl       +   ++       +    ++       +    +         +    ++       +   Ischial Tuberosity  ++         sl   -          -    -           -     -           -     -          -   -          -   -          -   -          -   -          -  -            -     -           -   -          -   -             -     Sl       -   Sl        Sl     -          -     -         -    Sl        Sl    Sacrococcygeal Ligs  ++          +   Sl        sl   +          +    +       +    Sl        Sl    Sl       Sl    +         sl  ++      +  +        Sl       Sl         +   Sl         Sl    Sl         Sl      -        Sl   not  tested    +         +    Sl       Sl    +          +   Paraspinals  +           +     +         sl    +    +          -   Sl         +   +         sl   +        sl  +         -   Sl          +   Sl          +   +          sl   ++         sl   +          -   +         Sl    ++        +     +       Sl    +         sl   Spinous Processes                                        C-spine rotated to                           C3-5  C2-5 C2-6         T-spine rotated to   -               -              T8-9  T4-5    T3-8          L-spine rotated to  L2-5        L1-5  L4-5  L3-5  L2-5            L3-5  L1-5  L2-5  L2-5              L1-5           L2-5 L2-5   L1-5 L3-5 L3-5  L2-5 L4-5 L1-5   Adductor Tony    +        +       +         +     -         +   -           Sl     -        sl   +          sl   -          -   -           -   -          -   -           -   -           -   -           sl  ++        +   +         sl   +         +   +         ++   ++        +  +            +   Iliopsoas  ++         +   ++        +   ++         +    +         ++  sl        Sl    Sl       Sl    Sl       Sl    Sl         -   Sl        -   Sl         +   +         Sl    +          sl  +          ++   +          +   +          +   +         ++   ++        +   ++         +   Quad Origin  sl          sl   Sl          sl    -          -   -           -    -         -   -          -   -         -   -          -   -          -   -           -    -         -   -            -   -         -   -         -   -         -   -            -   -          -   -         -   SI Joint  ++         +  ++        +    ++        +   +          +   +         +   +          ++   +         sl    +         sl   +        +  +          sl  +        Sl     +        Sl   +          ++   Sl       sl   +        +    + +        +  ++          +    ++        ++   Pubic Symphysis         ++          Not tested         ++      ++        ++            + Not tested Not tested          +             ++         +          +   Obturator externus          -          +   -          -   -          -    -           -   -          -   -          -   -         -    -            -   -          -   -            -   Rectus Diastasis   1/2 finger                                         Ant/middle scalenes                   ++        +   ++       +   Upper traps                  ++         +   +          +   Levator scap                   ++          +     +           sternocleidomastoid                   Sl        Sl      -         -                        sternocostals   +        +   +          +    +         -    +           +   Sl        Sl    +         Sl  Not tested     +         +     +         +   +         +   Sl      sl   +          +    +        +   +          +   +          +   All 4s Positioning: Pt not able to assume full lumbar extension in  this position  Leg Length:  The right  lower extremity is equal to the left        Monthly Objective Measurement/Functional Activity  Date: 01/08/2019 03/21/19 04/25/19 06/05/19 07/09/19 09/17/19     Oswestry Pain Score 52/100  46/100    48/100      48/100       50/100 48/100                           AROM Lumbar Spine: Degrees   Degrees      Degrees      Degrees      Degrees      Degrees      Degrees    Degrees      Flexion 106 pain          99          83       75 pain          63 onset of pain         69 pain increased        Extension 3 pain           17           19       11 pain           11  Pinches       16 pain increased        Right Lat. Flexion 29 pain right trunk          24        25      25    23 pinch       19 pinch         Left Lat. Flexion 31         19         23     14  15 pinch     11 pinch         Right Lat Shift Pelvis inc pain   Inc pain Left SI jt       Slight increase No change but can't do far Feels caught  Increased pain         Left Lat Shift Pelvis  less inc pain       No change       No change/ slight cielo  No change but can't go far  Easier but not natural  Increased pain                 AROM Hips:  (degrees) Right      Left Right Left Right  Left Right  Left Right  Left Right  Left Right  Left Right  Left      Flexion 110       110  125    120    128      125 130       130 130      130 125      120        Abduction 37       41   45      45      42      44  43          45   45       45  45          45        Int. Rotation 32       22  30      25      31         28  32          30   34        32   30         28        Ext. Rotation  34       42  35      40       38        41   35         40   40        42   35         37                Flexibility:   (degrees) Right    Left Right  Left Right  Left Right  Left Right  Left Right  Left Right  Left Right  Left      Hamstrings -32       -28  -30     -25   -25       -25   -28      -27  -30      -26   -28       -29        Quadriceps 39        42 40        45    not tested  40          42  45        47   50         52        HipExt/Rot(piriformis) 30       28   32      30  33         32   35         33   37        35   35        35        Hip Int/Rotators 27       9   25      10   28        15   27         16   25         18   20        20        Hip Flexors (iliopsoas) -       -             IT Band -       -                     Reflexes: Right      Left Right  Left Right  Left Right  Left Right  Left Right  Left Right  Left Right  Left      L4 (Quad) +1       +1             S1 (Achilles) +1       +1                     Root Level  - Motor Right      Left Right  Left Right  Left Right  Left Right  Left Right  Left Right  Left Right  Left     T12             Rectus Abdominus 4+/5     4+/5         5/5        5/5          L1              Paraspinals 5/5         5/5             L2              Hip Flexion 5/5          5/5             L3             Quads 5/5          5/5             L4              Anterior Tibialis 5/5         5/5             L5             Gr. Toe Extension 5/5           5/5             S1            Gastroc/Sol/Peroneals 5/5          5/5                     Functional Strength:             Single LegStanceTime (seconds) R:30   L:30   R:      L: R:      L: R: 30 pain     L:30 pain  R:      L: R:      L: R:      L: R:      L:     Pelvic Floor Isolation (seconds) -    10 sec 10 sec   > 10 sec        Inner Unit  Isolation (seconds) -    10 sec 10 sec     > 10 sec                Functional Activities:              Sit w/o pain? Pain increases (minutes) No/ 30 min No/ 30  min No/ 30 min  No/  30 min No/ 30 min  No/ 30min         Stand w/o pain? No/ 30 min No/ 30 min No/ 10 min  No/ 10 min No/ 10 min  No/ 15-20 min        Walk w/o pain? varies No/ 1 mile No/ 1/2 mile No/ 1 mile No/ 1mile, piriformis, B No/ a couple miles         Steps w/o pain? 1 fl, avoids them 1 fl avoids  1 fl avoids  No/ avoids No/ doesn't do No/ 1 flight         Drive w/o  "pain? No/ 10-15 min No/ 30-45 min No/ 30-45  Min  No/ 30-45 min No/ 35-45 No/ 35-45        Work w/o pain? No/ 30 min No/ 30 min No/ 30  No/ 30 min No/30 mi   No/ 30 min         # times wakes w/ pain? 4-5x/night, now taking meds at night she is able to sleep.  2x   2-3x  2x         1x         Several times          PLAN OF CARE:    ASSESSMENT:  Problem List:   1. SI joint dysfunction  2. Lack of spinal stabilization  3. Postural dysfunction     Discussion:  Reviewed how to do the diaphragmatic breathing with the weight on her belly.  If she is cued only to think \"stop urine\" and \"tail between legs\" she is able to engage the pelvic floor without engaging the piriformis.  She has to do this slowly and concentrate to do correctly.  But, she is able to do .     Instructed in how to do the BKFO, adapted to start from wall.  She is able to lift the knee about 1/\" from the wall without engaging the opposite piriformis, more than a 1/4\" and she inappropriately engages the piriformis.  The improvement is she can now sense when she does the correctly and incorrectly.  She will work on this at home.    Instructed in the use of an inversion table as one of the \"tools in her toolbox\" for self management at home.  She will have room for this when she moves to her new house in a month.  She responded very well when put on the inversion table here in our clinic today.     She was also asking about desks for her new house.  Recommended she get a sit stand desk, with push button control.  She will also begin searching for what will fit in her new home.       Short Term Goals: (4-6 weeks)                                   Date Met:                1.   pt independent in postural correction         02/07/19  2.   pt independent in SI joint self-corrections        03/21/19  3.   pt independent in exer to decrease post. disc pressures      03/21/19  4.   pt able to sleep through night without pain  5.   pt able to sit 15 minutes without " "pain  6.   pt able to walk 10 minutes without pain        06/20/19    7.   Reduce pt pain to no greater than 7/10        03/21/19  8.   Decrease Oswestry Pain Score to no greater than 45/100  9.  Reduce pt pain to no greater than 6/10        07/16/19  10.  Pt able to isolate the pelvic floor in supine x10, 10 sec      03/28/19  11.  Pt able to isolate transverse abdom in all 4s, x10 sec, x10         03/28/19  12.  Pt able to isolate the multifidus in sitting x10 sec, x10                            03/28/19  13.  Pt able to engage inner unit, move from sit to stand &back to sit      04/04/19                                                                                                                                                                                                                                           14.  Pt able to engage inner unit and walk 4 steps without overflow to hamstrings     06/20/19  15.  Pt able to hip abduct x6 without engaging the piriformis      06/20/19     16.  Pt able to perform prone knee flexion without engaging piriformis x6          05/07/19       17.  Pt able to perform remedial lower abs with scissor arm work x6 w/o pain    04/18/19    18.  Pt able to perform \"Pre-cursor\" lower abs leg lift initiation x6 without pain          04/18/19   19.  Pt able to perform retro step without engaging piriformis x6, bilaterally    09/24/19    20.  Pt able to perform bridging x6 without engaging piriformis      04/25/19    21.  Pt able to perform \"scissors\" arm movement with inner unit w/o piriformis x6   05/02/19  22.  Pt able to perform PIC hip adductors without engaging the piriformis  x6    05/07/19  23.  Pt able to perform \"Scissors\" arm motion w/ inner unit with 1lb wts x6 w/o pain   05/07/19  24.  Pt able to perform prone glut max over stability ball x6 w/o engaging piriformis   25.  Pt able to reduce pain w/ self PIC to left psoas       05/14/19  26.  Pt able to move laterally on " stability ball in sitting without pain x6  27.  Pt able to move A/P movement sitting on stability ball without pain x6  28.  Pt independent in pool exercises, to do without increasing pain     07/09/19  29.  Pt able to throw and catch stability ball in supine with inner unit on x6    06/11/19  30.  Pt able to hold medicine ball in stance and move away from trunk with IU on and w/o pain x6 06/11/19  31.  Pt independent in supine hamstring stretch, able to do 2x without pain.    07/16/19  32.  Pt independent in hip adductor stretching, supine and stance     07/16/19  33.  Pt independent in quad stretching, in stance, without pain.      08/06/19  34. Pt independent in hip internal rotator stretch in supine, without pain      08/06/19  35.  Pt able to tolerate ADLs with leukotape on scapula for enhanced spinal stabilization x 1 day 07/25/19  36.  Pt able to perform prone walk out x6 without losing inner unit engagement  37. Pt able to perform prone arm lift x6, phase 1, without engaging the upper trap   08/06/19  38.  Pt able to perform supine obliques over stability ball x6 without losing inner unit engagement 08/06/19  39.  Pt able to perform prone arm lift, lifting elbow without engaging upper traps x6  40.  Pt able to perform upper quarter stretches without increasing pain     09/17/19  41.  Pt able to do adapted BKFO without engaging piriformis x6  42.  Pt able to do diaphragmatic breathing x6 without engaging piriformis                                                                                                                                         Long Term Goals:(3-5 months)      Date Met:      1.  pt independent in self-management of symptoms       2.  pt independent in home program      3.  pt able to work 6 hours without pain      4.  pt able to sit 60 minutes without pain      5.  pt able to walk 45 min without pain    Progress Towards Goals:  As expected to a little slower than expected    Treatment  Plan:   1.  Ultrasound to increase extensibility, decrease pain, if needed  2.  Electrical stimulation for muscle relaxation, decrease pain, if needed, iontophoresis  3.  Manual therapy to increase function, decrease pain  4.  Therapeutic exercise to increase function, decrease pain  5.  Home programming and d/c planning to increase function and decrease pain,     Frequency & Duration:  The progression of Tete's instruction has been slow secondary to the flare ups of pain associated with her menses although this seems to have improved a little during her last menses.  However she is still in need of more visits for neuro motor retraining and continued strengthening for the inner unit thus will see on a once/wk to once every other week basis for 10 more visit to complete the spinal stabilization instruction, meet remaining short and long term goals and be independent in her home program.      Patient Participated in and Agrees With This Plan of Care and Goals:  YES  Rehab Potential:  jarret Marcial, PT, MS  Physical Therapist  [place]# [id number]      TREATMENT TODAY:    Total Treatment Time: (time in clinic)   60  min  Timed Code Treatment Minutes:     60      Supplies given:      Manual Therapy:  (MA)  RX min:     30  Manual posterior rotation of left innominate    Positional Isometric contraction (PIC) of rightt iliopsoas    Positional Isometric contraction of (PIC) right gluteus minimus    Distraction of both SI jts in open pack hip position  Piriformis stretching, bilaterally    Quadratus lumborum stretching, bilaterally    Anterior/Inferior Mobilization of  right hip    Positional Isometric Contraction of multifidus  Myofascial work trunk   Sternocostal and rib mobs   Manual spinal distraction  PIC C-spine  PIC T-spine  Stretching scalenes, upper traps, levator scap      Therapeutic Activities:  (TA)  RX min:    10    Neuromuscular Reeducation: (NMR)  RX min:   20    Ultrasound:  RX min:         1.6 vance/cm2        Location:      Iontophoresis:  6 hr patch                                Location:                           Ice:      Procedures:      Key:  i=instructed        r=review        c=corrected        a=adapted   Code Procedure/Instruction 01/08/2019 02/07/19 03/21/19 03/28/19 04/04/19 04/11/19 04/18/19 04/25/19 05/02/19 05/07/19 05/14/19 06/05/19 06/11/19 06/20/19 06/25/19 07/09/19 07/16/19 07/25/19 08/06/19 09/17/19 09/24/19 10/01/19   TA         Sleeping Positions instructed               reviewed,correct                       TA Sternum-Up Posture instructed reviewed                   In gait    TA SI jt. self-correction instructed corrected corrected  reviewed        Adapted and corrected            TA Lumbar Facet PIC instructed corrected corrected  reviewed        reviewed            TA   Order of Self-Corrections instructed    reviewed                    TA Use of lumbar pillow instructed                        TA Use of cervical roll instructed corrected                       TA Use of orthotics instructed                        TA Use of SI belt instructed                        TA Use of Nada chair                         TA Use of Ice instructed                        TA Use of [employer]/ heat instructed                        TA Use of Theraworx Relief instructed                        TA Use of TENS unit                         TA Use of iontophoresis                         TA Decreasing Disc Pressures  instructed                       TA Use of sacro wedge          attempted didnt work               TA Use of inversion table                      instructed                             NMR Leukotaping upper quarter                 instructed&  applied        NMR Pelvic Floor Isolation   instructed corrected                 Remedial work     NMR Transverse Abdominus   instructed corrected                     NMR Multifidus Isolation   instructed                      NMR Diaphragmtic breathe supine     instructed                 corrected Reviewed, adapt to do with ant/post recruitment   NMR  Diaphragmtic breath sit                         NMR Pelvic Clock in sitting   instructed                      NMR Kinesiotape    Applied to both QL              Applied to both piriformis & QLs for inhibition       NMR InnerUnit against Gravity    instructed                     NMR Sit-stand w/ inner unit    instructed                     NMR Roll w/ inner unit    instructed                     NMR Walk w/ inner unit     instructed                     NMR Up/down steps w/ inner unit    instructed                     NMR Water Exer Instruction            Instructed              NMR Hip Abd w/o piriformis     instructed stopped                   NMR Hip Add w/o iliop/ham      instructed stopped                   NMR Lower abs in supine      Adapted to pre cursor, initiation Cont, no change   Still not able to lift foot w/o engaging piriformis   adapted with bolster           Adapted again to one leg    NMR Inner unit challenge with scissor arm work       instructed Added legs  Adapted w/ ball & belt progressed to 1lb weights [employer] reps to 10, still use 1lb Added rectus abdom to this              NMR Abd obliques in supine      attempted                remedial Adaption    NMR Prone Knee Flexion     instructed  instructed corrected Made bolster higher. Dorsiflex left foot more reviewedstopped dorsiflex on Right              NMR Supine glute w/ ball btwn knees        instructed  Adapted to do w/ belt for abd Moved belt more proximal on thighs She will not bridge as high  continue              NMR Prone glut amrtia          instructed               NMR Retro Step       instructed stopped                 NMR Retro Walk                         NMR Retro walk on treadmill              instructed           NMR Forward walk w/ inner unit              instructed           NMR Stability ball multifidus bounce              instructed            NMR Stability Ball A/P & Lateral           instructed              NMR Ball Catch/Throw /Supine            instructed             NMR Ball movemt in /Stance            instructed             NMR StabilityBall All 4's Arm Lift                  attempted       NMR StabilityBall Prone Walk Out                  instructed stopped      NMR StabilityBall Supine Oblique              Adapted to do w/ belt around knees    instructed corrected      NMR Stability Ball sit-supine-sit                         NMR Walking Prog Progression                     instructed    MNR Home program sequencing                   instructed  reviewed                              TA Hamstring stretch                instructed         TA Hip Ext Rot Stretch              Instructed  Adapted to do in stance  instructed in supine         TA Hip Int Rot Stretch                instructed         TA Hip Flex/IT Stretch                         TA Hip Add Stretch                instructed         TA Lats Dorsi Stretch                attempted         TA Gastroc/soleus stretch                instructed         TA QuadratusLumborm Stretch                            Supine                            Stance              instructed           TA Quad Stretch                instructed                                   NMR Wall Push Ups                    instructed     NMR Planking                         NMR BOSU Ball Exercises                         NMR Lateral Bridge Drop                         NMR [place]                         NMR O'Feldt Lat Pull Down                    instructed     NMR O'Feldt Hip Ext                         NMR O'Feldt Trunk Ext                                                   TA CervDiscExtSup/stnd                         TA Cerv Stretches                         TA Self PIC Cervical Spine                         TA Neural Gliding                         TA Upper trap stretching                   instructed      TA  Ant/Mid Scalene Strch                   instructed      TA Levator Scap strch                   instructed      TA Biceps stretch                         TA Rotator Cuff Stretch                         TA Anterior Chest Stretch                         TA Wrist Ext Stretch                                                   NMR Prone Arm Lifts                  instructed progress      NMR Scapula Stabilization                         NMR Occulomotor Isometrics                         NMR Cervical Isometrics                                                   TA Shld AROM w/bar                         TA Shld Capsular Stretching                         TA Self PIC Thor Spine                         TA Rot Cuff Therabd, beginner                         TA Rot Cuff Therabd, intermed                                                                                                                                                                                                                                                                                                                         Miracle Marcial, PT, MS  Physical Therapist  [place]# [id number]

## 2019-10-29 ENCOUNTER — TREATMENT (OUTPATIENT)
Dept: PHYSICAL THERAPY | Facility: CLINIC | Age: 34
End: 2019-10-29

## 2019-10-29 DIAGNOSIS — M53.3 SACROILIAC JOINT DYSFUNCTION: Primary | ICD-10-CM

## 2019-10-29 DIAGNOSIS — R29.3 POSTURE IMBALANCE: ICD-10-CM

## 2019-10-29 DIAGNOSIS — S39.012D STRAIN OF LUMBAR REGION, SUBSEQUENT ENCOUNTER: ICD-10-CM

## 2019-10-29 DIAGNOSIS — M35.7 FAMILIAL LIGAMENTOUS LAXITY: ICD-10-CM

## 2019-10-29 PROCEDURE — 97140 MANUAL THERAPY 1/> REGIONS: CPT | Performed by: PHYSICAL THERAPIST

## 2019-10-29 PROCEDURE — 97530 THERAPEUTIC ACTIVITIES: CPT | Performed by: PHYSICAL THERAPIST

## 2019-10-29 NOTE — PROGRESS NOTES
"Physical Therapy Note      SUBJECTIVE:   Changes since last seen:  Tete has had bronchitis for 3 weeks, been on antibiotics for 10 days after waiting 10 days.  Coughing has been \"bad\" and has caused a bad flare up of low back pain.   She states she has been in bed or at computer for the last 3 weeks.   \"Right side is worst, both piriformis and low back are in pain but nothing down legs.\"  After using the inversion table last visit, she reports she felt great for at least one day, \"I felt like a new person.\"    Current level of pain:    7/10   Lowest level of pain since last seen:  5/10  Highest level of pain since last seen:   7-8/10       Past Medical History:  1.  AA 2013  Treated with PT for left shoulder labral tear, no low back treatment   2.  Hx of two falls when mopping in socks, over the last 2 years  3.  Hx of regular, heavy, cramping menses  4.  T& A, 2006  5.  Allergic to Wellbutrin and mild allergy to contrast dye  6.  Chronic yeast infections  7.  Uterine fibroids removed, 2 yrs ago       Functional Limitations:  Sitting, standing, walking, stairs, driving, working, sleeping, squatting, housework and changing positions.   Patient Goals for Physical Therapy: \"Pain relief\"      OBJECTIVE:  Observation:  Slight ight lateral shift of pelvis.   Swelling over left fib head, tingles a little in left foot when palpate      08/06/19 08/13/19 09/17/19 09/24/19 10/01/19 10/29/19               SI Joint Positioning  Right  Left Right  Left Right  Left Right  Left Right  Left Right  Left Right  Left Right  Left Right  Left Right  Left Right  Left Right  Left Right  Left Right Left Right Left Right Left Right  Left Right  Left       Innominate                            Anterior    x               x     x               x   X               x                      Posterior               x     x                x    x               x   x                  Sacrum                          Unilateral rotation    x      x    x "   x   x   x                                       SI Joint Testing  Right  Left Right  Left Right  Left Right  Left Right  Left Right  Left Right  Left Right  Left Right  Left Right  Left Right  Left Right  Left Right  Left Right Left Right Left Right Left Right  Left Right  Left           Distraction   +          +   +           +   +         +   +           +   +           +   +           +                        Joanna's   +          +   +         +   +          +   +         +   -           +                        Compression   Sl        Sl    Sl         Sl    -          -      -          -                        Prone Press Up   -          -   -           -   -            -   -          -   Sl        Sl                                      Special Tests  Right   Left Right  Left Right  Left Right  Left Right  Left Right  Left Right Left  Right  Left Right  Left Right  Left Right  Left Right  Left Right  Left Right Left Right Left Right Left Right Left  Right  Left      Straight Leg Raise                             Active   -            -    -         -    -           -                        Passive   -           -    -          -   -          -                    Hip Scour Test   sl           sl   Sl         Sl    +         sl    Sl        Sl    Sl       sl   Sl          sl                                    Bakers Cyst   -            -     +          -     Sl        -   -           -   -         -                    01/08/2019 02/07/19 03/21/19 03/28/19 04/04/19 04/11/19 04/18/19 04/25/19 05/02/19 05/07/19 05/14/19 06/05/19 06/11/19 06/20/19 06/25/19 07/09/19 07/16/19 07/23/19   SI Joint Positioning  Right  Left Right  Left Right  Left Right  Left Right  Left Right  Left Right  Left Right  Left Right  Left Right  Left Right  Left Right  Left Right  Left Right Left Right Left Right Left Right  Left Right  Left       Innominate                            Anterior    x   x    x  x                x                  x              X     x    x   x   x               x   X                    sl    x                x               x                x          Posterior                x               x        x              x     x     x    x               X                x               x              x                x               x   sl               x    x    x   x      Sacrum                               Unilateral rotation    x   x   x    x    x     x    x    x   x   x    x    x   x   sl   x    x     x                           SI Joint Testing  Right  Left Right  Left Right  Left Right  Left Right  Left Right  Left Right  Left Right  Left Right  Left Right  Left Right  Left Right  Left Right  Left Right Left Right Left Right Left Right  Left Right  Left           Distraction     +          +   +         +   +          +    +        +  +           +   +          +   +          +   +          +     +        +   +         +   +         +   +          +   +           + +           +   +          +    +          +   +          +   +           +           Joanna's     +          +   Sl         sl    +         -    Sl         -   Sl         -   Sl         -   -           -                      Compression     +          +   Sl          sl   Sl          Sl     Sl        Sl    -          -   -         -   -           -                      Prone Press Up           +         +         Sl          -         -        -         -                                   Special Tests  Right   Left Right  Left Right  Left Right  Left Right  Left Right  Left Right Left  Right  Left Right  Left Right  Left Right  Left Right  Left Right  Left Right Left Right Left Right Left Right Left  Right  Left      Straight Leg Raise                                     Active   -         -          -           -                      Passive   -         -             -             -                  Hip Scour Test   ++       Sl    ++       Sl    +            sl   Sl         Sl    Sl         sl Sl          Sl    Sl        sl   Sl       Sl    Sl         Sl    Sl         Sl     Sl         sl   Sl        Sl    Sl        Sl     -            -   Sl         sl   -           -   -          -   -            -                        Bakers Cyst       +         -     +        -    Sl        -   Sl         -     Sl        -   Sl        Sl   sl          sl   Sl         -   -           -   -          -   +           -   Sl      sl       Palpation:   Date 08/06/19 08/13/19 09/17/19 09/24/19 10/01/19 10/29/19                Right  Left Right  Left Right  Left Right  Left Right  Left Right  Left Right  Left Right  Left Right  Left Right  Left Right  Left Right  Left Right  Left Right Left Right Left Right Left Right  Left Right  Left   Piriformis   ++        ++   ++       ++   ++        ++   +         +    ++       +   ++        ++               Gr. Trochanter    +        -   ++          +   ++      +   ++         +     Sl        -   ++        +               IT Band    Sl         Sl    ++        +   +         sl   Sl        Sl     +          Sl    ++         +               Quadratus Lumborum   ++         +   +         +   ++        +   +         Sl    ++        +   ++         +               Ischial Tuberosity    -         -   -          -    -        -   -            -     Sl       -   Sl         Sl                Sacrococcygeal Ligs    +          +   +       +   +         sl    +         +     -          -   -          -               Paraspinals                     Spinous Processes   +           sl   ++        sl    +           -                       C-spine rotated to    C2-5 C2-6 C2-5   -             - C2-3  C2-4                     T-spine rotated to  T4-6 T4-7 T3-8   T3-7  T4-6  T4-6                      L-spine rotated to  L2-5 L2-5   L1-5  L3-5  L3-5  L2-5                Adductor Tony   +           +   ++      ++   +         ++   +        +   +           +                 Iliopsoas   ++          +   +          sl   +         sl   +          Sl    ++        +               Quad Origin    -          -   -          -   -        -   -          -    -          -               SI Joint   +        +   ++        ++  ++        +   ++       +   +            +   ++         +               Pubic Symphysis         +        +        +       +  not tested        ++               Obturator externus   +         +   +         +   Sl       sl   -           -   -          -                Rectus Diastasis                                          Ant/middle scalenes   +        sl   +         +   +         +   +          +   +        sl   +          +               Upper traps   +         Sl    +         +   +         +   +         sl   +        Sl    +          sl               Levator scap   +        Sl    +        +   +        sl   +         sl   +         sl    ++        sl               sternocleidomastoid   -         -   -            -   -          -    -          -   -          -   -            -                                      sternocostals   +          +   +          +   +        Sl   ++          +   +          +   +             +                         Muscle/Bone Irritation  Date 01/08/2019 02/07/19 03/21/19 03/28/19 04/04/18 04/11/19 04/18/19 04/25/19 05/02/19 05/07/19 05/14/19 06/05/19 06/11/19 06/20/19 06/25/19 07/09/19 07/16/19 07/23/19    Right  Left Right  Left Right  Left Right  Left Right  Left Right  Left Right  Left Right  Left Right  Left Right  Left Right  Left Right  Left Right  Left Right Left Right Left Right Left Right  Left Right  Left   Piriformis  ++        +   ++          +   ++       +   ++        +    ++        Sl    ++       Sl   ++       +    ++      +   ++        +   +           +    +        Sl      +         ++   +           ++    +       -   ++      ++   +          +   ++        +   ++         ++   Gr. Trochanter  +          +  +          sl    +           +     +       ++    +          -   +         Sl    +         sl   +         sl   Sl        Sl    +          +    Sl        sl    Sl         +   Sl          sl     +       -   +         +    +         +   +         sl   ++           +   IT Band  +         sl   Sl        sl     -          -   -          -    Sl         -   Sl         -   Sl        -   Sl         -  sl          -   Sl          Sl      -          -     -           -   -            -    Sl        Sl    Sl       Sl     Sl       sl   +          Sl     +          ++   Quadratus Lumborum  ++        ++   ++        +    +        ++   ++        ++     +       ++   +          +  ++        ++   Sl        Sl    +       ++   +          ++    Sl       +     +         +   +          ++     Sl       +   ++       +    ++       +    +         +    ++       +   Ischial Tuberosity  ++         sl   -          -    -           -     -           -     -          -   -          -   -          -   -          -   -          -  -            -     -           -   -          -   -             -     Sl       -   Sl        Sl     -          -     -         -    Sl        Sl    Sacrococcygeal Ligs  ++          +   Sl        sl   +          +    +       +    Sl        Sl    Sl       Sl    +         sl  ++      +  +        Sl       Sl         +   Sl         Sl    Sl         Sl      -        Sl   not  tested    +         +    Sl       Sl    +          +   Paraspinals  +           +     +         sl    +    +          -   Sl         +   +         sl   +        sl  +         -   Sl          +   Sl          +   +          sl   ++         sl   +          -   +         Sl    ++        +     +       Sl    +         sl   Spinous Processes                                        C-spine rotated to                           C3-5  C2-5 C2-6         T-spine rotated to   -               -              T8-9  T4-5    T3-8          L-spine rotated to  L2-5        L1-5  L4-5  L3-5  L2-5            L3-5   L1-5  L2-5  L2-5              L1-5           L2-5 L2-5   L1-5 L3-5 L3-5  L2-5 L4-5 L1-5   Adductor Tony    +        +       +         +     -         +   -           Sl     -        sl   +          sl   -          -   -           -   -          -   -           -   -          -   -           sl  ++        +   +         sl   +         +   +         ++   ++        +  +            +   Iliopsoas  ++         +   ++        +   ++         +    +         ++  sl        Sl    Sl       Sl    Sl       Sl    Sl         -   Sl        -   Sl         +   +         Sl    +          sl  +          ++   +          +   +          +   +         ++   ++        +   ++         +   Quad Origin  sl          sl   Sl          sl    -          -   -           -    -         -   -          -   -         -   -          -   -          -   -           -    -         -   -            -   -         -   -         -   -         -   -            -   -          -   -         -   SI Joint  ++         +  ++        +    ++        +   +          +   +         +   +          ++   +         sl    +         sl   +        +  +          sl  +        Sl     +        Sl   +          ++   Sl       sl   +        +    + +        +  ++          +    ++        ++   Pubic Symphysis         ++          Not tested         ++      ++        ++            + Not tested Not tested          +             ++         +          +   Obturator externus          -          +   -          -   -          -    -           -   -          -   -          -   -         -    -            -   -          -   -            -   Rectus Diastasis   1/2 finger                                         Ant/middle scalenes                   ++        +   ++       +   Upper traps                  ++         +   +          +   Levator scap                   ++          +     +           sternocleidomastoid                   Sl        Sl      -         -                        sternocostals   +        +    +          +    +         -    +           +   Sl        Sl    +         Sl  Not tested     +         +     +         +   +         +   Sl      sl   +          +    +        +   +          +   +          +   All 4s Positioning: Pt not able to assume full lumbar extension in this position  Leg Length:  The right  lower extremity is equal to the left        Monthly Objective Measurement/Functional Activity  Date: 01/08/2019 03/21/19 04/25/19 06/05/19 07/09/19 09/17/19 10/29/19    Oswestry Pain Score 52/100  46/100    48/100      48/100       50/100 48/100    52/100                          AROM Lumbar Spine: Degrees   Degrees      Degrees      Degrees      Degrees      Degrees      Degrees    Degrees      Flexion 106 pain          99          83       75 pain          63 onset of pain         69 pain increased      57pain        Extension 3 pain           17           19       11 pain           11  Pinches       16 pain increased    12       Right Lat. Flexion 29 pain right trunk          24        25      25    23 pinch       19 pinch  14       Left Lat. Flexion 31         19         23     14  15 pinch     11 pinch  18       Right Lat Shift Pelvis inc pain   Inc pain Left SI jt       Slight increase No change but can't do far Feels caught  Increased pain     Sl inc pain        Left Lat Shift Pelvis  less inc pain       No change       No change/ slight cielo  No change but can't go far  Easier but not natural  Increased pain  Sl inc pain                AROM Hips:  (degrees) Right      Left Right Left Right  Left Right  Left Right  Left Right  Left Right  Left Right  Left      Flexion 110       110  125    120    128      125 130       130 130      130 125      120  128    120       Abduction 37       41   45      45      42      44  43          45   45       45  45          45  43      45       Int. Rotation 32       22  30      25      31         28  32          30   34        32   30         28   32       31        Ext. Rotation  34       42  35      40       38        41   35         40   40        42   35         37   38         39               Flexibility:   (degrees) Right    Left Right  Left Right  Left Right  Left Right  Left Right  Left Right  Left Right  Left      Hamstrings -32       -28  -30     -25   -25       -25   -28      -27  -30      -26   -28       -29   -25     -25       Quadriceps 39       42 40        45    not tested  40          42  45        47   50         52   Not tested       HipExt/Rot(piriformis) 30       28   32      30  33         32   35         33   37        35   35        35   38       36       Hip Int/Rotators 27       9   25      10   28        15   27         16   25         18   20        20  22        18       Hip Flexors (iliopsoas) -       -             IT Band -       -                 No/ 30 min    Reflexes: Right      Left Right  Left Right  Left Right  Left Right  Left Right  Left Right  Left Right  Left      L4 (Quad) +1       +1             S1 (Achilles) +1       +1                     Root Level  - Motor Right      Left Right  Left Right  Left Right  Left Right  Left Right  Left Right  Left Right  Left     T12             Rectus Abdominus 4+/5     4+/5         5/5        5/5          L1              Paraspinals 5/5         5/5             L2              Hip Flexion 5/5          5/5             L3             Quads 5/5          5/5             L4              Anterior Tibialis 5/5         5/5             L5             Gr. Toe Extension 5/5           5/5             S1            Gastroc/Sol/Peroneals 5/5          5/5                     Functional Strength:             Single LegStanceTime (seconds) R:30   L:30   R:      L: R:      L: R: 30 pain     L:30 pain  R:      L: R:      L: R:      L: R:      L:     Pelvic Floor Isolation (seconds) -    10 sec 10 sec   > 10 sec        Inner Unit  Isolation (seconds) -    10 sec 10 sec     > 10 sec                Functional Activities:               Sit w/o pain? Pain increases (minutes) No/ 30 min No/ 30  min No/ 30 min  No/  30 min No/ 30 min  No/ 30min  No/ 30 min       Stand w/o pain? No/ 30 min No/ 30 min No/ 10 min  No/ 10 min No/ 10 min  No/ 15-20 min No/ 10 min       Walk w/o pain? varies No/ 1 mile No/ 1/2 mile No/ 1 mile No/ 1mile, piriformis, B No/ a couple miles  No/ 1/2 mile       Steps w/o pain? 1 fl, avoids them 1 fl avoids  1 fl avoids  No/ avoids No/ doesn't do No/ 1 flight  No/ 1 fl       Drive w/o pain? No/ 10-15 min No/ 30-45 min No/ 30-45  Min  No/ 30-45 min No/ 35-45 No/ 35-45 No/ 30 min       Work w/o pain? No/ 30 min No/ 30 min No/ 30  No/ 30 min No/30 mi   No/ 30 min  No/ 30  min       # times wakes w/ pain? 4-5x/night, now taking meds at night she is able to sleep.  2x   2-3x  2x         1x         Several times  Several times        PLAN OF CARE:    ASSESSMENT:  Problem List:   1. SI joint dysfunction  2. Lack of spinal stabilization  3. Postural dysfunction     Discussion:  The coughing with the bronchitis has flared up Tete's low back pain.  She actually was able to keep her SI joint symptoms at bay, as best she could with the self correction techniques and the positioning and movements/ exercises that she has learned since starting physical therapy.      She returns to see her primary care physician in a few weeks.  I believe it is time to get further assessment of her lumbar spine and get imaging.  She has made progress but it has been extremely slow and although she is self managing some of the SI joint symptoms which are secondary to the lumbar symptoms, ADLs can flare up her pain to point of impacting her function.      Spent the majority of today's treatment on manual work for pain relief. Unable to progress spinal stabilization secondary to the flare up she has had with the increased lumbar disc pressures from coughing.             Short Term Goals: (4-6 weeks)                                   Date Met:             "    1.   pt independent in postural correction         02/07/19  2.   pt independent in SI joint self-corrections        03/21/19  3.   pt independent in exer to decrease post. disc pressures      03/21/19  4.   pt able to sleep through night without pain  5.   pt able to sit 15 minutes without pain  6.   pt able to walk 10 minutes without pain        06/20/19    7.   Reduce pt pain to no greater than 7/10        03/21/19  8.   Decrease Oswestry Pain Score to no greater than 45/100  9.  Reduce pt pain to no greater than 6/10        07/16/19  10.  Pt able to isolate the pelvic floor in supine x10, 10 sec      03/28/19  11.  Pt able to isolate transverse abdom in all 4s, x10 sec, x10         03/28/19  12.  Pt able to isolate the multifidus in sitting x10 sec, x10                            03/28/19  13.  Pt able to engage inner unit, move from sit to stand &back to sit      04/04/19                                                                                                                                                                                                                                           14.  Pt able to engage inner unit and walk 4 steps without overflow to hamstrings     06/20/19  15.  Pt able to hip abduct x6 without engaging the piriformis      06/20/19     16.  Pt able to perform prone knee flexion without engaging piriformis x6          05/07/19       17.  Pt able to perform remedial lower abs with scissor arm work x6 w/o pain    04/18/19    18.  Pt able to perform \"Pre-cursor\" lower abs leg lift initiation x6 without pain          04/18/19   19.  Pt able to perform retro step without engaging piriformis x6, bilaterally    09/24/19    20.  Pt able to perform bridging x6 without engaging piriformis      04/25/19    21.  Pt able to perform \"scissors\" arm movement with inner unit w/o piriformis x6   05/02/19  22.  Pt able to perform PIC hip adductors without engaging the piriformis  " "x6    05/07/19  23.  Pt able to perform \"Scissors\" arm motion w/ inner unit with 1lb wts x6 w/o pain   05/07/19  24.  Pt able to perform prone glut max over stability ball x6 w/o engaging piriformis   25.  Pt able to reduce pain w/ self PIC to left psoas       05/14/19  26.  Pt able to move laterally on stability ball in sitting without pain x6  27.  Pt able to move A/P movement sitting on stability ball without pain x6  28.  Pt independent in pool exercises, to do without increasing pain     07/09/19  29.  Pt able to throw and catch stability ball in supine with inner unit on x6    06/11/19  30.  Pt able to hold medicine ball in stance and move away from trunk with IU on and w/o pain x6 06/11/19  31.  Pt independent in supine hamstring stretch, able to do 2x without pain.    07/16/19  32.  Pt independent in hip adductor stretching, supine and stance     07/16/19  33.  Pt independent in quad stretching, in stance, without pain.      08/06/19  34. Pt independent in hip internal rotator stretch in supine, without pain      08/06/19  35.  Pt able to tolerate ADLs with leukotape on scapula for enhanced spinal stabilization x 1 day 07/25/19  36.  Pt able to perform prone walk out x6 without losing inner unit engagement  37. Pt able to perform prone arm lift x6, phase 1, without engaging the upper trap   08/06/19  38.  Pt able to perform supine obliques over stability ball x6 without losing inner unit engagement 08/06/19  39.  Pt able to perform prone arm lift, lifting elbow without engaging upper traps x6  40.  Pt able to perform upper quarter stretches without increasing pain     09/17/19  41.  Pt able to do adapted BKFO without engaging piriformis x6  42.  Pt able to do diaphragmatic breathing x6 without engaging piriformis                                                                                                                                         Long Term Goals:(3-5 months)      Date Met:      1.  pt " independent in self-management of symptoms       2.  pt independent in home program      3.  pt able to work 6 hours without pain      4.  pt able to sit 60 minutes without pain      5.  pt able to walk 45 min without pain    Progress Towards Goals:  As expected to a little slower than expected    Treatment Plan:   1.  Ultrasound to increase extensibility, decrease pain, if needed  2.  Electrical stimulation for muscle relaxation, decrease pain, if needed, iontophoresis  3.  Manual therapy to increase function, decrease pain  4.  Therapeutic exercise to increase function, decrease pain  5.  Home programming and d/c planning to increase function and decrease pain,     Frequency & Duration:  The progression of Tete's instruction has been slow secondary to the flare ups of pain associated with her menses although this seems to have improved a little during her last menses.  However she is still in need of more visits for neuro motor retraining and continued strengthening for the inner unit thus will see on a once/wk to once every other week basis for 9 more visit to complete the spinal stabilization instruction, meet remaining short and long term goals and be independent in her home program.      Patient Participated in and Agrees With This Plan of Care and Goals:  YES  Rehab Potential:  jarret Marcial, PT, MS  Physical Therapist  [place]# [id number]      TREATMENT TODAY:    Total Treatment Time: (time in clinic)   60  min  Timed Code Treatment Minutes:           Supplies given:      Manual Therapy:  (MA)  RX min:     50  Manual posterior rotation of left innominate    Positional Isometric contraction (PIC) of rightt iliopsoas    Positional Isometric contraction of (PIC) right gluteus minimus    Distraction of both SI jts in open pack hip position  Piriformis stretching, bilaterally    Quadratus lumborum stretching, bilaterally    Anterior/Inferior Mobilization of  right hip    Positional Isometric Contraction of  multifidus  Myofascial work trunk   Sternocostal and rib mobs   Manual spinal distraction  PIC C-spine  PIC T-spine  Stretching scalenes, upper traps, levator scap      Therapeutic Activities:  (TA)  RX min:    10    Neuromuscular Reeducation: (NMR)  RX min:       Ultrasound:  RX min:         1.6 vance/cm2       Location:      Iontophoresis:  6 hr patch                                Location:                           Ice:        ocedures:      Key:  i=instructed        r=review        c=corrected        a=adapted   Code Procedure/Instruction 10/29/19                        TA         Sleeping Positions                         TA Sternum-Up Posture                         TA SI jt. self-correction                         TA Lumbar Facet PIC                         TA   Order of Self-Corrections                         TA Use of lumbar pillow                         TA Use of cervical roll                         TA Use of orthotics                         TA Use of SI belt                         TA Use of Nada chair                         TA Use of Ice                         TA Use of [employer]/ heat                         TA Use of Theraworx Relief                         TA Use of TENS unit                         TA Use of iontophoresis                         TA Decreasing Disc Pressures                         TA Use of sacro wedge                         TA Use of inversion table Used for 10 min                                                   NMR Leukotaping upper quarter                         NMR Pelvic Floor Isolation                         NMR Transverse Abdominus                         NMR Multifidus Isolation                         NMR Diaphragmtic breathe supine                         NMR  Diaphragmtic breath sit                         NMR Pelvic Clock in sitting                         NMR Kinesiotape                         NMR InnerUnit against Gravity                         NMR  Sit-stand w/ inner unit                         NMR Roll w/ inner unit                         NMR Walk w/ inner unit                          NMR Up/down steps w/ inner unit                         NMR Water Exer Instruction                         NMR Hip Abd w/o piriformis                         NMR Hip Add w/o iliop/ham                          NMR Lower abs in supine                         NMR Inner unit challenge with scissor arm work                          NMR Abd obliques in supine                         NMR Prone Knee Flexion                         NMR Supine glute w/ ball btwn knees                          NMR Prone glut amrita                         NMR Retro Step                         NMR Retro Walk                         NMR Retro walk on treadmill                         NMR Forward walk w/ inner unit                         NMR Stability ball multifidus bounce                         NMR Stability Ball A/P & Lateral                         NMR Ball Catch/Throw /Supine                         NMR Ball movemt in /Stance                         NMR StabilityBall All 4's Arm Lift                         NMR StabilityBall Prone Walk Out                         NMR StabilityBall Supine Oblique                         NMR Stability Ball sit-supine-sit                         NMR Walking Prog Progression                         MNR Home program sequencing                                                   TA Hamstring stretch                         TA Hip Ext Rot Stretch                         TA Hip Int Rot Stretch                         TA Hip Flex/IT Stretch                         TA Hip Add Stretch                         TA Lats Dorsi Stretch                         TA Gastroc/soleus stretch                         TA QuadratusLumborm Stretch                            Supine                            Stance                         TA Quad Stretch                                                    NMR Wall Push Ups                         NMR Planking                         NMR BOSU Ball Exercises                         NMR Lateral Bridge Drop                         NMR Waiters [place]                         NMR O'Feldt Lat Pull Down                         NMR O'Feldt Hip Ext                         NMR O'[place]dt Trunk Ext                                                   TA CervDiscExtSup/stnd                         TA Cerv Stretches                         TA Self PIC Cervical Spine                         TA Neural Gliding                         TA Upper trap stretching                         TA Ant/Mid Scalene Strch                         TA Levator Scap strch                         TA Biceps stretch                         TA Rotator Cuff Stretch                         TA Anterior Chest Stretch                         TA Wrist Ext Stretch                                                   NMR Prone Arm Lifts                         NMR Scapula Stabilization                         NMR Occulomotor Isometrics                         NMR Cervical Isometrics                                                   TA Shld AROM w/bar                         TA Shld Capsular Stretching                         TA Self PIC Thor Spine                         TA Rot Cuff Therabd, beginner                         TA Rot Cuff Therabd, intermed                                                                                                                                                                                                                                                                                                                           Miracle Marcial, PT, MS  Physical Therapist  [place]# [id number]

## 2019-11-05 ENCOUNTER — TREATMENT (OUTPATIENT)
Dept: PHYSICAL THERAPY | Facility: CLINIC | Age: 34
End: 2019-11-05

## 2019-11-05 DIAGNOSIS — R29.3 POSTURE IMBALANCE: ICD-10-CM

## 2019-11-05 DIAGNOSIS — S39.012D STRAIN OF LUMBAR REGION, SUBSEQUENT ENCOUNTER: ICD-10-CM

## 2019-11-05 DIAGNOSIS — M35.7 FAMILIAL LIGAMENTOUS LAXITY: ICD-10-CM

## 2019-11-05 DIAGNOSIS — M53.3 SACROILIAC JOINT DYSFUNCTION: Primary | ICD-10-CM

## 2019-11-05 PROCEDURE — 97140 MANUAL THERAPY 1/> REGIONS: CPT | Performed by: PHYSICAL THERAPIST

## 2019-11-05 PROCEDURE — 97530 THERAPEUTIC ACTIVITIES: CPT | Performed by: PHYSICAL THERAPIST

## 2019-11-05 PROCEDURE — 97112 NEUROMUSCULAR REEDUCATION: CPT | Performed by: PHYSICAL THERAPIST

## 2019-11-05 NOTE — PROGRESS NOTES
"Physical Therapy Note      SUBJECTIVE:   Changes since last seen:  Cough still lingering, but a little better.  She is bracing more as instructed when coughs to minimize the posterior lumbar disc pressures.    Nothing else new or different, returns to GYN in Dec, she had a very light period last week, \"still working on getting the hormones leveled out.\"   She did a temporary fix, raising her desk on fence post caps until she can purchase a sit/stand desk. \"I am amazed at how much better my neck and upper back feels from this small change!\"   The inversion table use here in the clinic on last visit was helpful but not quite as much relief as previous visit due to the increased coughing.         Current level of pain:    6/10   Lowest level of pain since last seen:  6/10  Highest level of pain since last seen:   7/10       Past Medical History:  1.  AA 2013  Treated with PT for left shoulder labral tear, no low back treatment   2.  Hx of two falls when mopping in socks, over the last 2 years  3.  Hx of regular, heavy, cramping menses  4.  T& A, 2006  5.  Allergic to Wellbutrin and mild allergy to contrast dye  6.  Chronic yeast infections  7.  Uterine fibroids removed, 2 yrs ago       Functional Limitations:  Sitting, standing, walking, stairs, driving, working, sleeping, squatting, housework and changing positions.   Patient Goals for Physical Therapy: \"Pain relief\"      OBJECTIVE:  Observation:  Slight ight lateral shift of pelvis.   Swelling over left fib head, tingles a little in left foot when palpate      08/06/19 08/13/19 09/17/19 09/24/19 10/01/19 10/29/19 11/05/19              SI Joint Positioning  Right  Left Right  Left Right  Left Right  Left Right  Left Right  Left Right  Left Right  Left Right  Left Right  Left Right  Left Right  Left Right  Left Right Left Right Left Right Left Right  Left Right  Left       Innominate                            Anterior    x               x     x               x   X     "           x               x                     Posterior               x     x                x    x               x   x    x                 Sacrum                          Unilateral rotation    x      x    x   x   x   x   x                                      SI Joint Testing  Right  Left Right  Left Right  Left Right  Left Right  Left Right  Left Right  Left Right  Left Right  Left Right  Left Right  Left Right  Left Right  Left Right Left Right Left Right Left Right  Left Right  Left           Distraction   +          +   +           +   +         +   +           +   +           +   +           +     +          +                      Joanna's   +          +   +         +   +          +   +         +   -           +                        Compression   Sl        Sl    Sl         Sl    -          -      -          -                        Prone Press Up   -          -   -           -   -            -   -          -   Sl        Sl                                      Special Tests  Right   Left Right  Left Right  Left Right  Left Right  Left Right  Left Right Left  Right  Left Right  Left Right  Left Right  Left Right  Left Right  Left Right Left Right Left Right Left Right Left  Right  Left      Straight Leg Raise                             Active   -            -    -         -    -           -                        Passive   -           -    -          -   -          -                    Hip Scour Test   sl           sl   Sl         Sl    +         sl    Sl        Sl    Sl       sl   Sl          sl   Sl         Sl                                    Bakers Cyst   -            -     +          -     Sl        -   -           -   -         -   +         -                   01/08/2019 02/07/19 03/21/19 03/28/19 04/04/19 04/11/19 04/18/19 04/25/19 05/02/19 05/07/19 05/14/19 06/05/19 06/11/19 06/20/19 06/25/19 07/09/19 07/16/19 07/23/19   SI Joint Positioning  Right  Left Right  Left Right  Left Right  Left Right   Left Right  Left Right  Left Right  Left Right  Left Right  Left Right  Left Right  Left Right  Left Right Left Right Left Right Left Right  Left Right  Left       Innominate                            Anterior    x   x    x  x                x                 x              X     x    x   x   x               x   X                    sl    x                x               x                x          Posterior                x               x        x              x     x     x    x               X                x               x              x                x               x   sl               x    x    x   x      Sacrum                               Unilateral rotation    x   x   x    x    x     x    x    x   x   x    x    x   x   sl   x    x     x                           SI Joint Testing  Right  Left Right  Left Right  Left Right  Left Right  Left Right  Left Right  Left Right  Left Right  Left Right  Left Right  Left Right  Left Right  Left Right Left Right Left Right Left Right  Left Right  Left           Distraction     +          +   +         +   +          +    +        +  +           +   +          +   +          +   +          +     +        +   +         +   +         +   +          +   +           + +           +   +          +    +          +   +          +   +           +           Joanna's     +          +   Sl         sl    +         -    Sl         -   Sl         -   Sl         -   -           -                      Compression     +          +   Sl          sl   Sl          Sl     Sl        Sl    -          -   -         -   -           -                      Prone Press Up           +         +         Sl          -         -        -         -                                   Special Tests  Right   Left Right  Left Right  Left Right  Left Right  Left Right  Left Right Left  Right  Left Right  Left Right  Left Right  Left Right  Left Right  Left Right Left Right Left Right Left Right Left   Right  Left      Straight Leg Raise                                     Active   -         -          -           -                      Passive   -         -             -             -                  Hip Scour Test   ++       Sl    ++       Sl    +           sl   Sl         Sl    Sl         sl Sl          Sl    Sl        sl   Sl       Sl    Sl         Sl    Sl         Sl     Sl         sl   Sl        Sl    Sl        Sl     -            -   Sl         sl   -           -   -          -   -            -                        Bakers Cyst       +         -     +        -    Sl        -   Sl         -     Sl        -   Sl        Sl   sl          sl   Sl         -   -           -   -          -   +           -   Sl      sl       Palpation:   Date 08/06/19 08/13/19 09/17/19 09/24/19 10/01/19 10/29/19 11/05/19               Right  Left Right  Left Right  Left Right  Left Right  Left Right  Left Right  Left Right  Left Right  Left Right  Left Right  Left Right  Left Right  Left Right Left Right Left Right Left Right  Left Right  Left   Piriformis   ++        ++   ++       ++   ++        ++   +         +    ++       +   ++        ++    ++        +              Gr. Trochanter    +        -   ++          +   ++      +   ++         +     Sl        -   ++        +    +         Sl               IT Band    Sl         Sl    ++        +   +         sl   Sl        Sl     +          Sl    ++         +    +         -              Quadratus Lumborum   ++         +   +         +   ++        +   +         Sl    ++        +   ++         +   +         ++               Ischial Tuberosity    -         -   -          -    -        -   -            -     Sl       -   Sl         Sl     -          Sl               Sacrococcygeal Ligs    +          +   +       +   +         sl    +         +     -          -   -          -   +         -              Paraspinals                     Spinous Processes   +           sl   ++        sl    +            -                       C-spine rotated to    C2-5 C2-6 C2-5   -             - C2-3  C2-4           C3-7                              T-spine rotated to  T4-6 T4-7 T3-8   T3-7  T4-6  T4-6 T4-5                     L-spine rotated to  L2-5 L2-5   L1-5  L3-5  L3-5  L2-5             L3-5              Adductor Tony   +           +   ++      ++   +         ++   +        +   +           +   +           +              Iliopsoas   ++          +   +          sl   +         sl   +          Sl    ++        +  ++        +              Quad Origin    -          -   -          -   -        -   -          -    -          -   -           -              SI Joint   +        +   ++        ++  ++        +   ++       +   +            +   ++         +   ++      +              Pubic Symphysis         +        +        +       +  not tested        ++           +              Obturator externus   +         +   +         +   Sl       sl   -           -   -          -    -          -              Rectus Diastasis                                          Ant/middle scalenes   +        sl   +         +   +         +   +          +   +        sl   +          +   +        Sl                Upper traps   +         Sl    +         +   +         +   +         sl   +        Sl    +          sl  sl         sl              Levator scap   +        Sl    +        +   +        sl   +         sl   +         sl    ++        sl   +          Sl               sternocleidomastoid   -         -   -            -   -          -    -          -   -          -   -            -                                      sternocostals   +          +   +          +   +        Sl   ++          +   +          +   +             +   Sl       Sl                         Muscle/Bone Irritation  Date 01/08/2019 02/07/19 03/21/19 03/28/19 04/04/18 04/11/19 04/18/19 04/25/19 05/02/19 05/07/19 05/14/19 06/05/19 06/11/19 06/20/19 06/25/19 07/09/19 07/16/19 07/23/19    Right  Left Right   Left Right  Left Right  Left Right  Left Right  Left Right  Left Right  Left Right  Left Right  Left Right  Left Right  Left Right  Left Right Left Right Left Right Left Right  Left Right  Left   Piriformis  ++        +   ++          +   ++       +   ++        +    ++        Sl    ++       Sl   ++       +    ++      +   ++        +   +           +    +        Sl      +         ++   +           ++    +       -   ++      ++   +          +   ++        +   ++         ++   Gr. Trochanter  +          +  +          sl    +           +    +       ++    +          -   +         Sl    +         sl   +         sl   Sl        Sl    +          +    Sl        sl    Sl         +   Sl          sl     +       -   +         +    +         +   +         sl   ++           +   IT Band  +         sl   Sl        sl     -          -   -          -    Sl         -   Sl         -   Sl        -   Sl         -  sl          -   Sl          Sl      -          -     -           -   -            -    Sl        Sl    Sl       Sl     Sl       sl   +          Sl     +          ++   Quadratus Lumborum  ++        ++   ++        +    +        ++   ++        ++     +       ++   +          +  ++        ++   Sl        Sl    +       ++   +          ++    Sl       +     +         +   +          ++     Sl       +   ++       +    ++       +    +         +    ++       +   Ischial Tuberosity  ++         sl   -          -    -           -     -           -     -          -   -          -   -          -   -          -   -          -  -            -     -           -   -          -   -             -     Sl       -   Sl        Sl     -          -     -         -    Sl        Sl    Sacrococcygeal Ligs  ++          +   Sl        sl   +          +    +       +    Sl        Sl    Sl       Sl    +         sl  ++      +  +        Sl       Sl         +   Sl         Sl    Sl         Sl      -        Sl   not  tested    +         +    Sl       Sl    +          +    Paraspinals  +           +     +         sl    +    +          -   Sl         +   +         sl   +        sl  +         -   Sl          +   Sl          +   +          sl   ++         sl   +          -   +         Sl    ++        +     +       Sl    +         sl   Spinous Processes                                        C-spine rotated to                           C3-5  C2-5 C2-6         T-spine rotated to   -               -              T8-9  T4-5    T3-8          L-spine rotated to  L2-5        L1-5  L4-5  L3-5  L2-5            L3-5  L1-5  L2-5  L2-5              L1-5           L2-5 L2-5   L1-5 L3-5 L3-5  L2-5 L4-5 L1-5   Adductor Tony    +        +       +         +     -         +   -           Sl     -        sl   +          sl   -          -   -           -   -          -   -           -   -          -   -           sl  ++        +   +         sl   +         +   +         ++   ++        +  +            +   Iliopsoas  ++         +   ++        +   ++         +    +         ++  sl        Sl    Sl       Sl    Sl       Sl    Sl         -   Sl        -   Sl         +   +         Sl    +          sl  +          ++   +          +   +          +   +         ++   ++        +   ++         +   Quad Origin  sl          sl   Sl          sl    -          -   -           -    -         -   -          -   -         -   -          -   -          -   -           -    -         -   -            -   -         -   -         -   -         -   -            -   -          -   -         -   SI Joint  ++         +  ++        +    ++        +   +          +   +         +   +          ++   +         sl    +         sl   +        +  +          sl  +        Sl     +        Sl   +          ++   Sl       sl   +        +    + +        +  ++          +    ++        ++   Pubic Symphysis         ++          Not tested         ++      ++        ++            + Not tested Not tested          +             ++         +          +   Obturator  externus          -          +   -          -   -          -    -           -   -          -   -          -   -         -    -            -   -          -   -            -   Rectus Diastasis   1/2 finger                                         Ant/middle scalenes                   ++        +   ++       +   Upper traps                  ++         +   +          +   Levator scap                   ++          +     +           sternocleidomastoid                   Sl        Sl      -         -                        sternocostals   +        +   +          +    +         -    +           +   Sl        Sl    +         Sl  Not tested     +         +     +         +   +         +   Sl      sl   +          +    +        +   +          +   +          +   All 4s Positioning: Pt not able to assume full lumbar extension in this position  Leg Length:  The right  lower extremity is equal to the left        Monthly Objective Measurement/Functional Activity  Date: 01/08/2019 03/21/19 04/25/19 06/05/19 07/09/19 09/17/19 10/29/19    Oswestry Pain Score 52/100  46/100    48/100      48/100       50/100 48/100    52/100                          AROM Lumbar Spine: Degrees   Degrees      Degrees      Degrees      Degrees      Degrees      Degrees    Degrees      Flexion 106 pain          99          83       75 pain          63 onset of pain         69 pain increased      57pain        Extension 3 pain           17           19       11 pain           11  Pinches       16 pain increased    12       Right Lat. Flexion 29 pain right trunk          24        25      25    23 pinch       19 pinch  14       Left Lat. Flexion 31         19         23     14  15 pinch     11 pinch  18       Right Lat Shift Pelvis inc pain   Inc pain Left SI jt       Slight increase No change but can't do far Feels caught  Increased pain     Sl inc pain        Left Lat Shift Pelvis  less inc pain       No change       No change/ slight cielo  No change but can't  go far  Easier but not natural  Increased pain  Sl inc pain                AROM Hips:  (degrees) Right      Left Right Left Right  Left Right  Left Right  Left Right  Left Right  Left Right  Left      Flexion 110       110  125    120    128      125 130       130 130      130 125      120  128    120       Abduction 37       41   45      45      42      44  43          45   45       45  45          45  43      45       Int. Rotation 32       22  30      25      31         28  32          30   34        32   30         28   32       31       Ext. Rotation  34       42  35      40       38        41   35         40   40        42   35         37   38         39               Flexibility:   (degrees) Right    Left Right  Left Right  Left Right  Left Right  Left Right  Left Right  Left Right  Left      Hamstrings -32       -28  -30     -25   -25       -25   -28      -27  -30      -26   -28       -29   -25     -25       Quadriceps 39       42 40        45    not tested  40          42  45        47   50         52   Not tested       HipExt/Rot(piriformis) 30       28   32      30  33         32   35         33   37        35   35        35   38       36       Hip Int/Rotators 27       9   25      10   28        15   27         16   25         18   20        20  22        18       Hip Flexors (iliopsoas) -       -             IT Band -       -                 No/ 30 min    Reflexes: Right      Left Right  Left Right  Left Right  Left Right  Left Right  Left Right  Left Right  Left      L4 (Quad) +1       +1             S1 (Achilles) +1       +1                     Root Level  - Motor Right      Left Right  Left Right  Left Right  Left Right  Left Right  Left Right  Left Right  Left     T12             Rectus Abdominus 4+/5     4+/5         5/5        5/5          L1              Paraspinals 5/5         5/5             L2              Hip Flexion 5/5          5/5             L3             Quads 5/5          5/5              L4              Anterior Tibialis 5/5         5/5             L5             Gr. Toe Extension 5/5           5/5             S1            Gastroc/Sol/Peroneals 5/5          5/5                     Functional Strength:             Single LegStanceTime (seconds) R:30   L:30   R:      L: R:      L: R: 30 pain     L:30 pain  R:      L: R:      L: R:      L: R:      L:     Pelvic Floor Isolation (seconds) -    10 sec 10 sec   > 10 sec        Inner Unit  Isolation (seconds) -    10 sec 10 sec     > 10 sec                Functional Activities:              Sit w/o pain? Pain increases (minutes) No/ 30 min No/ 30  min No/ 30 min  No/  30 min No/ 30 min  No/ 30min  No/ 30 min       Stand w/o pain? No/ 30 min No/ 30 min No/ 10 min  No/ 10 min No/ 10 min  No/ 15-20 min No/ 10 min       Walk w/o pain? varies No/ 1 mile No/ 1/2 mile No/ 1 mile No/ 1mile, piriformis, B No/ a couple miles  No/ 1/2 mile       Steps w/o pain? 1 fl, avoids them 1 fl avoids  1 fl avoids  No/ avoids No/ doesn't do No/ 1 flight  No/ 1 fl       Drive w/o pain? No/ 10-15 min No/ 30-45 min No/ 30-45  Min  No/ 30-45 min No/ 35-45 No/ 35-45 No/ 30 min       Work w/o pain? No/ 30 min No/ 30 min No/ 30  No/ 30 min No/30 mi   No/ 30 min  No/ 30  min       # times wakes w/ pain? 4-5x/night, now taking meds at night she is able to sleep.  2x   2-3x  2x         1x         Several times  Several times        PLAN OF CARE:    ASSESSMENT:  Problem List:   1. SI joint dysfunction  2. Lack of spinal stabilization  3. Postural dysfunction     Discussion:  Upper quarter much improved from last visit.  Decreased tightness in both upper traps and ant/mid scalenes.     She was able to tolerate the inversion table for 35 sec inverted but if increased to 45 sec it was too much and she started to guard with the right piriformis.  She will be purchasing an inversion table for home use over the next couple months.   Reviewed prone arm lift; doing well.  She's still having  "difficulty doing diaphragmatic breathing without engaging piriformis and bent knee fall outs without engaging piriformis.  She has to really concentrate to do this correctly.         Short Term Goals: (4-6 weeks)                                   Date Met:                1.   pt independent in postural correction         02/07/19  2.   pt independent in SI joint self-corrections        03/21/19  3.   pt independent in exer to decrease post. disc pressures      03/21/19  4.   pt able to sleep through night without pain  5.   pt able to sit 15 minutes without pain  6.   pt able to walk 10 minutes without pain        06/20/19    7.   Reduce pt pain to no greater than 7/10        03/21/19  8.   Decrease Oswestry Pain Score to no greater than 45/100  9.  Reduce pt pain to no greater than 6/10        07/16/19  10.  Pt able to isolate the pelvic floor in supine x10, 10 sec      03/28/19  11.  Pt able to isolate transverse abdom in all 4s, x10 sec, x10         03/28/19  12.  Pt able to isolate the multifidus in sitting x10 sec, x10                            03/28/19  13.  Pt able to engage inner unit, move from sit to stand &back to sit      04/04/19                                                                                                                                                                                                                                           14.  Pt able to engage inner unit and walk 4 steps without overflow to hamstrings     06/20/19  15.  Pt able to hip abduct x6 without engaging the piriformis      06/20/19     16.  Pt able to perform prone knee flexion without engaging piriformis x6          05/07/19       17.  Pt able to perform remedial lower abs with scissor arm work x6 w/o pain    04/18/19    18.  Pt able to perform \"Pre-cursor\" lower abs leg lift initiation x6 without pain          04/18/19   19.  Pt able to perform retro step without engaging piriformis x6, " "bilaterally    09/24/19    20.  Pt able to perform bridging x6 without engaging piriformis      04/25/19    21.  Pt able to perform \"scissors\" arm movement with inner unit w/o piriformis x6   05/02/19  22.  Pt able to perform PIC hip adductors without engaging the piriformis  x6    05/07/19  23.  Pt able to perform \"Scissors\" arm motion w/ inner unit with 1lb wts x6 w/o pain   05/07/19  24.  Pt able to perform prone glut max over stability ball x6 w/o engaging piriformis   25.  Pt able to reduce pain w/ self PIC to left psoas       05/14/19  26.  Pt able to move laterally on stability ball in sitting without pain x6     11/05/19  27.  Pt able to move A/P movement sitting on stability ball without pain x6    11/05/19  28.  Pt independent in pool exercises, to do without increasing pain     07/09/19  29.  Pt able to throw and catch stability ball in supine with inner unit on x6    06/11/19  30.  Pt able to hold medicine ball in stance and move away from trunk with IU on and w/o pain x6 06/11/19  31.  Pt independent in supine hamstring stretch, able to do 2x without pain.    07/16/19  32.  Pt independent in hip adductor stretching, supine and stance     07/16/19  33.  Pt independent in quad stretching, in stance, without pain.      08/06/19  34. Pt independent in hip internal rotator stretch in supine, without pain      08/06/19  35.  Pt able to tolerate ADLs with leukotape on scapula for enhanced spinal stabilization x 1 day 07/25/19  36.  Pt able to perform prone walk out x6 without losing inner unit engagement  37. Pt able to perform prone arm lift x6, phase 1, without engaging the upper trap   08/06/19  38.  Pt able to perform supine obliques over stability ball x6 without losing inner unit engagement 08/06/19  39.  Pt able to perform prone arm lift, lifting elbow without engaging upper traps x6  40.  Pt able to perform upper quarter stretches without increasing pain     09/17/19  41.  Pt able to do adapted BKFO " without engaging piriformis x6  42.  Pt able to do diaphragmatic breathing x6 without engaging piriformis                                                                                                                                         Long Term Goals:(3-5 months)      Date Met:      1.  pt independent in self-management of symptoms       2.  pt independent in home program      3.  pt able to work 6 hours without pain      4.  pt able to sit 60 minutes without pain      5.  pt able to walk 45 min without pain    Progress Towards Goals:  As expected to a little slower than expected    Treatment Plan:   1.  Ultrasound to increase extensibility, decrease pain, if needed  2.  Electrical stimulation for muscle relaxation, decrease pain, if needed, iontophoresis  3.  Manual therapy to increase function, decrease pain  4.  Therapeutic exercise to increase function, decrease pain  5.  Home programming and d/c planning to increase function and decrease pain,     Frequency & Duration:  Pt in need of more visits for neuro motor retraining and continued strengthening for the inner unit thus will see on a once/wk to once every other week basis for 8 more visits to complete the spinal stabilization instruction, meet remaining short and long term goals and be independent in her home program.      Patient Participated in and Agrees With This Plan of Care and Goals:  YES  Rehab Potential:  jarret Marcial, PT, MS  Physical Therapist  [place]# [id number]      TREATMENT TODAY:    Total Treatment Time: (time in clinic)   60  min  Timed Code Treatment Minutes:           Supplies given:      Manual Therapy:  (MA)  RX min:     35  Manual posterior rotation of left innominate    Positional Isometric contraction (PIC) of right iliopsoas    Positional Isometric contraction of (PIC) right gluteus minimus    Distraction of both SI jts in open pack hip position  Piriformis stretching, bilaterally    Quadratus lumborum stretching,  bilaterally    Anterior/Inferior Mobilization of  right hip    Positional Isometric Contraction of multifidus  Myofascial work trunk   Sternocostal and rib mobs   Manual spinal distraction  PIC C-spine  PIC T-spine  Stretching scalenes, upper traps, levator scap      Therapeutic Activities:  (TA)  RX min:    15    Neuromuscular Reeducation: (NMR)  RX min:   10    Ultrasound:  RX min:         1.6 vance/cm2       Location:      Iontophoresis:  6 hr patch                                Location:                           Ice:        ocedures:      Key:  i=instructed        r=review        c=corrected        a=adapted   Code Procedure/Instruction 10/29/19 11/05/19                       TA         Sleeping Positions                         TA Sternum-Up Posture                         TA SI jt. self-correction                         TA Lumbar Facet PIC                         TA   Order of Self-Corrections                         TA Use of lumbar pillow                         TA Use of cervical roll                         TA Use of orthotics                         TA Use of SI belt                         TA Use of Nada chair                         TA Use of Ice                         TA Use of [employer]/ heat                         TA Use of [employer] Relief                         TA Use of TENS unit                         TA Use of iontophoresis                         TA Decreasing Disc Pressures                         TA Use of sacro wedge                         TA Use of inversion table Used for 10 min  Used for 13 min                                                  NMR Leukotaping upper quarter                         NMR Pelvic Floor Isolation                         NMR Transverse Abdominus                         NMR Multifidus Isolation                         NMR Diaphragmtic breathe supine  reviewed                       NMR  Diaphragmtic breath sit                         NMR Pelvic Clock in  sitting                         NMR Kinesiotape                         NMR InnerUnit against Gravity                         NMR Sit-stand w/ inner unit                         NMR Roll w/ inner unit                         NMR Walk w/ inner unit                          NMR Up/down steps w/ inner unit                         NMR Water Exer Instruction                         NMR Hip Abd w/o piriformis                         NMR Hip Add w/o iliop/ham                          NMR Lower abs in supine                         NMR Inner unit challenge with scissor arm work                          NMR Abd obliques in supine  reviewed                       NMR Prone Knee Flexion                         NMR Supine glute w/ ball btwn knees                          NMR Prone glut amrita                         NMR Retro Step                         NMR Retro Walk                         NMR Retro walk on treadmill                         NMR Forward walk w/ inner unit  reviewed                       NMR Stability ball multifidus bounce                         NMR Stability Ball A/P & Lateral  reviewed                       NMR Ball Catch/Throw /Supine                         NMR Ball movemt in /Stance                         NMR StabilityBall All 4's Arm Lift                         NMR StabilityBall Prone Walk Out                         NMR StabilityBall Supine Oblique                         NMR Stability Ball sit-supine-sit                         NMR Walking Prog Progression                         MNR Home program sequencing                                                   TA Hamstring stretch                         TA Hip Ext Rot Stretch                         TA Hip Int Rot Stretch                         TA Hip Flex/IT Stretch                         TA Hip Add Stretch                         TA Lats Dorsi Stretch                         TA Gastroc/soleus stretch                         TA QuadratusLumborm  Stretch                            Supine                            Stance                         TA Quad Stretch                                                   NMR Wall Push Ups                         NMR Planking                         NMR BOSU Ball Exercises                         NMR Lateral Bridge Drop                         NMR [place]ers [place]                         NMR [place] Lat Pull Down                         NMR [place] Hip Ext                         NMR [place] Trunk Ext                                                   TA CervDiscExtSup/stnd                         TA Cerv Stretches                         TA Self PIC Cervical Spine                         TA Neural Gliding                         TA Upper trap stretching                         TA Ant/Mid Scalene Strch                         TA Levator Scap strch                         TA Biceps stretch                         TA Rotator Cuff Stretch                         TA Anterior Chest Stretch                         TA Wrist Ext Stretch                                                   NMR Prone Arm Lifts                         NMR Scapula Stabilization                         NMR Occulomotor Isometrics                         NMR Cervical Isometrics                                                   TA Shld AROM w/bar                         TA Shld Capsular Stretching                         TA Self PIC Thor Spine                         TA Rot Cuff Therabd, beginner                         TA Rot Cuff Therabd, intermed                                                                                                                                                                                                                                                                                                                           Miracle Marcial, PT, MS  Physical Therapist  [place]# [id number]

## 2019-11-12 ENCOUNTER — TREATMENT (OUTPATIENT)
Dept: PHYSICAL THERAPY | Facility: CLINIC | Age: 34
End: 2019-11-12

## 2019-11-12 DIAGNOSIS — R29.3 POSTURE IMBALANCE: ICD-10-CM

## 2019-11-12 DIAGNOSIS — M53.3 SACROILIAC JOINT DYSFUNCTION: Primary | ICD-10-CM

## 2019-11-12 DIAGNOSIS — S39.012D STRAIN OF LUMBAR REGION, SUBSEQUENT ENCOUNTER: ICD-10-CM

## 2019-11-12 DIAGNOSIS — M35.7 FAMILIAL LIGAMENTOUS LAXITY: ICD-10-CM

## 2019-11-12 PROCEDURE — 97140 MANUAL THERAPY 1/> REGIONS: CPT | Performed by: PHYSICAL THERAPIST

## 2019-11-12 PROCEDURE — 97530 THERAPEUTIC ACTIVITIES: CPT | Performed by: PHYSICAL THERAPIST

## 2019-11-12 NOTE — PROGRESS NOTES
"Physical Therapy Note      SUBJECTIVE:   Changes since last seen:  \"I'm ok, some pain since last seen.\"   She went to [email or web address] football game; had pain during and after as sat without good support, increased pain next day, not as bad today.   She was surprised she had minimal spasming day after game, typically would have had more.     \"I had more lumbar pain vs piriformis pain after the game.\"   Coughing is better, but still there.   Use of inversion table on last visit felt good but a little sore that night.   She is not sure why but last night had pain in right glut medius/ gr troch area, but better this am.     Current level of pain:    5/10   Lowest level of pain since last seen:  5/10  Highest level of pain since last seen:   7/10       Past Medical History:  1.  AA 2013  Treated with PT for left shoulder labral tear, no low back treatment   2.  Hx of two falls when mopping in socks, over the last 2 years  3.  Hx of regular, heavy, cramping menses  4.  T& A, 2006  5.  Allergic to Wellbutrin and mild allergy to contrast dye  6.  Chronic yeast infections  7.  Uterine fibroids removed, 2 yrs ago       Functional Limitations:  Sitting, standing, walking, stairs, driving, working, sleeping, squatting, housework and changing positions.   Patient Goals for Physical Therapy: \"Pain relief\"      OBJECTIVE:  Observation:      08/06/19 08/13/19 09/17/19 09/24/19 10/01/19 10/29/19 11/05/19 11/12/19             SI Joint Positioning  Right  Left Right  Left Right  Left Right  Left Right  Left Right  Left Right  Left Right  Left Right  Left Right  Left Right  Left Right  Left Right  Left Right Left Right Left Right Left Right  Left Right  Left       Innominate                            Anterior    x               x     x               x   X               x               x              sl                    Posterior               x     x                x    x               x   x    x   sl                Sacrum                        "   Unilateral rotation    x      x    x   x   x   x   x    x                                     SI Joint Testing  Right  Left Right  Left Right  Left Right  Left Right  Left Right  Left Right  Left Right  Left Right  Left Right  Left Right  Left Right  Left Right  Left Right Left Right Left Right Left Right  Left Right  Left           Distraction   +          +   +           +   +         +   +           +   +           +   +           +     +          +    +           +                     Joanna's   +          +   +         +   +          +   +         +   -           +                        Compression   Sl        Sl    Sl         Sl    -          -      -          -                        Prone Press Up   -          -   -           -   -            -   -          -   Sl        Sl                                      Special Tests  Right   Left Right  Left Right  Left Right  Left Right  Left Right  Left Right Left  Right  Left Right  Left Right  Left Right  Left Right  Left Right  Left Right Left Right Left Right Left Right Left  Right  Left      Straight Leg Raise                             Active   -            -    -         -    -           -     -         -                     Passive   -           -    -          -   -          -     -        -                 Hip Scour Test   sl           sl   Sl         Sl    +         sl    Sl        Sl    Sl       sl   Sl          sl   Sl         Sl    Sl        sl                                  Bakers Cyst   -            -     +          -     Sl        -   -           -   -         -   +         -   ++       -                  01/08/2019 02/07/19 03/21/19 03/28/19 04/04/19 04/11/19 04/18/19 04/25/19 05/02/19 05/07/19 05/14/19 06/05/19 06/11/19 06/20/19 06/25/19 07/09/19 07/16/19 07/23/19   SI Joint Positioning  Right  Left Right  Left Right  Left Right  Left Right  Left Right  Left Right  Left Right  Left Right  Left Right  Left Right  Left Right  Left Right  Left  Right Left Right Left Right Left Right  Left Right  Left       Innominate                            Anterior    x   x    x  x                x                 x              X     x    x   x   x               x   X                    sl    x                x               x                x          Posterior                x               x        x              x     x     x    x               X                x               x              x                x               x   sl               x    x    x   x      Sacrum                               Unilateral rotation    x   x   x    x    x     x    x    x   x   x    x    x   x   sl   x    x     x                           SI Joint Testing  Right  Left Right  Left Right  Left Right  Left Right  Left Right  Left Right  Left Right  Left Right  Left Right  Left Right  Left Right  Left Right  Left Right Left Right Left Right Left Right  Left Right  Left           Distraction     +          +   +         +   +          +    +        +  +           +   +          +   +          +   +          +     +        +   +         +   +         +   +          +   +           + +           +   +          +    +          +   +          +   +           +           Joanna's     +          +   Sl         sl    +         -    Sl         -   Sl         -   Sl         -   -           -                      Compression     +          +   Sl          sl   Sl          Sl     Sl        Sl    -          -   -         -   -           -                      Prone Press Up           +         +         Sl          -         -        -         -                                   Special Tests  Right   Left Right  Left Right  Left Right  Left Right  Left Right  Left Right Left  Right  Left Right  Left Right  Left Right  Left Right  Left Right  Left Right Left Right Left Right Left Right Left  Right  Left      Straight Leg Raise                                     Active   -         -          -            -                      Passive   -         -             -             -                  Hip Scour Test   ++       Sl    ++       Sl    +           sl   Sl         Sl    Sl         sl Sl          Sl    Sl        sl   Sl       Sl    Sl         Sl    Sl         Sl     Sl         sl   Sl        Sl    Sl        Sl     -            -   Sl         sl   -           -   -          -   -            -                        Bakers Cyst       +         -     +        -    Sl        -   Sl         -     Sl        -   Sl        Sl   sl          sl   Sl         -   -           -   -          -   +           -   Sl      sl       Palpation:   Date 08/06/19 08/13/19 09/17/19 09/24/19 10/01/19 10/29/19 11/05/19 11/12/19              Right  Left Right  Left Right  Left Right  Left Right  Left Right  Left Right  Left Right  Left Right  Left Right  Left Right  Left Right  Left Right  Left Right Left Right Left Right Left Right  Left Right  Left   Piriformis   ++        ++   ++       ++   ++        ++   +         +    ++       +   ++        ++    ++        +   ++        sl             Gr. Trochanter    +        -   ++          +   ++      +   ++         +     Sl        -   ++        +    +         Sl    ++         sl             IT Band    Sl         Sl    ++        +   +         sl   Sl        Sl     +          Sl    ++         +    +         -    +         -             Quadratus Lumborum   ++         +   +         +   ++        +   +         Sl    ++        +   ++         +   +         ++     +         +             Ischial Tuberosity    -         -   -          -    -        -   -            -     Sl       -   Sl         Sl     -          Sl      -         -             Sacrococcygeal Ligs    +          +   +       +   +         sl    +         +     -          -   -          -   +         -   -           +             Paraspinals                     Spinous Processes   +           sl   ++        sl    +           -                        C-spine rotated to    C2-5 C2-6 C2-5   -             - C2-3  C2-4           C3-7  C2    C3-7                         T-spine rotated to  T4-6 T4-7 T3-8   T3-7  T4-6  T4-6 T4-5 T3-12                    L-spine rotated to  L2-5 L2-5   L1-5  L3-5  L3-5  L2-5             L3-5 L2-5             Adductor Tony   +           +   ++      ++   +         ++   +        +   +           +   +           +   Sl        Sl              Iliopsoas   ++          +   +          sl   +         sl   +          Sl    ++        +  ++        +   +         +             Quad Origin    -          -   -          -   -        -   -          -    -          -   -           -   -          -             SI Joint   +        +   ++        ++  ++        +   ++       +   +            +   ++         +   ++      +    +         sl             Pubic Symphysis         +        +        +       +  not tested        ++           +          +             Obturator externus   +         +   +         +   Sl       sl   -           -   -          -    -          -   -         -             Rectus Diastasis                                          Ant/middle scalenes   +        sl   +         +   +         +   +          +   +        sl   +          +   +        Sl    +        sl             Upper traps   +         Sl    +         +   +         +   +         sl   +        Sl    +          sl  sl         sl  +        sl             Levator scap   +        Sl    +        +   +        sl   +         sl   +         sl    ++        sl   +          Sl   +         sl             sternocleidomastoid   -         -   -            -   -          -    -          -   -          -   -            -    -         -                                    sternocostals   +          +   +          +   +        Sl   ++          +   +          +   +             +   Sl       Sl    +          +                       Muscle/Bone Irritation  Date 01/08/2019 02/07/19 03/21/19  03/28/19 04/04/18 04/11/19 04/18/19 04/25/19 05/02/19 05/07/19 05/14/19 06/05/19 06/11/19 06/20/19 06/25/19 07/09/19 07/16/19 07/23/19    Right  Left Right  Left Right  Left Right  Left Right  Left Right  Left Right  Left Right  Left Right  Left Right  Left Right  Left Right  Left Right  Left Right Left Right Left Right Left Right  Left Right  Left   Piriformis  ++        +   ++          +   ++       +   ++        +    ++        Sl    ++       Sl   ++       +    ++      +   ++        +   +           +    +        Sl      +         ++   +           ++    +       -   ++      ++   +          +   ++        +   ++         ++   Gr. Trochanter  +          +  +          sl    +           +    +       ++    +          -   +         Sl    +         sl   +         sl   Sl        Sl    +          +    Sl        sl    Sl         +   Sl          sl     +       -   +         +    +         +   +         sl   ++           +   IT Band  +         sl   Sl        sl     -          -   -          -    Sl         -   Sl         -   Sl        -   Sl         -  sl          -   Sl          Sl      -          -     -           -   -            -    Sl        Sl    Sl       Sl     Sl       sl   +          Sl     +          ++   Quadratus Lumborum  ++        ++   ++        +    +        ++   ++        ++     +       ++   +          +  ++        ++   Sl        Sl    +       ++   +          ++    Sl       +     +         +   +          ++     Sl       +   ++       +    ++       +    +         +    ++       +   Ischial Tuberosity  ++         sl   -          -    -           -     -           -     -          -   -          -   -          -   -          -   -          -  -            -     -           -   -          -   -             -     Sl       -   Sl        Sl     -          -     -         -    Sl        Sl    Sacrococcygeal Ligs  ++          +   Sl        sl   +          +    +       +    Sl        Sl    Sl       Sl    +         sl  ++       +  +        Sl       Sl         +   Sl         Sl    Sl         Sl      -        Sl   not  tested    +         +    Sl       Sl    +          +   Paraspinals  +           +     +         sl    +    +          -   Sl         +   +         sl   +        sl  +         -   Sl          +   Sl          +   +          sl   ++         sl   +          -   +         Sl    ++        +     +       Sl    +         sl   Spinous Processes                                        C-spine rotated to                           C3-5  C2-5 C2-6         T-spine rotated to   -               -              T8-9  T4-5    T3-8          L-spine rotated to  L2-5        L1-5  L4-5  L3-5  L2-5            L3-5  L1-5  L2-5  L2-5              L1-5           L2-5 L2-5   L1-5 L3-5 L3-5  L2-5 L4-5 L1-5   Adductor Tony    +        +       +         +     -         +   -           Sl     -        sl   +          sl   -          -   -           -   -          -   -           -   -          -   -           sl  ++        +   +         sl   +         +   +         ++   ++        +  +            +   Iliopsoas  ++         +   ++        +   ++         +    +         ++  sl        Sl    Sl       Sl    Sl       Sl    Sl         -   Sl        -   Sl         +   +         Sl    +          sl  +          ++   +          +   +          +   +         ++   ++        +   ++         +   Quad Origin  sl          sl   Sl          sl    -          -   -           -    -         -   -          -   -         -   -          -   -          -   -           -    -         -   -            -   -         -   -         -   -         -   -            -   -          -   -         -   SI Joint  ++         +  ++        +    ++        +   +          +   +         +   +          ++   +         sl    +         sl   +        +  +          sl  +        Sl     +        Sl   +          ++   Sl       sl   +        +    + +        +  ++          +    ++        ++   Pubic Symphysis         ++           Not tested         ++      ++        ++            + Not tested Not tested          +             ++         +          +   Obturator externus          -          +   -          -   -          -    -           -   -          -   -          -   -         -    -            -   -          -   -            -   Rectus Diastasis   1/2 finger                                         Ant/middle scalenes                   ++        +   ++       +   Upper traps                  ++         +   +          +   Levator scap                   ++          +     +           sternocleidomastoid                   Sl        Sl      -         -                        sternocostals   +        +   +          +    +         -    +           +   Sl        Sl    +         Sl  Not tested     +         +     +         +   +         +   Sl      sl   +          +    +        +   +          +   +          +   All 4s Positioning: Pt not able to assume full lumbar extension in this position  Leg Length:  The right  lower extremity is equal to the left        Monthly Objective Measurement/Functional Activity  Date: 01/08/2019 03/21/19 04/25/19 06/05/19 07/09/19 09/17/19 10/29/19          Oswestry Pain Score 52/100  46/100    48/100      48/100       50/100 48/100 52/100                                            AROM Lumbar Spine: Degrees   Degrees      Degrees      Degrees      Degrees      Degrees Degrees Degres Degrees Degrees Degrees Degrees      Degrees    Degrees      Flexion 106 pain          99          83       75 pain          63 onset of pain         69 pain increased   57 pain             Extension 3 pain           17           19       11 pain           11  Pinches       16 pain increased 12             Right Lat. Flexion 29 pain right trunk          24        25      25    23 pinch       19 pinch  14             Left Lat. Flexion 31         19         23     14  15 pinch     11 pinch  18             Right Lat Shift Pelvis inc pain    Inc pain Left SI jt       Slight increase No change but can't do far Feels caught  Increased pain  Sl inc pain             Left Lat Shift Pelvis  less inc pain       No change       No change/ slight cielo  No change but can't go far  Easier but not natural  Increased pain  Sl  Inc pain                           AROM Hips:  (degrees) Right      Left Right Left Right  Left Right  Left Right  Left Right  Left Right Left Right Left Right Left Right Left Right Left Right left Right  Left Right  Left      Flexion 110       110  125    120    128      125 130       130 130      130 125      120 128     120             Abduction 37       41   45      45      42      44  43          45   45       45  45          45 43        45             Int. Rotation 32       22  30      25      31         28  32          30   34        32   30         28 32        31             Ext. Rotation  34       42  35      40       38        41   35         40   40        42   35         37  38        39                           Flexibility:   (degrees) Right    Left Right  Left Right  Left Right  Left Right  Left Right  Left Right  left Right Left Right Left Right Left Right Left Right Left Right  Left Right  Left      Hamstrings -32       -28  -30     -25   -25       -25   -28      -27  -30      -26   -28       -29  -25       -25             Quadriceps 39       42 40        45    not tested  40          42  45        47   50         52  not tested             HipExt/Rot(piriformis) 30       28   32      30  33         32   35         33   37        35   35        35  38       36             Hip Int/Rotators 27       9   25      10   28        15   27         16   25         18   20        20  22      18             Hip Flexors (iliopsoas) -       -                   IT Band -       -                                 Reflexes: Right      Left Right  Left Right  Left Right  Left Right  Left Right  Left Right Left Right left Right left Right  Left Right left Right Left Right  Left Right  Left      L4 (Quad) +1       +1                   S1 (Achilles) +1       +1                                 Root Level  - Motor Right      Left Right  Left Right  Left Right  Left Right  Left Right  Left Right Left Right Left Right Left Right Left Right Left Right Left Right  Left Right  Left     T12             Rectus Abdominus 4+/5     4+/5         5/5        5/5                L1              Paraspinals 5/5         5/5                   L2              Hip Flexion 5/5          5/5                   L3             Quads 5/5          5/5                   L4              Anterior Tibialis 5/5         5/5                   L5             Gr. Toe Extension 5/5           5/5                   S1            Gastroc/Sol/Peroneals 5/5          5/5                                 Functional Strength:                   Single LegStanceTime (seconds) R:30   L:30   R:      L: R:      L: R: 30 pain     L:30 pain  R:      L: R:      L: R:       L: R:       L: R:       L: R:       L: R:       L: R:       L:  R:      L: R:      L:     Pelvic Floor Isolation (seconds) -    10 sec 10 sec   > 10 sec              Inner Unit  Isolation (seconds) -    10 sec 10 sec     > 10 sec                            Functional Activities:                    Sit w/o pain? Pain increases (minutes) No/ 30 min No/ 30  min No/ 30 min  No/  30 min No/ 30 min  No/ 30min  No/ 30 min             Stand w/o pain? No/ 30 min No/ 30 min No/ 10 min  No/ 10 min No/ 10 min  No/ 15-20 min  No / 10 min             Walk w/o pain? varies No/ 1 mile No/ 1/2 mile No/ 1 mile No/ 1mile, piriformis, B No/ a couple miles  No/ 1/2 mile             Steps w/o pain? 1 fl, avoids them 1 fl avoids  1 fl avoids  No/ avoids No/ doesn't do No/ 1 flight  No/ 1 fl              Drive w/o pain? No/ 10-15 min No/ 30-45 min No/ 30-45  Min  No/ 30-45 min No/ 35-45 No/ 35-45 No/ 30 min             Work w/o pain? No/ 30 min No/ 30 min No/ 30   No/ 30 min No/30 mi   No/ 30 min  No/ 30 min             # times wakes w/ pain? 4-5x/night, now taking meds at night she is able to sleep.  2x   2-3x  2x         1x         Several times   several times              PLAN OF CARE:    ASSESSMENT:  Problem List:   1. SI joint dysfunction  2. Lack of spinal stabilization  3. Postural dysfunction     Discussion:  The flare up of symptoms from coughing is resolving.    Reapplied KT tape for neuro muscular retraining of the right lower trap and rhomboids as she is in need of bring right scap out of protraction and anterior tilting.  She is doing this, inappropriately, in an attempt to stabilize her trunk.  Thus this tightens the upper quarter musculature.    She is progressing well with the prone arm lifts to strengthen the upper posterior trunk.   She continues to tolerate the inversion table and will be purchasing one for home for self management once she moves into her new home next year.   She needs continued work on the neuro motor retraining of the bent knee fall out as she is still over engaging the right piriformis when doing this.       Tete has made great progress since she was first seen in January of 2019.  She has been taught a great deal in how to self manage her SI joint symptoms, lumbar disc symptoms, postural dysfunction, ligamentous laxity, and upper quarter guarding.  Her overall pain has decreased but her Oswestry Pain score, although at times decreased to 46/100 it remains the same as it was on the initial eval at 52/100.  I recommend further assessment /imaging of lumbar spine. She sees primary care doctor on the 21st of Nov and will discuss this recommendation with him.       Short Term Goals: (4-6 weeks)                                   Date Met:                1.   pt independent in postural correction         02/07/19  2.   pt independent in SI joint self-corrections        03/21/19  3.   pt independent in exer to decrease post. disc  "pressures      03/21/19  4.   pt able to sleep through night without pain  5.   pt able to sit 15 minutes without pain  6.   pt able to walk 10 minutes without pain        06/20/19    7.   Reduce pt pain to no greater than 7/10        03/21/19  8.   Decrease Oswestry Pain Score to no greater than 45/100  9.  Reduce pt pain to no greater than 6/10        07/16/19  10.  Pt able to isolate the pelvic floor in supine x10, 10 sec      03/28/19  11.  Pt able to isolate transverse abdom in all 4s, x10 sec, x10         03/28/19  12.  Pt able to isolate the multifidus in sitting x10 sec, x10                            03/28/19  13.  Pt able to engage inner unit, move from sit to stand &back to sit      04/04/19                                                                                                                                                                                                                                             14.  Pt able to engage inner unit and walk 4 steps without overflow to hamstrings   06/20/19  15.  Pt able to hip abduct x6 without engaging the piriformis      06/20/19     16.  Pt able to perform prone knee flexion without engaging piriformis x6          05/07/19       17.  Pt able to perform remedial lower abs with scissor arm work x6 w/o pain    04/18/19    18.  Pt able to perform \"Pre-cursor\" lower abs leg lift initiation x6 without pain          04/18/19   19.  Pt able to perform retro step without engaging piriformis x6, bilaterally    09/24/19    20.  Pt able to perform bridging x6 without engaging piriformis      04/25/19    21.  Pt able to perform \"scissors\" arm movement with inner unit w/o piriformis x6   05/02/19  22.  Pt able to perform PIC hip adductors without engaging the piriformis  x6    05/07/19  23.  Pt able to perform \"Scissors\" arm motion w/ inner unit with 1lb wts x6 w/o pain   05/07/19  24.  Pt able to perform prone glut max over stability ball x6 w/o engaging " piriformis   25.  Pt able to reduce pain w/ self PIC to left psoas       05/14/19  26.  Pt able to move laterally on stability ball in sitting without pain x6     11/05/19  27.  Pt able to move A/P movement sitting on stability ball without pain x6    11/05/19  28.  Pt independent in pool exercises, to do without increasing pain     07/09/19  29.  Pt able to throw and catch stability ball in supine with inner unit on x6    06/11/19  30.  Pt able to hold medicine ball in stance and move away from trunk with IU on and w/o pain x6 06/11/19  31.  Pt independent in supine hamstring stretch, able to do 2x without pain.    07/16/19  32.  Pt independent in hip adductor stretching, supine and stance     07/16/19  33.  Pt independent in quad stretching, in stance, without pain.      08/06/19  34. Pt independent in hip internal rotator stretch in supine, without pain      08/06/19  35.  Pt able to tolerate ADLs with leukotape on scapula for enhanced spinal stabilization x 1 day 07/25/19  36.  Pt able to perform prone walk out x6 without losing inner unit engagement    11/12/19  37. Pt able to perform prone arm lift x6, phase 1, without engaging the upper trap   08/06/19  38.  Pt able to perform supine obliques over stability ball x6 without losing inner unit engagement 08/06/19  39.  Pt able to perform prone arm lift, lifting elbow without engaging upper traps x6   11/12/19  40.  Pt able to perform upper quarter stretches without increasing pain     09/17/19  41.  Pt able to do adapted BKFO without engaging piriformis x6        42.  Pt able to do diaphragmatic breathing x6 without engaging piriformis       11/12/19                                                                                                                                          Long Term Goals:(3-5 months)      Date Met:      1.  pt independent in self-management of symptoms       2.  pt independent in home program      3.  pt able to work 6 hours without  pain      4.  pt able to sit 60 minutes without pain      5.  pt able to walk 45 min without pain    Progress Towards Goals:  As expected to a little slower than expected    Treatment Plan:   1.  Ultrasound to increase extensibility, decrease pain, if needed  2.  Electrical stimulation for muscle relaxation, decrease pain, if needed, iontophoresis  3.  Manual therapy to increase function, decrease pain  4.  Therapeutic exercise to increase function, decrease pain  5.  Home programming and d/c planning to increase function and decrease pain,     Frequency & Duration:  Pt in need of more visits for neuro motor retraining and continued strengthening for the inner unit thus will see on a once/wk to once every other week basis for 7 more visits to complete the spinal stabilization instruction, meet remaining short and long term goals and be independent in her home program.      Patient Participated in and Agrees With This Plan of Care and Goals:  YES  Rehab Potential:  jarret Marcial, PT, MS  Physical Therapist  [place]# [id number]      TREATMENT TODAY:    Total Treatment Time: (time in clinic)   60  min  Timed Code Treatment Minutes:     60      Supplies given:      Manual Therapy:  (MA)  RX min:     40  Manual posterior rotation of left innominate    Positional Isometric contraction (PIC) of right iliopsoas    Positional Isometric contraction of (PIC) right gluteus minimus    Distraction of both SI jts in open pack hip position  Piriformis stretching, bilaterally    Quadratus lumborum stretching, bilaterally    Anterior/Inferior Mobilization of  right hip    Positional Isometric Contraction of multifidus  Myofascial work trunk   Sternocostal and rib mobs   Manual spinal distraction  PIC C-spine  PIC T-spine  Stretching scalenes, upper traps, levator scap      Therapeutic Activities:  (TA)  RX min:   20     Neuromuscular Reeducation: (NMR)  RX min:       Ultrasound:  RX min:         1.6 vance/cm2       Location:       Iontophoresis:  6 hr patch                                Location:                           Ice:        ocedures:      Key:  i=instructed        r=review        c=corrected        a=adapted   Code Procedure/Instruction 10/29/19 11/05/19 11/12/19                      TA         Sleeping Positions                         TA Sternum-Up Posture                         TA SI jt. self-correction                         TA Lumbar Facet PIC                         TA   Order of Self-Corrections                         TA Use of lumbar pillow                         TA Use of cervical roll                         TA Use of orthotics                         TA Use of SI belt                         TA Use of Nada chair                         TA Use of Ice                         TA Use of [employer]/ heat                         TA Use of [employer] Relief                         TA Use of TENS unit                         TA Use of iontophoresis                         TA Decreasing Disc Pressures                         TA Use of sacro wedge                         TA Use of inversion table Used for 10 min  Used for 13 min  Used for 10 min                                                NMR Leukotaping upper quarter                         NMR Pelvic Floor Isolation                         NMR Transverse Abdominus                         NMR Multifidus Isolation                         NMR Diaphragmtic breathe supine  reviewed                       NMR  Diaphragmtic breath sit                         NMR Pelvic Clock in sitting                         NMR Kinesiotape   On right scapula                      NMR InnerUnit against Gravity                         NMR Sit-stand w/ inner unit                         NMR Roll w/ inner unit                         NMR Walk w/ inner unit                          NMR Up/down steps w/ inner unit                         NMR Water Exer Instruction                         NMR Hip  Abd w/o piriformis                         NMR Hip Add w/o iliop/ham                          NMR Lower abs in supine                         NMR Inner unit challenge with scissor arm work                          NMR Abd obliques in supine  reviewed                       NMR Prone Knee Flexion                         NMR Supine glute w/ ball btwn knees                          NMR Prone glut amrita                         NMR Retro Step                         NMR Retro Walk                         NMR Retro walk on treadmill                         NMR Forward walk w/ inner unit  reviewed                       NMR Stability ball multifidus bounce                         NMR Stability Ball A/P & Lateral  reviewed                       NMR Ball Catch/Throw /Supine                         NMR Ball movemt in /Stance                         NMR StabilityBall All 4's Arm Lift                         NMR StabilityBall Prone Walk Out                         NMR StabilityBall Supine Oblique                         NMR Stability Ball sit-supine-sit                         NMR Walking Prog Progression                         MNR Home program sequencing                                                   TA Hamstring stretch                         TA Hip Ext Rot Stretch                         TA Hip Int Rot Stretch                         TA Hip Flex/IT Stretch                         TA Hip Add Stretch                         TA Lats Dorsi Stretch                         TA Gastroc/soleus stretch                         TA QuadratusLumborm Stretch                            Supine                            Stance                         TA Quad Stretch                                                   NMR Wall Push Ups                         NMR Planking                         NMR BOSU Ball Exercises                         NMR Lateral Bridge Drop                         NMR Waiters Paradis                         NMR  O'Feldt Lat Pull Down                         NMR O'Feldt Hip Ext                         NMR O'Feldt Trunk Ext                                                   TA CervDiscExtSup/stnd                         TA Cerv Stretches                         TA Self PIC Cervical Spine                         TA Neural Gliding                         TA Upper trap stretching                         TA Ant/Mid Scalene Strch                         TA Levator Scap strch                         TA Biceps stretch                         TA Rotator Cuff Stretch                         TA Anterior Chest Stretch                         TA Wrist Ext Stretch                                                   NMR Prone Arm Lifts                         NMR Scapula Stabilization                         NMR Occulomotor Isometrics                         NMR Cervical Isometrics                                                   TA Shld AROM w/bar                         TA Shld Capsular Stretching                         TA Self PIC Thor Spine                         TA Rot Cuff Therabd, beginner                         TA Rot Cuff Therabd, intermed                                                                                                                                                                                                                                                                                                                           Miracle Marcial, PT, MS  Physical Therapist  [place]# [id number]

## 2019-11-26 ENCOUNTER — TREATMENT (OUTPATIENT)
Dept: PHYSICAL THERAPY | Facility: CLINIC | Age: 34
End: 2019-11-26

## 2019-11-26 DIAGNOSIS — S39.012D STRAIN OF LUMBAR REGION, SUBSEQUENT ENCOUNTER: ICD-10-CM

## 2019-11-26 DIAGNOSIS — M53.3 SACROILIAC JOINT DYSFUNCTION: Primary | ICD-10-CM

## 2019-11-26 DIAGNOSIS — M35.7 FAMILIAL LIGAMENTOUS LAXITY: ICD-10-CM

## 2019-11-26 DIAGNOSIS — R29.3 POSTURE IMBALANCE: ICD-10-CM

## 2019-11-26 PROCEDURE — 97530 THERAPEUTIC ACTIVITIES: CPT | Performed by: PHYSICAL THERAPIST

## 2019-11-26 PROCEDURE — 97035 APP MDLTY 1+ULTRASOUND EA 15: CPT | Performed by: PHYSICAL THERAPIST

## 2019-11-26 PROCEDURE — 97140 MANUAL THERAPY 1/> REGIONS: CPT | Performed by: PHYSICAL THERAPIST

## 2019-11-26 NOTE — PROGRESS NOTES
"Physical Therapy Note      SUBJECTIVE:   Changes since last seen:  Tete reports she had a lot of Right SI joint/piriformis pain this Sat, after cleaning and after many busy days at work in front of computer.    Apparently, the pain was so severe she was unable to step on right leg.  Started to improve today.   She also reports she had neck pain last week, she used positional isometric contraction to C-spine as we have done here in the clinic and it helped.   She saw her PCP, Dayne Ramírez M.D.  He has ordered a lumbar MRI.   She is also going to see OB/GYN, on Dec 10.    Her coughing has resolved.       Current level of pain:    5-6/10  Low back      Lowest level of pain since last seen:  5/10  Highest level of pain since last seen:   7-8/10       Past Medical History:  1.  AA 2013  Treated with PT for left shoulder labral tear, no low back treatment   2.  Hx of two falls when mopping in socks, over the last 2 years  3.  Hx of regular, heavy, cramping menses  4.  T& A, 2006  5.  Allergic to Wellbutrin and mild allergy to contrast dye  6.  Chronic yeast infections  7.  Uterine fibroids removed, 2 yrs ago       Functional Limitations:  Sitting, standing, walking, stairs, driving, working, sleeping, squatting, housework and changing positions.   Patient Goals for Physical Therapy: \"Pain relief\"      OBJECTIVE:  Observation:   Tete is standing with the right hip resting in external rotation but there is no significant lateral shift of the pelvis.        08/06/19 08/13/19 09/17/19 09/24/19 10/01/19 10/29/19 11/05/19 11/12/19 11/26/19            SI Joint Positioning  Right  Left Right  Left Right  Left Right  Left Right  Left Right  Left Right  Left Right  Left Right  Left Right  Left Right  Left Right  Left Right  Left Right Left Right Left Right Left Right  Left Right  Left       Innominate                            Anterior    x               x     x               x   X               x               x              sl  "              x                   Posterior               x     x                x    x               x   x    x   sl    x               Sacrum                          Unilateral rotation    x      x    x   x   x   x   x    x     x                                    SI Joint Testing  Right  Left Right  Left Right  Left Right  Left Right  Left Right  Left Right  Left Right  Left Right  Left Right  Left Right  Left Right  Left Right  Left Right Left Right Left Right Left Right  Left Right  Left           Distraction   +          +   +           +   +         +   +           +   +           +   +           +     +          +    +           +   +          +                    Joanna's   +          +   +         +   +          +   +         +   -           +                        Compression   Sl        Sl    Sl         Sl    -          -      -          -                        Prone Press Up   -          -   -           -   -            -   -          -   Sl        Sl                                      Special Tests  Right   Left Right  Left Right  Left Right  Left Right  Left Right  Left Right Left  Right  Left Right  Left Right  Left Right  Left Right  Left Right  Left Right Left Right Left Right Left Right Left  Right  Left      Straight Leg Raise                             Active   -            -    -         -    -           -     -         -   -        -                    Passive   -           -    -          -   -          -     -        -   -          -                Hip Scour Test   sl           sl   Sl         Sl    +         sl    Sl        Sl    Sl       sl   Sl          sl   Sl         Sl    Sl        sl   Sl         sl                                 Bakers Cyst   -            -     +          -     Sl        -   -           -   -         -   +         -   ++       -   +          sl                 01/08/2019 02/07/19 03/21/19 03/28/19 04/04/19 04/11/19 04/18/19 04/25/19 05/02/19 05/07/19 05/14/19  06/05/19 06/11/19 06/20/19 06/25/19 07/09/19 07/16/19 07/23/19   SI Joint Positioning  Right  Left Right  Left Right  Left Right  Left Right  Left Right  Left Right  Left Right  Left Right  Left Right  Left Right  Left Right  Left Right  Left Right Left Right Left Right Left Right  Left Right  Left       Innominate                            Anterior    x   x    x  x                x                 x              X     x    x   x   x               x   X                    sl    x                x               x                x          Posterior                x               x        x              x     x     x    x               X                x               x              x                x               x   sl               x    x    x   x      Sacrum                               Unilateral rotation    x   x   x    x    x     x    x    x   x   x    x    x   x   sl   x    x     x                           SI Joint Testing  Right  Left Right  Left Right  Left Right  Left Right  Left Right  Left Right  Left Right  Left Right  Left Right  Left Right  Left Right  Left Right  Left Right Left Right Left Right Left Right  Left Right  Left           Distraction     +          +   +         +   +          +    +        +  +           +   +          +   +          +   +          +     +        +   +         +   +         +   +          +   +           + +           +   +          +    +          +   +          +   +           +           Joanna's     +          +   Sl         sl    +         -    Sl         -   Sl         -   Sl         -   -           -                      Compression     +          +   Sl          sl   Sl          Sl     Sl        Sl    -          -   -         -   -           -                      Prone Press Up           +         +         Sl          -         -        -         -                                   Special Tests  Right   Left Right  Left Right  Left Right  Left Right  Left  Right  Left Right Left  Right  Left Right  Left Right  Left Right  Left Right  Left Right  Left Right Left Right Left Right Left Right Left  Right  Left      Straight Leg Raise                                     Active   -         -          -           -                      Passive   -         -             -             -                  Hip Scour Test   ++       Sl    ++       Sl    +           sl   Sl         Sl    Sl         sl Sl          Sl    Sl        sl   Sl       Sl    Sl         Sl    Sl         Sl     Sl         sl   Sl        Sl    Sl        Sl     -            -   Sl         sl   -           -   -          -   -            -                        Bakers Cyst       +         -     +        -    Sl        -   Sl         -     Sl        -   Sl        Sl   sl          sl   Sl         -   -           -   -          -   +           -   Sl      sl       Palpation:   Date 08/06/19 08/13/19 09/17/19 09/24/19 10/01/19 10/29/19 11/05/19 11/12/19 11/26/19             Right  Left Right  Left Right  Left Right  Left Right  Left Right  Left Right  Left Right  Left Right  Left Right  Left Right  Left Right  Left Right  Left Right Left Right Left Right Left Right  Left Right  Left   Piriformis   ++        ++   ++       ++   ++        ++   +         +    ++       +   ++        ++    ++        +   ++        sl   ++         Sl             Gr. Trochanter    +        -   ++          +   ++      +   ++         +     Sl        -   ++        +    +         Sl    ++         sl   ++         sl            IT Band    Sl         Sl    ++        +   +         sl   Sl        Sl     +          Sl    ++         +    +         -    +         -   +           -            Quadratus Lumborum   ++         +   +         +   ++        +   +         Sl    ++        +   ++         +   +         ++     +         +   +         ++            Ischial Tuberosity    -         -   -          -    -        -   -            -     Sl       -   Sl          Sl     -          Sl      -         -   -           -            Sacrococcygeal Ligs    +          +   +       +   +         sl    +         +     -          -   -          -   +         -   -           +   Sl        +            Paraspinals                     Spinous Processes   +           sl   ++        sl    +           -                       C-spine rotated to    C2-5 C2-6 C2-5   -             - C2-3  C2-4           C3-7  C2    C3-7       C2-4   C5-7                  T-spine rotated to  T4-6 T4-7 T3-8   T3-7  T4-6  T4-6 T4-5 T3-12  T3-7                   L-spine rotated to  L2-5 L2-5   L1-5  L3-5  L3-5  L2-5             L3-5 L2-5 L2-5            Adductor Tony   +           +   ++      ++   +         ++   +        +   +           +   +           +   Sl        Sl    Sl        Sl             Iliopsoas   ++          +   +          sl   +         sl   +          Sl    ++        +  ++        +   +         +   -          +            Quad Origin    -          -   -          -   -        -   -          -    -          -   -           -   -          -   -          -            SI Joint   +        +   ++        ++  ++        +   ++       +   +            +   ++         +   ++      +    +         sl   ++       +            Pubic Symphysis         +        +        +       +  not tested        ++           +          +         +            Obturator externus   +         +   +         +   Sl       sl   -           -   -          -    -          -   -         -   Sl       Sl             Rectus Diastasis                                          Ant/middle scalenes   +        sl   +         +   +         +   +          +   +        sl   +          +   +        Sl    +        sl   +         +            Upper traps   +         Sl    +         +   +         +   +         sl   +        Sl    +          sl  sl         sl  +        sl   +        Sl              Levator scap   +        Sl    +        +   +        sl   +          sl   +         sl    ++        sl   +          Sl   +         sl   +         s;            sternocleidomastoid   -         -   -            -   -          -    -          -   -          -   -            -    -         -   -          -                                   sternocostals   +          +   +          +   +        Sl   ++          +   +          +   +             +   Sl       Sl    +          +  +          sl                      Muscle/Bone Irritation  Date 01/08/2019 02/07/19 03/21/19 03/28/19 04/04/18 04/11/19 04/18/19 04/25/19 05/02/19 05/07/19 05/14/19 06/05/19 06/11/19 06/20/19 06/25/19 07/09/19 07/16/19 07/23/19    Right  Left Right  Left Right  Left Right  Left Right  Left Right  Left Right  Left Right  Left Right  Left Right  Left Right  Left Right  Left Right  Left Right Left Right Left Right Left Right  Left Right  Left   Piriformis  ++        +   ++          +   ++       +   ++        +    ++        Sl    ++       Sl   ++       +    ++      +   ++        +   +           +    +        Sl      +         ++   +           ++    +       -   ++      ++   +          +   ++        +   ++         ++   Gr. Trochanter  +          +  +          sl    +           +    +       ++    +          -   +         Sl    +         sl   +         sl   Sl        Sl    +          +    Sl        sl    Sl         +   Sl          sl     +       -   +         +    +         +   +         sl   ++           +   IT Band  +         sl   Sl        sl     -          -   -          -    Sl         -   Sl         -   Sl        -   Sl         -  sl          -   Sl          Sl      -          -     -           -   -            -    Sl        Sl    Sl       Sl     Sl       sl   +          Sl     +          ++   Quadratus Lumborum  ++        ++   ++        +    +        ++   ++        ++     +       ++   +          +  ++        ++   Sl        Sl    +       ++   +          ++    Sl       +     +         +   +          ++     Sl        +   ++       +    ++       +    +         +    ++       +   Ischial Tuberosity  ++         sl   -          -    -           -     -           -     -          -   -          -   -          -   -          -   -          -  -            -     -           -   -          -   -             -     Sl       -   Sl        Sl     -          -     -         -    Sl        Sl    Sacrococcygeal Ligs  ++          +   Sl        sl   +          +    +       +    Sl        Sl    Sl       Sl    +         sl  ++      +  +        Sl       Sl         +   Sl         Sl    Sl         Sl      -        Sl   not  tested    +         +    Sl       Sl    +          +   Paraspinals  +           +     +         sl    +    +          -   Sl         +   +         sl   +        sl  +         -   Sl          +   Sl          +   +          sl   ++         sl   +          -   +         Sl    ++        +     +       Sl    +         sl   Spinous Processes                                        C-spine rotated to                           C3-5  C2-5 C2-6         T-spine rotated to   -               -              T8-9  T4-5    T3-8          L-spine rotated to  L2-5        L1-5  L4-5  L3-5  L2-5            L3-5  L1-5  L2-5  L2-5              L1-5           L2-5 L2-5   L1-5 L3-5 L3-5  L2-5 L4-5 L1-5   Adductor Tony    +        +       +         +     -         +   -           Sl     -        sl   +          sl   -          -   -           -   -          -   -           -   -          -   -           sl  ++        +   +         sl   +         +   +         ++   ++        +  +            +   Iliopsoas  ++         +   ++        +   ++         +    +         ++  sl        Sl    Sl       Sl    Sl       Sl    Sl         -   Sl        -   Sl         +   +         Sl    +          sl  +          ++   +          +   +          +   +         ++   ++        +   ++         +   Quad Origin  sl          sl   Sl          sl    -          -   -           -    -          -   -          -   -         -   -          -   -          -   -           -    -         -   -            -   -         -   -         -   -         -   -            -   -          -   -         -   SI Joint  ++         +  ++        +    ++        +   +          +   +         +   +          ++   +         sl    +         sl   +        +  +          sl  +        Sl     +        Sl   +          ++   Sl       sl   +        +    + +        +  ++          +    ++        ++   Pubic Symphysis         ++          Not tested         ++      ++        ++            + Not tested Not tested          +             ++         +          +   Obturator externus          -          +   -          -   -          -    -           -   -          -   -          -   -         -    -            -   -          -   -            -   Rectus Diastasis   1/2 finger                                         Ant/middle scalenes                   ++        +   ++       +   Upper traps                  ++         +   +          +   Levator scap                   ++          +     +           sternocleidomastoid                   Sl        Sl      -         -                        sternocostals   +        +   +          +    +         -    +           +   Sl        Sl    +         Sl  Not tested     +         +     +         +   +         +   Sl      sl   +          +    +        +   +          +   +          +   All 4s Positioning: Pt not able to assume full lumbar extension in this position  Leg Length:  The right  lower extremity is equal to the left        Monthly Objective Measurement/Functional Activity  Date: 01/08/2019 03/21/19 04/25/19 06/05/19 07/09/19 09/17/19 10/29/19 11/26/19         Oswestry Pain Score 52/100  46/100    48/100      48/100       50/100 48/100 52/100   50/100                                           AROM Lumbar Spine: Degrees   Degrees      Degrees      Degrees      Degrees      Degrees Degrees Degrees Degrees Degrees  Degrees Degrees      Degrees    Degrees      Flexion 106 pain          99          83       75 pain          63 onset of pain         69 pain increased   57 pain       25            Extension 3 pain           17           19       11 pain           11  Pinches       16 pain increased 12  instant pain in right SI jt            Right Lat. Flexion 29 pain right trunk          24        25      25    23 pinch       19 pinch  14 24            Left Lat. Flexion 31         19         23     14  15 pinch     11 pinch  18 19            Right Lat Shift Pelvis inc pain   Inc pain Left SI jt       Slight increase No change but can't do far Feels caught  Increased pain  Sl inc pain No change in pain             Left Lat Shift Pelvis  less inc pain       No change       No change/ slight cielo  No change but can't go far  Easier but not natural  Increased pain  Sl  Inc pain No change in pain                           AROM Hips:  (degrees) Right      Left Right Left Right  Left Right  Left Right  Left Right  Left Right Left Right Left Right Left Right Left Right Left Right left Right  Left Right  Left      Flexion 110       110  125    120    128      125 130       130 130      130 125      120 128     120  125      120            Abduction 37       41   45      45      42      44  43          45   45       45  45          45 43        45  40         45            Int. Rotation 32       22  30      25      31         28  32          30   34        32   30         28 32        31  30          33            Ext. Rotation  34       42  35      40       38        41   35         40   40        42   35         37  38        39  35           40                          Flexibility:   (degrees) Right    Left Right  Left Right  Left Right  Left Right  Left Right  Left Right  left Right Left Right Left Right Left Right Left Right Left Right  Left Right  Left      Hamstrings -32       -28  -30     -25   -25       -25   -28      -27  -30       -26   -28       -29  -25       -25  -25       -20            Quadriceps 39       42 40        45    not tested  40          42  45        47   50         52  not tested   55         55            HipExt/Rot(piriformis) 30       28   32      30  33         32   35         33   37        35   35        35  38       36  35        40             Hip Int/Rotators 27       9   25      10   28        15   27         16   25         18   20        20  22      18  18        23            Hip Flexors (iliopsoas) -       -                   IT Band -       -                                 Reflexes: Right      Left Right  Left Right  Left Right  Left Right  Left Right  Left Right Left Right left Right left Right Left Right left Right Left Right  Left Right  Left      L4 (Quad) +1       +1                   S1 (Achilles) +1       +1                                 Root Level  - Motor Right      Left Right  Left Right  Left Right  Left Right  Left Right  Left Right Left Right Left Right Left Right Left Right Left Right Left Right  Left Right  Left     T12             Rectus Abdominus 4+/5     4+/5         5/5        5/5             5/5            L1              Paraspinals 5/5         5/5        5/5        5/5            L2              Hip Flexion 5/5          5/5         5/5        5/5            L3             Quads 5/5          5/5         5/5        5/5            L4              Anterior Tibialis 5/5         5/5       5/5         5/5            L5             Gr. Toe Extension 5/5           5/5         5/5        5/5            S1            Gastroc/Sol/Peroneals 5/5          5/5       5/5          5/5                          Functional Strength:                   Single LegStanceTime (seconds) R:30   L:30   R:      L: R:      L: R: 30 pain     L:30 pain  R:      L: R:      L: R:       L: R:       L: R:       L: R:       L: R:       L: R:       L:  R:      L: R:      L:     Pelvic Floor Isolation (seconds) -    10 sec 10  sec   > 10 sec   > 10 sec            Inner Unit  Isolation (seconds) -    10 sec 10 sec     > 10 sec   > 10 sec                          Functional Activities:                    Sit w/o pain? Pain increases (minutes) No/ 30 min No/ 30  min No/ 30 min  No/  30 min No/ 30 min  No/ 30min  No/ 30 min No/ 10-15 min            Stand w/o pain? No/ 30 min No/ 30 min No/ 10 min  No/ 10 min No/ 10 min  No/ 15-20 min  No / 10 min No/10-15min, 1/4 mile            Walk w/o pain? varies No/ 1 mile No/ 1/2 mile No/ 1 mile No/ 1mile, piriformis, B No/ a couple miles  No/ 1/2 mile No/ not much             Steps w/o pain? 1 fl, avoids them 1 fl avoids  1 fl avoids  No/ avoids No/ doesn't do No/ 1 flight  No/ 1 fl  No/ 1 fl             Drive w/o pain? No/ 10-15 min No/ 30-45 min No/ 30-45  Min  No/ 30-45 min No/ 35-45 No/ 35-45 No/ 30 min No/ 10 -15 min            Work w/o pain? No/ 30 min No/ 30 min No/ 30  No/ 30 min No/30 mi   No/ 30 min  No/ 30 min No/10-15 min            # times wakes w/ pain? 4-5x/night, now taking meds at night she is able to sleep.  2x   2-3x  2x         1x         Several times   several times several times             PLAN OF CARE:    ASSESSMENT:  Problem List:   1. SI joint dysfunction  2. Lack of spinal stabilization  3. Postural dysfunction     Discussion:  Tete's flare up seems to be directly related to her cleaning her house, vacuuming etc and increased work time in front of computer.  She has actually self managed it fairly well, as she was able to decrease her flared up pain with the self correction exercises and techniques that she has been instructed in here in the clinic.  However, despite all of this, I am concerned with all of her recent flare ups and pleased she is getting the MRI of the lumbar spine.      The manual work done in the clinic today, did help to reduce her pain, however, not able to get her completely pain free in lumbar nor cervical spine.      Will see Tete after gets results  "from MRI and proceed accordingly.           Short Term Goals: (4-6 weeks)                                   Date Met:                1.   pt independent in postural correction         02/07/19  2.   pt independent in SI joint self-corrections        03/21/19  3.   pt independent in exer to decrease post. disc pressures      03/21/19  4.   pt able to sleep through night without pain  5.   pt able to sit 15 minutes without pain  6.   pt able to walk 10 minutes without pain        06/20/19    7.   Reduce pt pain to no greater than 7/10        03/21/19  8.   Decrease Oswestry Pain Score to no greater than 45/100  9.  Reduce pt pain to no greater than 6/10        07/16/19  10.  Pt able to isolate the pelvic floor in supine x10, 10 sec      03/28/19  11.  Pt able to isolate transverse abdom in all 4s, x10 sec, x10         03/28/19  12.  Pt able to isolate the multifidus in sitting x10 sec, x10                            03/28/19  13.  Pt able to engage inner unit, move from sit to stand &back to sit      04/04/19                                                                                                                                                                                                                                             14.  Pt able to engage inner unit and walk 4 steps without overflow to hamstrings   06/20/19  15.  Pt able to hip abduct x6 without engaging the piriformis      06/20/19     16.  Pt able to perform prone knee flexion without engaging piriformis x6          05/07/19       17.  Pt able to perform remedial lower abs with scissor arm work x6 w/o pain    04/18/19    18.  Pt able to perform \"Pre-cursor\" lower abs leg lift initiation x6 without pain          04/18/19   19.  Pt able to perform retro step without engaging piriformis x6, bilaterally    09/24/19    20.  Pt able to perform bridging x6 without engaging piriformis      04/25/19    21.  Pt able to perform \"scissors\" arm movement " "with inner unit w/o piriformis x6   05/02/19  22.  Pt able to perform PIC hip adductors without engaging the piriformis  x6    05/07/19  23.  Pt able to perform \"Scissors\" arm motion w/ inner unit with 1lb wts x6 w/o pain   05/07/19  24.  Pt able to perform prone glut max over stability ball x6 w/o engaging piriformis   25.  Pt able to reduce pain w/ self PIC to left psoas       05/14/19  26.  Pt able to move laterally on stability ball in sitting without pain x6     11/05/19  27.  Pt able to move A/P movement sitting on stability ball without pain x6    11/05/19  28.  Pt independent in pool exercises, to do without increasing pain     07/09/19  29.  Pt able to throw and catch stability ball in supine with inner unit on x6    06/11/19  30.  Pt able to hold medicine ball in stance and move away from trunk with IU on and w/o pain x6 06/11/19  31.  Pt independent in supine hamstring stretch, able to do 2x without pain.    07/16/19  32.  Pt independent in hip adductor stretching, supine and stance     07/16/19  33.  Pt independent in quad stretching, in stance, without pain.      08/06/19  34. Pt independent in hip internal rotator stretch in supine, without pain      08/06/19  35.  Pt able to tolerate ADLs with leukotape on scapula for enhanced spinal stabilization x 1 day 07/25/19  36.  Pt able to perform prone walk out x6 without losing inner unit engagement    11/12/19  37. Pt able to perform prone arm lift x6, phase 1, without engaging the upper trap   08/06/19  38.  Pt able to perform supine obliques over stability ball x6 without losing inner unit engagement 08/06/19  39.  Pt able to perform prone arm lift, lifting elbow without engaging upper traps x6   11/12/19  40.  Pt able to perform upper quarter stretches without increasing pain     09/17/19  41.  Pt able to do adapted BKFO without engaging piriformis x6        42.  Pt able to do diaphragmatic breathing x6 without engaging piriformis       11/12/19          "                                                                                                                                 Long Term Goals:(3-5 months)      Date Met:      1.  pt independent in self-management of symptoms       2.  pt independent in home program      3.  pt able to work 6 hours without pain      4.  pt able to sit 60 minutes without pain      5.  pt able to walk 45 min without pain    Progress Towards Goals:  As expected to a little slower than expected    Treatment Plan:   1.  Ultrasound to increase extensibility, decrease pain, if needed  2.  Electrical stimulation for muscle relaxation, decrease pain, if needed, iontophoresis  3.  Manual therapy to increase function, decrease pain  4.  Therapeutic exercise to increase function, decrease pain  5.  Home programming and d/c planning to increase function and decrease pain,     Frequency & Duration:  Pt in need of more visits for neuro motor retraining and continued strengthening for the inner unit thus will see on a once/wk to once every other week basis for 6 more visits to complete the spinal stabilization instruction, meet remaining short and long term goals and be independent in her home program.      Patient Participated in and Agrees With This Plan of Care and Goals:  YES  Rehab Potential:  jarret Marcial, PT, MS  Physical Therapist  [place]# [id number]      TREATMENT TODAY:    Total Treatment Time: (time in clinic)   60  min  Timed Code Treatment Minutes:     60      Supplies given:      Manual Therapy:  (MA)  RX min:     35  Manual posterior rotation of left innominate    Positional Isometric contraction (PIC) of right iliopsoas    Positional Isometric contraction of (PIC) right gluteus minimus    Distraction of both SI jts in open pack hip position  Piriformis stretching, bilaterally    Quadratus lumborum stretching, bilaterally    Anterior/Inferior Mobilization of  right hip    Positional Isometric Contraction of  multifidus  Myofascial work trunk   Sternocostal and rib mobs   Manual spinal distraction  PIC C-spine  PIC T-spine  Stretching scalenes, upper traps, levator scap      Therapeutic Activities:  (TA)  RX min:   10    Neuromuscular Reeducation: (NMR)  RX min:       Ultrasound:  RX min:  15       1.6 vance/cm2       Location:  C2-5 and right piriformis    Iontophoresis:  6 hr patch                                Location:                           Ice:        ocedures:      Key:  i=instructed        r=review        c=corrected        a=adapted   Code Procedure/Instruction 10/29/19 11/05/19 11/12/19 11/26/19                     TA         Sleeping Positions                         TA Sternum-Up Posture                         TA SI jt. self-correction                         TA Lumbar Facet PIC                         TA   Order of Self-Corrections                         TA Use of lumbar pillow                         TA Use of cervical roll                         TA Use of orthotics                         TA Use of SI belt                         TA Use of Nada chair                         TA Use of Ice                         TA Use of [employer]/ heat                         TA Use of [employer] Relief                         TA Use of TENS unit                         TA Use of iontophoresis                         TA Decreasing Disc Pressures                         TA Use of sacro wedge                         TA Use of inversion table Used for 10 min  Used for 13 min  Used for 10 min                                                NMR Leukotaping upper quarter                         NMR Pelvic Floor Isolation                         NMR Transverse Abdominus                         NMR Multifidus Isolation                         NMR Diaphragmtic breathe supine  reviewed                       NMR  Diaphragmtic breath sit                         NMR Pelvic Clock in sitting                         NMR Kinesiotape    On right scapula                      NMR InnerUnit against Gravity                         NMR Sit-stand w/ inner unit                         NMR Roll w/ inner unit                         NMR Walk w/ inner unit                          NMR Up/down steps w/ inner unit                         NMR Water Exer Instruction                         NMR Hip Abd w/o piriformis                         NMR Hip Add w/o iliop/ham                          NMR Lower abs in supine                         NMR Inner unit challenge with scissor arm work                          NMR Abd obliques in supine  reviewed                       NMR Prone Knee Flexion                         NMR Supine glute w/ ball btwn knees                          NMR Prone glut amrita                         NMR Retro Step                         NMR Retro Walk                         NMR Retro walk on treadmill                         NMR Forward walk w/ inner unit  reviewed                       NMR Stability ball multifidus bounce                         NMR Stability Ball A/P & Lateral  reviewed                       NMR Ball Catch/Throw /Supine                         NMR Ball movemt in /Stance                         NMR StabilityBall All 4's Arm Lift                         NMR StabilityBall Prone Walk Out                         NMR StabilityBall Supine Oblique                         NMR Stability Ball sit-supine-sit                         NMR Walking Prog Progression                         MNR Home program sequencing                                                   TA Hamstring stretch                         TA Hip Ext Rot Stretch                         TA Hip Int Rot Stretch                         TA Hip Flex/IT Stretch                         TA Hip Add Stretch                         TA Lats Dorsi Stretch                         TA Gastroc/soleus stretch                         TA QuadratusLumborm Stretch                             Supine                            Stance                         TA Quad Stretch                                                   NMR Wall Push Ups                         NMR Planking                         NMR BOSU Ball Exercises                         NMR Lateral Bridge Drop                         NMR Waiters [place]                         NMR O'Feldt Lat Pull Down                         NMR O'Feldt Hip Ext                         NMR O'Feldt Trunk Ext                                                   TA CervDiscExtSup/stnd    instructed/reviewed                     TA Cerv Stretches                         TA Self PIC Cervical Spine                         TA Neural Gliding                         TA Upper trap stretching                         TA Ant/Mid Scalene Strch                         TA Levator Scap strch                         TA Biceps stretch                         TA Rotator Cuff Stretch                         TA Anterior Chest Stretch                         TA Wrist Ext Stretch                                                   NMR Prone Arm Lifts                         NMR Scapula Stabilization                         NMR Occulomotor Isometrics                         NMR Cervical Isometrics                                                   TA Shld AROM w/bar                         TA Shld Capsular Stretching                         TA Self PIC Thor Spine                         TA Rot Cuff Therabd, beginner                         TA Rot Cuff Therabd, intermed                                                                                                                                                                                                                                                                                                                           Miracle Marcial, PT, MS  Physical Therapist  [place]# [id number]

## 2019-12-19 ENCOUNTER — TREATMENT (OUTPATIENT)
Dept: PHYSICAL THERAPY | Facility: CLINIC | Age: 34
End: 2019-12-19

## 2019-12-19 DIAGNOSIS — R29.3 POSTURE IMBALANCE: ICD-10-CM

## 2019-12-19 DIAGNOSIS — M35.7 FAMILIAL LIGAMENTOUS LAXITY: ICD-10-CM

## 2019-12-19 DIAGNOSIS — S39.012D STRAIN OF LUMBAR REGION, SUBSEQUENT ENCOUNTER: ICD-10-CM

## 2019-12-19 DIAGNOSIS — M53.3 SACROILIAC JOINT DYSFUNCTION: Primary | ICD-10-CM

## 2019-12-19 PROCEDURE — 97530 THERAPEUTIC ACTIVITIES: CPT | Performed by: PHYSICAL THERAPIST

## 2019-12-19 PROCEDURE — 97140 MANUAL THERAPY 1/> REGIONS: CPT | Performed by: PHYSICAL THERAPIST

## 2019-12-19 NOTE — PROGRESS NOTES
"Physical Therapy Note      SUBJECTIVE:   Changes since last seen:  Tete has attempted self management over the last 4 weeks.  She states, \"I'm not great, but I'm not in severe pain.\"  Had MRI, she brought a copy of her report showing \"mild broad central disc protrusion L4-5, mild degenerative disc disease at L4-5 and L5-S1   There is a central HNP at L5-S1 with very mild bilateral neural foraminal narrowing.\"  Her PCP, Dr Ramírez has referred her to neurosurgeon, scheduled for tomorrow.   She saw her GYN about a week ago.  She has a 2 cm uterine fibroid tumor.  Since seeing her GYN Tete states she decided to stop taking her birth control pills as she thinks it has increased her depression.  \"I am realizing that the more I move around the better I feel.   She is doing the self correction exercises \"almost daily and they definitely decrease pain.\"  She likes the stretching exercises, \"as they seem to help the most, along with the icing.\"   She continues to use the inner unit in ADLs, and she is attentive to posture and positioning.   \"I haven't had any major flare ups with spasms in a while, occasionally the piriformis has sharp pain.\"  \"I've had a little neck pain but I've used the positional isometric contraction techniques and it helped some.           Current level of pain:    5/10  Low back      Lowest level of pain since last seen:  4-5/10  Highest level of pain since last seen:   7/10       Past Medical History:  1.  AA 2013  Treated with PT for left shoulder labral tear, no low back treatment   2.  Hx of two falls when mopping in socks, over the last 2 years  3.  Hx of regular, heavy, cramping menses  4.  T& A, 2006  5.  Allergic to Wellbutrin and mild allergy to contrast dye  6.  Chronic yeast infections  7.  Uterine fibroids removed, 2 yrs ago       Functional Limitations:  Sitting, standing, walking, stairs, driving, working, sleeping, squatting, housework and changing positions.   Patient Goals for " "Physical Therapy: \"Pain relief\"      OBJECTIVE:  Observation:    08/06/19 08/13/19 09/17/19 09/24/19 10/01/19 10/29/19 11/05/19 11/12/19 11/26/19 12/19/19           SI Joint Positioning  Right  Left Right  Left Right  Left Right  Left Right  Left Right  Left Right  Left Right  Left Right  Left Right  Left Right  Left Right  Left Right  Left Right Left Right Left Right Left Right  Left Right  Left       Innominate                            Anterior    x               x     x               x   X               x               x              sl               x                x                  Posterior               x     x                x    x               x   x    x   sl    x   x              Sacrum                          Unilateral rotation    x      x    x   x   x   x   x    x     x   x                                   SI Joint Testing  Right  Left Right  Left Right  Left Right  Left Right  Left Right  Left Right  Left Right  Left Right  Left Right  Left Right  Left Right  Left Right  Left Right Left Right Left Right Left Right  Left Right  Left           Distraction   +          +   +           +   +         +   +           +   +           +   +           +     +          +    +           +   +          +   +           +                   Joanna's   +          +   +         +   +          +   +         +   -           +                        Compression   Sl        Sl    Sl         Sl    -          -      -          -                        Prone Press Up   -          -   -           -   -            -   -          -   Sl        Sl                                      Special Tests  Right   Left Right  Left Right  Left Right  Left Right  Left Right  Left Right Left  Right  Left Right  Left Right  Left Right  Left Right  Left Right  Left Right Left Right Left Right Left Right Left  Right  Left      Straight Leg Raise                             Active   -            -    -         -    -           -     -     "     -   -        -                    Passive   -           -    -          -   -          -     -        -   -          -                Hip Scour Test   sl           sl   Sl         Sl    +         sl    Sl        Sl    Sl       sl   Sl          sl   Sl         Sl    Sl        sl   Sl         sl   Sl         sl                                Bakers Cyst   -            -     +          -     Sl        -   -           -   -         -   +         -   ++       -   +          sl   Sl          -                01/08/2019 02/07/19 03/21/19 03/28/19 04/04/19 04/11/19 04/18/19 04/25/19 05/02/19 05/07/19 05/14/19 06/05/19 06/11/19 06/20/19 06/25/19 07/09/19 07/16/19 07/23/19   SI Joint Positioning  Right  Left Right  Left Right  Left Right  Left Right  Left Right  Left Right  Left Right  Left Right  Left Right  Left Right  Left Right  Left Right  Left Right Left Right Left Right Left Right  Left Right  Left       Innominate                            Anterior    x   x    x  x                x                 x              X     x    x   x   x               x   X                    sl    x                x               x                x          Posterior                x               x        x              x     x     x    x               X                x               x              x                x               x   sl               x    x    x   x      Sacrum                               Unilateral rotation    x   x   x    x    x     x    x    x   x   x    x    x   x   sl   x    x     x                           SI Joint Testing  Right  Left Right  Left Right  Left Right  Left Right  Left Right  Left Right  Left Right  Left Right  Left Right  Left Right  Left Right  Left Right  Left Right Left Right Left Right Left Right  Left Right  Left           Distraction     +          +   +         +   +          +    +        +  +           +   +          +   +          +   +          +     +        +   +         +   +          +   +          +   +           + +           +   +          +    +          +   +          +   +           +           Joanna's     +          +   Sl         sl    +         -    Sl         -   Sl         -   Sl         -   -           -                      Compression     +          +   Sl          sl   Sl          Sl     Sl        Sl    -          -   -         -   -           -                      Prone Press Up           +         +         Sl          -         -        -         -                                   Special Tests  Right   Left Right  Left Right  Left Right  Left Right  Left Right  Left Right Left  Right  Left Right  Left Right  Left Right  Left Right  Left Right  Left Right Left Right Left Right Left Right Left  Right  Left      Straight Leg Raise                                     Active   -         -          -           -                      Passive   -         -             -             -                  Hip Scour Test   ++       Sl    ++       Sl    +           sl   Sl         Sl    Sl         sl Sl          Sl    Sl        sl   Sl       Sl    Sl         Sl    Sl         Sl     Sl         sl   Sl        Sl    Sl        Sl     -            -   Sl         sl   -           -   -          -   -            -                        Bakers Cyst       +         -     +        -    Sl        -   Sl         -     Sl        -   Sl        Sl   sl          sl   Sl         -   -           -   -          -   +           -   Sl      sl       Palpation:   Date 08/06/19 08/13/19 09/17/19 09/24/19 10/01/19 10/29/19 11/05/19 11/12/19 11/26/19 12/19/19            Right  Left Right  Left Right  Left Right  Left Right  Left Right  Left Right  Left Right  Left Right  Left Right  Left Right  Left Right  Left Right  Left Right Left Right Left Right Left Right  Left Right  Left   Piriformis   ++        ++   ++       ++   ++        ++   +         +    ++       +   ++        ++    ++        +   ++        sl    ++         Sl    +         +           Gr. Trochanter    +        -   ++          +   ++      +   ++         +     Sl        -   ++        +    +         Sl    ++         sl   ++         sl   +          sl           IT Band    Sl         Sl    ++        +   +         sl   Sl        Sl     +          Sl    ++         +    +         -    +         -   +           -   +         -           Quadratus Lumborum   ++         +   +         +   ++        +   +         Sl    ++        +   ++         +   +         ++     +         +   +         ++    +        +           Ischial Tuberosity    -         -   -          -    -        -   -            -     Sl       -   Sl         Sl     -          Sl      -         -   -           -   -          sl           Sacrococcygeal Ligs    +          +   +       +   +         sl    +         +     -          -   -          -   +         -   -           +   Sl        +   Sl        sl           Paraspinals                     Spinous Processes   +           sl   ++        sl    +           -                       C-spine rotated to    C2-5 C2-6 C2-5   -             - C2-3  C2-4           C3-7  C2    C3-7       C2-4   C5-7 C2-6                 T-spine rotated to  T4-6 T4-7 T3-8   T3-7  T4-6  T4-6 T4-5 T3-12  T3-7 T3-6                  L-spine rotated to  L2-5 L2-5   L1-5  L3-5  L3-5  L2-5             L3-5 L2-5 L2-5  L2-5           Adductor Tony   +           +   ++      ++   +         ++   +        +   +           +   +           +   Sl        Sl    Sl        Sl    Sl       sl           Iliopsoas   ++          +   +          sl   +         sl   +          Sl    ++        +  ++        +   +         +   -          +   -         sl           Quad Origin    -          -   -          -   -        -   -          -    -          -   -           -   -          -   -          -   -           -           SI Joint   +        +   ++        ++  ++        +   ++       +   +            +   ++         +    ++      +    +         sl   ++       +  ++        +           Pubic Symphysis         +        +        +       +  not tested        ++           +          +         +          +           Obturator externus   +         +   +         +   Sl       sl   -           -   -          -    -          -   -         -   Sl       Sl    Sl        sl           Rectus Diastasis                                          Ant/middle scalenes   +        sl   +         +   +         +   +          +   +        sl   +          +   +        Sl    +        sl   +         +   +        +           Upper traps   +         Sl    +         +   +         +   +         sl   +        Sl    +          sl  sl         sl  +        sl   +        Sl     +        sl           Levator scap   +        Sl    +        +   +        sl   +         sl   +         sl    ++        sl   +          Sl   +         sl   +         s;   +         +           sternocleidomastoid   -         -   -            -   -          -    -          -   -          -   -            -    -         -   -          -   -          -                                  sternocostals   +          +   +          +   +        Sl   ++          +   +          +   +             +   Sl       Sl    +          +  +          sl   Sll        sl                     Muscle/Bone Irritation  Date 01/08/2019 02/07/19 03/21/19 03/28/19 04/04/18 04/11/19 04/18/19 04/25/19 05/02/19 05/07/19 05/14/19 06/05/19 06/11/19 06/20/19 06/25/19 07/09/19 07/16/19 07/23/19    Right  Left Right  Left Right  Left Right  Left Right  Left Right  Left Right  Left Right  Left Right  Left Right  Left Right  Left Right  Left Right  Left Right Left Right Left Right Left Right  Left Right  Left   Piriformis  ++        +   ++          +   ++       +   ++        +    ++        Sl    ++       Sl   ++       +    ++      +   ++        +   +           +    +        Sl      +         ++   +           ++    +       -   ++      ++   +           +   ++        +   ++         ++   Gr. Trochanter  +          +  +          sl    +           +    +       ++    +          -   +         Sl    +         sl   +         sl   Sl        Sl    +          +    Sl        sl    Sl         +   Sl          sl     +       -   +         +    +         +   +         sl   ++           +   IT Band  +         sl   Sl        sl     -          -   -          -    Sl         -   Sl         -   Sl        -   Sl         -  sl          -   Sl          Sl      -          -     -           -   -            -    Sl        Sl    Sl       Sl     Sl       sl   +          Sl     +          ++   Quadratus Lumborum  ++        ++   ++        +    +        ++   ++        ++     +       ++   +          +  ++        ++   Sl        Sl    +       ++   +          ++    Sl       +     +         +   +          ++     Sl       +   ++       +    ++       +    +         +    ++       +   Ischial Tuberosity  ++         sl   -          -    -           -     -           -     -          -   -          -   -          -   -          -   -          -  -            -     -           -   -          -   -             -     Sl       -   Sl        Sl     -          -     -         -    Sl        Sl    Sacrococcygeal Ligs  ++          +   Sl        sl   +          +    +       +    Sl        Sl    Sl       Sl    +         sl  ++      +  +        Sl       Sl         +   Sl         Sl    Sl         Sl      -        Sl   not  tested    +         +    Sl       Sl    +          +   Paraspinals  +           +     +         sl    +    +          -   Sl         +   +         sl   +        sl  +         -   Sl          +   Sl          +   +          sl   ++         sl   +          -   +         Sl    ++        +     +       Sl    +         sl   Spinous Processes                                        C-spine rotated to                           C3-5  C2-5 C2-6         T-spine rotated to   -               -               T8-9  T4-5    T3-8          L-spine rotated to  L2-5        L1-5  L4-5  L3-5  L2-5            L3-5  L1-5  L2-5  L2-5              L1-5           L2-5 L2-5   L1-5 L3-5 L3-5  L2-5 L4-5 L1-5   Adductor Tony    +        +       +         +     -         +   -           Sl     -        sl   +          sl   -          -   -           -   -          -   -           -   -          -   -           sl  ++        +   +         sl   +         +   +         ++   ++        +  +            +   Iliopsoas  ++         +   ++        +   ++         +    +         ++  sl        Sl    Sl       Sl    Sl       Sl    Sl         -   Sl        -   Sl         +   +         Sl    +          sl  +          ++   +          +   +          +   +         ++   ++        +   ++         +   Quad Origin  sl          sl   Sl          sl    -          -   -           -    -         -   -          -   -         -   -          -   -          -   -           -    -         -   -            -   -         -   -         -   -         -   -            -   -          -   -         -   SI Joint  ++         +  ++        +    ++        +   +          +   +         +   +          ++   +         sl    +         sl   +        +  +          sl  +        Sl     +        Sl   +          ++   Sl       sl   +        +    + +        +  ++          +    ++        ++   Pubic Symphysis         ++          Not tested         ++      ++        ++            + Not tested Not tested          +             ++         +          +   Obturator externus          -          +   -          -   -          -    -           -   -          -   -          -   -         -    -            -   -          -   -            -   Rectus Diastasis   1/2 finger                                         Ant/middle scalenes                   ++        +   ++       +   Upper traps                  ++         +   +          +   Levator scap                   ++          +     +            sternocleidomastoid                   Sl        Sl      -         -                        sternocostals   +        +   +          +    +         -    +           +   Sl        Sl    +         Sl  Not tested     +         +     +         +   +         +   Sl      sl   +          +    +        +   +          +   +          +   All 4s Positioning: Pt not able to assume full lumbar extension in this position  Leg Length:  The right  lower extremity is equal to the left        Monthly Objective Measurement/Functional Activity  Date: 01/08/2019 03/21/19 04/25/19 06/05/19 07/09/19 09/17/19 10/29/19 11/26/19         Oswestry Pain Score 52/100  46/100    48/100      48/100       50/100 48/100 52/100   50/100                                           AROM Lumbar Spine: Degrees   Degrees      Degrees      Degrees      Degrees      Degrees Degrees Degrees Degrees Degrees Degrees Degrees      Degrees    Degrees      Flexion 106 pain          99          83       75 pain          63 onset of pain         69 pain increased   57 pain       25            Extension 3 pain           17           19       11 pain           11  Pinches       16 pain increased 12  instant pain in right SI jt            Right Lat. Flexion 29 pain right trunk          24        25      25    23 pinch       19 pinch  14 24            Left Lat. Flexion 31         19         23     14  15 pinch     11 pinch  18 19            Right Lat Shift Pelvis inc pain   Inc pain Left SI jt       Slight increase No change but can't do far Feels caught  Increased pain  Sl inc pain No change in pain             Left Lat Shift Pelvis  less inc pain       No change       No change/ slight cielo  No change but can't go far  Easier but not natural  Increased pain  Sl  Inc pain No change in pain                           AROM Hips:  (degrees) Right      Left Right Left Right  Left Right  Left Right  Left Right  Left Right Left Right Left Right Left Right Left Right Left Right  left Right  Left Right  Left      Flexion 110       110  125    120    128      125 130       130 130      130 125      120 128     120  125      120            Abduction 37       41   45      45      42      44  43          45   45       45  45          45 43        45  40         45            Int. Rotation 32       22  30      25      31         28  32          30   34        32   30         28 32        31  30          33            Ext. Rotation  34       42  35      40       38        41   35         40   40        42   35         37  38        39  35           40                          Flexibility:   (degrees) Right    Left Right  Left Right  Left Right  Left Right  Left Right  Left Right  left Right Left Right Left Right Left Right Left Right Left Right  Left Right  Left      Hamstrings -32       -28  -30     -25   -25       -25   -28      -27  -30      -26   -28       -29  -25       -25  -25       -20            Quadriceps 39       42 40        45    not tested  40          42  45        47   50         52  not tested   55         55            HipExt/Rot(piriformis) 30       28   32      30  33         32   35         33   37        35   35        35  38       36  35        40             Hip Int/Rotators 27       9   25      10   28        15   27         16   25         18   20        20  22      18  18        23            Hip Flexors (iliopsoas) -       -                   IT Band -       -                                 Reflexes: Right      Left Right  Left Right  Left Right  Left Right  Left Right  Left Right Left Right left Right left Right Left Right left Right Left Right  Left Right  Left      L4 (Quad) +1       +1                   S1 (Achilles) +1       +1                                 Root Level  - Motor Right      Left Right  Left Right  Left Right  Left Right  Left Right  Left Right Left Right Left Right Left Right Left Right Left Right Left Right  Left Right  Left     T12              Rectus Abdominus 4+/5     4+/5         5/5        5/5             5/5            L1              Paraspinals 5/5         5/5        5/5        5/5            L2              Hip Flexion 5/5          5/5         5/5        5/5            L3             Quads 5/5          5/5         5/5        5/5            L4              Anterior Tibialis 5/5         5/5       5/5         5/5            L5             Gr. Toe Extension 5/5           5/5         5/5        5/5            S1            Gastroc/Sol/Peroneals 5/5          5/5       5/5          5/5                          Functional Strength:                   Single LegStanceTime (seconds) R:30   L:30   R:      L: R:      L: R: 30 pain     L:30 pain  R:      L: R:      L: R:       L: R:       L: R:       L: R:       L: R:       L: R:       L:  R:      L: R:      L:     Pelvic Floor Isolation (seconds) -    10 sec 10 sec   > 10 sec   > 10 sec            Inner Unit  Isolation (seconds) -    10 sec 10 sec     > 10 sec   > 10 sec                          Heart Rate        (12/19/19)         Blood Pressure           81 bpm                    109/78                                           Functional Activities:        11/26/19            Sit w/o pain? Pain increases (minutes) No/ 30 min No/ 30  min No/ 30 min  No/  30 min No/ 30 min  No/ 30min  No/ 30 min No/ 10-15 min            Stand w/o pain? No/ 30 min No/ 30 min No/ 10 min  No/ 10 min No/ 10 min  No/ 15-20 min  No / 10 min No/10-15min, 1/4 mile            Walk w/o pain? varies No/ 1 mile No/ 1/2 mile No/ 1 mile No/ 1mile, piriformis, B No/ a couple miles  No/ 1/2 mile No/ not much             Steps w/o pain? 1 fl, avoids them 1 fl avoids  1 fl avoids  No/ avoids No/ doesn't do No/ 1 flight  No/ 1 fl  No/ 1 fl             Drive w/o pain? No/ 10-15 min No/ 30-45 min No/ 30-45  Min  No/ 30-45 min No/ 35-45 No/ 35-45 No/ 30 min No/ 10 -15 min            Work w/o pain? No/ 30 min No/ 30 min No/ 30  No/ 30 min No/30 mi    "No/ 30 min  No/ 30 min No/10-15 min            # times wakes w/ pain? 4-5x/night, now taking meds at night she is able to sleep.  2x   2-3x  2x         1x         Several times   several times several times             PLAN OF CARE:    ASSESSMENT:  Problem List:   1. SI joint dysfunction  2. Lack of spinal stabilization  3. Postural dysfunction     Discussion:  Tete is anxious to get opinion from neurosurgeon regarding her MRI.      Discussed at length how to proceed to meeting her remaining short and long term goals.  Once she moves to [place] in January, she will be able to adapt her home office with a sit stand desk, she will be able to purchase a new mattress which will be more supportive and she will purchase an inversion table to use at home.  She will also begin a swimming regime at the pool in [place].  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  This will be an excellent form of exercise to progress her spinal stabilization.     She was able to self manage \"ok\" for 4 weeks but she notes that if she were to go more than 4 weeks without manual work on her trunk, she would not have been able to self manage. She needs the manual work to augment her home program at least once/ a month.      She got on the inversion table here in the clinic today for 15 min.  This was extremely effective in decreasing her pain after the manual work .       Short Term Goals: (4-6 weeks)                                   Date Met:                1.   pt independent in postural correction         02/07/19  2.   pt independent in SI joint self-corrections        03/21/19  3.   pt independent in exer to decrease post. disc pressures      03/21/19  4.   pt able to sleep through night without pain  5.   pt able to sit 15 minutes without pain  6.   pt able to walk 10 minutes without pain        06/20/19    7.   Reduce pt pain to no greater than 7/10        03/21/19  8.   Decrease Oswestry Pain Score to no greater " "than 45/100  9.  Reduce pt pain to no greater than 6/10        07/16/19  10.  Pt able to isolate the pelvic floor in supine x10, 10 sec      03/28/19  11.  Pt able to isolate transverse abdom in all 4s, x10 sec, x10         03/28/19  12.  Pt able to isolate the multifidus in sitting x10 sec, x10                            03/28/19  13.  Pt able to engage inner unit, move from sit to stand &back to sit      04/04/19                                                                                                                                                                                                                                             14.  Pt able to engage inner unit and walk 4 steps without overflow to hamstrings   06/20/19  15.  Pt able to hip abduct x6 without engaging the piriformis      06/20/19     16.  Pt able to perform prone knee flexion without engaging piriformis x6          05/07/19       17.  Pt able to perform remedial lower abs with scissor arm work x6 w/o pain    04/18/19    18.  Pt able to perform \"Pre-cursor\" lower abs leg lift initiation x6 without pain          04/18/19   19.  Pt able to perform retro step without engaging piriformis x6, bilaterally    09/24/19    20.  Pt able to perform bridging x6 without engaging piriformis      04/25/19    21.  Pt able to perform \"scissors\" arm movement with inner unit w/o piriformis x6   05/02/19  22.  Pt able to perform PIC hip adductors without engaging the piriformis  x6    05/07/19  23.  Pt able to perform \"Scissors\" arm motion w/ inner unit with 1lb wts x6 w/o pain   05/07/19  24.  Pt able to perform prone glut max over stability ball x6 w/o engaging piriformis   25.  Pt able to reduce pain w/ self PIC to left psoas       05/14/19  26.  Pt able to move laterally on stability ball in sitting without pain x6     11/05/19  27.  Pt able to move A/P movement sitting on stability ball without pain x6    11/05/19  28.  Pt independent in pool " exercises, to do without increasing pain     07/09/19  29.  Pt able to throw and catch stability ball in supine with inner unit on x6    06/11/19  30.  Pt able to hold medicine ball in stance and move away from trunk with IU on and w/o pain x6 06/11/19  31.  Pt independent in supine hamstring stretch, able to do 2x without pain.    07/16/19  32.  Pt independent in hip adductor stretching, supine and stance     07/16/19  33.  Pt independent in quad stretching, in stance, without pain.      08/06/19  34. Pt independent in hip internal rotator stretch in supine, without pain      08/06/19  35.  Pt able to tolerate ADLs with leukotape on scapula for enhanced spinal stabilization x 1 day 07/25/19  36.  Pt able to perform prone walk out x6 without losing inner unit engagement    11/12/19  37. Pt able to perform prone arm lift x6, phase 1, without engaging the upper trap   08/06/19  38.  Pt able to perform supine obliques over stability ball x6 without losing inner unit engagement 08/06/19  39.  Pt able to perform prone arm lift, lifting elbow without engaging upper traps x6   11/12/19  40.  Pt able to perform upper quarter stretches without increasing pain     09/17/19  41.  Pt able to do adapted BKFO without engaging piriformis x6        42.  Pt able to do diaphragmatic breathing x6 without engaging piriformis       11/12/19                                                                                                                                          Long Term Goals:(3-5 months)      Date Met:      1.  pt independent in self-management of symptoms   12/19/19 - for 4 weeks only       2.  pt independent in home program     12/19/19      3.  pt able to work 6 hours without pain      4.  pt able to sit 60 minutes without pain      5.  pt able to walk 45 min without pain      6.  Pt independent in use of inversion table      7.  Pt independent in swimming program to be done without pain    Progress Towards Goals:  As  expected to a little slower than expected    Treatment Plan:   1.  Ultrasound to increase extensibility, decrease pain, if needed  2.  Electrical stimulation for muscle relaxation, decrease pain, if needed, iontophoresis  3.  Manual therapy to increase function, decrease pain  4.  Therapeutic exercise to increase function, decrease pain  5.  Home programming and d/c planning to increase function and decrease pain,     Frequency & Duration:  Pt in need of more visits for neuro motor retraining and continued strengthening for the inner unit thus will see on a once/wk to once every other week basis for 6 more visits to complete the spinal stabilization instruction, meet remaining short and long term goals and be independent in her home program.      Patient Participated in and Agrees With This Plan of Care and Goals:  YES  Rehab Potential:  jarret Marcial, PT, MS  Physical Therapist  [place]# [id number]      TREATMENT TODAY:    Total Treatment Time: (time in clinic)   60  min  Timed Code Treatment Minutes:     60      Supplies given:      Manual Therapy:  (MA)  RX min:    40   Manual posterior rotation of left innominate    Positional Isometric contraction (PIC) of right iliopsoas    Positional Isometric contraction of (PIC) right gluteus minimus    Distraction of both SI jts in open pack hip position  Piriformis stretching, bilaterally    Quadratus lumborum stretching, bilaterally    Anterior/Inferior Mobilization of  right hip    Positional Isometric Contraction of multifidus  Myofascial work trunk   Sternocostal and rib mobs   Manual spinal distraction  PIC C-spine  PIC T-spine  Stretching scalenes, upper traps, levator scap      Therapeutic Activities:  (TA)  RX min:   20    Neuromuscular Reeducation: (NMR)  RX min:       Ultrasound:  RX min:        1.6 vance/cm2       Location:  C2-5 and right piriformis    Iontophoresis:  6 hr patch                                Location:                           Ice:         ocedures:      Key:  i=instructed        r=review        c=corrected        a=adapted   Code Procedure/Instruction 10/29/19 11/05/19 11/12/19 11/26/19 12/19/19                    TA         Sleeping Positions                         TA Sternum-Up Posture                         TA SI jt. self-correction     reviewed                    TA Lumbar Facet PIC                         TA   Order of Self-Corrections                         TA Use of lumbar pillow                         TA Use of cervical roll                         TA Use of orthotics                         TA Use of SI belt                         TA Use of Nada chair                         TA Use of Ice                         TA Use of [employer]/ heat                         TA Use of [employer] Relief                         TA Use of TENS unit                         TA Use of iontophoresis                         TA Decreasing Disc Pressures                         TA Use of sacro wedge                         TA Use of inversion table Used for 10 min  Used for 13 min  Used for 10 min  Used 14 min                                              NMR Leukotaping upper quarter                         NMR Pelvic Floor Isolation                         NMR Transverse Abdominus                         NMR Multifidus Isolation                         NMR Diaphragmtic breathe supine  reviewed                       NMR  Diaphragmtic breath sit                         NMR Pelvic Clock in sitting                         NMR Kinesiotape   On right scapula                      NMR InnerUnit against Gravity     reviewed                    NMR Sit-stand w/ inner unit                         NMR Roll w/ inner unit                         NMR Walk w/ inner unit                          NMR Up/down steps w/ inner unit                         NMR Water Exer Instruction    instructed                     NMR Hip Abd w/o piriformis                         NMR Hip  Add w/o iliop/ham                          NMR Lower abs in supine                         NMR Inner unit challenge with scissor arm work                          NMR Abd obliques in supine  reviewed                       NMR Prone Knee Flexion                         NMR Supine glute w/ ball btwn knees                          NMR Prone glut amrita                         NMR Retro Step                         NMR Retro Walk                         NMR Retro walk on treadmill                         NMR Forward walk w/ inner unit  reviewed                       NMR Stability ball multifidus bounce                         NMR Stability Ball A/P & Lateral  reviewed                       NMR Ball Catch/Throw /Supine                         NMR Ball movemt in /Stance                         NMR StabilityBall All 4's Arm Lift                         NMR StabilityBall Prone Walk Out                         NMR StabilityBall Supine Oblique                         NMR Stability Ball sit-supine-sit                         NMR Walking Prog Progression                         MNR Home program sequencing                                                   TA Hamstring stretch                         TA Hip Ext Rot Stretch                         TA Hip Int Rot Stretch                         TA Hip Flex/IT Stretch                         TA Hip Add Stretch                         TA Lats Dorsi Stretch                         TA Gastroc/soleus stretch                         TA QuadratusLumborm Stretch                            Supine                            Stance                         TA Quad Stretch                                                   NMR Wall Push Ups                         NMR Planking                         NMR BOSU Ball Exercises                         NMR Lateral Bridge Drop                         NMR Waiters Dike                         NMR O'Feldt Lat Pull Down                         NMR O'Feldt  Hip Ext                         NMR O'Feldt Trunk Ext                                                   TA CervDiscExtSup/stnd    instructed/reviewed                     TA Cerv Stretches                         TA Self PIC Cervical Spine                         TA Neural Gliding                         TA Upper trap stretching                         TA Ant/Mid Scalene Strch                         TA Levator Scap strch                         TA Biceps stretch                         TA Rotator Cuff Stretch                         TA Anterior Chest Stretch                         TA Wrist Ext Stretch                                                   NMR Prone Arm Lifts                         NMR Scapula Stabilization                         NMR Occulomotor Isometrics                         NMR Cervical Isometrics                                                   TA Shld AROM w/bar                         TA Shld Capsular Stretching                         TA Self PIC Thor Spine                         TA Rot Cuff Therabd, beginner                         TA Rot Cuff Therabd, intermed                                                                                                                                                                                                                                                                                                                           Miracle Marcial, PT, MS  Physical Therapist  [place]# [id number]

## 2020-01-07 ENCOUNTER — TREATMENT (OUTPATIENT)
Dept: PHYSICAL THERAPY | Facility: CLINIC | Age: 35
End: 2020-01-07

## 2020-01-07 DIAGNOSIS — M53.3 SACROILIAC JOINT DYSFUNCTION: Primary | ICD-10-CM

## 2020-01-07 DIAGNOSIS — S39.012D STRAIN OF LUMBAR REGION, SUBSEQUENT ENCOUNTER: ICD-10-CM

## 2020-01-07 DIAGNOSIS — R29.3 POSTURE IMBALANCE: ICD-10-CM

## 2020-01-07 DIAGNOSIS — M35.7 FAMILIAL LIGAMENTOUS LAXITY: ICD-10-CM

## 2020-01-07 PROCEDURE — 97140 MANUAL THERAPY 1/> REGIONS: CPT | Performed by: PHYSICAL THERAPIST

## 2020-01-07 PROCEDURE — 97530 THERAPEUTIC ACTIVITIES: CPT | Performed by: PHYSICAL THERAPIST

## 2020-01-07 NOTE — PROGRESS NOTES
"Physical Therapy Note      SUBJECTIVE:   Changes since last seen:  Tete went on trip to [place] over Christmas and \"did ok\".  She slept on a firmer mattress while there and noticed an improvement in her pain.   When she got home she flipped her mattress over to put the pillow top on the bottom side of the mattress and put a foam egg crate on top... she can't get over how much better she is sleeping; there is less piriformis pain the last 3 nights since she did this.   Her neck has been bothering her but notes it was bothering her before she flipped the mattress.   She saw a PA at neurosurgeon's office and had more xrays taken.  She doesn't have the results yet. The PA told her to continue PT and all she has been doing and she that she may need pain management down the road.        Current level of pain:    5 to 5.5/10  Low back        Neck pain 6.5/10   Lowest level of pain since last seen:  4-5/10         3/10  Highest level of pain since last seen:   7/10 before flipped mattress       6.5/10      Past Medical History:  1.  AA 2013  Treated with PT for left shoulder labral tear, no low back treatment   2.  Hx of two falls when mopping in socks, over the last 2 years  3.  Hx of regular, heavy, cramping menses  4.  T& A, 2006  5.  Allergic to Wellbutrin and mild allergy to contrast dye  6.  Chronic yeast infections  7.  Uterine fibroids removed, 2 yrs ago       Functional Limitations:  Sitting, standing, walking, stairs, driving, working, sleeping, squatting, housework and changing positions.   Patient Goals for Physical Therapy: \"Pain relief\"      OBJECTIVE:  Observation:    08/06/19 08/13/19 09/17/19 09/24/19 10/01/19 10/29/19 11/05/19 11/12/19 11/26/19 12/19/19 01/07/20          SI Joint Positioning  Right  Left Right  Left Right  Left Right  Left Right  Left Right  Left Right  Left Right  Left Right  Left Right  Left Right  Left Right  Left Right  Left Right Left Right Left Right Left Right  Left Right  Left       " Innominate                            Anterior    x               x     x               x   X               x               x              sl               x                x                x                 Posterior               x     x                x    x               x   x    x   sl    x   x   x             Sacrum                          Unilateral rotation    x      x    x   x   x   x   x    x     x   x    x                                  SI Joint Testing  Right  Left Right  Left Right  Left Right  Left Right  Left Right  Left Right  Left Right  Left Right  Left Right  Left Right  Left Right  Left Right  Left Right Left Right Left Right Left Right  Left Right  Left           Distraction   +          +   +           +   +         +   +           +   +           +   +           +     +          +    +           +   +          +   +           +  +          +                  Joanna's   +          +   +         +   +          +   +         +   -           +                        Compression   Sl        Sl    Sl         Sl    -          -      -          -                        Prone Press Up   -          -   -           -   -            -   -          -   Sl        Sl                                      Special Tests  Right   Left Right  Left Right  Left Right  Left Right  Left Right  Left Right Left  Right  Left Right  Left Right  Left Right  Left Right  Left Right  Left Right Left Right Left Right Left Right Left  Right  Left      Straight Leg Raise                             Active   -            -    -         -    -           -     -         -   -        -                    Passive   -           -    -          -   -          -     -        -   -          -                Hip Scour Test   sl           sl   Sl         Sl    +         sl    Sl        Sl    Sl       sl   Sl          sl   Sl         Sl    Sl        sl   Sl         sl   Sl         sl   +        sl                               Francis  Cyst   -            -     +          -     Sl        -   -           -   -         -   +         -   ++       -   +          sl   Sl          -  sl          -               01/08/2019 02/07/19 03/21/19 03/28/19 04/04/19 04/11/19 04/18/19 04/25/19 05/02/19 05/07/19 05/14/19 06/05/19 06/11/19 06/20/19 06/25/19 07/09/19 07/16/19 07/23/19   SI Joint Positioning  Right  Left Right  Left Right  Left Right  Left Right  Left Right  Left Right  Left Right  Left Right  Left Right  Left Right  Left Right  Left Right  Left Right Left Right Left Right Left Right  Left Right  Left       Innominate                            Anterior    x   x    x  x                x                 x              X     x    x   x   x               x   X                    sl    x                x               x                x          Posterior                x               x        x              x     x     x    x               X                x               x              x                x               x   sl               x    x    x   x      Sacrum                               Unilateral rotation    x   x   x    x    x     x    x    x   x   x    x    x   x   sl   x    x     x                           SI Joint Testing  Right  Left Right  Left Right  Left Right  Left Right  Left Right  Left Right  Left Right  Left Right  Left Right  Left Right  Left Right  Left Right  Left Right Left Right Left Right Left Right  Left Right  Left           Distraction     +          +   +         +   +          +    +        +  +           +   +          +   +          +   +          +     +        +   +         +   +         +   +          +   +           + +           +   +          +    +          +   +          +   +           +           Joanna's     +          +   Sl         sl    +         -    Sl         -   Sl         -   Sl         -   -           -                      Compression     +          +   Sl          sl   Sl          Sl     Sl         Sl    -          -   -         -   -           -                      Prone Press Up           +         +         Sl          -         -        -         -                                   Special Tests  Right   Left Right  Left Right  Left Right  Left Right  Left Right  Left Right Left  Right  Left Right  Left Right  Left Right  Left Right  Left Right  Left Right Left Right Left Right Left Right Left  Right  Left      Straight Leg Raise                                     Active   -         -          -           -                      Passive   -         -             -             -                  Hip Scour Test   ++       Sl    ++       Sl    +           sl   Sl         Sl    Sl         sl Sl          Sl    Sl        sl   Sl       Sl    Sl         Sl    Sl         Sl     Sl         sl   Sl        Sl    Sl        Sl     -            -   Sl         sl   -           -   -          -   -            -                        Bakers Cyst       +         -     +        -    Sl        -   Sl         -     Sl        -   Sl        Sl   sl          sl   Sl         -   -           -   -          -   +           -   Sl      sl       Palpation:   Date 08/06/19 08/13/19 09/17/19 09/24/19 10/01/19 10/29/19 11/05/19 11/12/19 11/26/19 12/19/19 01/07/20           Right  Left Right  Left Right  Left Right  Left Right  Left Right  Left Right  Left Right  Left Right  Left Right  Left Right  Left Right  Left Right  Left Right Left Right Left Right Left Right  Left Right  Left   Piriformis   ++        ++   ++       ++   ++        ++   +         +    ++       +   ++        ++    ++        +   ++        sl   ++         Sl    +         +  +           +          Gr. Trochanter    +        -   ++          +   ++      +   ++         +     Sl        -   ++        +    +         Sl    ++         sl   ++         sl   +          sl   +         sl          IT Band    Sl         Sl    ++        +   +         sl   Sl        Sl     +           Sl    ++         +    +         -    +         -   +           -   +         -   Sl         -          Quadratus Lumborum   ++         +   +         +   ++        +   +         Sl    ++        +   ++         +   +         ++     +         +   +         ++    +        +   +          +          Ischial Tuberosity    -         -   -          -    -        -   -            -     Sl       -   Sl         Sl     -          Sl      -         -   -           -   -          sl   -           -          Sacrococcygeal Ligs    +          +   +       +   +         sl    +         +     -          -   -          -   +         -   -           +   Sl        +   Sl        sl   Sl         Sl           Paraspinals                     Spinous Processes   +           sl   ++        sl    +           -                       C-spine rotated to    C2-5 C2-6 C2-5   -             - C2-3  C2-4           C3-7  C2    C3-7       C2-4   C5-7 C2-6 C2-6                T-spine rotated to  T4-6 T4-7 T3-8   T3-7  T4-6  T4-6 T4-5 T3-12  T3-7 T3-6 T5-9                 L-spine rotated to  L2-5 L2-5   L1-5  L3-5  L3-5  L2-5             L3-5 L2-5 L2-5  L2-5 L3-5          Adductor Tony   +           +   ++      ++   +         ++   +        +   +           +   +           +   Sl        Sl    Sl        Sl    Sl       sl  sl         Sl           Iliopsoas   ++          +   +          sl   +         sl   +          Sl    ++        +  ++        +   +         +   -          +   -         sl   Sl          +          Quad Origin    -          -   -          -   -        -   -          -    -          -   -           -   -          -   -          -   -           -   -         -          SI Joint   +        +   ++        ++  ++        +   ++       +   +            +   ++         +   ++      +    +         sl   ++       +  ++        +   +          sl          Pubic Symphysis         +        +        +       +  not tested        ++           +          +         +           +          +          Obturator externus   +         +   +         +   Sl       sl   -           -   -          -    -          -   -         -   Sl       Sl    Sl        sl   Sl         sl          Rectus Diastasis                                          Ant/middle scalenes   +        sl   +         +   +         +   +          +   +        sl   +          +   +        Sl    +        sl   +         +   +        +   ++        +          Upper traps   +         Sl    +         +   +         +   +         sl   +        Sl    +          sl  sl         sl  +        sl   +        Sl     +        sl   +         sl          Levator scap   +        Sl    +        +   +        sl   +         sl   +         sl    ++        sl   +          Sl   +         sl   +         s;   +         +   +          sl          sternocleidomastoid   -         -   -            -   -          -    -          -   -          -   -            -    -         -   -          -   -          -   -           -                                 sternocostals   +          +   +          +   +        Sl   ++          +   +          +   +             +   Sl       Sl    +          +  +          sl   Sll        sl   +          sl                    Muscle/Bone Irritation  Date 01/08/2019 02/07/19 03/21/19 03/28/19 04/04/18 04/11/19 04/18/19 04/25/19 05/02/19 05/07/19 05/14/19 06/05/19 06/11/19 06/20/19 06/25/19 07/09/19 07/16/19 07/23/19    Right  Left Right  Left Right  Left Right  Left Right  Left Right  Left Right  Left Right  Left Right  Left Right  Left Right  Left Right  Left Right  Left Right Left Right Left Right Left Right  Left Right  Left   Piriformis  ++        +   ++          +   ++       +   ++        +    ++        Sl    ++       Sl   ++       +    ++      +   ++        +   +           +    +        Sl      +         ++   +           ++    +       -   ++      ++   +          +   ++        +   ++         ++   Gr. Trochanter  +          +  +           sl    +           +    +       ++    +          -   +         Sl    +         sl   +         sl   Sl        Sl    +          +    Sl        sl    Sl         +   Sl          sl     +       -   +         +    +         +   +         sl   ++           +   IT Band  +         sl   Sl        sl     -          -   -          -    Sl         -   Sl         -   Sl        -   Sl         -  sl          -   Sl          Sl      -          -     -           -   -            -    Sl        Sl    Sl       Sl     Sl       sl   +          Sl     +          ++   Quadratus Lumborum  ++        ++   ++        +    +        ++   ++        ++     +       ++   +          +  ++        ++   Sl        Sl    +       ++   +          ++    Sl       +     +         +   +          ++     Sl       +   ++       +    ++       +    +         +    ++       +   Ischial Tuberosity  ++         sl   -          -    -           -     -           -     -          -   -          -   -          -   -          -   -          -  -            -     -           -   -          -   -             -     Sl       -   Sl        Sl     -          -     -         -    Sl        Sl    Sacrococcygeal Ligs  ++          +   Sl        sl   +          +    +       +    Sl        Sl    Sl       Sl    +         sl  ++      +  +        Sl       Sl         +   Sl         Sl    Sl         Sl      -        Sl   not  tested    +         +    Sl       Sl    +          +   Paraspinals  +           +     +         sl    +    +          -   Sl         +   +         sl   +        sl  +         -   Sl          +   Sl          +   +          sl   ++         sl   +          -   +         Sl    ++        +     +       Sl    +         sl   Spinous Processes                                        C-spine rotated to                           C3-5  C2-5 C2-6         T-spine rotated to   -               -              T8-9  T4-5    T3-8          L-spine rotated to  L2-5        L1-5  L4-5   L3-5  L2-5            L3-5  L1-5  L2-5  L2-5              L1-5           L2-5 L2-5   L1-5 L3-5 L3-5  L2-5 L4-5 L1-5   Adductor Tony    +        +       +         +     -         +   -           Sl     -        sl   +          sl   -          -   -           -   -          -   -           -   -          -   -           sl  ++        +   +         sl   +         +   +         ++   ++        +  +            +   Iliopsoas  ++         +   ++        +   ++         +    +         ++  sl        Sl    Sl       Sl    Sl       Sl    Sl         -   Sl        -   Sl         +   +         Sl    +          sl  +          ++   +          +   +          +   +         ++   ++        +   ++         +   Quad Origin  sl          sl   Sl          sl    -          -   -           -    -         -   -          -   -         -   -          -   -          -   -           -    -         -   -            -   -         -   -         -   -         -   -            -   -          -   -         -   SI Joint  ++         +  ++        +    ++        +   +          +   +         +   +          ++   +         sl    +         sl   +        +  +          sl  +        Sl     +        Sl   +          ++   Sl       sl   +        +    + +        +  ++          +    ++        ++   Pubic Symphysis         ++          Not tested         ++      ++        ++            + Not tested Not tested          +             ++         +          +   Obturator externus          -          +   -          -   -          -    -           -   -          -   -          -   -         -    -            -   -          -   -            -   Rectus Diastasis   1/2 finger                                         Ant/middle scalenes                   ++        +   ++       +   Upper traps                  ++         +   +          +   Levator scap                   ++          +     +           sternocleidomastoid                   Sl        Sl      -         -                         sternocostals   +        +   +          +    +         -    +           +   Sl        Sl    +         Sl  Not tested     +         +     +         +   +         +   Sl      sl   +          +    +        +   +          +   +          +   All 4s Positioning: Pt not able to assume full lumbar extension in this position  Leg Length:  The right  lower extremity is equal to the left        Monthly Objective Measurement/Functional Activity  Date: 01/08/2019 03/21/19 04/25/19 06/05/19 07/09/19 09/17/19 10/29/19 11/26/19 01/07/20        Oswestry Pain Score 52/100  46/100    48/100      48/100       50/100 48/100 52/100   50/100     50/100                                          AROM Lumbar Spine: Degrees   Degrees      Degrees      Degrees      Degrees      Degrees Degrees Degrees Degrees Degrees Degrees Degrees      Degrees    Degrees      Flexion 106 pain          99          83       75 pain          63 onset of pain         69 pain increased   57 pain       25        53           Extension 3 pain           17           19       11 pain           11  Pinches       16 pain increased 12  instant pain in right SI jt         12 pain lumbar spine           Right Lat. Flexion 29 pain right trunk          24        25      25    23 pinch       19 pinch  14 24    20           Left Lat. Flexion 31         19         23     14  15 pinch     11 pinch  18 19 18 easier            Right Lat Shift Pelvis inc pain   Inc pain Left SI jt       Slight increase No change but can't do far Feels caught  Increased pain  Sl inc pain No change in pain  No change in lumbar but inc hip           Left Lat Shift Pelvis  less inc pain       No change       No change/ slight cielo  No change but can't go far  Easier but not natural  Increased pain  Sl  Inc pain No change in pain  No change lumbar but sl inc hip                          AROM Hips:  (degrees) Right      Left Right Left Right  Left Right  Left Right  Left Right  Left Right Left Right  Left Right Left Right Left Right Left Right left Right  Left Right  Left      Flexion 110       110  125    120    128      125 130       130 130      130 125      120 128     120  125      120    125   122           Abduction 37       41   45      45      42      44  43          45   45       45  45          45 43        45  40         45  43         45           Int. Rotation 32       22  30      25      31         28  32          30   34        32   30         28 32        31  30          33  31         35           Ext. Rotation  34       42  35      40       38        41   35         40   40        42   35         37  38        39  35           40  36          40                         Flexibility:   (degrees) Right    Left Right  Left Right  Left Right  Left Right  Left Right  Left Right  left Right Left Right Left Right Left Right Left Right Left Right  Left Right  Left      Hamstrings -32       -28  -30     -25   -25       -25   -28      -27  -30      -26   -28       -29  -25       -25  -25       -20   -25      -21           Quadriceps 39       42 40        45    not tested  40          42  45        47   50         52  not tested   55         55  48        59           HipExt/Rot(piriformis) 30       28   32      30  33         32   35         33   37        35   35        35  38       36  35        40   29        37           Hip Int/Rotators 27       9   25      10   28        15   27         16   25         18   20        20  22      18  18        23   20       25           Hip Flexors (iliopsoas) -       -                   IT Band -       -                                 Reflexes: Right      Left Right  Left Right  Left Right  Left Right  Left Right  Left Right Left Right left Right left Right Left Right left Right Left Right  Left Right  Left      L4 (Quad) +1       +1                   S1 (Achilles) +1       +1                                 Root Level  - Motor Right      Left Right  Left Right   Left Right  Left Right  Left Right  Left Right Left Right Left Right Left Right Left Right Left Right Left Right  Left Right  Left     T12             Rectus Abdominus 4+/5     4+/5         5/5        5/5             5/5            L1              Paraspinals 5/5         5/5        5/5        5/5            L2              Hip Flexion 5/5          5/5         5/5        5/5            L3             Quads 5/5          5/5         5/5        5/5            L4              Anterior Tibialis 5/5         5/5       5/5         5/5            L5             Gr. Toe Extension 5/5           5/5         5/5        5/5            S1            Gastroc/Sol/Peroneals 5/5          5/5       5/5          5/5                          Functional Strength:                   Single LegStanceTime (seconds) R:30   L:30   R:      L: R:      L: R: 30 pain     L:30 pain  R:      L: R:      L: R:       L: R:       L: R:       L: R:       L: R:       L: R:       L:  R:      L: R:      L:     Pelvic Floor Isolation (seconds) -    10 sec 10 sec   > 10 sec   > 10 sec            Inner Unit  Isolation (seconds) -    10 sec 10 sec     > 10 sec   > 10 sec                          Heart Rate        (12/19/19)         Blood Pressure           81 bpm                    109/78                                           Functional Activities:        11/26/19            Sit w/o pain? Pain increases (minutes) No/ 30 min No/ 30  min No/ 30 min  No/  30 min No/ 30 min  No/ 30min  No/ 30 min No/ 10-15 min No/ 10-15 min            Stand w/o pain? No/ 30 min No/ 30 min No/ 10 min  No/ 10 min No/ 10 min  No/ 15-20 min  No / 10 min No/10-15min, 1/4 mile No/ 10-15           Walk w/o pain? varies No/ 1 mile No/ 1/2 mile No/ 1 mile No/ 1mile, piriformis, B No/ a couple miles  No/ 1/2 mile No/ not much  No/ 1/2 mile           Steps w/o pain? 1 fl, avoids them 1 fl avoids  1 fl avoids  No/ avoids No/ doesn't do No/ 1 flight  No/ 1 fl  No/ 1 fl  No/ 1 fl             Drive w/o pain? No/ 10-15 min No/ 30-45 min No/ 30-45  Min  No/ 30-45 min No/ 35-45 No/ 35-45 No/ 30 min No/ 10 -15 min No/ 10-15 min            Work w/o pain? No/ 30 min No/ 30 min No/ 30  No/ 30 min No/30 mi   No/ 30 min  No/ 30 min No/10-15 min No/ 10-15           # times wakes w/ pain? 4-5x/night, now taking meds at night she is able to sleep.  2x   2-3x  2x         1x         Several times   several times several times The last few nights 0x, a few times over last month            PLAN OF CARE:    ASSESSMENT:  Problem List:   1. SI joint dysfunction  2. Lack of spinal stabilization  3. Postural dysfunction     Discussion:  Elda alaniz will gone on vacation as she was sleeping on a firmer mattress and wasn't sitting at computer all day.  She is optimistic that she will be able to control these factors once she moves to a new house hopefully within the next month.  She will be able to get a new mattress, a sit stand desk and will be close to a pool to swim regularly.  She will also be able to purchase an inversion table to use on her own at home as she will have room for it then.     By the end of today's visit including manual work and inversion table, her pain decreased to 2-3/10 in both the low back and the neck.  Tete has a thorough home program and is incorporating all principles and instruction into her ADLs however she notes if she does not get manual work at least every 2-3 weeks she is not able to manage her pain as well.  Thus she will be seen on an every other week basis for 2 visits and then move to be seen once every 3 week basis to facilitate transitioning her to more independence and self management.       Short Term Goals: (4-6 weeks)                                   Date Met:                1.   pt independent in postural correction         02/07/19  2.   pt independent in SI joint self-corrections        03/21/19  3.   pt independent in exer to decrease post. disc pressures      03/21/19  4.   " pt able to sleep through night without pain  5.   pt able to sit 15 minutes without pain  6.   pt able to walk 10 minutes without pain        06/20/19    7.   Reduce pt pain to no greater than 7/10        03/21/19  8.   Decrease Oswestry Pain Score to no greater than 45/100  9.  Reduce pt pain to no greater than 6/10        07/16/19  10.  Pt able to isolate the pelvic floor in supine x10, 10 sec      03/28/19  11.  Pt able to isolate transverse abdom in all 4s, x10 sec, x10         03/28/19  12.  Pt able to isolate the multifidus in sitting x10 sec, x10                            03/28/19  13.  Pt able to engage inner unit, move from sit to stand &back to sit      04/04/19                                                                                                                                                                                                                                             14.  Pt able to engage inner unit and walk 4 steps without overflow to hamstrings   06/20/19  15.  Pt able to hip abduct x6 without engaging the piriformis      06/20/19     16.  Pt able to perform prone knee flexion without engaging piriformis x6          05/07/19       17.  Pt able to perform remedial lower abs with scissor arm work x6 w/o pain    04/18/19    18.  Pt able to perform \"Pre-cursor\" lower abs leg lift initiation x6 without pain          04/18/19   19.  Pt able to perform retro step without engaging piriformis x6, bilaterally    09/24/19    20.  Pt able to perform bridging x6 without engaging piriformis      04/25/19    21.  Pt able to perform \"scissors\" arm movement with inner unit w/o piriformis x6   05/02/19  22.  Pt able to perform PIC hip adductors without engaging the piriformis  x6    05/07/19  23.  Pt able to perform \"Scissors\" arm motion w/ inner unit with 1lb wts x6 w/o pain   05/07/19  24.  Pt able to perform prone glut max over stability ball x6 w/o engaging piriformis   25.  Pt able to " reduce pain w/ self PIC to left psoas       05/14/19  26.  Pt able to move laterally on stability ball in sitting without pain x6     11/05/19  27.  Pt able to move A/P movement sitting on stability ball without pain x6    11/05/19  28.  Pt independent in pool exercises, to do without increasing pain     07/09/19  29.  Pt able to throw and catch stability ball in supine with inner unit on x6    06/11/19  30.  Pt able to hold medicine ball in stance and move away from trunk with IU on and w/o pain x6 06/11/19  31.  Pt independent in supine hamstring stretch, able to do 2x without pain.    07/16/19  32.  Pt independent in hip adductor stretching, supine and stance     07/16/19  33.  Pt independent in quad stretching, in stance, without pain.      08/06/19  34. Pt independent in hip internal rotator stretch in supine, without pain      08/06/19  35.  Pt able to tolerate ADLs with leukotape on scapula for enhanced spinal stabilization x 1 day 07/25/19  36.  Pt able to perform prone walk out x6 without losing inner unit engagement    11/12/19  37. Pt able to perform prone arm lift x6, phase 1, without engaging the upper trap   08/06/19  38.  Pt able to perform supine obliques over stability ball x6 without losing inner unit engagement 08/06/19  39.  Pt able to perform prone arm lift, lifting elbow without engaging upper traps x6   11/12/19  40.  Pt able to perform upper quarter stretches without increasing pain     09/17/19  41.  Pt able to do adapted BKFO without engaging piriformis x6        42.  Pt able to do diaphragmatic breathing x6 without engaging piriformis       11/12/19                                                                                                                                          Long Term Goals:(3-5 months)      Date Met:      1.  pt independent in self-management of symptoms   12/19/19 - for 4 weeks only       2.  pt independent in home program     12/19/19      3.  pt able to work 6  hours without pain      4.  pt able to sit 60 minutes without pain      5.  pt able to walk 45 min without pain      6.  Pt independent in use of inversion table      7.  Pt independent in swimming program to be done without pain    Progress Towards Goals:  As expected to a little slower than expected    Treatment Plan:   1.  Ultrasound to increase extensibility, decrease pain, if needed  2.  Electrical stimulation for muscle relaxation, decrease pain, if needed, iontophoresis  3.  Manual therapy to increase function, decrease pain  4.  Therapeutic exercise to increase function, decrease pain  5.  Home programming and d/c planning to increase function and decrease pain,     Frequency & Duration:  Pt in need of more visits for neuro motor retraining and continued strengthening for the inner unit thus will see on a once/wk to once every other week basis for 5 more visits to complete the spinal stabilization instruction, meet remaining short and long term goals and be independent in her home program.      Patient Participated in and Agrees With This Plan of Care and Goals:  YES  Rehab Potential:  jarret Marcial, PT, MS  Physical Therapist  [place]# [id number]      TREATMENT TODAY:    Total Treatment Time: (time in clinic)   65  min  Timed Code Treatment Minutes:     65      Supplies given:      Manual Therapy:  (MA)  RX min:    50  Manual posterior rotation of left innominate    Positional Isometric contraction (PIC) of right iliopsoas    Positional Isometric contraction of (PIC) right gluteus minimus    Distraction of both SI jts in open pack hip position  Piriformis stretching, bilaterally    Quadratus lumborum stretching, bilaterally    Anterior/Inferior Mobilization of  right hip    Positional Isometric Contraction of multifidus  Myofascial work trunk   Sternocostal and rib mobs   Manual spinal distraction  PIC C-spine  PIC T-spine  Stretching scalenes, upper traps, levator scap      Therapeutic Activities:  (TA)   RX min:   15    Neuromuscular Reeducation: (NMR)  RX min:       Ultrasound:  RX min:        1.6 vance/cm2       Location:     Iontophoresis:  6 hr patch                                Location:                           Ice:        ocedures:      Key:  i=instructed        r=review        c=corrected        a=adapted   Code Procedure/Instruction 10/29/19 11/05/19 11/12/19 11/26/19 12/19/19 01/07/20                   TA         Sleeping Positions                         TA Sternum-Up Posture                         TA SI jt. self-correction     reviewed                    TA Lumbar Facet PIC                         TA   Order of Self-Corrections                         TA Use of lumbar pillow                         TA Use of cervical roll                         TA Use of orthotics                         TA Use of SI belt                         TA Use of Nada chair                         TA Use of Ice                         TA Use of [employer]/ heat                         TA Use of [employer] Relief                         TA Use of TENS unit                         TA Use of iontophoresis                         TA Decreasing Disc Pressures                         TA Use of sacro wedge                         TA Use of inversion table Used for 10 min  Used for 13 min  Used for 10 min  Used 14 min reviewed and used 15 min                                              NMR Leukotaping upper quarter                         NMR Pelvic Floor Isolation                         NMR Transverse Abdominus                         NMR Multifidus Isolation                         NMR Diaphragmtic breathe supine  reviewed                       NMR  Diaphragmtic breath sit                         NMR Pelvic Clock in sitting                         NMR Kinesiotape   On right scapula                      NMR InnerUnit against Gravity     reviewed                    NMR Sit-stand w/ inner unit                         NMR Roll w/  inner unit                         NMR Walk w/ inner unit                          NMR Up/down steps w/ inner unit                         NMR Water Exer Instruction    instructed                     NMR Hip Abd w/o piriformis                         NMR Hip Add w/o iliop/ham                          NMR Lower abs in supine                         NMR Inner unit challenge with scissor arm work                          NMR Abd obliques in supine  reviewed                       NMR Prone Knee Flexion                         NMR Supine glute w/ ball btwn knees                          NMR Prone glut amrita                         NMR Retro Step                         NMR Retro Walk                         NMR Retro walk on treadmill                         NMR Forward walk w/ inner unit  reviewed                       NMR Stability ball multifidus bounce                         NMR Stability Ball A/P & Lateral  reviewed                       NMR Ball Catch/Throw /Supine                         NMR Ball movemt in /Stance                         NMR StabilityBall All 4's Arm Lift                         NMR StabilityBall Prone Walk Out                         NMR StabilityBall Supine Oblique                         NMR Stability Ball sit-supine-sit                         NMR Walking Prog Progression                         MNR Home program sequencing                                                   TA Hamstring stretch                         TA Hip Ext Rot Stretch                         TA Hip Int Rot Stretch                         TA Hip Flex/IT Stretch                         TA Hip Add Stretch                         TA Lats Dorsi Stretch                         TA Gastroc/soleus stretch                         TA QuadratusLumborm Stretch                            Supine                            Stance                         TA Quad Stretch                                                   NMR Wall Push Ups                          NMR Planking                         NMR BOSU Ball Exercises                         NMR Lateral Bridge Drop                         NMR [place]                         NMR O'Feldt Lat Pull Down                         NMR O'Feldt Hip Ext                         NMR O'Feldt Trunk Ext                                                   TA CervDiscExtSup/stnd    instructed/reviewed                     TA Cerv Stretches                         TA Self PIC Cervical Spine                         TA Neural Gliding                         TA Upper trap stretching                         TA Ant/Mid Scalene Strch                         TA Levator Scap strch                         TA Biceps stretch                         TA Rotator Cuff Stretch                         TA Anterior Chest Stretch                         TA Wrist Ext Stretch                                                   NMR Prone Arm Lifts                         NMR Scapula Stabilization                         NMR Occulomotor Isometrics                         NMR Cervical Isometrics                                                   TA Shld AROM w/bar                         TA Shld Capsular Stretching                         TA Self PIC Thor Spine                         TA Rot Cuff Therabd, beginner                         TA Rot Cuff Therabd, intermed                                                                                                                                                                                                                                                                                                                           Miracle Marcial, PT, MS  Physical Therapist  [place]# [id number]

## 2020-01-21 ENCOUNTER — TREATMENT (OUTPATIENT)
Dept: PHYSICAL THERAPY | Facility: CLINIC | Age: 35
End: 2020-01-21

## 2020-01-21 DIAGNOSIS — S39.012D STRAIN OF LUMBAR REGION, SUBSEQUENT ENCOUNTER: ICD-10-CM

## 2020-01-21 DIAGNOSIS — M35.7 FAMILIAL LIGAMENTOUS LAXITY: ICD-10-CM

## 2020-01-21 DIAGNOSIS — M53.3 SACROILIAC JOINT DYSFUNCTION: Primary | ICD-10-CM

## 2020-01-21 DIAGNOSIS — R29.3 POSTURE IMBALANCE: ICD-10-CM

## 2020-01-21 PROCEDURE — 97112 NEUROMUSCULAR REEDUCATION: CPT | Performed by: PHYSICAL THERAPIST

## 2020-01-21 PROCEDURE — 97140 MANUAL THERAPY 1/> REGIONS: CPT | Performed by: PHYSICAL THERAPIST

## 2020-01-21 NOTE — PROGRESS NOTES
"Physical Therapy Note      SUBJECTIVE:   Changes since last seen:  \"Doing pretty good!\"  She had a menses and \"it wasn't too bad.\"   Her worst pain is in right buttoock, right anterior hip and some in mid lower back.   No neck pain recently.   She thinks flipping her mattress over has really helped to decrease pain at night and thus during the day.     Current level of pain:    4.5/10  Low back/ right buttock       Neck pain   1/10   Lowest level of pain since last seen:  4/10          1/10  Highest level of pain since last seen:   7/10       2/10      Past Medical History:  1.  AA 2013  Treated with PT for left shoulder labral tear, no low back treatment   2.  Hx of two falls when mopping in socks, over the last 2 years  3.  Hx of regular, heavy, cramping menses  4.  T& A, 2006  5.  Allergic to Wellbutrin and mild allergy to contrast dye  6.  Chronic yeast infections  7.  Uterine fibroids removed, 2 yrs ago       Functional Limitations:  Sitting, standing, walking, stairs, driving, working, sleeping, squatting, housework and changing positions.   Patient Goals for Physical Therapy: \"Pain relief\"      OBJECTIVE:  Observation:     Slight right lateral shift of pelvis      08/06/19 08/13/19 09/17/19 09/24/19 10/01/19 10/29/19 11/05/19 11/12/19 11/26/19 12/19/19 01/07/20 01/21/20         SI Joint Positioning  Right  Left Right  Left Right  Left Right  Left Right  Left Right  Left Right  Left Right  Left Right  Left Right  Left Right  Left Right  Left Right  Left Right Left Right Left Right Left Right  Left Right  Left       Innominate                            Anterior    x               x     x               x   X               x               x              sl               x                x                x Sl                 Posterior               x     x                x    x               x   x    x   sl    x   x   x              sl            Sacrum                          Unilateral rotation    x      x    " x   x   x   x   x    x     x   x    x   x                                 SI Joint Testing  Right  Left Right  Left Right  Left Right  Left Right  Left Right  Left Right  Left Right  Left Right  Left Right  Left Right  Left Right  Left Right  Left Right Left Right Left Right Left Right  Left Right  Left           Distraction   +          +   +           +   +         +   +           +   +           +   +           +     +          +    +           +   +          +   +           +  +          +     +         +                 Joanna's   +          +   +         +   +          +   +         +   -           +                        Compression   Sl        Sl    Sl         Sl    -          -      -          -                        Prone Press Up   -          -   -           -   -            -   -          -   Sl        Sl                                      Special Tests  Right   Left Right  Left Right  Left Right  Left Right  Left Right  Left Right Left  Right  Left Right  Left Right  Left Right  Left Right  Left Right  Left Right Left Right Left Right Left Right Left  Right  Left      Straight Leg Raise                             Active   -            -    -         -    -           -     -         -   -        -                    Passive   -           -    -          -   -          -     -        -   -          -                Hip Scour Test   sl           sl   Sl         Sl    +         sl    Sl        Sl    Sl       sl   Sl          sl   Sl         Sl    Sl        sl   Sl         sl   Sl         sl   +        sl   Sl       Sl                               Bakers Cyst   -            -     +          -     Sl        -   -           -   -         -   +         -   ++       -   +          sl   Sl          -  sl          -   +         -              01/08/2019 02/07/19 03/21/19 03/28/19 04/04/19 04/11/19 04/18/19 04/25/19 05/02/19 05/07/19 05/14/19 06/05/19 06/11/19 06/20/19 06/25/19 07/09/19 07/16/19 07/23/19   SI  Joint Positioning  Right  Left Right  Left Right  Left Right  Left Right  Left Right  Left Right  Left Right  Left Right  Left Right  Left Right  Left Right  Left Right  Left Right Left Right Left Right Left Right  Left Right  Left       Innominate                            Anterior    x   x    x  x                x                 x              X     x    x   x   x               x   X                    sl    x                x               x                x          Posterior                x               x        x              x     x     x    x               X                x               x              x                x               x   sl               x    x    x   x      Sacrum                               Unilateral rotation    x   x   x    x    x     x    x    x   x   x    x    x   x   sl   x    x     x                           SI Joint Testing  Right  Left Right  Left Right  Left Right  Left Right  Left Right  Left Right  Left Right  Left Right  Left Right  Left Right  Left Right  Left Right  Left Right Left Right Left Right Left Right  Left Right  Left           Distraction     +          +   +         +   +          +    +        +  +           +   +          +   +          +   +          +     +        +   +         +   +         +   +          +   +           + +           +   +          +    +          +   +          +   +           +           Joanna's     +          +   Sl         sl    +         -    Sl         -   Sl         -   Sl         -   -           -                      Compression     +          +   Sl          sl   Sl          Sl     Sl        Sl    -          -   -         -   -           -                      Prone Press Up           +         +         Sl          -         -        -         -                                   Special Tests  Right   Left Right  Left Right  Left Right  Left Right  Left Right  Left Right Left  Right  Left Right  Left Right  Left Right   Left Right  Left Right  Left Right Left Right Left Right Left Right Left  Right  Left      Straight Leg Raise                                     Active   -         -          -           -                      Passive   -         -             -             -                  Hip Scour Test   ++       Sl    ++       Sl    +           sl   Sl         Sl    Sl         sl Sl          Sl    Sl        sl   Sl       Sl    Sl         Sl    Sl         Sl     Sl         sl   Sl        Sl    Sl        Sl     -            -   Sl         sl   -           -   -          -   -            -                        Bakers Cyst       +         -     +        -    Sl        -   Sl         -     Sl        -   Sl        Sl   sl          sl   Sl         -   -           -   -          -   +           -   Sl      sl       Palpation:   Date 08/06/19 08/13/19 09/17/19 09/24/19 10/01/19 10/29/19 11/05/19 11/12/19 11/26/19 12/19/19 01/07/20 01/21/20          Right  Left Right  Left Right  Left Right  Left Right  Left Right  Left Right  Left Right  Left Right  Left Right  Left Right  Left Right  Left Right  Left Right Left Right Left Right Left Right  Left Right  Left   Piriformis   ++        ++   ++       ++   ++        ++   +         +    ++       +   ++        ++    ++        +   ++        sl   ++         Sl    +         +  +           +   +         sl         Gr. Trochanter    +        -   ++          +   ++      +   ++         +     Sl        -   ++        +    +         Sl    ++         sl   ++         sl   +          sl   +         sl   +         sl         IT Band    Sl         Sl    ++        +   +         sl   Sl        Sl     +          Sl    ++         +    +         -    +         -   +           -   +         -   Sl         -   Tight    -         Quadratus Lumborum   ++         +   +         +   ++        +   +         Sl    ++        +   ++         +   +         ++     +         +   +         ++    +        +   +          +   +          +         Ischial Tuberosity    -         -   -          -    -        -   -            -     Sl       -   Sl         Sl     -          Sl      -         -   -           -   -          sl   -           -   -         -         Sacrococcygeal Ligs    +          +   +       +   +         sl    +         +     -          -   -          -   +         -   -           +   Sl        +   Sl        sl   Sl         Sl    Sl        Sl          Paraspinals                     Spinous Processes   +           sl   ++        sl    +           -                       C-spine rotated to    C2-5 C2-6 C2-5   -             - C2-3  C2-4           C3-7  C2    C3-7       C2-4   C5-7 C2-6 C2-6 C3-6               T-spine rotated to  T4-6 T4-7 T3-8   T3-7  T4-6  T4-6 T4-5 T3-12  T3-7 T3-6 T5-9 T4-8                L-spine rotated to  L2-5 L2-5   L1-5  L3-5  L3-5  L2-5             L3-5 L2-5 L2-5  L2-5 L3-5 L4-5         Adductor Tony   +           +   ++      ++   +         ++   +        +   +           +   +           +   Sl        Sl    Sl        Sl    Sl       sl  sl         Sl   sl       Sl          Iliopsoas   ++          +   +          sl   +         sl   +          Sl    ++        +  ++        +   +         +   -          +   -         sl   Sl          +   +        +         Quad Origin    -          -   -          -   -        -   -          -    -          -   -           -   -          -   -          -   -           -   -         -   -           -         SI Joint   +        +   ++        ++  ++        +   ++       +   +            +   ++         +   ++      +    +         sl   ++       +  ++        +   +          sl   +         Sl          Pubic Symphysis         +        +        +       +  not tested        ++           +          +         +          +          +       +         Obturator externus   +         +   +         +   Sl       sl   -           -   -          -    -          -   -         -   Sl       Sl    Sl         sl   Sl         sl   Sl        Sl          Rectus Diastasis                                          Ant/middle scalenes   +        sl   +         +   +         +   +          +   +        sl   +          +   +        Sl    +        sl   +         +   +        +   ++        +   +       +         Upper traps   +         Sl    +         +   +         +   +         sl   +        Sl    +          sl  sl         sl  +        sl   +        Sl     +        sl   +         sl   +       sl         Levator scap   +        Sl    +        +   +        sl   +         sl   +         sl    ++        sl   +          Sl   +         sl   +         s;   +         +   +          sl   +       sl         sternocleidomastoid   -         -   -            -   -          -    -          -   -          -   -            -    -         -   -          -   -          -   -           -   -         -                                sternocostals   +          +   +          +   +        Sl   ++          +   +          +   +             +   Sl       Sl    +          +  +          sl   Sll        sl   +          sl   +        +                   Muscle/Bone Irritation  Date 01/08/2019 02/07/19 03/21/19 03/28/19 04/04/18 04/11/19 04/18/19 04/25/19 05/02/19 05/07/19 05/14/19 06/05/19 06/11/19 06/20/19 06/25/19 07/09/19 07/16/19 07/23/19    Right  Left Right  Left Right  Left Right  Left Right  Left Right  Left Right  Left Right  Left Right  Left Right  Left Right  Left Right  Left Right  Left Right Left Right Left Right Left Right  Left Right  Left   Piriformis  ++        +   ++          +   ++       +   ++        +    ++        Sl    ++       Sl   ++       +    ++      +   ++        +   +           +    +        Sl      +         ++   +           ++    +       -   ++      ++   +          +   ++        +   ++         ++   Gr. Trochanter  +          +  +          sl    +           +    +       ++    +          -   +         Sl    +         sl   +          sl   Sl        Sl    +          +    Sl        sl    Sl         +   Sl          sl     +       -   +         +    +         +   +         sl   ++           +   IT Band  +         sl   Sl        sl     -          -   -          -    Sl         -   Sl         -   Sl        -   Sl         -  sl          -   Sl          Sl      -          -     -           -   -            -    Sl        Sl    Sl       Sl     Sl       sl   +          Sl     +          ++   Quadratus Lumborum  ++        ++   ++        +    +        ++   ++        ++     +       ++   +          +  ++        ++   Sl        Sl    +       ++   +          ++    Sl       +     +         +   +          ++     Sl       +   ++       +    ++       +    +         +    ++       +   Ischial Tuberosity  ++         sl   -          -    -           -     -           -     -          -   -          -   -          -   -          -   -          -  -            -     -           -   -          -   -             -     Sl       -   Sl        Sl     -          -     -         -    Sl        Sl    Sacrococcygeal Ligs  ++          +   Sl        sl   +          +    +       +    Sl        Sl    Sl       Sl    +         sl  ++      +  +        Sl       Sl         +   Sl         Sl    Sl         Sl      -        Sl   not  tested    +         +    Sl       Sl    +          +   Paraspinals  +           +     +         sl    +    +          -   Sl         +   +         sl   +        sl  +         -   Sl          +   Sl          +   +          sl   ++         sl   +          -   +         Sl    ++        +     +       Sl    +         sl   Spinous Processes                                        C-spine rotated to                           C3-5  C2-5 C2-6         T-spine rotated to   -               -              T8-9  T4-5    T3-8          L-spine rotated to  L2-5        L1-5  L4-5  L3-5  L2-5            L3-5  L1-5  L2-5  L2-5              L1-5           L2-5 L2-5   L1-5 L3-5 L3-5   L2-5 L4-5 L1-5   Adductor Tony    +        +       +         +     -         +   -           Sl     -        sl   +          sl   -          -   -           -   -          -   -           -   -          -   -           sl  ++        +   +         sl   +         +   +         ++   ++        +  +            +   Iliopsoas  ++         +   ++        +   ++         +    +         ++  sl        Sl    Sl       Sl    Sl       Sl    Sl         -   Sl        -   Sl         +   +         Sl    +          sl  +          ++   +          +   +          +   +         ++   ++        +   ++         +   Quad Origin  sl          sl   Sl          sl    -          -   -           -    -         -   -          -   -         -   -          -   -          -   -           -    -         -   -            -   -         -   -         -   -         -   -            -   -          -   -         -   SI Joint  ++         +  ++        +    ++        +   +          +   +         +   +          ++   +         sl    +         sl   +        +  +          sl  +        Sl     +        Sl   +          ++   Sl       sl   +        +    + +        +  ++          +    ++        ++   Pubic Symphysis         ++          Not tested         ++      ++        ++            + Not tested Not tested          +             ++         +          +   Obturator externus          -          +   -          -   -          -    -           -   -          -   -          -   -         -    -            -   -          -   -            -   Rectus Diastasis   1/2 finger                                         Ant/middle scalenes                   ++        +   ++       +   Upper traps                  ++         +   +          +   Levator scap                   ++          +     +           sternocleidomastoid                   Sl        Sl      -         -                        sternocostals   +        +   +          +    +         -    +           +   Sl        Sl    +          Sl  Not tested     +         +     +         +   +         +   Sl      sl   +          +    +        +   +          +   +          +   All 4s Positioning: Pt not able to assume full lumbar extension in this position  Leg Length:  The right  lower extremity is equal to the left        Monthly Objective Measurement/Functional Activity  Date: 01/08/2019 03/21/19 04/25/19 06/05/19 07/09/19 09/17/19 10/29/19 11/26/19 01/07/20        Oswestry Pain Score 52/100  46/100    48/100      48/100       50/100 48/100 52/100   50/100     50/100                                          AROM Lumbar Spine: Degrees   Degrees      Degrees      Degrees      Degrees      Degrees Degrees Degrees Degrees Degrees Degrees Degrees      Degrees    Degrees      Flexion 106 pain          99          83       75 pain          63 onset of pain         69 pain increased   57 pain       25        53           Extension 3 pain           17           19       11 pain           11  Pinches       16 pain increased 12  instant pain in right SI jt         12 pain lumbar spine           Right Lat. Flexion 29 pain right trunk          24        25      25    23 pinch       19 pinch  14 24    20           Left Lat. Flexion 31         19         23     14  15 pinch     11 pinch  18 19 18 easier            Right Lat Shift Pelvis inc pain   Inc pain Left SI jt       Slight increase No change but can't do far Feels caught  Increased pain  Sl inc pain No change in pain  No change in lumbar but inc hip           Left Lat Shift Pelvis  less inc pain       No change       No change/ slight cielo  No change but can't go far  Easier but not natural  Increased pain  Sl  Inc pain No change in pain  No change lumbar but sl inc hip                          AROM Hips:  (degrees) Right      Left Right Left Right  Left Right  Left Right  Left Right  Left Right Left Right Left Right Left Right Left Right Left Right left Right  Left Right  Left      Flexion 110       110  125     120    128      125 130       130 130      130 125      120 128     120  125      120    125   122           Abduction 37       41   45      45      42      44  43          45   45       45  45          45 43        45  40         45  43         45           Int. Rotation 32       22  30      25      31         28  32          30   34        32   30         28 32        31  30          33  31         35           Ext. Rotation  34       42  35      40       38        41   35         40   40        42   35         37  38        39  35           40  36          40                         Flexibility:   (degrees) Right    Left Right  Left Right  Left Right  Left Right  Left Right  Left Right  left Right Left Right Left Right Left Right Left Right Left Right  Left Right  Left      Hamstrings -32       -28  -30     -25   -25       -25   -28      -27  -30      -26   -28       -29  -25       -25  -25       -20   -25      -21           Quadriceps 39       42 40        45    not tested  40          42  45        47   50         52  not tested   55         55  48        59           HipExt/Rot(piriformis) 30       28   32      30  33         32   35         33   37        35   35        35  38       36  35        40   29        37           Hip Int/Rotators 27       9   25      10   28        15   27         16   25         18   20        20  22      18  18        23   20       25           Hip Flexors (iliopsoas) -       -                   IT Band -       -                                 Reflexes: Right      Left Right  Left Right  Left Right  Left Right  Left Right  Left Right Left Right left Right left Right Left Right left Right Left Right  Left Right  Left      L4 (Quad) +1       +1                   S1 (Achilles) +1       +1                                 Root Level  - Motor Right      Left Right  Left Right  Left Right  Left Right  Left Right  Left Right Left Right Left Right Left Right Left Right Left Right Left  Right  Left Right  Left     T12             Rectus Abdominus 4+/5     4+/5         5/5        5/5             5/5            L1              Paraspinals 5/5         5/5        5/5        5/5            L2              Hip Flexion 5/5          5/5         5/5        5/5            L3             Quads 5/5          5/5         5/5        5/5            L4              Anterior Tibialis 5/5         5/5       5/5         5/5            L5             Gr. Toe Extension 5/5           5/5         5/5        5/5            S1            Gastroc/Sol/Peroneals 5/5          5/5       5/5          5/5                          Functional Strength:                   Single LegStanceTime (seconds) R:30   L:30   R:      L: R:      L: R: 30 pain     L:30 pain  R:      L: R:      L: R:       L: R:       L: R:       L: R:       L: R:       L: R:       L:  R:      L: R:      L:     Pelvic Floor Isolation (seconds) -    10 sec 10 sec   > 10 sec   > 10 sec            Inner Unit  Isolation (seconds) -    10 sec 10 sec     > 10 sec   > 10 sec                          Heart Rate        (12/19/19)         Blood Pressure           81 bpm                    109/78                                           Functional Activities:        11/26/19            Sit w/o pain? Pain increases (minutes) No/ 30 min No/ 30  min No/ 30 min  No/  30 min No/ 30 min  No/ 30min  No/ 30 min No/ 10-15 min No/ 10-15 min            Stand w/o pain? No/ 30 min No/ 30 min No/ 10 min  No/ 10 min No/ 10 min  No/ 15-20 min  No / 10 min No/10-15min, 1/4 mile No/ 10-15           Walk w/o pain? varies No/ 1 mile No/ 1/2 mile No/ 1 mile No/ 1mile, piriformis, B No/ a couple miles  No/ 1/2 mile No/ not much  No/ 1/2 mile           Steps w/o pain? 1 fl, avoids them 1 fl avoids  1 fl avoids  No/ avoids No/ doesn't do No/ 1 flight  No/ 1 fl  No/ 1 fl  No/ 1 fl            Drive w/o pain? No/ 10-15 min No/ 30-45 min No/ 30-45  Min  No/ 30-45 min No/ 35-45 No/ 35-45 No/ 30 min No/  "10 -15 min No/ 10-15 min            Work w/o pain? No/ 30 min No/ 30 min No/ 30  No/ 30 min No/30 mi   No/ 30 min  No/ 30 min No/10-15 min No/ 10-15           # times wakes w/ pain? 4-5x/night, now taking meds at night she is able to sleep.  2x   2-3x  2x         1x         Several times   several times several times The last few nights 0x, a few times over last month            PLAN OF CARE:    ASSESSMENT:  Problem List:   1. SI joint dysfunction  2. Lack of spinal stabilization  3. Postural dysfunction     Discussion:  Tete is making slow but steady progress.  It seems that coming off the hormones at this time is effective as she had a menses without a flare up however, she has only been off the hormones a few weeks.  It will be more revealing over the next menses.      Very pleased Tete was able to use the [employer] bike without increasing her pain.  She is also able to tolerate a little more time on the inversion table.  If she hangs more than 15 min she begins to feel a strain.  She is now going down and coming up with ease and has pain relief afterwards.    Took a lot of stretching of the piriformis to get it calmed down today.  I believe Tete may benefit from a TENS unit as another \"tool in her toolbox\" for managing her pain.  She will order it and bring it on her next visit for instruction in how to use it.     Continue progressing the spinal stabilization program.       Short Term Goals: (4-6 weeks)                                   Date Met:                1.   pt independent in postural correction         02/07/19  2.   pt independent in SI joint self-corrections        03/21/19  3.   pt independent in exer to decrease post. disc pressures      03/21/19  4.   pt able to sleep through night without pain  5.   pt able to sit 15 minutes without pain  6.   pt able to walk 10 minutes without pain        06/20/19    7.   Reduce pt pain to no greater than 7/10        03/21/19  8.   Decrease Oswestry Pain Score to " "no greater than 45/100  9.  Reduce pt pain to no greater than 6/10        07/16/19  10.  Pt able to isolate the pelvic floor in supine x10, 10 sec      03/28/19  11.  Pt able to isolate transverse abdom in all 4s, x10 sec, x10         03/28/19  12.  Pt able to isolate the multifidus in sitting x10 sec, x10                            03/28/19  13.  Pt able to engage inner unit, move from sit to stand &back to sit      04/04/19                                                                                                                                                                                                                                             14.  Pt able to engage inner unit and walk 4 steps without overflow to hamstrings   06/20/19  15.  Pt able to hip abduct x6 without engaging the piriformis      06/20/19     16.  Pt able to perform prone knee flexion without engaging piriformis x6          05/07/19       17.  Pt able to perform remedial lower abs with scissor arm work x6 w/o pain    04/18/19    18.  Pt able to perform \"Pre-cursor\" lower abs leg lift initiation x6 without pain          04/18/19   19.  Pt able to perform retro step without engaging piriformis x6, bilaterally    09/24/19    20.  Pt able to perform bridging x6 without engaging piriformis      04/25/19    21.  Pt able to perform \"scissors\" arm movement with inner unit w/o piriformis x6   05/02/19  22.  Pt able to perform PIC hip adductors without engaging the piriformis  x6    05/07/19  23.  Pt able to perform \"Scissors\" arm motion w/ inner unit with 1lb wts x6 w/o pain   05/07/19  24.  Pt able to perform prone glut max over stability ball x6 w/o engaging piriformis   25.  Pt able to reduce pain w/ self PIC to left psoas       05/14/19  26.  Pt able to move laterally on stability ball in sitting without pain x6     11/05/19  27.  Pt able to move A/P movement sitting on stability ball without pain x6    11/05/19  28.  Pt independent in " pool exercises, to do without increasing pain     07/09/19  29.  Pt able to throw and catch stability ball in supine with inner unit on x6    06/11/19  30.  Pt able to hold medicine ball in stance and move away from trunk with IU on and w/o pain x6 06/11/19  31.  Pt independent in supine hamstring stretch, able to do 2x without pain.    07/16/19  32.  Pt independent in hip adductor stretching, supine and stance     07/16/19  33.  Pt independent in quad stretching, in stance, without pain.      08/06/19  34. Pt independent in hip internal rotator stretch in supine, without pain      08/06/19  35.  Pt able to tolerate ADLs with leukotape on scapula for enhanced spinal stabilization x 1 day 07/25/19  36.  Pt able to perform prone walk out x6 without losing inner unit engagement    11/12/19  37. Pt able to perform prone arm lift x6, phase 1, without engaging the upper trap   08/06/19  38.  Pt able to perform supine obliques over stability ball x6 without losing inner unit engagement 08/06/19  39.  Pt able to perform prone arm lift, lifting elbow without engaging upper traps x6   11/12/19  40.  Pt able to perform upper quarter stretches without increasing pain     09/17/19  41.  Pt able to do adapted BKFO without engaging piriformis x6        42.  Pt able to do diaphragmatic breathing x6 without engaging piriformis       11/12/19    43.  Pt able to ride the [employer] x10 min without increasing pain  44.  Pt able to tolerate the inversion table, 20 sec down, 30 up x 6 cycles  45.  Pt independent in the use of TENS unit                                                                                                                                        Long Term Goals:(3-5 months)      Date Met:      1.  pt independent in self-management of symptoms   12/19/19 - for 4 weeks only       2.  pt independent in home program     12/19/19      3.  pt able to work 6 hours without pain      4.  pt able to sit 60 minutes  without pain      5.  pt able to walk 45 min without pain      6.  Pt independent in use of inversion table      7.  Pt independent in swimming program to be done without pain    Progress Towards Goals:  As expected to a little slower than expected    Treatment Plan:   1.  Ultrasound to increase extensibility, decrease pain, if needed  2.  Electrical stimulation for muscle relaxation, decrease pain, if needed, iontophoresis  3.  Manual therapy to increase function, decrease pain  4.  Therapeutic exercise to increase function, decrease pain  5.  Home programming and d/c planning to increase function and decrease pain,     Frequency & Duration:  Pt in need of more visits for neuro motor retraining and continued strengthening for the inner unit thus will see on a once/wk to once every other week basis for 5 more visits to complete the spinal stabilization instruction, meet remaining short and long term goals and be independent in her home program.      Patient Participated in and Agrees With This Plan of Care and Goals:  YES  Rehab Potential:  jarret Marcial, PT, MS  Physical Therapist  [place]# [id number]      TREATMENT TODAY:    Total Treatment Time: (time in clinic)   60  min  Timed Code Treatment Minutes:     60      Supplies given:      Manual Therapy:  (MA)  RX min:    45  Manual posterior rotation of left innominate    Positional Isometric contraction (PIC) of right iliopsoas    Positional Isometric contraction of (PIC) right gluteus minimus    Distraction of both SI jts in open pack hip position  Piriformis stretching, bilaterally    Quadratus lumborum stretching, bilaterally    Anterior/Inferior Mobilization of  right hip    Positional Isometric Contraction of multifidus  Myofascial work trunk   Sternocostal and rib mobs   Manual spinal distraction  PIC C-spine  PIC T-spine  Stretching scalenes, upper traps, levator scap      Therapeutic Activities:  (TA)  RX min:       Neuromuscular Reeducation: (NMR)  RX  min:   15    Ultrasound:  RX min:        1.6 vance/cm2       Location:     Iontophoresis:  6 hr patch                                Location:                           Ice:        ocedures:      Key:  i=instructed        r=review        c=corrected        a=adapted   Code Procedure/Instruction 10/29/19 11/05/19 11/12/19 11/26/19 12/19/19 01/07/20 01/21/20                  TA         Sleeping Positions                         TA Sternum-Up Posture                         TA SI jt. self-correction     reviewed                    TA Lumbar Facet PIC                         TA   Order of Self-Corrections                         TA Use of lumbar pillow                         TA Use of cervical roll                         TA Use of orthotics                         TA Use of SI belt                         TA Use of Nada chair                         TA Use of Ice                         TA Use of [employer]/ heat                         TA Use of [employer] Relief                         TA Use of TENS unit                         TA Use of iontophoresis                         TA Decreasing Disc Pressures                         TA Use of sacro wedge                         TA Use of inversion table Used for 10 min  Used for 13 min  Used for 10 min  Used 14 min reviewed and used 15 min                    NMR Use of airdyne w/ IU on/off       instructed                  NMR Leukotaping upper quarter                         NMR Pelvic Floor Isolation                         NMR Transverse Abdominus                         NMR Multifidus Isolation                         NMR Diaphragmtic breathe supine  reviewed                       NMR  Diaphragmtic breath sit                         NMR Pelvic Clock in sitting                         NMR Kinesiotape   On right scapula                      NMR InnerUnit against Gravity     reviewed                    NMR Sit-stand w/ inner unit                         NMR Roll w/ inner  unit                         NMR Walk w/ inner unit                          NMR Up/down steps w/ inner unit                         NMR Water Exer Instruction    instructed                     NMR Hip Abd w/o piriformis                         NMR Hip Add w/o iliop/ham                          NMR Lower abs in supine                         NMR Inner unit challenge with scissor arm work                          NMR Abd obliques in supine  reviewed                       NMR Prone Knee Flexion                         NMR Supine glute w/ ball btwn knees                          NMR Prone glut amrita                         NMR Retro Step                         NMR Retro Walk                         NMR Retro walk on treadmill                         NMR Forward walk w/ inner unit  reviewed                       NMR Stability ball multifidus bounce                         NMR Stability Ball A/P & Lateral  reviewed                       NMR Ball Catch/Throw /Supine                         NMR Ball movemt in /Stance                         NMR StabilityBall All 4's Arm Lift                         NMR StabilityBall Prone Walk Out                         NMR StabilityBall Supine Oblique                         NMR Stability Ball sit-supine-sit                         NMR Walking Prog Progression                         MNR Home program sequencing                                                   TA Hamstring stretch                         TA Hip Ext Rot Stretch                         TA Hip Int Rot Stretch                         TA Hip Flex/IT Stretch                         TA Hip Add Stretch                         TA Lats Dorsi Stretch                         TA Gastroc/soleus stretch                         TA QuadratusLumborm Stretch                            Supine                            Stance                         TA Quad Stretch                                                   NMR Wall Push Ups                          NMR Planking                         NMR BOSU Ball Exercises                         NMR Lateral Bridge Drop                         NMR [place]                         NMR O'Feldt Lat Pull Down                         NMR O'Feldt Hip Ext                         NMR O'Feldt Trunk Ext                                                   TA CervDiscExtSup/stnd    instructed/reviewed                     TA Cerv Stretches                         TA Self PIC Cervical Spine                         TA Neural Gliding                         TA Upper trap stretching                         TA Ant/Mid Scalene Strch                         TA Levator Scap strch                         TA Biceps stretch                         TA Rotator Cuff Stretch                         TA Anterior Chest Stretch                         TA Wrist Ext Stretch                                                   NMR Prone Arm Lifts                         NMR Scapula Stabilization                         NMR Occulomotor Isometrics                         NMR Cervical Isometrics                                                   TA Shld AROM w/bar                         TA Shld Capsular Stretching                         TA Self PIC Thor Spine                         TA Rot Cuff Therabd, beginner                         TA Rot Cuff Therabd, intermed                                                                                                                                                                                                                                                                                                                           Miracle Marcial, PT, MS  Physical Therapist  [place]# [id number]

## 2020-02-11 ENCOUNTER — TREATMENT (OUTPATIENT)
Dept: PHYSICAL THERAPY | Facility: CLINIC | Age: 35
End: 2020-02-11

## 2020-02-11 DIAGNOSIS — M35.7 FAMILIAL LIGAMENTOUS LAXITY: ICD-10-CM

## 2020-02-11 DIAGNOSIS — R29.3 POSTURE IMBALANCE: ICD-10-CM

## 2020-02-11 DIAGNOSIS — M53.3 SACROILIAC JOINT DYSFUNCTION: Primary | ICD-10-CM

## 2020-02-11 DIAGNOSIS — S39.012D STRAIN OF LUMBAR REGION, SUBSEQUENT ENCOUNTER: ICD-10-CM

## 2020-02-11 PROCEDURE — 97140 MANUAL THERAPY 1/> REGIONS: CPT | Performed by: PHYSICAL THERAPIST

## 2020-02-11 PROCEDURE — 97530 THERAPEUTIC ACTIVITIES: CPT | Performed by: PHYSICAL THERAPIST

## 2020-02-11 PROCEDURE — 97112 NEUROMUSCULAR REEDUCATION: CPT | Performed by: PHYSICAL THERAPIST

## 2020-02-11 NOTE — PROGRESS NOTES
"Physical Therapy Note      SUBJECTIVE:   Changes since last seen:  \"I've been ok, stiff; I had a period and felt a little flare up, not as much of a flare up as before.\" But she notes she's only been off birth control for 2 months.   She states the KT tape seemed to help but would like to try the Leukotape again as she got more support from it.   On Friday, she pushed heavy cart at [employer] which flared up her pain.     Current level of pain:    5.5/10  Low back/ right buttock       Neck pain   5/10   Lowest level of pain since last seen:  4/10               2/10  Highest level of pain since last seen:   7/10           6/10      Past Medical History:  1.  AA 2013  Treated with PT for left shoulder labral tear, no low back treatment   2.  Hx of two falls when mopping in socks, over the last 2 years  3.  Hx of regular, heavy, cramping menses  4.  T& A, 2006  5.  Allergic to Wellbutrin and mild allergy to contrast dye  6.  Chronic yeast infections  7.  Uterine fibroids removed, 2 yrs ago       Functional Limitations:  Sitting, standing, walking, stairs, driving, working, sleeping, squatting, housework and changing positions.   Patient Goals for Physical Therapy: \"Pain relief\"      OBJECTIVE:  Observation:     Slight right lateral shift of pelvis      08/06/19 08/13/19 09/17/19 09/24/19 10/01/19 10/29/19 11/05/19 11/12/19 11/26/19 12/19/19 01/07/20 01/21/20 02/11/20        SI Joint Positioning  Right  Left Right  Left Right  Left Right  Left Right  Left Right  Left Right  Left Right  Left Right  Left Right  Left Right  Left Right  Left Right  Left Right Left Right Left Right Left Right  Left Right  Left       Innominate                            Anterior    x               x     x               x   X               x               x              sl               x                x                x Sl                sl               Posterior               x     x                x    x               x   x    x   sl    x   " x   x              sl    sl           Sacrum                                    Unilateral rotation    x      x    x   x   x   x   x    x     x   x    x   x     sl                                SI Joint Testing  Right  Left Right  Left Right  Left Right  Left Right  Left Right  Left Right  Left Right  Left Right  Left Right  Left Right  Left Right  Left Right  Left Right Left Right Left Right Left Right  Left Right  Left           Distraction   +          +   +           +   +         +   +           +   +           +   +           +     +          +    +           +   +          +   +           +  +          +     +         +    +          +                Joanna's   +          +   +         +   +          +   +         +   -           +                        Compression   Sl        Sl    Sl         Sl    -          -      -          -                        Prone Press Up   -          -   -           -   -            -   -          -   Sl        Sl                                      Special Tests  Right   Left Right  Left Right  Left Right  Left Right  Left Right  Left Right Left  Right  Left Right  Left Right  Left Right  Left Right  Left Right  Left Right Left Right Left Right Left Right Left  Right  Left      Straight Leg Raise                             Active   -            -    -         -    -           -     -         -   -        -                    Passive   -           -    -          -   -          -     -        -   -          -                Hip Scour Test   sl           sl   Sl         Sl    +         sl    Sl        Sl    Sl       sl   Sl          sl   Sl         Sl    Sl        sl   Sl         sl   Sl         sl   +        sl   Sl       Sl   sl         sl                              Bakers Cyst   -            -     +          -     Sl        -   -           -   -         -   +         -   ++       -   +          sl   Sl          -  sl          -   +         -   -          -              01/08/2019 02/07/19 03/21/19 03/28/19 04/04/19 04/11/19 04/18/19 04/25/19 05/02/19 05/07/19 05/14/19 06/05/19 06/11/19 06/20/19 06/25/19 07/09/19 07/16/19 07/23/19   SI Joint Positioning  Right  Left Right  Left Right  Left Right  Left Right  Left Right  Left Right  Left Right  Left Right  Left Right  Left Right  Left Right  Left Right  Left Right Left Right Left Right Left Right  Left Right  Left       Innominate                            Anterior    x   x    x  x                x                 x              X     x    x   x   x               x   X                    sl    x                x               x                x          Posterior                x               x        x              x     x     x    x               X                x               x              x                x               x   sl               x    x    x   x      Sacrum                               Unilateral rotation    x   x   x    x    x     x    x    x   x   x    x    x   x   sl   x    x     x                           SI Joint Testing  Right  Left Right  Left Right  Left Right  Left Right  Left Right  Left Right  Left Right  Left Right  Left Right  Left Right  Left Right  Left Right  Left Right Left Right Left Right Left Right  Left Right  Left           Distraction     +          +   +         +   +          +    +        +  +           +   +          +   +          +   +          +     +        +   +         +   +         +   +          +   +           + +           +   +          +    +          +   +          +   +           +           Joanna's     +          +   Sl         sl    +         -    Sl         -   Sl         -   Sl         -   -           -                      Compression     +          +   Sl          sl   Sl          Sl     Sl        Sl    -          -   -         -   -           -                      Prone Press Up           +         +         Sl          -         -        -         -                                    Special Tests  Right   Left Right  Left Right  Left Right  Left Right  Left Right  Left Right Left  Right  Left Right  Left Right  Left Right  Left Right  Left Right  Left Right Left Right Left Right Left Right Left  Right  Left      Straight Leg Raise                                     Active   -         -          -           -                      Passive   -         -             -             -                  Hip Scour Test   ++       Sl    ++       Sl    +           sl   Sl         Sl    Sl         sl Sl          Sl    Sl        sl   Sl       Sl    Sl         Sl    Sl         Sl     Sl         sl   Sl        Sl    Sl        Sl     -            -   Sl         sl   -           -   -          -   -            -                        Bakers Cyst       +         -     +        -    Sl        -   Sl         -     Sl        -   Sl        Sl   sl          sl   Sl         -   -           -   -          -   +           -   Sl      sl       Palpation:   Date 08/06/19 08/13/19 09/17/19 09/24/19 10/01/19 10/29/19 11/05/19 11/12/19 11/26/19 12/19/19 01/07/20 01/21/20 02/11/20         Right  Left Right  Left Right  Left Right  Left Right  Left Right  Left Right  Left Right  Left Right  Left Right  Left Right  Left Right  Left Right  Left Right Left Right Left Right Left Right  Left Right  Left   Piriformis   ++        ++   ++       ++   ++        ++   +         +    ++       +   ++        ++    ++        +   ++        sl   ++         Sl    +         +  +           +   +         sl   +         sl        Gr. Trochanter    +        -   ++          +   ++      +   ++         +     Sl        -   ++        +    +         Sl    ++         sl   ++         sl   +          sl   +         sl   +         sl   Sl        +        IT Band    Sl         Sl    ++        +   +         sl   Sl        Sl     +          Sl    ++         +    +         -    +         -   +           -   +         -   Sl         -    Tight    -   Sl         sl        Quadratus Lumborum   ++         +   +         +   ++        +   +         Sl    ++        +   ++         +   +         ++     +         +   +         ++    +        +   +          +   +         + inst sl/ inst  sl        Ischial Tuberosity    -         -   -          -    -        -   -            -     Sl       -   Sl         Sl     -          Sl      -         -   -           -   -          sl   -           -   -         -   -         -        Sacrococcygeal Ligs    +          +   +       +   +         sl    +         +     -          -   -          -   +         -   -           +   Sl        +   Sl        sl   Sl         Sl    Sl        Sl    -         -        Paraspinals                     Spinous Processes   +           sl   ++        sl    +           -                       C-spine rotated to    C2-5 C2-6 C2-5   -             - C2-3  C2-4           C3-7  C2    C3-7       C2-4   C5-7 C2-6 C2-6 C3-6 C5-6              T-spine rotated to  T4-6 T4-7 T3-8   T3-7  T4-6  T4-6 T4-5 T3-12  T3-7 T3-6 T5-9 T4-8 T4-8               L-spine rotated to  L2-5 L2-5   L1-5  L3-5  L3-5  L2-5             L3-5 L2-5 L2-5  L2-5 L3-5 L4-5 L4-5        Adductor Tony   +           +   ++      ++   +         ++   +        +   +           +   +           +   Sl        Sl    Sl        Sl    Sl       sl  sl         Sl   sl       Sl    -         -        Iliopsoas   ++          +   +          sl   +         sl   +          Sl    ++        +  ++        +   +         +   -          +   -         sl   Sl          +   +        +   Sl        Sl         Quad Origin    -          -   -          -   -        -   -          -    -          -   -           -   -          -   -          -   -           -   -         -   -           -   -          -        SI Joint   +        +   ++        ++  ++        +   ++       +   +            +   ++         +   ++      +    +         sl   ++       +  ++        +   +           sl   +         Sl   ++        -        Pubic Symphysis         +        +        +       +  not tested        ++           +          +         +          +          +       +       Sl         Obturator externus   +         +   +         +   Sl       sl   -           -   -          -    -          -   -         -   Sl       Sl    Sl        sl   Sl         sl   Sl        Sl    Sl       Sl         Rectus Diastasis                                          Ant/middle scalenes   +        sl   +         +   +         +   +          +   +        sl   +          +   +        Sl    +        sl   +         +   +        +   ++        +   +       +   +          +        Upper traps   +         Sl    +         +   +         +   +         sl   +        Sl    +          sl  sl         sl  +        sl   +        Sl     +        sl   +         sl   +       sl   +          sl        Levator scap   +        Sl    +        +   +        sl   +         sl   +         sl    ++        sl   +          Sl   +         sl   +         s;   +         +   +          sl   +       sl   +          sl        sternocleidomastoid   -         -   -            -   -          -    -          -   -          -   -            -    -         -   -          -   -          -   -           -   -         -   -         -                               sternocostals   +          +   +          +   +        Sl   ++          +   +          +   +             +   Sl       Sl    +          +  +          sl   Sll        sl   +          sl   +        +   +         +                  Muscle/Bone Irritation  Date 01/08/2019 02/07/19 03/21/19 03/28/19 04/04/18 04/11/19 04/18/19 04/25/19 05/02/19 05/07/19 05/14/19 06/05/19 06/11/19 06/20/19 06/25/19 07/09/19 07/16/19 07/23/19    Right  Left Right  Left Right  Left Right  Left Right  Left Right  Left Right  Left Right  Left Right  Left Right  Left Right  Left Right  Left Right  Left Right Left Right Left Right Left Right  Left  Right  Left   Piriformis  ++        +   ++          +   ++       +   ++        +    ++        Sl    ++       Sl   ++       +    ++      +   ++        +   +           +    +        Sl      +         ++   +           ++    +       -   ++      ++   +          +   ++        +   ++         ++   Gr. Trochanter  +          +  +          sl    +           +    +       ++    +          -   +         Sl    +         sl   +         sl   Sl        Sl    +          +    Sl        sl    Sl         +   Sl          sl     +       -   +         +    +         +   +         sl   ++           +   IT Band  +         sl   Sl        sl     -          -   -          -    Sl         -   Sl         -   Sl        -   Sl         -  sl          -   Sl          Sl      -          -     -           -   -            -    Sl        Sl    Sl       Sl     Sl       sl   +          Sl     +          ++   Quadratus Lumborum  ++        ++   ++        +    +        ++   ++        ++     +       ++   +          +  ++        ++   Sl        Sl    +       ++   +          ++    Sl       +     +         +   +          ++     Sl       +   ++       +    ++       +    +         +    ++       +   Ischial Tuberosity  ++         sl   -          -    -           -     -           -     -          -   -          -   -          -   -          -   -          -  -            -     -           -   -          -   -             -     Sl       -   Sl        Sl     -          -     -         -    Sl        Sl    Sacrococcygeal Ligs  ++          +   Sl        sl   +          +    +       +    Sl        Sl    Sl       Sl    +         sl  ++      +  +        Sl       Sl         +   Sl         Sl    Sl         Sl      -        Sl   not  tested    +         +    Sl       Sl    +          +   Paraspinals  +           +     +         sl    +    +          -   Sl         +   +         sl   +        sl  +         -   Sl          +   Sl          +   +          sl   ++         sl   +           -   +         Sl    ++        +     +       Sl    +         sl   Spinous Processes                                        C-spine rotated to                           C3-5  C2-5 C2-6         T-spine rotated to   -               -              T8-9  T4-5    T3-8          L-spine rotated to  L2-5        L1-5  L4-5  L3-5  L2-5            L3-5  L1-5  L2-5  L2-5              L1-5           L2-5 L2-5   L1-5 L3-5 L3-5  L2-5 L4-5 L1-5   Adductor Tony    +        +       +         +     -         +   -           Sl     -        sl   +          sl   -          -   -           -   -          -   -           -   -          -   -           sl  ++        +   +         sl   +         +   +         ++   ++        +  +            +   Iliopsoas  ++         +   ++        +   ++         +    +         ++  sl        Sl    Sl       Sl    Sl       Sl    Sl         -   Sl        -   Sl         +   +         Sl    +          sl  +          ++   +          +   +          +   +         ++   ++        +   ++         +   Quad Origin  sl          sl   Sl          sl    -          -   -           -    -         -   -          -   -         -   -          -   -          -   -           -    -         -   -            -   -         -   -         -   -         -   -            -   -          -   -         -   SI Joint  ++         +  ++        +    ++        +   +          +   +         +   +          ++   +         sl    +         sl   +        +  +          sl  +        Sl     +        Sl   +          ++   Sl       sl   +        +    + +        +  ++          +    ++        ++   Pubic Symphysis         ++          Not tested         ++      ++        ++            + Not tested Not tested          +             ++         +          +   Obturator externus          -          +   -          -   -          -    -           -   -          -   -          -   -         -    -            -   -          -   -            -   Rectus Diastasis   1/2  finger                                         Ant/middle scalenes                   ++        +   ++       +   Upper traps                  ++         +   +          +   Levator scap                   ++          +     +           sternocleidomastoid                   Sl        Sl      -         -                        sternocostals   +        +   +          +    +         -    +           +   Sl        Sl    +         Sl  Not tested     +         +     +         +   +         +   Sl      sl   +          +    +        +   +          +   +          +   All 4s Positioning: Pt not able to assume full lumbar extension in this position  Leg Length:  The right  lower extremity is equal to the left        Monthly Objective Measurement/Functional Activity  Date: 01/08/2019 03/21/19 04/25/19 06/05/19 07/09/19 09/17/19 10/29/19 11/26/19 01/07/20 02/11/20       Oswestry Pain Score 52/100  46/100    48/100      48/100       50/100 48/100 52/100   50/100     50/100                                          AROM Lumbar Spine: Degrees   Degrees      Degrees      Degrees      Degrees      Degrees Degrees Degrees Degrees Degrees Degrees Degrees      Degrees    Degrees      Flexion 106 pain          99          83       75 pain          63 onset of pain         69 pain increased   57 pain       25        53       42          Extension 3 pain           17           19       11 pain           11  Pinches       16 pain increased 12  instant pain in right SI jt         12 pain lumbar spine       9   pain          Right Lat. Flexion 29 pain right trunk          24        25      25    23 pinch       19 pinch  14 24    20     20          Left Lat. Flexion 31         19         23     14  15 pinch     11 pinch  18 19 18 easier        14           Right Lat Shift Pelvis inc pain   Inc pain Left SI jt       Slight increase No change but can't do far Feels caught  Increased pain  Sl inc pain No change in pain  No change in lumbar but inc hip  Discomfort in right buttock           Left Lat Shift Pelvis  less inc pain       No change       No change/ slight cielo  No change but can't go far  Easier but not natural  Increased pain  Sl  Inc pain No change in pain  No change lumbar but sl inc hip  Inc pain left buttock                         AROM Hips:  (degrees) Right      Left Right Left Right  Left Right  Left Right  Left Right  Left Right Left Right Left Right Left Right Left Right Left Right left Right  Left Right  Left      Flexion 110       110  125    120    128      125 130       130 130      130 125      120 128     120  125      120    125   122  120      125          Abduction 37       41   45      45      42      44  43          45   45       45  45          45 43        45  40         45  43         45   45        45          Int. Rotation 32       22  30      25      31         28  32          30   34        32   30         28 32        31  30          33  31         35  30         35          Ext. Rotation  34       42  35      40       38        41   35         40   40        42   35         37  38        39  35           40  36          40  35         40                        Flexibility:   (degrees) Right    Left Right  Left Right  Left Right  Left Right  Left Right  Left Right  left Right Left Right Left Right Left Right Left Right Left Right  Left Right  Left      Hamstrings -32       -28  -30     -25   -25       -25   -28      -27  -30      -26   -28       -29  -25       -25  -25       -20   -25      -21           Quadriceps 39       42 40        45    not tested  40          42  45        47   50         52  not tested   55         55  48        59    55        58          HipExt/Rot(piriformis) 30       28   32      30  33         32   35         33   37        35   35        35  38       36  35        40   29        37  35         40           Hip Int/Rotators 27       9   25      10   28        15   27         16   25         18    20        20  22      18  18        23   20       25   25        25          Hip Flexors (iliopsoas) -       -                   IT Band -       -                                 Reflexes: Right      Left Right  Left Right  Left Right  Left Right  Left Right  Left Right Left Right left Right left Right Left Right left Right Left Right  Left Right  Left      L4 (Quad) +1       +1                   S1 (Achilles) +1       +1                                 Root Level  - Motor Right      Left Right  Left Right  Left Right  Left Right  Left Right  Left Right Left Right Left Right Left Right Left Right Left Right Left Right  Left Right  Left     T12             Rectus Abdominus 4+/5     4+/5         5/5        5/5             5/5          5/5           L1              Paraspinals 5/5         5/5        5/5        5/5    5/5       5/5          L2              Hip Flexion 5/5          5/5         5/5        5/5    5/5        5/5          L3             Quads 5/5          5/5         5/5        5/5   5/5         5/5          L4              Anterior Tibialis 5/5         5/5       5/5         5/5   5/5         5/5          L5             Gr. Toe Extension 5/5           5/5         5/5        5/5   5/5        5/5          S1            Gastroc/Sol/Peroneals 5/5          5/5       5/5          5/5  5/5        5/5                        Functional Strength:                   Single LegStanceTime (seconds) R:30   L:30   R:      L: R:      L: R: 30 pain     L:30 pain  R:      L: R:      L: R:       L: R:       L: R:       L: R:       L: R:       L: R:       L:  R:      L: R:      L:     Pelvic Floor Isolation (seconds) -    10 sec 10 sec   > 10 sec   > 10 sec            Inner Unit  Isolation (seconds) -    10 sec 10 sec     > 10 sec   > 10 sec                          Heart Rate        (12/19/19)         Blood Pressure           81 bpm                    109/78                                           Functional Activities:         11/26/19            Sit w/o pain? Pain increases (minutes) No/ 30 min No/ 30  min No/ 30 min  No/  30 min No/ 30 min  No/ 30min  No/ 30 min No/ 10-15 min No/ 10-15 min  No/ 15 min          Stand w/o pain? No/ 30 min No/ 30 min No/ 10 min  No/ 10 min No/ 10 min  No/ 15-20 min  No / 10 min No/10-15min, 1/4 mile No/ 10-15  No/ 15 min          Walk w/o pain? varies No/ 1 mile No/ 1/2 mile No/ 1 mile No/ 1mile, piriformis, B No/ a couple miles  No/ 1/2 mile No/ not much  No/ 1/2 mile  no/ 1/2 mile          Steps w/o pain? 1 fl, avoids them 1 fl avoids  1 fl avoids  No/ avoids No/ doesn't do No/ 1 flight  No/ 1 fl  No/ 1 fl  No/ 1 fl   no/  1 fl          Drive w/o pain? No/ 10-15 min No/ 30-45 min No/ 30-45  Min  No/ 30-45 min No/ 35-45 No/ 35-45 No/ 30 min No/ 10 -15 min No/ 10-15 min    No/ 15 min          Work w/o pain? No/ 30 min No/ 30 min No/ 30  No/ 30 min No/30 mi   No/ 30 min  No/ 30 min No/10-15 min No/ 10-15  no/ 15 min          # times wakes w/ pain? 4-5x/night, now taking meds at night she is able to sleep.  2x   2-3x  2x         1x         Several times   several times several times The last few nights 0x, a few times over last month   0x            PLAN OF CARE:    ASSESSMENT:  Problem List:   1. SI joint dysfunction  2. Lack of spinal stabilization  3. Postural dysfunction     Discussion:  After manual work on spine, was able to progress exercise program to ...  1.  4 min on Airdyne, no resistance  2.  5 min forward treadmill at 2.3 mph  3.  5 min on inversion table, 10 sec down and 20-30 sec up.    Tete responded well to this visit.  She had minimal pain by the end of today's treatment.  She hs a thorough home program and will continue with it over the next few weeks.  She will be moving to [place] and will return after this move.  Hoping she will be able to swim on a regular basis with the move and she is interested in getting an inversion table for home use.     She has not purchased a TENS  unit  "yet.  She will bring it on next visit.     Applied cover roll and leukotape to the right scapula.  Will assess how well this works to help stabilize her scapula.             Short Term Goals: (4-6 weeks)                                   Date Met:                1.   pt independent in postural correction         02/07/19  2.   pt independent in SI joint self-corrections        03/21/19  3.   pt independent in exer to decrease post. disc pressures      03/21/19  4.   pt able to sleep through night without pain        02/11/20  5.   pt able to sit 15 minutes without pain  6.   pt able to walk 10 minutes without pain        06/20/19    7.   Reduce pt pain to no greater than 7/10        03/21/19  8.   Decrease Oswestry Pain Score to no greater than 45/100  9.  Reduce pt pain to no greater than 6/10        07/16/19  10.  Pt able to isolate the pelvic floor in supine x10, 10 sec      03/28/19  11.  Pt able to isolate transverse abdom in all 4s, x10 sec, x10         03/28/19  12.  Pt able to isolate the multifidus in sitting x10 sec, x10                            03/28/19  13.  Pt able to engage inner unit, move from sit to stand &back to sit      04/04/19                                                                                                                                                                                                                                             14.  Pt able to engage inner unit and walk 4 steps without overflow to hamstrings   06/20/19  15.  Pt able to hip abduct x6 without engaging the piriformis      06/20/19     16.  Pt able to perform prone knee flexion without engaging piriformis x6          05/07/19       17.  Pt able to perform remedial lower abs with scissor arm work x6 w/o pain    04/18/19    18.  Pt able to perform \"Pre-cursor\" lower abs leg lift initiation x6 without pain          04/18/19   19.  Pt able to perform retro step without engaging piriformis x6, " "bilaterally    09/24/19    20.  Pt able to perform bridging x6 without engaging piriformis      04/25/19    21.  Pt able to perform \"scissors\" arm movement with inner unit w/o piriformis x6   05/02/19  22.  Pt able to perform PIC hip adductors without engaging the piriformis  x6    05/07/19  23.  Pt able to perform \"Scissors\" arm motion w/ inner unit with 1lb wts x6 w/o pain   05/07/19  24.  Pt able to perform prone glut max over stability ball x6 w/o engaging piriformis   25.  Pt able to reduce pain w/ self PIC to left psoas       05/14/19  26.  Pt able to move laterally on stability ball in sitting without pain x6     11/05/19  27.  Pt able to move A/P movement sitting on stability ball without pain x6    11/05/19  28.  Pt independent in pool exercises, to do without increasing pain     07/09/19  29.  Pt able to throw and catch stability ball in supine with inner unit on x6    06/11/19  30.  Pt able to hold medicine ball in stance and move away from trunk with IU on and w/o pain x6 06/11/19  31.  Pt independent in supine hamstring stretch, able to do 2x without pain.    07/16/19  32.  Pt independent in hip adductor stretching, supine and stance     07/16/19  33.  Pt independent in quad stretching, in stance, without pain.      08/06/19  34. Pt independent in hip internal rotator stretch in supine, without pain      08/06/19  35.  Pt able to tolerate ADLs with leukotape on scapula for enhanced spinal stabilization x 1 day 07/25/19  36.  Pt able to perform prone walk out x6 without losing inner unit engagement    11/12/19  37. Pt able to perform prone arm lift x6, phase 1, without engaging the upper trap   08/06/19  38.  Pt able to perform supine obliques over stability ball x6 without losing inner unit engagement 08/06/19  39.  Pt able to perform prone arm lift, lifting elbow without engaging upper traps x6   11/12/19  40.  Pt able to perform upper quarter stretches without increasing pain     09/17/19  41.  Pt " able to do adapted BKFO without engaging piriformis x6        42.  Pt able to do diaphragmatic breathing x6 without engaging piriformis       11/12/19    43.  Pt able to ride the [employer] x10 min without increasing pain  44.  Pt able to tolerate the inversion table, 20 sec down, 30 up x 6 cycles      45.  Pt independent in the use of TENS unit                                                                                                                                        Long Term Goals:(3-5 months)      Date Met:      1.  pt independent in self-management of symptoms   12/19/19 - for 4 weeks only       2.  pt independent in home program     12/19/19      3.  pt able to work 6 hours without pain      4.  pt able to sit 60 minutes without pain      5.  pt able to walk 45 min without pain      6.  Pt independent in use of inversion table      7.  Pt independent in swimming program to be done without pain    Progress Towards Goals:  As expected to a little slower than expected    Treatment Plan:   1.  Ultrasound to increase extensibility, decrease pain, if needed  2.  Electrical stimulation for muscle relaxation, decrease pain, if needed, iontophoresis  3.  Manual therapy to increase function, decrease pain  4.  Therapeutic exercise to increase function, decrease pain  5.  Home programming and d/c planning to increase function and decrease pain,     Frequency & Duration:  Pt in need of more visits for neuro motor retraining and continued strengthening for the inner unit thus will see on a once/wk to once every other week basis for 4 more visits to complete the spinal stabilization instruction, meet remaining short and long term goals and be independent in her home program.      Patient Participated in and Agrees With This Plan of Care and Goals:  YES  Rehab Potential:  jarret Marcial, PT, MS  Physical Therapist  [place]# [id number]      TREATMENT TODAY:    Total Treatment Time: (time in clinic)   60   min  Timed Code Treatment Minutes:     60      Supplies given:      Manual Therapy:  (MA)  RX min:    35  Manual posterior rotation of left innominate    Positional Isometric contraction (PIC) of right iliopsoas    Positional Isometric contraction of (PIC) right gluteus minimus    Distraction of both SI jts in open pack hip position  Piriformis stretching, bilaterally    Quadratus lumborum stretching, bilaterally    Anterior/Inferior Mobilization of  right hip    Positional Isometric Contraction of multifidus  Myofascial work trunk   Sternocostal and rib mobs   Manual spinal distraction  PIC C-spine  PIC T-spine  Stretching scalenes, upper traps, levator scap      Therapeutic Activities:  (TA)  RX min:   15    Neuromuscular Reeducation: (NMR)  RX min:   10    Ultrasound:  RX min:        1.6 vance/cm2       Location:     Iontophoresis:  6 hr patch                                Location:                           Ice:        ocedures:      Key:  i=instructed        r=review        c=corrected        a=adapted   Code Procedure/Instruction 10/29/19 11/05/19 11/12/19 11/26/19 12/19/19 01/07/20 01/21/20 02/11/20                 TA         Sleeping Positions                         TA Sternum-Up Posture                         TA SI jt. self-correction     reviewed                    TA Lumbar Facet PIC                         TA   Order of Self-Corrections                         TA Use of lumbar pillow                         TA Use of cervical roll                         TA Use of orthotics                         TA Use of SI belt                         TA Use of Nada chair                         TA Use of Ice                         TA Use of [employer]/ heat                         TA Use of [employer] Relief                         TA Use of TENS unit                         TA Use of iontophoresis                         TA Decreasing Disc Pressures                         TA Use of sacro wedge                          TA Use of inversion table Used for 10 min  Used for 13 min  Used for 10 min  Used 14 min reviewed and used 15 min                    NMR Use of airdyne w/ IU on/off       instructed                  NMR Leukotaping upper quarter        applied                 NMR Pelvic Floor Isolation                         NMR Transverse Abdominus                         NMR Multifidus Isolation                         NMR Diaphragmtic breathe supine  reviewed                       NMR  Diaphragmtic breath sit                         NMR Pelvic Clock in sitting                         NMR Kinesiotape   On right scapula                      NMR InnerUnit against Gravity     reviewed                    NMR Sit-stand w/ inner unit                         NMR Roll w/ inner unit                         NMR Walk w/ inner unit                          NMR Up/down steps w/ inner unit                         NMR Water Exer Instruction    instructed                     NMR Hip Abd w/o piriformis                         NMR Hip Add w/o iliop/ham                          NMR Lower abs in supine                         NMR Inner unit challenge with scissor arm work                          NMR Abd obliques in supine  reviewed                       NMR Prone Knee Flexion                         NMR Supine glute w/ ball btwn knees                          NMR Prone glut amrita                         NMR Retro Step                         NMR Retro Walk                         NMR Retro walk on treadmill        instructed                 NMR Forward walk w/ inner unit  reviewed      reviewed                 NMR Stability ball multifidus bounce                         NMR Stability Ball A/P & Lateral  reviewed                       NMR Ball Catch/Throw /Supine                         NMR Ball movemt in /Stance                         NMR StabilityBall All 4's Arm Lift                         NMR StabilityBall Prone Walk Out                          NMR StabilityBall Supine Oblique                         NMR Stability Ball sit-supine-sit                         NMR Walking Prog Progression                         MNR Home program sequencing                                                   TA Hamstring stretch                         TA Hip Ext Rot Stretch                         TA Hip Int Rot Stretch                         TA Hip Flex/IT Stretch                         TA Hip Add Stretch                         TA Lats Dorsi Stretch                         TA Gastroc/soleus stretch                         TA QuadratusLumborm Stretch                            Supine                            Stance                         TA Quad Stretch                                                   NMR Wall Push Ups                         NMR Planking                         NMR BOSU Ball Exercises                         NMR Lateral Bridge Drop                         NMR [place]                         NMR O'Feldt Lat Pull Down                         NMR O'Feldt Hip Ext                         NMR O'Feldt Trunk Ext                                                   TA CervDiscExtSup/stnd    instructed/reviewed                     TA Cerv Stretches                         TA Self PIC Cervical Spine                         TA Neural Gliding                         TA Upper trap stretching                         TA Ant/Mid Scalene Strch                         TA Levator Scap strch                         TA Biceps stretch                         TA Rotator Cuff Stretch                         TA Anterior Chest Stretch                         TA Wrist Ext Stretch                                                   NMR Prone Arm Lifts                         NMR Scapula Stabilization                         NMR Occulomotor Isometrics                         NMR Cervical Isometrics                                                   TA Shld AROM w/bar                          TA Shcalos Capsular Stretching                         TA Self PIC Thor Spine                         TA Rot Cuff Therabd, beginner                         TA Rot Cuff Therabd, intermed                                                                                                                                                                                                                                                                                                                           Miracle Marcial, PT, MS  Physical Therapist  [place]# [id number]

## 2020-03-03 ENCOUNTER — TREATMENT (OUTPATIENT)
Dept: PHYSICAL THERAPY | Facility: CLINIC | Age: 35
End: 2020-03-03

## 2020-03-03 DIAGNOSIS — M53.3 SACROILIAC JOINT DYSFUNCTION: Primary | ICD-10-CM

## 2020-03-03 DIAGNOSIS — S39.012D STRAIN OF LUMBAR REGION, SUBSEQUENT ENCOUNTER: ICD-10-CM

## 2020-03-03 DIAGNOSIS — R29.3 POSTURE IMBALANCE: ICD-10-CM

## 2020-03-03 DIAGNOSIS — M35.7 FAMILIAL LIGAMENTOUS LAXITY: ICD-10-CM

## 2020-03-03 PROCEDURE — 97140 MANUAL THERAPY 1/> REGIONS: CPT | Performed by: PHYSICAL THERAPIST

## 2020-03-03 PROCEDURE — 97035 APP MDLTY 1+ULTRASOUND EA 15: CPT | Performed by: PHYSICAL THERAPIST

## 2020-03-03 NOTE — PROGRESS NOTES
"Physical Therapy Note      SUBJECTIVE:   Changes since last seen:  I am really stiff today; I have been sitting at my desk a lot more lately as work has gotten really busy and thus the pain has increased since last visit.    She notes she is about to start her period. This is the 3rd month off birth control pills.   She c/o onset of right shoulder pain a couple weeks ago.     Current level of pain:    6 .5/10  Low back/ right buttock       Neck pain   4/10   Lowest level of pain since last seen:  4/10               3/10  Highest level of pain since last seen:   7/10           6/10      Past Medical History:  1.  AA 2013  Treated with PT for left shoulder labral tear, no low back treatment   2.  Hx of two falls when mopping in socks, over the last 2 years  3.  Hx of regular, heavy, cramping menses  4.  T& A, 2006  5.  Allergic to Wellbutrin and mild allergy to contrast dye  6.  Chronic yeast infections  7.  Uterine fibroids removed, 2 yrs ago       Functional Limitations:  Sitting, standing, walking, stairs, driving, working, sleeping, squatting, housework and changing positions.   Patient Goals for Physical Therapy: \"Pain relief\"      OBJECTIVE:  Observation:     Slight right lateral shift of pelvis      08/06/19 08/13/19 09/17/19 09/24/19 10/01/19 10/29/19 11/05/19 11/12/19 11/26/19 12/19/19 01/07/20 01/21/20 02/11/20 03/03/20       SI Joint Positioning  Right  Left Right  Left Right  Left Right  Left Right  Left Right  Left Right  Left Right  Left Right  Left Right  Left Right  Left Right  Left Right  Left Right Left Right Left Right Left Right  Left Right  Left       Innominate                            Anterior    x               x     x               x   X               x               x              sl               x                x                x Sl                sl     x              Posterior               x     x                x    x               x   x    x   sl    x   x   x              sl    sl   "              x          Sacrum                                    Unilateral rotation    x      x    x   x   x   x   x    x     x   x    x   x     sl   x                               SI Joint Testing  Right  Left Right  Left Right  Left Right  Left Right  Left Right  Left Right  Left Right  Left Right  Left Right  Left Right  Left Right  Left Right  Left Right Left Right Left Right Left Right  Left Right  Left           Distraction   +          +   +           +   +         +   +           +   +           +   +           +     +          +    +           +   +          +   +           +  +          +     +         +    +          +   +          +               Joanna's   +          +   +         +   +          +   +         +   -           +                        Compression   Sl        Sl    Sl         Sl    -          -      -          -                        Prone Press Up   -          -   -           -   -            -   -          -   Sl        Sl                                      Special Tests  Right   Left Right  Left Right  Left Right  Left Right  Left Right  Left Right Left  Right  Left Right  Left Right  Left Right  Left Right  Left Right  Left Right Left Right Left Right Left Right Left  Right  Left      Straight Leg Raise                             Active   -            -    -         -    -           -     -         -   -        -                    Passive   -           -    -          -   -          -     -        -   -          -                Hip Scour Test   sl           sl   Sl         Sl    +         sl    Sl        Sl    Sl       sl   Sl          sl   Sl         Sl    Sl        sl   Sl         sl   Sl         sl   +        sl   Sl       Sl   sl         sl    +          +                             Bakers Cyst   -            -     +          -     Sl        -   -           -   -         -   +         -   ++       -   +          sl   Sl          -  sl          -   +         -   -           -    Sl       sl            01/08/2019 02/07/19 03/21/19 03/28/19 04/04/19 04/11/19 04/18/19 04/25/19 05/02/19 05/07/19 05/14/19 06/05/19 06/11/19 06/20/19 06/25/19 07/09/19 07/16/19 07/23/19   SI Joint Positioning  Right  Left Right  Left Right  Left Right  Left Right  Left Right  Left Right  Left Right  Left Right  Left Right  Left Right  Left Right  Left Right  Left Right Left Right Left Right Left Right  Left Right  Left       Innominate                            Anterior    x   x    x  x                x                 x              X     x    x   x   x               x   X                    sl    x                x               x                x          Posterior                x               x        x              x     x     x    x               X                x               x              x                x               x   sl               x    x    x   x      Sacrum                               Unilateral rotation    x   x   x    x    x     x    x    x   x   x    x    x   x   sl   x    x     x                           SI Joint Testing  Right  Left Right  Left Right  Left Right  Left Right  Left Right  Left Right  Left Right  Left Right  Left Right  Left Right  Left Right  Left Right  Left Right Left Right Left Right Left Right  Left Right  Left           Distraction     +          +   +         +   +          +    +        +  +           +   +          +   +          +   +          +     +        +   +         +   +         +   +          +   +           + +           +   +          +    +          +   +          +   +           +           Joanna's     +          +   Sl         sl    +         -    Sl         -   Sl         -   Sl         -   -           -                      Compression     +          +   Sl          sl   Sl          Sl     Sl        Sl    -          -   -         -   -           -                      Prone Press Up           +         +         Sl          -         -         -         -                                   Special Tests  Right   Left Right  Left Right  Left Right  Left Right  Left Right  Left Right Left  Right  Left Right  Left Right  Left Right  Left Right  Left Right  Left Right Left Right Left Right Left Right Left  Right  Left      Straight Leg Raise                                     Active   -         -          -           -                      Passive   -         -             -             -                  Hip Scour Test   ++       Sl    ++       Sl    +           sl   Sl         Sl    Sl         sl Sl          Sl    Sl        sl   Sl       Sl    Sl         Sl    Sl         Sl     Sl         sl   Sl        Sl    Sl        Sl     -            -   Sl         sl   -           -   -          -   -            -                        Bakers Cyst       +         -     +        -    Sl        -   Sl         -     Sl        -   Sl        Sl   sl          sl   Sl         -   -           -   -          -   +           -   Sl      sl       Palpation:   Date 08/06/19 08/13/19 09/17/19 09/24/19 10/01/19 10/29/19 11/05/19 11/12/19 11/26/19 12/19/19 01/07/20 01/21/20 02/11/20 03/03/20        Right  Left Right  Left Right  Left Right  Left Right  Left Right  Left Right  Left Right  Left Right  Left Right  Left Right  Left Right  Left Right  Left Right Left Right Left Right Left Right  Left Right  Left   Piriformis   ++        ++   ++       ++   ++        ++   +         +    ++       +   ++        ++    ++        +   ++        sl   ++         Sl    +         +  +           +   +         sl   +         sl   ++        +       Gr. Trochanter    +        -   ++          +   ++      +   ++         +     Sl        -   ++        +    +         Sl    ++         sl   ++         sl   +          sl   +         sl   +         sl   Sl        +   ++        +       IT Band    Sl         Sl    ++        +   +         sl   Sl        Sl     +          Sl    ++         +    +         -    +          -   +           -   +         -   Sl         -   Tight    -   Sl         sl   +          sl       Quadratus Lumborum   ++         +   +         +   ++        +   +         Sl    ++        +   ++         +   +         ++     +         +   +         ++    +        +   +          +   +         + inst sl/ inst  sl   +          s;       Ischial Tuberosity    -         -   -          -    -        -   -            -     Sl       -   Sl         Sl     -          Sl      -         -   -           -   -          sl   -           -   -         -   -         -   +        -       Sacrococcygeal Ligs    +          +   +       +   +         sl    +         +     -          -   -          -   +         -   -           +   Sl        +   Sl        sl   Sl         Sl    Sl        Sl    -         -   Sl        _       Paraspinals                     Spinous Processes   +           sl   ++        sl    +           -                       C-spine rotated to    C2-5 C2-6 C2-5   -             - C2-3  C2-4           C3-7  C2    C3-7       C2-4   C5-7 C2-6 C2-6 C3-6 C5-6 C2-6             T-spine rotated to  T4-6 T4-7 T3-8   T3-7  T4-6  T4-6 T4-5 T3-12  T3-7 T3-6 T5-9 T4-8 T4-8  T3-8              L-spine rotated to  L2-5 L2-5   L1-5  L3-5  L3-5  L2-5             L3-5 L2-5 L2-5  L2-5 L3-5 L4-5 L4-5  L3-5       Adductor Tony   +           +   ++      ++   +         ++   +        +   +           +   +           +   Sl        Sl    Sl        Sl    Sl       sl  sl         Sl   sl       Sl    -         -   -           -       Iliopsoas   ++          +   +          sl   +         sl   +          Sl    ++        +  ++        +   +         +   -          +   -         sl   Sl          +   +        +   Sl        Sl    +         +       Quad Origin    -          -   -          -   -        -   -          -    -          -   -           -   -          -   -          -   -           -   -         -   -           -   -          -   -           -        SI Joint   +        +   ++        ++  ++        +   ++       +   +            +   ++         +   ++      +    +         sl   ++       +  ++        +   +          sl   +         Sl   ++        -    +          +       Pubic Symphysis         +        +        +       +  not tested        ++           +          +         +          +          +       +       Sl         +       Obturator externus   +         +   +         +   Sl       sl   -           -   -          -    -          -   -         -   Sl       Sl    Sl        sl   Sl         sl   Sl        Sl    Sl       Sl     Sl         sl       Rectus Diastasis                                          Ant/middle scalenes   +        sl   +         +   +         +   +          +   +        sl   +          +   +        Sl    +        sl   +         +   +        +   ++        +   +       +   +          +   ++        +       Upper traps   +         Sl    +         +   +         +   +         sl   +        Sl    +          sl  sl         sl  +        sl   +        Sl     +        sl   +         sl   +       sl   +          sl  ++         +       Levator scap   +        Sl    +        +   +        sl   +         sl   +         sl    ++        sl   +          Sl   +         sl   +         s;   +         +   +          sl   +       sl   +          sl  ++         +       sternocleidomastoid   -         -   -            -   -          -    -          -   -          -   -            -    -         -   -          -   -          -   -           -   -         -   -         -   -          -                              sternocostals   +          +   +          +   +        Sl   ++          +   +          +   +             +   Sl       Sl    +          +  +          sl   Sll        sl   +          sl   +        +   +         +   +          +                 Muscle/Bone Irritation  Date 01/08/2019 02/07/19 03/21/19 03/28/19 04/04/18 04/11/19 04/18/19 04/25/19 05/02/19 05/07/19  05/14/19 06/05/19 06/11/19 06/20/19 06/25/19 07/09/19 07/16/19 07/23/19    Right  Left Right  Left Right  Left Right  Left Right  Left Right  Left Right  Left Right  Left Right  Left Right  Left Right  Left Right  Left Right  Left Right Left Right Left Right Left Right  Left Right  Left   Piriformis  ++        +   ++          +   ++       +   ++        +    ++        Sl    ++       Sl   ++       +    ++      +   ++        +   +           +    +        Sl      +         ++   +           ++    +       -   ++      ++   +          +   ++        +   ++         ++   Gr. Trochanter  +          +  +          sl    +           +    +       ++    +          -   +         Sl    +         sl   +         sl   Sl        Sl    +          +    Sl        sl    Sl         +   Sl          sl     +       -   +         +    +         +   +         sl   ++           +   IT Band  +         sl   Sl        sl     -          -   -          -    Sl         -   Sl         -   Sl        -   Sl         -  sl          -   Sl          Sl      -          -     -           -   -            -    Sl        Sl    Sl       Sl     Sl       sl   +          Sl     +          ++   Quadratus Lumborum  ++        ++   ++        +    +        ++   ++        ++     +       ++   +          +  ++        ++   Sl        Sl    +       ++   +          ++    Sl       +     +         +   +          ++     Sl       +   ++       +    ++       +    +         +    ++       +   Ischial Tuberosity  ++         sl   -          -    -           -     -           -     -          -   -          -   -          -   -          -   -          -  -            -     -           -   -          -   -             -     Sl       -   Sl        Sl     -          -     -         -    Sl        Sl    Sacrococcygeal Ligs  ++          +   Sl        sl   +          +    +       +    Sl        Sl    Sl       Sl    +         sl  ++      +  +        Sl       Sl         +   Sl         Sl    Sl          Sl      -        Sl   not  tested    +         +    Sl       Sl    +          +   Paraspinals  +           +     +         sl    +    +          -   Sl         +   +         sl   +        sl  +         -   Sl          +   Sl          +   +          sl   ++         sl   +          -   +         Sl    ++        +     +       Sl    +         sl   Spinous Processes                                        C-spine rotated to                           C3-5  C2-5 C2-6         T-spine rotated to   -               -              T8-9  T4-5    T3-8          L-spine rotated to  L2-5        L1-5  L4-5  L3-5  L2-5            L3-5  L1-5  L2-5  L2-5              L1-5           L2-5 L2-5   L1-5 L3-5 L3-5  L2-5 L4-5 L1-5   Adductor Tony    +        +       +         +     -         +   -           Sl     -        sl   +          sl   -          -   -           -   -          -   -           -   -          -   -           sl  ++        +   +         sl   +         +   +         ++   ++        +  +            +   Iliopsoas  ++         +   ++        +   ++         +    +         ++  sl        Sl    Sl       Sl    Sl       Sl    Sl         -   Sl        -   Sl         +   +         Sl    +          sl  +          ++   +          +   +          +   +         ++   ++        +   ++         +   Quad Origin  sl          sl   Sl          sl    -          -   -           -    -         -   -          -   -         -   -          -   -          -   -           -    -         -   -            -   -         -   -         -   -         -   -            -   -          -   -         -   SI Joint  ++         +  ++        +    ++        +   +          +   +         +   +          ++   +         sl    +         sl   +        +  +          sl  +        Sl     +        Sl   +          ++   Sl       sl   +        +    + +        +  ++          +    ++        ++   Pubic Symphysis         ++          Not tested         ++      ++        ++            + Not  tested Not tested          +             ++         +          +   Obturator externus          -          +   -          -   -          -    -           -   -          -   -          -   -         -    -            -   -          -   -            -   Rectus Diastasis   1/2 finger                                         Ant/middle scalenes                   ++        +   ++       +   Upper traps                  ++         +   +          +   Levator scap                   ++          +     +           sternocleidomastoid                   Sl        Sl      -         -                        sternocostals   +        +   +          +    +         -    +           +   Sl        Sl    +         Sl  Not tested     +         +     +         +   +         +   Sl      sl   +          +    +        +   +          +   +          +   All 4s Positioning: Pt not able to assume full lumbar extension in this position  Leg Length:  The right  lower extremity is equal to the left        Monthly Objective Measurement/Functional Activity  Date: 01/08/2019 03/21/19 04/25/19 06/05/19 07/09/19 09/17/19 10/29/19 11/26/19 01/07/20 02/11/20       Oswestry Pain Score 52/100  46/100    48/100      48/100       50/100 48/100 52/100   50/100     50/100                                          AROM Lumbar Spine: Degrees   Degrees      Degrees      Degrees      Degrees      Degrees Degrees Degrees Degrees Degrees Degrees Degrees      Degrees    Degrees      Flexion 106 pain          99          83       75 pain          63 onset of pain         69 pain increased   57 pain       25        53       42          Extension 3 pain           17           19       11 pain           11  Pinches       16 pain increased 12  instant pain in right SI jt         12 pain lumbar spine       9   pain          Right Lat. Flexion 29 pain right trunk          24        25      25    23 pinch       19 pinch  14 24    20     20          Left Lat. Flexion 31         19          23     14  15 pinch     11 pinch  18 19 18 easier        14           Right Lat Shift Pelvis inc pain   Inc pain Left SI jt       Slight increase No change but can't do far Feels caught  Increased pain  Sl inc pain No change in pain  No change in lumbar but inc hip Discomfort in right buttock           Left Lat Shift Pelvis  less inc pain       No change       No change/ slight cielo  No change but can't go far  Easier but not natural  Increased pain  Sl  Inc pain No change in pain  No change lumbar but sl inc hip  Inc pain left buttock                         AROM Hips:  (degrees) Right      Left Right Left Right  Left Right  Left Right  Left Right  Left Right Left Right Left Right Left Right Left Right Left Right left Right  Left Right  Left      Flexion 110       110  125    120    128      125 130       130 130      130 125      120 128     120  125      120    125   122  120      125          Abduction 37       41   45      45      42      44  43          45   45       45  45          45 43        45  40         45  43         45   45        45          Int. Rotation 32       22  30      25      31         28  32          30   34        32   30         28 32        31  30          33  31         35  30         35          Ext. Rotation  34       42  35      40       38        41   35         40   40        42   35         37  38        39  35           40  36          40  35         40                        Flexibility:   (degrees) Right    Left Right  Left Right  Left Right  Left Right  Left Right  Left Right  left Right Left Right Left Right Left Right Left Right Left Right  Left Right  Left      Hamstrings -32       -28  -30     -25   -25       -25   -28      -27  -30      -26   -28       -29  -25       -25  -25       -20   -25      -21           Quadriceps 39       42 40        45    not tested  40          42  45        47   50         52  not tested   55         55  48        59    55        58           HipExt/Rot(piriformis) 30       28   32      30  33         32   35         33   37        35   35        35  38       36  35        40   29        37  35         40           Hip Int/Rotators 27       9   25      10   28        15   27         16   25         18   20        20  22      18  18        23   20       25   25        25          Hip Flexors (iliopsoas) -       -                   IT Band -       -                                 Reflexes: Right      Left Right  Left Right  Left Right  Left Right  Left Right  Left Right Left Right left Right left Right Left Right left Right Left Right  Left Right  Left      L4 (Quad) +1       +1                   S1 (Achilles) +1       +1                                 Root Level  - Motor Right      Left Right  Left Right  Left Right  Left Right  Left Right  Left Right Left Right Left Right Left Right Left Right Left Right Left Right  Left Right  Left     T12             Rectus Abdominus 4+/5     4+/5         5/5        5/5             5/5          5/5           L1              Paraspinals 5/5         5/5        5/5        5/5    5/5       5/5          L2              Hip Flexion 5/5          5/5         5/5        5/5    5/5        5/5          L3             Quads 5/5          5/5         5/5        5/5   5/5         5/5          L4              Anterior Tibialis 5/5         5/5       5/5         5/5   5/5         5/5          L5             Gr. Toe Extension 5/5           5/5         5/5        5/5   5/5        5/5          S1            Gastroc/Sol/Peroneals 5/5          5/5       5/5          5/5  5/5        5/5                        Functional Strength:                   Single LegStanceTime (seconds) R:30   L:30   R:      L: R:      L: R: 30 pain     L:30 pain  R:      L: R:      L: R:       L: R:       L: R:       L: R:       L: R:       L: R:       L:  R:      L: R:      L:     Pelvic Floor Isolation (seconds) -    10 sec 10 sec   > 10 sec   > 10 sec             Inner Unit  Isolation (seconds) -    10 sec 10 sec     > 10 sec   > 10 sec                          Heart Rate        (12/19/19)         Blood Pressure           81 bpm                    109/78                                           Functional Activities:        11/26/19            Sit w/o pain? Pain increases (minutes) No/ 30 min No/ 30  min No/ 30 min  No/  30 min No/ 30 min  No/ 30min  No/ 30 min No/ 10-15 min No/ 10-15 min  No/ 15 min          Stand w/o pain? No/ 30 min No/ 30 min No/ 10 min  No/ 10 min No/ 10 min  No/ 15-20 min  No / 10 min No/10-15min, 1/4 mile No/ 10-15  No/ 15 min          Walk w/o pain? varies No/ 1 mile No/ 1/2 mile No/ 1 mile No/ 1mile, piriformis, B No/ a couple miles  No/ 1/2 mile No/ not much  No/ 1/2 mile  no/ 1/2 mile          Steps w/o pain? 1 fl, avoids them 1 fl avoids  1 fl avoids  No/ avoids No/ doesn't do No/ 1 flight  No/ 1 fl  No/ 1 fl  No/ 1 fl   no/  1 fl          Drive w/o pain? No/ 10-15 min No/ 30-45 min No/ 30-45  Min  No/ 30-45 min No/ 35-45 No/ 35-45 No/ 30 min No/ 10 -15 min No/ 10-15 min    No/ 15 min          Work w/o pain? No/ 30 min No/ 30 min No/ 30  No/ 30 min No/30 mi   No/ 30 min  No/ 30 min No/10-15 min No/ 10-15  no/ 15 min          # times wakes w/ pain? 4-5x/night, now taking meds at night she is able to sleep.  2x   2-3x  2x         1x         Several times   several times several times The last few nights 0x, a few times over last month   0x            PLAN OF CARE:    ASSESSMENT:  Problem List:   1. SI joint dysfunction  2. Lack of spinal stabilization  3. Postural dysfunction     Discussion:  The right supraspinatus/ infraspinatus tendons and right bicipital tendon are all irritated.  When questioned Tete on her positioning at the computer she has been keeping her right elbow in abduction and slight shoulder flex to use her mouse as it is not placed appropriately.  She was instructed in stretching, strengthening with theraband and icing to  minimize pain and facilitate healing.  She will adjust her workstation as directed.   They still haven't moved yet; however when she moves in a few weeks, she will be able to set up her workstation in a more ergonomic fashion.    She hasn't bought TENS yet however plans to soon.   The scapula tape was good last visit, but was irritating her skin so removed it at the end of the day.  She notes it did give relief while on.   It took more manual work than usual to get pain relief today.  Wasn't able to calm pain down significantly until after ultrasound to the right piriformis and right gr trochanter.     She did 4 min on the Aird[employer] with no resistance and her pain returned in the right piriformis.     It is possible the hormonal flux with the onset of her menses could be contributing to her increased pain along with increased time in front of her computer with work.         Short Term Goals: (4-6 weeks)                                   Date Met:                1.   pt independent in postural correction         02/07/19  2.   pt independent in SI joint self-corrections        03/21/19  3.   pt independent in exer to decrease post. disc pressures      03/21/19  4.   pt able to sleep through night without pain        02/11/20  5.   pt able to sit 15 minutes without pain  6.   pt able to walk 10 minutes without pain        06/20/19    7.   Reduce pt pain to no greater than 7/10        03/21/19  8.   Decrease Oswestry Pain Score to no greater than 45/100  9.  Reduce pt pain to no greater than 6/10        07/16/19  10.  Pt able to isolate the pelvic floor in supine x10, 10 sec      03/28/19  11.  Pt able to isolate transverse abdom in all 4s, x10 sec, x10         03/28/19  12.  Pt able to isolate the multifidus in sitting x10 sec, x10                            03/28/19  13.  Pt able to engage inner unit, move from sit to stand &back to sit      04/04/19                                                                        "                                                                                                                                                                      14.  Pt able to engage inner unit and walk 4 steps without overflow to hamstrings   06/20/19  15.  Pt able to hip abduct x6 without engaging the piriformis      06/20/19     16.  Pt able to perform prone knee flexion without engaging piriformis x6          05/07/19       17.  Pt able to perform remedial lower abs with scissor arm work x6 w/o pain    04/18/19    18.  Pt able to perform \"Pre-cursor\" lower abs leg lift initiation x6 without pain          04/18/19   19.  Pt able to perform retro step without engaging piriformis x6, bilaterally    09/24/19    20.  Pt able to perform bridging x6 without engaging piriformis      04/25/19    21.  Pt able to perform \"scissors\" arm movement with inner unit w/o piriformis x6   05/02/19  22.  Pt able to perform PIC hip adductors without engaging the piriformis  x6    05/07/19  23.  Pt able to perform \"Scissors\" arm motion w/ inner unit with 1lb wts x6 w/o pain   05/07/19  24.  Pt able to perform prone glut max over stability ball x6 w/o engaging piriformis   25.  Pt able to reduce pain w/ self PIC to left psoas       05/14/19  26.  Pt able to move laterally on stability ball in sitting without pain x6     11/05/19  27.  Pt able to move A/P movement sitting on stability ball without pain x6    11/05/19  28.  Pt independent in pool exercises, to do without increasing pain     07/09/19  29.  Pt able to throw and catch stability ball in supine with inner unit on x6    06/11/19  30.  Pt able to hold medicine ball in stance and move away from trunk with IU on and w/o pain x6 06/11/19  31.  Pt independent in supine hamstring stretch, able to do 2x without pain.    07/16/19  32.  Pt independent in hip adductor stretching, supine and stance     07/16/19  33.  Pt independent in quad stretching, in stance, without " pain.      08/06/19  34. Pt independent in hip internal rotator stretch in supine, without pain      08/06/19  35.  Pt able to tolerate ADLs with leukotape on scapula for enhanced spinal stabilization x 1 day 07/25/19  36.  Pt able to perform prone walk out x6 without losing inner unit engagement    11/12/19  37. Pt able to perform prone arm lift x6, phase 1, without engaging the upper trap   08/06/19  38.  Pt able to perform supine obliques over stability ball x6 without losing inner unit engagement 08/06/19  39.  Pt able to perform prone arm lift, lifting elbow without engaging upper traps x6   11/12/19  40.  Pt able to perform upper quarter stretches without increasing pain     09/17/19  41.  Pt able to do adapted BKFO without engaging piriformis x6        42.  Pt able to do diaphragmatic breathing x6 without engaging piriformis       11/12/19    43.  Pt able to ride the [employer] x10 min without increasing pain  44.  Pt able to tolerate the inversion table, 20 sec down, 30 up x 6 cycles      45.  Pt independent in the use of TENS unit                                                                                                                                        Long Term Goals:(3-5 months)      Date Met:      1.  pt independent in self-management of symptoms   12/19/19 - for 4 weeks only       2.  pt independent in home program     12/19/19      3.  pt able to work 6 hours without pain      4.  pt able to sit 60 minutes without pain      5.  pt able to walk 45 min without pain      6.  Pt independent in use of inversion table      7.  Pt independent in swimming program to be done without pain    Progress Towards Goals:  As expected to a little slower than expected    Treatment Plan:   1.  Ultrasound to increase extensibility, decrease pain, if needed  2.  Electrical stimulation for muscle relaxation, decrease pain, if needed, iontophoresis  3.  Manual therapy to increase function, decrease pain  4.   Therapeutic exercise to increase function, decrease pain  5.  Home programming and d/c planning to increase function and decrease pain,     Frequency & Duration:  Pt in need of more visits for neuro motor retraining and continued strengthening for the inner unit thus will see on a once/wk to once every other week basis for 4 more visits to complete the spinal stabilization instruction, meet remaining short and long term goals and be independent in her home program.      Patient Participated in and Agrees With This Plan of Care and Goals:  YES  Rehab Potential:  jarret Marcial, PT, MS  Physical Therapist  [place]# [id number]      TREATMENT TODAY:    Total Treatment Time: (time in clinic)   70  min  Timed Code Treatment Minutes:     70      Supplies given:      Manual Therapy:  (MA)  RX min:   45  Manual posterior rotation of right innominate    Positional Isometric contraction (PIC) of left iliopsoas    Positional Isometric contraction of (PIC) right gluteus minimus    Distraction of both SI jts in open pack hip position  Piriformis stretching, bilaterally    Quadratus lumborum stretching, bilaterally    Anterior/Inferior Mobilization of  right hip    Positional Isometric Contraction of multifidus  Myofascial work trunk   Sternocostal and rib mobs   Manual spinal distraction  PIC C-spine  PIC T-spine  Stretching scalenes, upper traps, levator scap      Therapeutic Activities:  (TA)  RX min:   5    Neuromuscular Reeducation: (NMR)  RX min:       Ultrasound:  RX min:  20      1.6 vance/cm2       Location:  Right piriformis and right gr trochanter    Iontophoresis:  6 hr patch                                Location:                           Ice:        ocedures:      Key:  i=instructed        r=review        c=corrected        a=adapted   Code Procedure/Instruction 10/29/19 11/05/19 11/12/19 11/26/19 12/19/19 01/07/20 01/21/20 02/11/20 03/03/20                TA         Sleeping Positions                         TA  Sternum-Up Posture                         TA SI jt. self-correction     reviewed                    TA Lumbar Facet PIC                         TA   Order of Self-Corrections                         TA Use of lumbar pillow                         TA Use of cervical roll                         TA Use of orthotics                         TA Use of SI belt                         TA Use of Nada chair                         TA Use of Ice                         TA Use of [employer]/ heat                         TA Use of Theraworx Relief                         TA Use of TENS unit                         TA Use of iontophoresis                         TA Decreasing Disc Pressures                         TA Use of sacro wedge                         TA Use of inversion table Used for 10 min  Used for 13 min  Used for 10 min  Used 14 min reviewed and used 15 min                    NMR Use of airdyne w/ IU on/off       instructed                  NMR Leukotaping upper quarter        applied                 NMR Pelvic Floor Isolation                         NMR Transverse Abdominus                         NMR Multifidus Isolation                         NMR Diaphragmtic breathe supine  reviewed                       NMR  Diaphragmtic breath sit                         NMR Pelvic Clock in sitting                         NMR Kinesiotape   On right scapula                      NMR InnerUnit against Gravity     reviewed                    NMR Sit-stand w/ inner unit                         NMR Roll w/ inner unit                         NMR Walk w/ inner unit                          NMR Up/down steps w/ inner unit                         NMR Water Exer Instruction    instructed                     NMR Hip Abd w/o piriformis                         NMR Hip Add w/o iliop/ham                          NMR Lower abs in supine                         NMR Inner unit challenge with scissor arm work                          NMR Abd  obliques in supine  reviewed                       NMR Prone Knee Flexion                         NMR Supine glute w/ ball btwn knees                          NMR Prone glut amrita                         NMR Retro Step                         NMR Retro Walk                         NMR Retro walk on treadmill        instructed                 NMR Forward walk w/ inner unit  reviewed      reviewed                 NMR Stability ball multifidus bounce                         NMR Stability Ball A/P & Lateral  reviewed                       NMR Ball Catch/Throw /Supine                         NMR Ball movemt in /Stance                         NMR StabilityBall All 4's Arm Lift                         NMR StabilityBall Prone Walk Out                         NMR StabilityBall Supine Oblique                         NMR Stability Ball sit-supine-sit                         NMR Walking Prog Progression                         MNR Home program sequencing                                                   TA Hamstring stretch                         TA Hip Ext Rot Stretch                         TA Hip Int Rot Stretch                         TA Hip Flex/IT Stretch                         TA Hip Add Stretch                         TA Lats Dorsi Stretch                         TA Gastroc/soleus stretch                         TA QuadratusLumborm Stretch                            Supine                            Stance                         TA Quad Stretch                                                   NMR Wall Push Ups                         NMR Planking                         NMR BOSU Ball Exercises                         NMR Lateral Bridge Drop                         NMR [place]                         NMR O'Feldt Lat Pull Down                         NMR O'Feldt Hip Ext                         NMR O'Feldt Trunk Ext                                                   TA CervDiscExtSup/stnd    instructed/reviewed                      TA Cerv Stretches                         TA Self PIC Cervical Spine                         TA Neural Gliding                         TA Upper trap stretching                         TA Ant/Mid Scalene Strch                         TA Levator Scap strch                         TA Biceps stretch                         TA Rotator Cuff Stretch         instructed                TA Anterior Chest Stretch                         TA Wrist Ext Stretch                                                   NMR Prone Arm Lifts                         NMR Scapula Stabilization                         NMR Occulomotor Isometrics                         NMR Cervical Isometrics                                                   TA Shld AROM w/bar                         TA Shld Capsular Stretching                         TA Self PIC Thor Spine                         TA Rot Cuff Therabd, beginner         instructed                TA Rot Cuff Therabd, ruy Marcial, PT, MS  Physical Therapist  [place]# [id number]

## 2020-06-09 ENCOUNTER — TREATMENT (OUTPATIENT)
Dept: PHYSICAL THERAPY | Facility: CLINIC | Age: 35
End: 2020-06-09

## 2020-06-09 DIAGNOSIS — M35.7 FAMILIAL LIGAMENTOUS LAXITY: ICD-10-CM

## 2020-06-09 DIAGNOSIS — M53.3 SACROILIAC JOINT DYSFUNCTION: Primary | ICD-10-CM

## 2020-06-09 DIAGNOSIS — R29.3 POSTURE IMBALANCE: ICD-10-CM

## 2020-06-09 DIAGNOSIS — S39.012D STRAIN OF LUMBAR REGION, SUBSEQUENT ENCOUNTER: ICD-10-CM

## 2020-06-09 PROCEDURE — 97140 MANUAL THERAPY 1/> REGIONS: CPT | Performed by: PHYSICAL THERAPIST

## 2020-06-09 PROCEDURE — 97164 PT RE-EVAL EST PLAN CARE: CPT | Performed by: PHYSICAL THERAPIST

## 2020-06-09 NOTE — PROGRESS NOTES
"Physical Therapy Re-Evaluation      SUBJECTIVE:   Changes since last seen:  Since Tete was last seen, her boyfriend  unexpectedly.  She states she was in bed for several weeks after his death.  She is now in grief counseling and is on bereavement leave from work.  Her pain has not been as bad   since she's not been sitting at a desk all day.   She indicates that this has really underscored that a lot of her pain has been positional and she will be more attentive to getting her home office set up properly.    She is going to be moving to a new house as soon as possible and will incorporate all of the ergonomic recommendations into this new house.   She asked about purchasing a Plank mattress.  This certainly looks like a good option for her.       Current level of pain:    4/10  Low back/ right buttock       Neck pain   1-2/10   Lowest level of pain since last seen:  3-4/10               0/10  Highest level of pain since last seen:   5-6/10           3/10      Past Medical History:  1.  AA   Treated with PT for left shoulder labral tear, no low back treatment   2.  Hx of two falls when mopping in socks, over the last 2 years  3.  Hx of regular, heavy, cramping menses  4.  T& A,   5.  Allergic to Wellbutrin and mild allergy to contrast dye  6.  Chronic yeast infections  7.  Uterine fibroids removed, 2 yrs ago       Functional Limitations:  Sitting, standing, walking, stairs, driving, working, sleeping, squatting, housework and changing positions.   Patient Goals for Physical Therapy: \"Pain relief\"      OBJECTIVE:  Observation:     No lateral shift of pelvis      08/06/19 08/13/19 09/17/19 09/24/19 10/01/19 10/29/19 11/05/19 11/12/19 11/26/19 12/19/19 01/07/20 01/21/20 02/11/20 03/03/20 06/09/20      SI Joint Positioning  Right  Left Right  Left Right  Left Right  Left Right  Left Right  Left Right  Left Right  Left Right  Left Right  Left Right  Left Right  Left Right  Left Right Left Right Left Right Left " Right  Left Right  Left       Innominate                            Anterior    x               x     x               x   X               x               x              sl               x                x                x Sl                sl     x sl             Posterior               x     x                x    x               x   x    x   sl    x   x   x              sl    sl                x                Sl          Sacrum                                    Unilateral rotation    x      x    x   x   x   x   x    x     x   x    x   x     sl   x    x                              SI Joint Testing  Right  Left Right  Left Right  Left Right  Left Right  Left Right  Left Right  Left Right  Left Right  Left Right  Left Right  Left Right  Left Right  Left Right Left Right Left Right Left Right  Left Right  Left           Distraction   +          +   +           +   +         +   +           +   +           +   +           +     +          +    +           +   +          +   +           +  +          +     +         +    +          +   +          +   +         +              Joanna's   +          +   +         +   +          +   +         +   -           +                        Compression   Sl        Sl    Sl         Sl    -          -      -          -                        Prone Press Up   -          -   -           -   -            -   -          -   Sl        Sl                                      Special Tests  Right   Left Right  Left Right  Left Right  Left Right  Left Right  Left Right Left  Right  Left Right  Left Right  Left Right  Left Right  Left Right  Left Right Left Right Left Right Left Right Left  Right  Left      Straight Leg Raise                             Active   -            -    -         -    -           -     -         -   -        -                    Passive   -           -    -          -   -          -     -        -   -          -                Hip Scour Test   sl           sl   Sl          Sl    +         sl    Sl        Sl    Sl       sl   Sl          sl   Sl         Sl    Sl        sl   Sl         sl   Sl         sl   +        sl   Sl       Sl   sl         sl    +          +   +        sl                            Bakers Cyst   -            -     +          -     Sl        -   -           -   -         -   +         -   ++       -   +          sl   Sl          -  sl          -   +         -   -          -    Sl       sl   Sl         -           01/08/2019 02/07/19 03/21/19 03/28/19 04/04/19 04/11/19 04/18/19 04/25/19 05/02/19 05/07/19 05/14/19 06/05/19 06/11/19 06/20/19 06/25/19 07/09/19 07/16/19 07/23/19   SI Joint Positioning  Right  Left Right  Left Right  Left Right  Left Right  Left Right  Left Right  Left Right  Left Right  Left Right  Left Right  Left Right  Left Right  Left Right Left Right Left Right Left Right  Left Right  Left       Innominate                            Anterior    x   x    x  x                x                 x              X     x    x   x   x               x   X                    sl    x                x               x                x          Posterior                x               x        x              x     x     x    x               X                x               x              x                x               x   sl               x    x    x   x      Sacrum                               Unilateral rotation    x   x   x    x    x     x    x    x   x   x    x    x   x   sl   x    x     x                           SI Joint Testing  Right  Left Right  Left Right  Left Right  Left Right  Left Right  Left Right  Left Right  Left Right  Left Right  Left Right  Left Right  Left Right  Left Right Left Right Left Right Left Right  Left Right  Left           Distraction     +          +   +         +   +          +    +        +  +           +   +          +   +          +   +          +     +        +   +         +   +         +   +          +   +            + +           +   +          +    +          +   +          +   +           +           Joanna's     +          +   Sl         sl    +         -    Sl         -   Sl         -   Sl         -   -           -                      Compression     +          +   Sl          sl   Sl          Sl     Sl        Sl    -          -   -         -   -           -                      Prone Press Up           +         +         Sl          -         -        -         -                                   Special Tests  Right   Left Right  Left Right  Left Right  Left Right  Left Right  Left Right Left  Right  Left Right  Left Right  Left Right  Left Right  Left Right  Left Right Left Right Left Right Left Right Left  Right  Left      Straight Leg Raise                                     Active   -         -          -           -                      Passive   -         -             -             -                  Hip Scour Test   ++       Sl    ++       Sl    +           sl   Sl         Sl    Sl         sl Sl          Sl    Sl        sl   Sl       Sl    Sl         Sl    Sl         Sl     Sl         sl   Sl        Sl    Sl        Sl     -            -   Sl         sl   -           -   -          -   -            -                        Bakers Cyst       +         -     +        -    Sl        -   Sl         -     Sl        -   Sl        Sl   sl          sl   Sl         -   -           -   -          -   +           -   Sl      sl       Palpation:   Date 08/06/19 08/13/19 09/17/19 09/24/19 10/01/19 10/29/19 11/05/19 11/12/19 11/26/19 12/19/19 01/07/20 01/21/20 02/11/20 03/03/20 06/09/20       Right  Left Right  Left Right  Left Right  Left Right  Left Right  Left Right  Left Right  Left Right  Left Right  Left Right  Left Right  Left Right  Left Right Left Right Left Right Left Right  Left Right  Left   Piriformis   ++        ++   ++       ++   ++        ++   +         +    ++       +   ++        ++    ++        +   ++         sl   ++         Sl    +         +  +           +   +         sl   +         sl   ++        +    +       sl      Gr. Trochanter    +        -   ++          +   ++      +   ++         +     Sl        -   ++        +    +         Sl    ++         sl   ++         sl   +          sl   +         sl   +         sl   Sl        +   ++        +      +     sl      IT Band    Sl         Sl    ++        +   +         sl   Sl        Sl     +          Sl    ++         +    +         -    +         -   +           -   +         -   Sl         -   Tight    -   Sl         sl   +          sl       Quadratus Lumborum   ++         +   +         +   ++        +   +         Sl    ++        +   ++         +   +         ++     +         +   +         ++    +        +   +          +   +         + inst sl/ inst  sl   +          s;  +         Sl       Ischial Tuberosity    -         -   -          -    -        -   -            -     Sl       -   Sl         Sl     -          Sl      -         -   -           -   -          sl   -           -   -         -   -         -   +        -   +        Sl       Sacrococcygeal Ligs    +          +   +       +   +         sl    +         +     -          -   -          -   +         -   -           +   Sl        +   Sl        sl   Sl         Sl    Sl        Sl    -         -   Sl        _   Sl         sl      Paraspinals                     Spinous Processes   +           sl   ++        sl    +           -                       C-spine rotated to    C2-5 C2-6 C2-5   -             - C2-3  C2-4           C3-7  C2    C3-7       C2-4   C5-7 C2-6 C2-6 C3-6 C5-6 C2-6 C2-7            T-spine rotated to  T4-6 T4-7 T3-8   T3-7  T4-6  T4-6 T4-5 T3-12  T3-7 T3-6 T5-9 T4-8 T4-8  T3-8 T1-4             L-spine rotated to  L2-5 L2-5   L1-5  L3-5  L3-5  L2-5             L3-5 L2-5 L2-5  L2-5 L3-5 L4-5 L4-5  L3-5    -         -      Adductor Tony   +           +   ++      ++   +         ++   +        +   +           +    +           +   Sl        Sl    Sl        Sl    Sl       sl  sl         Sl   sl       Sl    -         -   -           -  -          -      Iliopsoas   ++          +   +          sl   +         sl   +          Sl    ++        +  ++        +   +         +   -          +   -         sl   Sl          +   +        +   Sl        Sl    +         +    Sl       sl      Quad Origin    -          -   -          -   -        -   -          -    -          -   -           -   -          -   -          -   -           -   -         -   -           -   -          -   -           -   -           -      SI Joint   +        +   ++        ++  ++        +   ++       +   +            +   ++         +   ++      +    +         sl   ++       +  ++        +   +          sl   +         Sl   ++        -    +          +   Sl        sl      Pubic Symphysis         +        +        +       +  not tested        ++           +          +         +          +          +       +       Sl         +         sl      Obturator externus   +         +   +         +   Sl       sl   -           -   -          -    -          -   -         -   Sl       Sl    Sl        sl   Sl         sl   Sl        Sl    Sl       Sl     Sl         sl   Sl       Sl       Rectus Diastasis                                          Ant/middle scalenes   +        sl   +         +   +         +   +          +   +        sl   +          +   +        Sl    +        sl   +         +   +        +   ++        +   +       +   +          +   ++        +   +        sl      Upper traps   +         Sl    +         +   +         +   +         sl   +        Sl    +          sl  sl         sl  +        sl   +        Sl     +        sl   +         sl   +       sl   +          sl  ++         +   +        Sl       Levator scap   +        Sl    +        +   +        sl   +         sl   +         sl    ++        sl   +          Sl   +         sl   +         s;   +         +   +          sl   +        sl   +          sl  ++         +   +         sl      sternocleidomastoid   -         -   -            -   -          -    -          -   -          -   -            -    -         -   -          -   -          -   -           -   -         -   -         -   -          -   -           -                             sternocostals   +          +   +          +   +        Sl   ++          +   +          +   +             +   Sl       Sl    +          +  +          sl   Sll        sl   +          sl   +        +   +         +   +          +   Sl         Sl                 Muscle/Bone Irritation  Date 01/08/2019 02/07/19 03/21/19 03/28/19 04/04/18 04/11/19 04/18/19 04/25/19 05/02/19 05/07/19 05/14/19 06/05/19 06/11/19 06/20/19 06/25/19 07/09/19 07/16/19 07/23/19    Right  Left Right  Left Right  Left Right  Left Right  Left Right  Left Right  Left Right  Left Right  Left Right  Left Right  Left Right  Left Right  Left Right Left Right Left Right Left Right  Left Right  Left   Piriformis  ++        +   ++          +   ++       +   ++        +    ++        Sl    ++       Sl   ++       +    ++      +   ++        +   +           +    +        Sl      +         ++   +           ++    +       -   ++      ++   +          +   ++        +   ++         ++   Gr. Trochanter  +          +  +          sl    +           +    +       ++    +          -   +         Sl    +         sl   +         sl   Sl        Sl    +          +    Sl        sl    Sl         +   Sl          sl     +       -   +         +    +         +   +         sl   ++           +   IT Band  +         sl   Sl        sl     -          -   -          -    Sl         -   Sl         -   Sl        -   Sl         -  sl          -   Sl          Sl      -          -     -           -   -            -    Sl        Sl    Sl       Sl     Sl       sl   +          Sl     +          ++   Quadratus Lumborum  ++        ++   ++        +    +        ++   ++        ++     +       ++   +           +  ++        ++   Sl        Sl    +       ++   +          ++    Sl       +     +         +   +          ++     Sl       +   ++       +    ++       +    +         +    ++       +   Ischial Tuberosity  ++         sl   -          -    -           -     -           -     -          -   -          -   -          -   -          -   -          -  -            -     -           -   -          -   -             -     Sl       -   Sl        Sl     -          -     -         -    Sl        Sl    Sacrococcygeal Ligs  ++          +   Sl        sl   +          +    +       +    Sl        Sl    Sl       Sl    +         sl  ++      +  +        Sl       Sl         +   Sl         Sl    Sl         Sl      -        Sl   not  tested    +         +    Sl       Sl    +          +   Paraspinals  +           +     +         sl    +    +          -   Sl         +   +         sl   +        sl  +         -   Sl          +   Sl          +   +          sl   ++         sl   +          -   +         Sl    ++        +     +       Sl    +         sl   Spinous Processes                                        C-spine rotated to                           C3-5  C2-5 C2-6         T-spine rotated to   -               -              T8-9  T4-5    T3-8          L-spine rotated to  L2-5        L1-5  L4-5  L3-5  L2-5            L3-5  L1-5  L2-5  L2-5              L1-5           L2-5 L2-5   L1-5 L3-5 L3-5  L2-5 L4-5 L1-5   Adductor Tony    +        +       +         +     -         +   -           Sl     -        sl   +          sl   -          -   -           -   -          -   -           -   -          -   -           sl  ++        +   +         sl   +         +   +         ++   ++        +  +            +   Iliopsoas  ++         +   ++        +   ++         +    +         ++  sl        Sl    Sl       Sl    Sl       Sl    Sl         -   Sl        -   Sl         +   +         Sl    +          sl  +          ++   +          +   +          +   +          ++   ++        +   ++         +   Quad Origin  sl          sl   Sl          sl    -          -   -           -    -         -   -          -   -         -   -          -   -          -   -           -    -         -   -            -   -         -   -         -   -         -   -            -   -          -   -         -   SI Joint  ++         +  ++        +    ++        +   +          +   +         +   +          ++   +         sl    +         sl   +        +  +          sl  +        Sl     +        Sl   +          ++   Sl       sl   +        +    + +        +  ++          +    ++        ++   Pubic Symphysis         ++          Not tested         ++      ++        ++            + Not tested Not tested          +             ++         +          +   Obturator externus          -          +   -          -   -          -    -           -   -          -   -          -   -         -    -            -   -          -   -            -   Rectus Diastasis   1/2 finger                                         Ant/middle scalenes                   ++        +   ++       +   Upper traps                  ++         +   +          +   Levator scap                   ++          +     +           sternocleidomastoid                   Sl        Sl      -         -                        sternocostals   +        +   +          +    +         -    +           +   Sl        Sl    +         Sl  Not tested     +         +     +         +   +         +   Sl      sl   +          +    +        +   +          +   +          +   All 4s Positioning: Pt not able to assume full lumbar extension in this position  Leg Length:  The right  lower extremity is equal to the left        Monthly Objective Measurement/Functional Activity  Date: 01/08/2019 03/21/19 04/25/19 06/05/19 07/09/19 09/17/19 10/29/19 11/26/19 01/07/20 02/11/20 06/09/20      Oswestry Pain Score 52/100  46/100    48/100      48/100       50/100 48/100 52/100   50/100     50/100      50/100                                        AROM Lumbar Spine: Degrees   Degrees      Degrees      Degrees      Degrees      Degrees Degrees Degrees Degrees Degrees Degrees Degrees      Degrees    Degrees      Flexion 106 pain          99          83       75 pain          63 onset of pain         69 pain increased   57 pain       25        53       42    68 onset of pain         Extension 3 pain           17           19       11 pain           11  Pinches       16 pain increased 12  instant pain in right SI jt         12 pain lumbar spine       9   pain      22 pain         Right Lat. Flexion 29 pain right trunk          24        25      25    23 pinch       19 pinch  14 24    20     20      27         Left Lat. Flexion 31         19         23     14  15 pinch     11 pinch  18 19 18 easier        14        26         Right Lat Shift Pelvis inc pain   Inc pain Left SI jt       Slight increase No change but can't do far Feels caught  Increased pain  Sl inc pain No change in pain  No change in lumbar but inc hip Discomfort in right buttock  Slight pain piriformis         Left Lat Shift Pelvis  less inc pain       No change       No change/ slight cielo  No change but can't go far  Easier but not natural  Increased pain  Sl  Inc pain No change in pain  No change lumbar but sl inc hip  Inc pain left buttock  Sl pain piriformis                        AROM Hips:  (degrees) Right      Left Right Left Right  Left Right  Left Right  Left Right  Left Right Left Right Left Right Left Right Left Right Left Right left Right  Left Right  Left      Flexion 110       110  125    120    128      125 130       130 130      130 125      120 128     120  125      120    125   122  120      125  125     125         Abduction 37       41   45      45      42      44  43          45   45       45  45          45 43        45  40         45  43         45   45        45   45        45         Int. Rotation 32       22  30      25       31         28  32          30   34        32   30         28 32        31  30          33  31         35  30         35  35         40         Ext. Rotation  34       42  35      40       38        41   35         40   40        42   35         37  38        39  35           40  36          40  35         40   40      43                       Flexibility:   (degrees) Right    Left Right  Left Right  Left Right  Left Right  Left Right  Left Right  left Right Left Right Left Right Left Right Left Right Left Right  Left Right  Left      Hamstrings -32       -28  -30     -25   -25       -25   -28      -27  -30      -26   -28       -29  -25       -25  -25       -20   -25      -21    -18     -15         Quadriceps 39       42 40        45    not tested  40          42  45        47   50         52  not tested   55         55  48        59    55        58   65        60         HipExt/Rot(piriformis) 30       28   32      30  33         32   35         33   37        35   35        35  38       36  35        40   29        37  35         40    40        42         Hip Int/Rotators 27       9   25      10   28        15   27         16   25         18   20        20  22      18  18        23   20       25   25        25   30        31         Hip Flexors (iliopsoas) -       -                   IT Band -       -                                 Reflexes: Right      Left Right  Left Right  Left Right  Left Right  Left Right  Left Right Left Right left Right left Right Left Right left Right Left Right  Left Right  Left      L4 (Quad) +1       +1                   S1 (Achilles) +1       +1                                 Root Level  - Motor Right      Left Right  Left Right  Left Right  Left Right  Left Right  Left Right Left Right Left Right Left Right Left Right Left Right Left Right  Left Right  Left     T12             Rectus Abdominus 4+/5     4+/5         5/5        5/5             5/5          5/5         5/5         L1               Paraspinals 5/5         5/5        5/5        5/5    5/5       5/5   5/5        5/5         L2              Hip Flexion 5/5          5/5         5/5        5/5    5/5        5/5  5/5         5/5         L3             Quads 5/5          5/5         5/5        5/5   5/5         5/5   5/5        5/5         L4              Anterior Tibialis 5/5         5/5       5/5         5/5   5/5         5/5   5/5        5/5         L5             Gr. Toe Extension 5/5           5/5         5/5        5/5   5/5        5/5   5/5        5/5         S1            Gastroc/Sol/Peroneals 5/5          5/5       5/5          5/5  5/5        5/5  5/5        5/5                       Functional Strength:                   Single LegStanceTime (seconds) R:30   L:30   R:      L: R:      L: R: 30 pain     L:30 pain  R:      L: R:      L: R:       L: R:       L: R:       L: R:       L: R: 30    L:30 R:       L:  R:      L: R:      L:     Pelvic Floor Isolation (seconds) -    10 sec 10 sec   > 10 sec   > 10 sec            Inner Unit  Isolation (seconds) -    10 sec 10 sec     > 10 sec   > 10 sec                          Heart Rate        (12/19/19)         Blood Pressure           81 bpm                    109/78                                           Functional Activities:        11/26/19            Sit w/o pain? Pain increases (minutes) No/ 30 min No/ 30  min No/ 30 min  No/  30 min No/ 30 min  No/ 30min  No/ 30 min No/ 10-15 min No/ 10-15 min  No/ 15 min No/ 30 min         Stand w/o pain? No/ 30 min No/ 30 min No/ 10 min  No/ 10 min No/ 10 min  No/ 15-20 min  No / 10 min No/10-15min, 1/4 mile No/ 10-15  No/ 15 min No/ 10-15 min         Walk w/o pain? varies No/ 1 mile No/ 1/2 mile No/ 1 mile No/ 1mile, piriformis, B No/ a couple miles  No/ 1/2 mile No/ not much  No/ 1/2 mile  no/ 1/2 mile No/ 1/2 mile         Steps w/o pain? 1 fl, avoids them 1 fl avoids  1 fl avoids  No/ avoids No/ doesn't do No/ 1 flight  No/ 1 fl  No/ 1 fl   No/ 1 fl   no/  1 fl No/ 1 fl         Drive w/o pain? No/ 10-15 min No/ 30-45 min No/ 30-45  Min  No/ 30-45 min No/ 35-45 No/ 35-45 No/ 30 min No/ 10 -15 min No/ 10-15 min    No/ 15 min No/ 30 min         Work w/o pain? No/ 30 min No/ 30 min No/ 30  No/ 30 min No/30 mi   No/ 30 min  No/ 30 min No/10-15 min No/ 10-15  no/ 15 min Has been off work         # times wakes w/ pain? 4-5x/night, now taking meds at night she is able to sleep.  2x   2-3x  2x         1x         Several times   several times several times The last few nights 0x, a few times over last month   0x     0-1x          PLAN OF CARE:    ASSESSMENT:  Problem List:   1. SI joint dysfunction  2. Lack of spinal stabilization  3. Postural dysfunction     Discussion:  Tete has suffered from the unexpected death of her boyfriend.  They were building a house together and about to move in when he .  She is now looking for a new house to move into.  She is off work on bereavement leave which has meant she is not sitting working at a desk all day.  Thus, her overall low back and neck pain is less.  She is encouraged to realize this as it underscores the need for her to have her home office set up ergonomically to minimize her pain when she returns to work.  It is the combination of her familial ligamentous laxity and sitting at a computer all day that has contributed to a great deal of her pain.     She responded well to today's manual work on both the lower back and the upper thoracic spine/ cervical spine.     She is in need of manual work to augment her home program once every 2-3 weeks as she is not in as much pain, currently, but will be moving and packing which will flare up her pain.         Short Term Goals: (4-6 weeks)                                   Date Met:                1.   pt independent in postural correction         19  2.   pt independent in SI joint self-corrections        19  3.   pt independent in exer to decrease post.  "disc pressures      03/21/19  4.   pt able to sleep through night without pain        02/11/20  5.   pt able to sit 15 minutes without pain  6.   pt able to walk 10 minutes without pain        06/20/19    7.   Reduce pt pain to no greater than 7/10        03/21/19  8.   Decrease Oswestry Pain Score to no greater than 45/100  9.  Reduce pt pain to no greater than 6/10        07/16/19  10.  Pt able to isolate the pelvic floor in supine x10, 10 sec      03/28/19  11.  Pt able to isolate transverse abdom in all 4s, x10 sec, x10         03/28/19  12.  Pt able to isolate the multifidus in sitting x10 sec, x10                            03/28/19  13.  Pt able to engage inner unit, move from sit to stand &back to sit      04/04/19                                                                                                                                                                                                                                             14.  Pt able to engage inner unit and walk 4 steps without overflow to hamstrings   06/20/19  15.  Pt able to hip abduct x6 without engaging the piriformis      06/20/19     16.  Pt able to perform prone knee flexion without engaging piriformis x6          05/07/19       17.  Pt able to perform remedial lower abs with scissor arm work x6 w/o pain    04/18/19    18.  Pt able to perform \"Pre-cursor\" lower abs leg lift initiation x6 without pain          04/18/19   19.  Pt able to perform retro step without engaging piriformis x6, bilaterally    09/24/19    20.  Pt able to perform bridging x6 without engaging piriformis      04/25/19    21.  Pt able to perform \"scissors\" arm movement with inner unit w/o piriformis x6   05/02/19  22.  Pt able to perform PIC hip adductors without engaging the piriformis  x6    05/07/19  23.  Pt able to perform \"Scissors\" arm motion w/ inner unit with 1lb wts x6 w/o pain   05/07/19  24.  Pt able to perform prone glut max over stability " ball x6 w/o engaging piriformis   25.  Pt able to reduce pain w/ self PIC to left psoas       05/14/19  26.  Pt able to move laterally on stability ball in sitting without pain x6     11/05/19  27.  Pt able to move A/P movement sitting on stability ball without pain x6    11/05/19  28.  Pt independent in pool exercises, to do without increasing pain     07/09/19  29.  Pt able to throw and catch stability ball in supine with inner unit on x6    06/11/19  30.  Pt able to hold medicine ball in stance and move away from trunk with IU on and w/o pain x6 06/11/19  31.  Pt independent in supine hamstring stretch, able to do 2x without pain.    07/16/19  32.  Pt independent in hip adductor stretching, supine and stance     07/16/19  33.  Pt independent in quad stretching, in stance, without pain.      08/06/19  34. Pt independent in hip internal rotator stretch in supine, without pain      08/06/19  35.  Pt able to tolerate ADLs with leukotape on scapula for enhanced spinal stabilization x 1 day 07/25/19  36.  Pt able to perform prone walk out x6 without losing inner unit engagement    11/12/19  37. Pt able to perform prone arm lift x6, phase 1, without engaging the upper trap   08/06/19  38.  Pt able to perform supine obliques over stability ball x6 without losing inner unit engagement 08/06/19  39.  Pt able to perform prone arm lift, lifting elbow without engaging upper traps x6   11/12/19  40.  Pt able to perform upper quarter stretches without increasing pain     09/17/19  41.  Pt able to do adapted BKFO without engaging piriformis x6        42.  Pt able to do diaphragmatic breathing x6 without engaging piriformis       11/12/19    43.  Pt able to ride the [employer] x10 min without increasing pain  44.  Pt able to tolerate the inversion table, 20 sec down, 30 up x 6 cycles      45.  Pt independent in the use of TENS unit                                                                                                                                         Long Term Goals:(3-5 months)      Date Met:      1.  pt independent in self-management of symptoms   12/19/19 - for 4 weeks only       2.  pt independent in home program     12/19/19      3.  pt able to work 6 hours without pain      4.  pt able to sit 60 minutes without pain      5.  pt able to walk 45 min without pain      6.  Pt independent in use of inversion table      7.  Pt independent in swimming program to be done without pain    Progress Towards Goals:  As expected to a little slower than expected    Treatment Plan:   1.  Ultrasound to increase extensibility, decrease pain, if needed  2.  Electrical stimulation for muscle relaxation, decrease pain, if needed, iontophoresis  3.  Manual therapy to increase function, decrease pain  4.  Therapeutic exercise to increase function, decrease pain  5.  Home programming and d/c planning to increase function and decrease pain,     Frequency & Duration:  Pt in need of more visits for neuro motor retraining and continued strengthening for the inner unit.  Will see on a once/ every 3-4 wk basis for 4 more visits to complete the spinal stabilization instruction, meet remaining short and long term goals and be independent in her home program.      Patient Participated in and Agrees With This Plan of Care and Goals:  YES  Rehab Potential:  jarret Marcial, PT, MS  Physical Therapist  [place]# [id number]      TREATMENT TODAY:  Total Treatment Time: (time in clinic)   60  min  Timed Code Treatment Minutes:     60      Re-Evaluation:  [id number]    Supplies given:      Manual Therapy:  (MA)  RX min:   40  Manual posterior rotation of right innominate    Positional Isometric contraction (PIC) of left iliopsoas    Positional Isometric contraction of (PIC) right gluteus minimus    Distraction of both SI jts in open pack hip position  Piriformis stretching, bilaterally    Quadratus lumborum stretching, bilaterally    Anterior/Inferior  Mobilization of  right hip    Myofascial work trunk   Sternocostal and rib mobs   Manual spinal distraction  PIC C-spine  PIC T-spine  Stretching scalenes, upper traps, levator scap      Therapeutic Activities:  (TA)  RX min:      Neuromuscular Reeducation: (NMR)  RX min:       Ultrasound:  RX min:       1.6 vance/cm2       Location:  Right piriformis and right gr trochanter    Iontophoresis:  6 hr patch                                Location:                           Ice:        ocedures:      Key:  i=instructed        r=review        c=corrected        a=adapted   Code Procedure/Instruction 10/29/19 11/05/19 11/12/19 11/26/19 12/19/19 01/07/20 01/21/20 02/11/20 03/03/20 06/09/20               TA         Sleeping Positions                         TA Sternum-Up Posture                         TA SI jt. self-correction     reviewed                    TA Lumbar Facet PIC                         TA   Order of Self-Corrections                         TA Use of lumbar pillow                         TA Use of cervical roll                         TA Use of orthotics                         TA Use of SI belt                         TA Use of Nada chair                         TA Use of Ice                         TA Use of [employer]/ heat                         TA Use of [employer] Relief                         TA Use of TENS unit                         TA Use of iontophoresis                         TA Decreasing Disc Pressures                         TA Use of sacro wedge                         TA Use of inversion table Used for 10 min  Used for 13 min  Used for 10 min  Used 14 min reviewed and used 15 min                    NMR Use of airdyne w/ IU on/off       instructed                  NMR Leukotaping upper quarter        applied                 NMR Pelvic Floor Isolation                         NMR Transverse Abdominus                         NMR Multifidus Isolation                         NMR Diaphragmtic  breathe supine  reviewed                       NMR  Diaphragmtic breath sit                         NMR Pelvic Clock in sitting                         NMR Kinesiotape   On right scapula                      NMR InnerUnit against Gravity     reviewed                    NMR Sit-stand w/ inner unit                         NMR Roll w/ inner unit                         NMR Walk w/ inner unit                          NMR Up/down steps w/ inner unit                         NMR Water Exer Instruction    instructed                     NMR Hip Abd w/o piriformis                         NMR Hip Add w/o iliop/ham                          NMR Lower abs in supine                         NMR Inner unit challenge with scissor arm work                          NMR Abd obliques in supine  reviewed                       NMR Prone Knee Flexion                         NMR Supine glute w/ ball btwn knees                          NMR Prone glut amrita                         NMR Retro Step                         NMR Retro Walk                         NMR Retro walk on treadmill        instructed                 NMR Forward walk w/ inner unit  reviewed      reviewed                 NMR Stability ball multifidus bounce                         NMR Stability Ball A/P & Lateral  reviewed                       NMR Ball Catch/Throw /Supine                         NMR Ball movemt in /Stance                         NMR StabilityBall All 4's Arm Lift                         NMR StabilityBall Prone Walk Out                         NMR StabilityBall Supine Oblique                         NMR Stability Ball sit-supine-sit                         NMR Walking Prog Progression                         MNR Home program sequencing                                                   TA Hamstring stretch                         TA Hip Ext Rot Stretch                         TA Hip Int Rot Stretch                         TA Hip Flex/IT Stretch                          TA Hip Add Stretch                         TA Lats Dorsi Stretch                         TA Gastroc/soleus stretch                         TA QuadratusLumborm Stretch                            Supine                            Stance                         TA Quad Stretch                                                   NMR Wall Push Ups                         NMR Planking                         NMR BOSU Ball Exercises                         NMR Lateral Bridge Drop                         NMR [place]                         NMR [place] Lat Pull Down                         NMR [place] Hip Ext                         NMR O'[place]dt Trunk Ext                                                   TA CervDiscExtSup/stnd    instructed/reviewed                     TA Cerv Stretches                         TA Self PIC Cervical Spine                         TA Neural Gliding                         TA Upper trap stretching                         TA Ant/Mid Scalene Strch                         TA Levator Scap strch                         TA Biceps stretch                         TA Rotator Cuff Stretch         instructed                TA Anterior Chest Stretch                         TA Wrist Ext Stretch                                                   NMR Prone Arm Lifts                         NMR Scapula Stabilization                         NMR Occulomotor Isometrics                         NMR Cervical Isometrics                                                   TA Shld AROM w/bar                         TA Shld Capsular Stretching                         TA Self PIC Thor Spine                         TA Rot Cuff Therabd, beginner         instructed                TA Rot Cuff Therabd, intermed                                                                                                                                                                                                                                                                                                                            Miracle Marcial, PT, MS  Physical Therapist  [place]# [id number]

## 2020-08-11 ENCOUNTER — TRANSCRIBE ORDERS (OUTPATIENT)
Dept: PHYSICAL THERAPY | Facility: CLINIC | Age: 35
End: 2020-08-11

## 2020-08-11 ENCOUNTER — TREATMENT (OUTPATIENT)
Dept: PHYSICAL THERAPY | Facility: CLINIC | Age: 35
End: 2020-08-11

## 2020-08-11 DIAGNOSIS — M53.3 SACROILIAC JOINT DYSFUNCTION: Primary | ICD-10-CM

## 2020-08-11 DIAGNOSIS — M35.7 FAMILIAL LIGAMENTOUS LAXITY: ICD-10-CM

## 2020-08-11 DIAGNOSIS — M53.3 DISORDER OF SACRUM: Primary | ICD-10-CM

## 2020-08-11 DIAGNOSIS — R29.3 POSTURE IMBALANCE: ICD-10-CM

## 2020-08-11 DIAGNOSIS — S39.012D STRAIN OF LUMBAR REGION, SUBSEQUENT ENCOUNTER: ICD-10-CM

## 2020-08-11 PROCEDURE — 97140 MANUAL THERAPY 1/> REGIONS: CPT | Performed by: PHYSICAL THERAPIST

## 2020-08-11 NOTE — PROGRESS NOTES
"Physical Therapy Re-Evaluation      SUBJECTIVE:   Changes since last seen:  Tete arrives today 13 weeks pregnant. She states she is concerned on the impact this will have on her low back.   She reports her back is doing surprisingly well, but she has not been back to work yet.  She returns to work this week.   She has not gotten a firmer mattress yet, as she will now be moving to [place], [place] in a couple of months and will get a new mattress at that time.    She will be getting a sit-stand up desk this week as she returns to work.   She is still self correcting the SI joints and lumbar facets several times a week, and still gets relief of low back pain when she does this.       Current level of pain:    0/10  Low back/ right buttock        Neck pain   0-1/10   Lowest level of pain since last seen:  0/10                 0/10  Highest level of pain since last seen:   7/10  When moving to new home         3/10      Past Medical History:  1.  AA 2013  Treated with PT for left shoulder labral tear, no low back treatment   2.  Hx of two falls when mopping in socks, over the last 2 years  3.  Hx of regular, heavy, cramping menses  4.  T& A, 2006  5.  Allergic to Wellbutrin and mild allergy to contrast dye  6.  Chronic yeast infections  7.  Uterine fibroids removed, 2 yrs ago       Functional Limitations:  Sitting, standing, walking, stairs, driving, working, sleeping, squatting, housework and changing positions.   Patient Goals for Physical Therapy: \"Pain relief\"      OBJECTIVE:  Observation:     No lateral shift of pelvis      08/06/19 08/13/19 09/17/19 09/24/19 10/01/19 10/29/19 11/05/19 11/12/19 11/26/19 12/19/19 01/07/20 01/21/20 02/11/20 03/03/20 06/09/20 08/11/20     SI Joint Positioning  Right  Left Right  Left Right  Left Right  Left Right  Left Right  Left Right  Left Right  Left Right  Left Right  Left Right  Left Right  Left Right  Left Right Left Right Left Right Left Right  Left Right  Left       Innominate   "                          Anterior    x               x     x               x   X               x               x              sl               x                x                x Sl                sl     x sl   sl            Posterior               x     x                x    x               x   x    x   sl    x   x   x              sl    sl                x                Sl              sl        Sacrum                                    Unilateral rotation    x      x    x   x   x   x   x    x     x   x    x   x     sl   x    x   x                             SI Joint Testing  Right  Left Right  Left Right  Left Right  Left Right  Left Right  Left Right  Left Right  Left Right  Left Right  Left Right  Left Right  Left Right  Left Right Left Right Left Right Left Right  Left Right  Left           Distraction   +          +   +           +   +         +   +           +   +           +   +           +     +          +    +           +   +          +   +           +  +          +     +         +    +          +   +          +   +         +   +           +             Joanna's   +          +   +         +   +          +   +         +   -           +                        Compression   Sl        Sl    Sl         Sl    -          -      -          -                        Prone Press Up   -          -   -           -   -            -   -          -   Sl        Sl                                      Special Tests  Right   Left Right  Left Right  Left Right  Left Right  Left Right  Left Right Left  Right  Left Right  Left Right  Left Right  Left Right  Left Right  Left Right Left Right Left Right Left Right Left  Right  Left      Straight Leg Raise                             Active   -            -    -         -    -           -     -         -   -        -                    Passive   -           -    -          -   -          -     -        -   -          -                Hip Scour Test   sl           sl   Sl          Sl    +         sl    Sl        Sl    Sl       sl   Sl          sl   Sl         Sl    Sl        sl   Sl         sl   Sl         sl   +        sl   Sl       Sl   sl         sl    +          +   +        sl   +        sl                           Bakers Cyst   -            -     +          -     Sl        -   -           -   -         -   +         -   ++       -   +          sl   Sl          -  sl          -   +         -   -          -    Sl       sl   Sl         -   -           -          01/08/2019 02/07/19 03/21/19 03/28/19 04/04/19 04/11/19 04/18/19 04/25/19 05/02/19 05/07/19 05/14/19 06/05/19 06/11/19 06/20/19 06/25/19 07/09/19 07/16/19 07/23/19   SI Joint Positioning  Right  Left Right  Left Right  Left Right  Left Right  Left Right  Left Right  Left Right  Left Right  Left Right  Left Right  Left Right  Left Right  Left Right Left Right Left Right Left Right  Left Right  Left       Innominate                            Anterior    x   x    x  x                x                 x              X     x    x   x   x               x   X                    sl    x                x               x                x          Posterior                x               x        x              x     x     x    x               X                x               x              x                x               x   sl               x    x    x   x      Sacrum                               Unilateral rotation    x   x   x    x    x     x    x    x   x   x    x    x   x   sl   x    x     x                           SI Joint Testing  Right  Left Right  Left Right  Left Right  Left Right  Left Right  Left Right  Left Right  Left Right  Left Right  Left Right  Left Right  Left Right  Left Right Left Right Left Right Left Right  Left Right  Left           Distraction     +          +   +         +   +          +    +        +  +           +   +          +   +          +   +          +     +        +   +         +   +         +   +           +   +           + +           +   +          +    +          +   +          +   +           +           Joanna's     +          +   Sl         sl    +         -    Sl         -   Sl         -   Sl         -   -           -                      Compression     +          +   Sl          sl   Sl          Sl     Sl        Sl    -          -   -         -   -           -                      Prone Press Up           +         +         Sl          -         -        -         -                                   Special Tests  Right   Left Right  Left Right  Left Right  Left Right  Left Right  Left Right Left  Right  Left Right  Left Right  Left Right  Left Right  Left Right  Left Right Left Right Left Right Left Right Left  Right  Left      Straight Leg Raise                                     Active   -         -          -           -                      Passive   -         -             -             -                  Hip Scour Test   ++       Sl    ++       Sl    +           sl   Sl         Sl    Sl         sl Sl          Sl    Sl        sl   Sl       Sl    Sl         Sl    Sl         Sl     Sl         sl   Sl        Sl    Sl        Sl     -            -   Sl         sl   -           -   -          -   -            -                        Bakers Cyst       +         -     +        -    Sl        -   Sl         -     Sl        -   Sl        Sl   sl          sl   Sl         -   -           -   -          -   +           -   Sl      sl       Palpation:   Date 08/06/19 08/13/19 09/17/19 09/24/19 10/01/19 10/29/19 11/05/19 11/12/19 11/26/19 12/19/19 01/07/20 01/21/20 02/11/20 03/03/20 06/09/20 08/11/20      Right  Left Right  Left Right  Left Right  Left Right  Left Right  Left Right  Left Right  Left Right  Left Right  Left Right  Left Right  Left Right  Left Right Left Right Left Right Left Right  Left Right  Left   Piriformis   ++        ++   ++       ++   ++        ++   +         +    ++       +   ++         ++    ++        +   ++        sl   ++         Sl    +         +  +           +   +         sl   +         sl   ++        +    +       sl   -          -     Gr. Trochanter    +        -   ++          +   ++      +   ++         +     Sl        -   ++        +    +         Sl    ++         sl   ++         sl   +          sl   +         sl   +         sl   Sl        +   ++        +      +     sl   -            -     IT Band    Sl         Sl    ++        +   +         sl   Sl        Sl     +          Sl    ++         +    +         -    +         -   +           -   +         -   Sl         -   Tight    -   Sl         sl   +          sl    -            -     Quadratus Lumborum   ++         +   +         +   ++        +   +         Sl    ++        +   ++         +   +         ++     +         +   +         ++    +        +   +          +   +         + inst sl/ inst  sl   +          s;  +         Sl    -            -     Ischial Tuberosity    -         -   -          -    -        -   -            -     Sl       -   Sl         Sl     -          Sl      -         -   -           -   -          sl   -           -   -         -   -         -   +        -   +        Sl    -           -     Sacrococcygeal Ligs    +          +   +       +   +         sl    +         +     -          -   -          -   +         -   -           +   Sl        +   Sl        sl   Sl         Sl    Sl        Sl    -         -   Sl        _   Sl         sl   -             -     Paraspinals                     Spinous Processes   +           sl   ++        sl    +           -                       C-spine rotated to    C2-5 C2-6 C2-5   -             - C2-3  C2-4           C3-7  C2    C3-7       C2-4   C5-7 C2-6 C2-6 C3-6 C5-6 C2-6 C2-7 C2-5           T-spine rotated to  T4-6 T4-7 T3-8   T3-7  T4-6  T4-6 T4-5 T3-12  T3-7 T3-6 T5-9 T4-8 T4-8  T3-8 T1-4  T3-4            L-spine rotated to  L2-5 L2-5   L1-5  L3-5  L3-5  L2-5             L3-5 L2-5 L2-5  L2-5  L3-5 L4-5 L4-5  L3-5    -         - L1-5     Adductor Tony   +           +   ++      ++   +         ++   +        +   +           +   +           +   Sl        Sl    Sl        Sl    Sl       sl  sl         Sl   sl       Sl    -         -   -           -  -          -      Iliopsoas   ++          +   +          sl   +         sl   +          Sl    ++        +  ++        +   +         +   -          +   -         sl   Sl          +   +        +   Sl        Sl    +         +    Sl       sl   Sl        Sl      Quad Origin    -          -   -          -   -        -   -          -    -          -   -           -   -          -   -          -   -           -   -         -   -           -   -          -   -           -   -           -      SI Joint   +        +   ++        ++  ++        +   ++       +   +            +   ++         +   ++      +    +         sl   ++       +  ++        +   +          sl   +         Sl   ++        -    +          +   Sl        sl   Sl       Sl      Pubic Symphysis         +        +        +       +  not tested        ++           +          +         +          +          +       +       Sl         +         sl         +     Obturator externus   +         +   +         +   Sl       sl   -           -   -          -    -          -   -         -   Sl       Sl    Sl        sl   Sl         sl   Sl        Sl    Sl       Sl     Sl         sl   Sl       Sl   -           -     Rectus Diastasis                                          Ant/middle scalenes   +        sl   +         +   +         +   +          +   +        sl   +          +   +        Sl    +        sl   +         +   +        +   ++        +   +       +   +          +   ++        +   +        sl   Sl         -     Upper traps   +         Sl    +         +   +         +   +         sl   +        Sl    +          sl  sl         sl  +        sl   +        Sl     +        sl   +         sl   +       sl   +          sl  ++         +   +         Sl    Sl          -     Levator scap   +        Sl    +        +   +        sl   +         sl   +         sl    ++        sl   +          Sl   +         sl   +         s;   +         +   +          sl   +       sl   +          sl  ++         +   +         sl   Sl         -     sternocleidomastoid   -         -   -            -   -          -    -          -   -          -   -            -    -         -   -          -   -          -   -           -   -         -   -         -   -          -   -           -   -         -                            sternocostals   +          +   +          +   +        Sl   ++          +   +          +   +             +   Sl       Sl    +          +  +          sl   Sll        sl   +          sl   +        +   +         +   +          +   Sl         Sl    +         +               Muscle/Bone Irritation  Date 01/08/2019 02/07/19 03/21/19 03/28/19 04/04/18 04/11/19 04/18/19 04/25/19 05/02/19 05/07/19 05/14/19 06/05/19 06/11/19 06/20/19 06/25/19 07/09/19 07/16/19 07/23/19    Right  Left Right  Left Right  Left Right  Left Right  Left Right  Left Right  Left Right  Left Right  Left Right  Left Right  Left Right  Left Right  Left Right Left Right Left Right Left Right  Left Right  Left   Piriformis  ++        +   ++          +   ++       +   ++        +    ++        Sl    ++       Sl   ++       +    ++      +   ++        +   +           +    +        Sl      +         ++   +           ++    +       -   ++      ++   +          +   ++        +   ++         ++   Gr. Trochanter  +          +  +          sl    +           +    +       ++    +          -   +         Sl    +         sl   +         sl   Sl        Sl    +          +    Sl        sl    Sl         +   Sl          sl     +       -   +         +    +         +   +         sl   ++           +   IT Band  +         sl   Sl        sl     -          -   -          -    Sl         -   Sl         -   Sl        -   Sl         -  sl           -   Sl          Sl      -          -     -           -   -            -    Sl        Sl    Sl       Sl     Sl       sl   +          Sl     +          ++   Quadratus Lumborum  ++        ++   ++        +    +        ++   ++        ++     +       ++   +          +  ++        ++   Sl        Sl    +       ++   +          ++    Sl       +     +         +   +          ++     Sl       +   ++       +    ++       +    +         +    ++       +   Ischial Tuberosity  ++         sl   -          -    -           -     -           -     -          -   -          -   -          -   -          -   -          -  -            -     -           -   -          -   -             -     Sl       -   Sl        Sl     -          -     -         -    Sl        Sl    Sacrococcygeal Ligs  ++          +   Sl        sl   +          +    +       +    Sl        Sl    Sl       Sl    +         sl  ++      +  +        Sl       Sl         +   Sl         Sl    Sl         Sl      -        Sl   not  tested    +         +    Sl       Sl    +          +   Paraspinals  +           +     +         sl    +    +          -   Sl         +   +         sl   +        sl  +         -   Sl          +   Sl          +   +          sl   ++         sl   +          -   +         Sl    ++        +     +       Sl    +         sl   Spinous Processes                                        C-spine rotated to                           C3-5  C2-5 C2-6         T-spine rotated to   -               -              T8-9  T4-5    T3-8          L-spine rotated to  L2-5        L1-5  L4-5  L3-5  L2-5            L3-5  L1-5  L2-5  L2-5              L1-5           L2-5 L2-5   L1-5 L3-5 L3-5  L2-5 L4-5 L1-5   Adductor Tony    +        +       +         +     -         +   -           Sl     -        sl   +          sl   -          -   -           -   -          -   -           -   -          -   -           sl  ++        +   +         sl   +         +   +         ++   ++        +  +             +   Iliopsoas  ++         +   ++        +   ++         +    +         ++  sl        Sl    Sl       Sl    Sl       Sl    Sl         -   Sl        -   Sl         +   +         Sl    +          sl  +          ++   +          +   +          +   +         ++   ++        +   ++         +   Quad Origin  sl          sl   Sl          sl    -          -   -           -    -         -   -          -   -         -   -          -   -          -   -           -    -         -   -            -   -         -   -         -   -         -   -            -   -          -   -         -   SI Joint  ++         +  ++        +    ++        +   +          +   +         +   +          ++   +         sl    +         sl   +        +  +          sl  +        Sl     +        Sl   +          ++   Sl       sl   +        +    + +        +  ++          +    ++        ++   Pubic Symphysis         ++          Not tested         ++      ++        ++            + Not tested Not tested          +             ++         +          +   Obturator externus          -          +   -          -   -          -    -           -   -          -   -          -   -         -    -            -   -          -   -            -   Rectus Diastasis   1/2 finger                                         Ant/middle scalenes                   ++        +   ++       +   Upper traps                  ++         +   +          +   Levator scap                   ++          +     +           sternocleidomastoid                   Sl        Sl      -         -                        sternocostals   +        +   +          +    +         -    +           +   Sl        Sl    +         Sl  Not tested     +         +     +         +   +         +   Sl      sl   +          +    +        +   +          +   +          +   All 4s Positioning: Pt is able to assume full lumbar extension in this position  Leg Length:  Equal today        Monthly Objective Measurement/Functional  Activity  Date: 01/08/2019 03/21/19 04/25/19 06/05/19 07/09/19 09/17/19 10/29/19 11/26/19 01/07/20 02/11/20 06/09/20 08/11/20     Oswestry Pain Score 52/100  46/100    48/100      48/100       50/100 48/100 52/100   50/100     50/100     50/100     39/100                                       AROM Lumbar Spine: Degrees   Degrees      Degrees      Degrees      Degrees      Degrees Degrees Degrees Degrees Degrees Degrees Degrees      Degrees    Degrees      Flexion 106 pain          99          83       75 pain          63 onset of pain         69 pain increased   57 pain       25        53       42    68 onset of pain        71        Extension 3 pain           17           19       11 pain           11  Pinches       16 pain increased 12  instant pain in right SI jt         12 pain lumbar spine       9   pain      22 pain    19 tight         Right Lat. Flexion 29 pain right trunk          24        25      25    23 pinch       19 pinch  14 24    20     20      27         32        Left Lat. Flexion 31         19         23     14  15 pinch     11 pinch  18 19 18 easier        14        26         17        Right Lat Shift Pelvis inc pain   Inc pain Left SI jt       Slight increase No change but can't do far Feels caught  Increased pain  Sl inc pain No change in pain  No change in lumbar but inc hip Discomfort in right buttock  Slight pain piriformis Tweaks on left SI         Left Lat Shift Pelvis  less inc pain       No change       No change/ slight cielo  No change but can't go far  Easier but not natural  Increased pain  Sl  Inc pain No change in pain  No change lumbar but sl inc hip  Inc pain left buttock  Sl pain piriformis  Not as easy                       AROM Hips:  (degrees) Right      Left Right Left Right  Left Right  Left Right  Left Right  Left Right Left Right Left Right Left Right Left Right Left Right left Right  Left Right  Left      Flexion 110       110  125    120    128      125 130       130  130      130 125      120 128     120  125      120    125   122  120      125  125     125 128      122        Abduction 37       41   45      45      42      44  43          45   45       45  45          45 43        45  40         45  43         45   45        45   45        45  48       46        Int. Rotation 32       22  30      25      31         28  32          30   34        32   30         28 32        31  30          33  31         35  30         35  35         40  34         32        Ext. Rotation  34       42  35      40       38        41   35         40   40        42   35         37  38        39  35           40  36          40  35         40   40      43   48       45                      Flexibility:   (degrees) Right    Left Right  Left Right  Left Right  Left Right  Left Right  Left Right  left Right Left Right Left Right Left Right Left Right Left Right  Left Right  Left      Hamstrings -32       -28  -30     -25   -25       -25   -28      -27  -30      -26   -28       -29  -25       -25  -25       -20   -25      -21    -18     -15  -20    -17        Quadriceps 39       42 40        45    not tested  40          42  45        47   50         52  not tested   55         55  48        59    55        58   65        60   65      65        HipExt/Rot(piriformis) 30       28   32      30  33         32   35         33   37        35   35        35  38       36  35        40   29        37  35         40    40        42  43       42        Hip Int/Rotators 27       9   25      10   28        15   27         16   25         18   20        20  22      18  18        23   20       25   25        25   30        31   33      32        Hip Flexors (iliopsoas) -       -                   IT Band -       -                                 Reflexes: Right      Left Right  Left Right  Left Right  Left Right  Left Right  Left Right Left Right left Right left Right Left Right left Right Left Right  Left Right   Left      L4 (Quad) +1       +1                   S1 (Achilles) +1       +1                                 Root Level  - Motor Right      Left Right  Left Right  Left Right  Left Right  Left Right  Left Right Left Right Left Right Left Right Left Right Left Right Left Right  Left Right  Left     T12             Rectus Abdominus 4+/5     4+/5         5/5        5/5             5/5          5/5         5/5        5/5        L1              Paraspinals 5/5         5/5        5/5        5/5    5/5       5/5   5/5        5/5   5/5       5/5        L2              Hip Flexion 5/5          5/5         5/5        5/5    5/5        5/5  5/5         5/5  5/5        5/5        L3             Quads 5/5          5/5         5/5        5/5   5/5         5/5   5/5        5/5  5/5        5/5        L4              Anterior Tibialis 5/5         5/5       5/5         5/5   5/5         5/5   5/5        5/5  5/5        5/5        L5             Gr. Toe Extension 5/5           5/5         5/5        5/5   5/5        5/5   5/5        5/5  5/5        5/5        S1            Gastroc/Sol/Peroneals 5/5          5/5       5/5          5/5  5/5        5/5  5/5        5/5  5/5        5/5                       Functional Strength:                   Single LegStanceTime (seconds) R:30   L:30   R:      L: R:      L: R: 30 pain     L:30 pain  R:      L: R:      L: R:       L: R:       L: R:       L: R:       L: R: 30    L:30 R:       L:  R:      L: R:      L:     Pelvic Floor Isolation (seconds) -    10 sec 10 sec   > 10 sec   > 10 sec            Inner Unit  Isolation (seconds) -    10 sec 10 sec     > 10 sec   > 10 sec                          Heart Rate        (12/19/19)         Blood Pressure           81 bpm                    109/78                                           Functional Activities:        11/26/19            Sit w/o pain? Pain increases (minutes) No/ 30 min No/ 30  min No/ 30 min  No/  30 min No/ 30 min  No/ 30min  No/ 30 min  No/ 10-15 min No/ 10-15 min  No/ 15 min No/ 30 min Yes/ 10 min        Stand w/o pain? No/ 30 min No/ 30 min No/ 10 min  No/ 10 min No/ 10 min  No/ 15-20 min  No / 10 min No/10-15min, 1/4 mile No/ 10-15  No/ 15 min No/ 10-15 min Yes/ 10-15 min         Walk w/o pain? varies No/ 1 mile No/ 1/2 mile No/ 1 mile No/ 1mile, piriformis, B No/ a couple miles  No/ 1/2 mile No/ not much  No/ 1/2 mile  no/ 1/2 mile No/ 1/2 mile Yes/ 10--15        Steps w/o pain? 1 fl, avoids them 1 fl avoids  1 fl avoids  No/ avoids No/ doesn't do No/ 1 flight  No/ 1 fl  No/ 1 fl  No/ 1 fl   no/  1 fl No/ 1 fl Yes/ 1 fl         Drive w/o pain? No/ 10-15 min No/ 30-45 min No/ 30-45  Min  No/ 30-45 min No/ 35-45 No/ 35-45 No/ 30 min No/ 10 -15 min No/ 10-15 min    No/ 15 min No/ 30 min Yes/ 3 hrs   Min         Work w/o pain? No/ 30 min No/ 30 min No/ 30  No/ 30 min No/30 mi   No/ 30 min  No/ 30 min No/10-15 min No/ 10-15  no/ 15 min Has been off work Still off work         # times wakes w/ pain? 4-5x/night, now taking meds at night she is able to sleep.  2x   2-3x  2x         1x         Several times   several times several times The last few nights 0x, a few times over last month   0x     0-1x  0x          PLAN OF CARE:    ASSESSMENT:  Problem List:   1. SI joint dysfunction  2. Lack of spinal stabilization  3. Postural dysfunction     Discussion:  Tete has done very well over the last 8 weeks self managing her symptoms.  However, it must be noted that she has not been working and sitting at a desk all day.   I am concerned as she returns to sitting all day at the computer and encouraged her to get the sit stand desk as soon as possible.    Gait: she is walking with a slightly wider base of support. Instead of having axial rotation, both arms are held out slightly in abduction, the left > right, and she bends her elbows instead of moving each arm reciprocally with each leg.  The right LE stays in external rotation through the entire stance, push  off and heel strike phase.  There is slightly less stance time on the right.     She responded well to the manual work today, but has lost some spinal stabilization since last seen; this became evident when performing positional isometric contraction technique to the cervical spine, it tweaked her piriformis and caused pain.  Was able to get a better piriformis stretch when put wedge under the opposite lateral buttock to keep her from inappropriately engaging the piriformis to stabilize the trunk.     Discussed my concern for her lumbar disc if she has to push in labor.  Asked her to discuss this with her OB on her next visit.     She understands she will have some increased ligamentous laxity from the hormones in pregnancy and she is to be more attentive to her self corrections secondary to this.      She is in need of instruction in how to hold, lift, wash, change baby and how to carry baby in car seat and put it in the car with minimal stress on the lumbar spine.  Will incorporate this teaching into future visit.       She is in need of manual work to augment her home program once every 2-3 weeks as she is not in as much pain, currently, but anticipate this to change as her pregnancy progresses.         Short Term Goals: (4-6 weeks)                                   Date Met:                1.   pt independent in postural correction         02/07/19  2.   pt independent in SI joint self-corrections        03/21/19  3.   pt independent in exer to decrease post. disc pressures      03/21/19  4.   pt able to sleep through night without pain        02/11/20  5.   pt able to sit 15 minutes without pain  6.   pt able to walk 10 minutes without pain        06/20/19    7.   Reduce pt pain to no greater than 7/10        03/21/19  8.   Decrease Oswestry Pain Score to no greater than 45/100      08/11/20  9.  Reduce pt pain to no greater than 6/10        07/16/19  10.  Pt able to isolate the pelvic floor in supine x10, 10  "sec      03/28/19  11.  Pt able to isolate transverse abdom in all 4s, x10 sec, x10         03/28/19  12.  Pt able to isolate the multifidus in sitting x10 sec, x10                            03/28/19  13.  Pt able to engage inner unit, move from sit to stand &back to sit      04/04/19                                                                                                                                                                                                                                             14.  Pt able to engage inner unit and walk 4 steps without overflow to hamstrings   06/20/19  15.  Pt able to hip abduct x6 without engaging the piriformis      06/20/19     16.  Pt able to perform prone knee flexion without engaging piriformis x6          05/07/19       17.  Pt able to perform remedial lower abs with scissor arm work x6 w/o pain    04/18/19    18.  Pt able to perform \"Pre-cursor\" lower abs leg lift initiation x6 without pain          04/18/19   19.  Pt able to perform retro step without engaging piriformis x6, bilaterally    09/24/19    20.  Pt able to perform bridging x6 without engaging piriformis      04/25/19    21.  Pt able to perform \"scissors\" arm movement with inner unit w/o piriformis x6   05/02/19  22.  Pt able to perform PIC hip adductors without engaging the piriformis  x6    05/07/19  23.  Pt able to perform \"Scissors\" arm motion w/ inner unit with 1lb wts x6 w/o pain   05/07/19  24.  Pt able to perform prone glut max over stability ball x6 w/o engaging piriformis   25.  Pt able to reduce pain w/ self PIC to left psoas       05/14/19  26.  Pt able to move laterally on stability ball in sitting without pain x6     11/05/19  27.  Pt able to move A/P movement sitting on stability ball without pain x6    11/05/19  28.  Pt independent in pool exercises, to do without increasing pain     07/09/19  29.  Pt able to throw and catch stability ball in supine with inner unit on " x6    06/11/19  30.  Pt able to hold medicine ball in stance and move away from trunk with IU on and w/o pain x6 06/11/19  31.  Pt independent in supine hamstring stretch, able to do 2x without pain.    07/16/19  32.  Pt independent in hip adductor stretching, supine and stance     07/16/19  33.  Pt independent in quad stretching, in stance, without pain.      08/06/19  34. Pt independent in hip internal rotator stretch in supine, without pain      08/06/19  35.  Pt able to tolerate ADLs with leukotape on scapula for enhanced spinal stabilization x 1 day 07/25/19  36.  Pt able to perform prone walk out x6 without losing inner unit engagement    11/12/19  37. Pt able to perform prone arm lift x6, phase 1, without engaging the upper trap   08/06/19  38.  Pt able to perform supine obliques over stability ball x6 without losing inner unit engagement 08/06/19  39.  Pt able to perform prone arm lift, lifting elbow without engaging upper traps x6   11/12/19  40.  Pt able to perform upper quarter stretches without increasing pain     09/17/19  41.  Pt able to do adapted BKFO without engaging piriformis x6        42.  Pt able to do diaphragmatic breathing x6 without engaging piriformis       11/12/19    43.  Pt able to ride the [employer] x10 min without increasing pain  44.  Pt able to tolerate the inversion table, 20 sec down, 30 up x 6 cycles      45.  Pt independent in the use of TENS unit                                                                                                                                          Long Term Goals:(3-5 months)      Date Met:      1.  pt independent in self-management of symptoms   12/19/19 - for 4 weeks only       2.  pt independent in home program     12/19/19      3.  pt able to work 6 hours without pain      4.  pt able to sit 60 minutes without pain      5.  pt able to walk 45 min without pain      6.  Pt independent in use of inversion table      7.  Pt independent in  swimming program to be done without pain    Progress Towards Goals:  As expected to a little slower than expected    Treatment Plan:   1.  Ultrasound to increase extensibility, decrease pain, if needed  2.  Electrical stimulation for muscle relaxation, decrease pain, if needed, iontophoresis  3.  Manual therapy to increase function, decrease pain  4.  Therapeutic exercise to increase function, decrease pain  5.  Home programming and d/c planning to increase function and decrease pain,     Frequency & Duration:  Pt in need of more visits for neuro motor retraining and continued strengthening for the inner unit and instruction in adapted positioning with . Will see on a once/ every 3-4 wk basis for 4 more visits to complete the spinal stabilization instruction, meet remaining short and long term goals and be independent in her home program.      Patient Participated in and Agrees With This Plan of Care and Goals:  YES  Rehab Potential:  jarret Marcial, PT, MS  Physical Therapist  [place]# [id number]      TREATMENT TODAY:  Total Treatment Time: (time in clinic)   60  min  Timed Code Treatment Minutes:     60      Supplies given:      Manual Therapy:  (MA)  RX min:    60  Manual posterior rotation of right innominate    Positional Isometric contraction (PIC) of left iliopsoas    Positional Isometric contraction of (PIC) right gluteus minimus    Distraction of both SI jts in open pack hip position  Piriformis stretching, bilaterally    Quadratus lumborum stretching, bilaterally    Anterior/Inferior Mobilization of  right hip    Myofascial work trunk   Sternocostal and rib mobs   Manual spinal distraction  PIC C-spine  PIC T-spine  Stretching scalenes, upper traps, levator scap      Therapeutic Activities:  (TA)  RX min:      Neuromuscular Reeducation: (NMR)  RX min:       Ultrasound:  RX min:       1.6 vance/cm2       Location:      Iontophoresis:  6 hr patch                                Location:                            Ice:        ocedures:      Key:  i=instructed        r=review        c=corrected        a=adapted   Code Procedure/Instruction 10/29/19 11/05/19 11/12/19 11/26/19 12/19/19 01/07/20 01/21/20 02/11/20 03/03/20 06/09/20 08/11/20              TA         Sleeping Positions                         TA Sternum-Up Posture                         TA SI jt. self-correction     reviewed                    TA Lumbar Facet PIC                         TA   Order of Self-Corrections                         TA Use of lumbar pillow                         TA Use of cervical roll                         TA Use of orthotics                         TA Use of SI belt                         TA Use of Nada chair                         TA Use of Ice                         TA Use of [employer]/ heat                         TA Use of [employer] Relief                         TA Use of TENS unit                         TA Use of iontophoresis                         TA Decreasing Disc Pressures                         TA Use of sacro wedge                         TA Use of inversion table Used for 10 min  Used for 13 min  Used for 10 min  Used 14 min reviewed and used 15 min                    NMR Use of airdyne w/ IU on/off       instructed                  NMR Leukotaping upper quarter        applied                 NMR Pelvic Floor Isolation                         NMR Transverse Abdominus                         NMR Multifidus Isolation                         NMR Diaphragmtic breathe supine  reviewed                       NMR  Diaphragmtic breath sit                         NMR Pelvic Clock in sitting                         NMR Kinesiotape   On right scapula                      NMR InnerUnit against Gravity     reviewed                    NMR Sit-stand w/ inner unit                         NMR Roll w/ inner unit                         NMR Walk w/ inner unit                          NMR Up/down steps w/ inner unit                          NMR Water Exer Instruction    instructed                     NMR Hip Abd w/o piriformis                         NMR Hip Add w/o iliop/ham                          NMR Lower abs in supine                         NMR Inner unit challenge with scissor arm work                          NMR Abd obliques in supine  reviewed                       NMR Prone Knee Flexion                         NMR Supine glute w/ ball btwn knees                          NMR Prone glut amrita                         NMR Retro Step                         NMR Retro Walk                         NMR Retro walk on treadmill        instructed                 NMR Forward walk w/ inner unit  reviewed      reviewed                 NMR Stability ball multifidus bounce                         NMR Stability Ball A/P & Lateral  reviewed                       NMR Ball Catch/Throw /Supine                         NMR Ball movemt in /Stance                         NMR StabilityBall All 4's Arm Lift                         NMR StabilityBall Prone Walk Out                         NMR StabilityBall Supine Oblique                         NMR Stability Ball sit-supine-sit                         NMR Walking Prog Progression                         MNR Home program sequencing                                                   TA Hamstring stretch                         TA Hip Ext Rot Stretch                         TA Hip Int Rot Stretch                         TA Hip Flex/IT Stretch                         TA Hip Add Stretch                         TA Lats Dorsi Stretch                         TA Gastroc/soleus stretch                         TA QuadratusLumborm Stretch                            Supine                            Stance                         TA Quad Stretch                                                   NMR Wall Push Ups                         NMR Planking                         NMR BOSU Ball Exercises                          NMR Lateral Bridge Drop                         NMR [place]                         NMR O'Feldt Lat Pull Down                         NMR O'Feldt Hip Ext                         NMR O'Feldt Trunk Ext                                                   TA CervDiscExtSup/stnd    instructed/reviewed                     TA Cerv Stretches                         TA Self PIC Cervical Spine                         TA Neural Gliding                         TA Upper trap stretching                         TA Ant/Mid Scalene Strch                         TA Levator Scap strch                         TA Biceps stretch                         TA Rotator Cuff Stretch         instructed                TA Anterior Chest Stretch                         TA Wrist Ext Stretch                                                   NMR Prone Arm Lifts                         NMR Scapula Stabilization                         NMR Occulomotor Isometrics                         NMR Cervical Isometrics                                                   TA Shld AROM w/bar                         TA Shld Capsular Stretching                         TA Self PIC Thor Spine                         TA Rot Cuff Therabd, beginner         instructed                TA Rot Cuff Therabd, ruy Marcial, PT, MS  Physical Therapist  [place]# [id number]

## 2020-09-10 ENCOUNTER — TREATMENT (OUTPATIENT)
Dept: PHYSICAL THERAPY | Facility: CLINIC | Age: 35
End: 2020-09-10

## 2020-09-10 DIAGNOSIS — S39.012D STRAIN OF LUMBAR REGION, SUBSEQUENT ENCOUNTER: ICD-10-CM

## 2020-09-10 DIAGNOSIS — M35.7 FAMILIAL LIGAMENTOUS LAXITY: ICD-10-CM

## 2020-09-10 DIAGNOSIS — M53.3 SACROILIAC JOINT DYSFUNCTION: Primary | ICD-10-CM

## 2020-09-10 DIAGNOSIS — R29.3 POSTURE IMBALANCE: ICD-10-CM

## 2020-09-10 PROCEDURE — 97140 MANUAL THERAPY 1/> REGIONS: CPT | Performed by: PHYSICAL THERAPIST

## 2020-09-10 NOTE — PROGRESS NOTES
"Physical Therapy Re-Evaluation      SUBJECTIVE:   Changes since last seen:  \"My back is ok; had pain last week in right hip/buttock and  Thoracic spine then I pulled a muscle in my neck when sleeping last week.\"  \"I got a stand up desk this week!\"  She can already feel the improvement in her lower back.   She'll be moving to [place] in 2-3 months.  She is now 17 weeks pregnant and a little concerned with her back pain and how it will tolerate the pregnancy.     Current level of pain:    3/10  Low back/ right buttock        Neck pain   2/10   Lowest level of pain since last seen:  0/10                 0/10  Highest level of pain since last seen:   7/10            7/10  Last week      Past Medical History:  1.  AA 2013  Treated with PT for left shoulder labral tear, no low back treatment   2.  Hx of two falls when mopping in socks, over the last 2 years  3.  Hx of regular, heavy, cramping menses  4.  T& A, 2006  5.  Allergic to Wellbutrin and mild allergy to contrast dye  6.  Chronic yeast infections  7.  Uterine fibroids removed, 2 yrs ago       Functional Limitations:  Sitting, standing, walking, stairs, driving, working, sleeping, squatting, housework and changing positions.   Patient Goals for Physical Therapy: \"Pain relief\"      OBJECTIVE:  Observation:     No lateral shift of pelvis      08/06/19 08/13/19 09/17/19 09/24/19 10/01/19 10/29/19 11/05/19 11/12/19 11/26/19 12/19/19 01/07/20 01/21/20 02/11/20 03/03/20 06/09/20 08/11/20 09/10/20    SI Joint Positioning  Right  Left Right  Left Right  Left Right  Left Right  Left Right  Left Right  Left Right  Left Right  Left Right  Left Right  Left Right  Left Right  Left Right Left Right Left Right Left Right  Left Right  Left       Innominate                            Anterior    x               x     x               x   X               x               x              sl               x                x                x Sl                sl     x sl   sl            "    sl           Posterior               x     x                x    x               x   x    x   sl    x   x   x              sl    sl                x                Sl              sl   sl       Sacrum                                    Unilateral rotation    x      x    x   x   x   x   x    x     x   x    x   x     sl   x    x   x   x                            SI Joint Testing  Right  Left Right  Left Right  Left Right  Left Right  Left Right  Left Right  Left Right  Left Right  Left Right  Left Right  Left Right  Left Right  Left Right Left Right Left Right Left Right  Left Right  Left           Distraction   +          +   +           +   +         +   +           +   +           +   +           +     +          +    +           +   +          +   +           +  +          +     +         +    +          +   +          +   +         +   +           +   +         +            Joanna's   +          +   +         +   +          +   +         +   -           +                        Compression   Sl        Sl    Sl         Sl    -          -      -          -                        Prone Press Up   -          -   -           -   -            -   -          -   Sl        Sl                                      Special Tests  Right   Left Right  Left Right  Left Right  Left Right  Left Right  Left Right Left  Right  Left Right  Left Right  Left Right  Left Right  Left Right  Left Right Left Right Left Right Left Right Left  Right  Left      Straight Leg Raise                             Active   -            -    -         -    -           -     -         -   -        -                    Passive   -           -    -          -   -          -     -        -   -          -                Hip Scour Test   sl           sl   Sl         Sl    +         sl    Sl        Sl    Sl       sl   Sl          sl   Sl         Sl    Sl        sl   Sl         sl   Sl         sl   +        sl   Sl       Sl   sl         sl    +           +   +        sl   +        sl   +         sl                          Bakers Cyst   -            -     +          -     Sl        -   -           -   -         -   +         -   ++       -   +          sl   Sl          -  sl          -   +         -   -          -    Sl       sl   Sl         -   -           -          01/08/2019 02/07/19 03/21/19 03/28/19 04/04/19 04/11/19 04/18/19 04/25/19 05/02/19 05/07/19 05/14/19 06/05/19 06/11/19 06/20/19 06/25/19 07/09/19 07/16/19 07/23/19   SI Joint Positioning  Right  Left Right  Left Right  Left Right  Left Right  Left Right  Left Right  Left Right  Left Right  Left Right  Left Right  Left Right  Left Right  Left Right Left Right Left Right Left Right  Left Right  Left       Innominate                            Anterior    x   x    x  x                x                 x              X     x    x   x   x               x   X                    sl    x                x               x                x          Posterior                x               x        x              x     x     x    x               X                x               x              x                x               x   sl               x    x    x   x      Sacrum                               Unilateral rotation    x   x   x    x    x     x    x    x   x   x    x    x   x   sl   x    x     x                           SI Joint Testing  Right  Left Right  Left Right  Left Right  Left Right  Left Right  Left Right  Left Right  Left Right  Left Right  Left Right  Left Right  Left Right  Left Right Left Right Left Right Left Right  Left Right  Left           Distraction     +          +   +         +   +          +    +        +  +           +   +          +   +          +   +          +     +        +   +         +   +         +   +          +   +           + +           +   +          +    +          +   +          +   +           +           Joanna's     +          +   Sl         sl    +         -    Sl          -   Sl         -   Sl         -   -           -                      Compression     +          +   Sl          sl   Sl          Sl     Sl        Sl    -          -   -         -   -           -                      Prone Press Up           +         +         Sl          -         -        -         -                                   Special Tests  Right   Left Right  Left Right  Left Right  Left Right  Left Right  Left Right Left  Right  Left Right  Left Right  Left Right  Left Right  Left Right  Left Right Left Right Left Right Left Right Left  Right  Left      Straight Leg Raise                                     Active   -         -          -           -                      Passive   -         -             -             -                  Hip Scour Test   ++       Sl    ++       Sl    +           sl   Sl         Sl    Sl         sl Sl          Sl    Sl        sl   Sl       Sl    Sl         Sl    Sl         Sl     Sl         sl   Sl        Sl    Sl        Sl     -            -   Sl         sl   -           -   -          -   -            -                        Bakers Cyst       +         -     +        -    Sl        -   Sl         -     Sl        -   Sl        Sl   sl          sl   Sl         -   -           -   -          -   +           -   Sl      sl       Palpation:   Date 08/06/19 08/13/19 09/17/19 09/24/19 10/01/19 10/29/19 11/05/19 11/12/19 11/26/19 12/19/19 01/07/20 01/21/20 02/11/20 03/03/20 06/09/20 08/11/20 09/10/20     Right  Left Right  Left Right  Left Right  Left Right  Left Right  Left Right  Left Right  Left Right  Left Right  Left Right  Left Right  Left Right  Left Right Left Right Left Right Left Right  Left Right  Left   Piriformis   ++        ++   ++       ++   ++        ++   +         +    ++       +   ++        ++    ++        +   ++        sl   ++         Sl    +         +  +           +   +         sl   +         sl   ++        +    +       sl   -          -   +         sl     Gr. Trochanter    +        -   ++          +   ++      +   ++         +     Sl        -   ++        +    +         Sl    ++         sl   ++         sl   +          sl   +         sl   +         sl   Sl        +   ++        +      +     sl   -            -  sl           -    IT Band    Sl         Sl    ++        +   +         sl   Sl        Sl     +          Sl    ++         +    +         -    +         -   +           -   +         -   Sl         -   Tight    -   Sl         sl   +          sl    -            -   -           -    Quadratus Lumborum   ++         +   +         +   ++        +   +         Sl    ++        +   ++         +   +         ++     +         +   +         ++    +        +   +          +   +         + inst sl/ inst  sl   +          s;  +         Sl    -            -   Sl         sl    Ischial Tuberosity    -         -   -          -    -        -   -            -     Sl       -   Sl         Sl     -          Sl      -         -   -           -   -          sl   -           -   -         -   -         -   +        -   +        Sl    -           -   -         -    Sacrococcygeal Ligs    +          +   +       +   +         sl    +         +     -          -   -          -   +         -   -           +   Sl        +   Sl        sl   Sl         Sl    Sl        Sl    -         -   Sl        _   Sl         sl   -             -   -          -    Paraspinals                     Spinous Processes   +           sl   ++        sl    +           -                       C-spine rotated to    C2-5 C2-6 C2-5   -             - C2-3  C2-4           C3-7  C2    C3-7       C2-4   C5-7 C2-6 C2-6 C3-6 C5-6 C2-6 C2-7 C2-5 C2-6          T-spine rotated to  T4-6 T4-7 T3-8   T3-7  T4-6  T4-6 T4-5 T3-12  T3-7 T3-6 T5-9 T4-8 T4-8  T3-8 T1-4  T3-4 T3-6           L-spine rotated to  L2-5 L2-5   L1-5  L3-5  L3-5  L2-5             L3-5 L2-5 L2-5  L2-5 L3-5 L4-5 L4-5  L3-5    -         - L1-5 L3          L1    Adductor  Tony   +           +   ++      ++   +         ++   +        +   +           +   +           +   Sl        Sl    Sl        Sl    Sl       sl  sl         Sl   sl       Sl    -         -   -           -  -          -    -          -    Iliopsoas   ++          +   +          sl   +         sl   +          Sl    ++        +  ++        +   +         +   -          +   -         sl   Sl          +   +        +   Sl        Sl    +         +    Sl       sl   Sl        Sl  Sl          sl    Quad Origin    -          -   -          -   -        -   -          -    -          -   -           -   -          -   -          -   -           -   -         -   -           -   -          -   -           -   -           -    -           -    SI Joint   +        +   ++        ++  ++        +   ++       +   +            +   ++         +   ++      +    +         sl   ++       +  ++        +   +          sl   +         Sl   ++        -    +          +   Sl        sl   Sl       Sl     +         -    Pubic Symphysis         +        +        +       +  not tested        ++           +          +         +          +          +       +       Sl         +         sl         +           +    Obturator externus   +         +   +         +   Sl       sl   -           -   -          -    -          -   -         -   Sl       Sl    Sl        sl   Sl         sl   Sl        Sl    Sl       Sl     Sl         sl   Sl       Sl   -           -   -          -    Rectus Diastasis                                          Ant/middle scalenes   +        sl   +         +   +         +   +          +   +        sl   +          +   +        Sl    +        sl   +         +   +        +   ++        +   +       +   +          +   ++        +   +        sl   Sl         -   +           sl    Upper traps   +         Sl    +         +   +         +   +         sl   +        Sl    +          sl  sl         sl  +        sl   +        Sl     +        sl   +         sl    +       sl   +          sl  ++         +   +        Sl    Sl          -   +          sl    Levator scap   +        Sl    +        +   +        sl   +         sl   +         sl    ++        sl   +          Sl   +         sl   +         s;   +         +   +          sl   +       sl   +          sl  ++         +   +         sl   Sl         -   +          sl    sternocleidomastoid   -         -   -            -   -          -    -          -   -          -   -            -    -         -   -          -   -          -   -           -   -         -   -         -   -          -   -           -   -         -                            sternocostals   +          +   +          +   +        Sl   ++          +   +          +   +             +   Sl       Sl    +          +  +          sl   Sll        sl   +          sl   +        +   +         +   +          +   Sl         Sl    +         +  ++         ++              Muscle/Bone Irritation  Date 01/08/2019 02/07/19 03/21/19 03/28/19 04/04/18 04/11/19 04/18/19 04/25/19 05/02/19 05/07/19 05/14/19 06/05/19 06/11/19 06/20/19 06/25/19 07/09/19 07/16/19 07/23/19    Right  Left Right  Left Right  Left Right  Left Right  Left Right  Left Right  Left Right  Left Right  Left Right  Left Right  Left Right  Left Right  Left Right Left Right Left Right Left Right  Left Right  Left   Piriformis  ++        +   ++          +   ++       +   ++        +    ++        Sl    ++       Sl   ++       +    ++      +   ++        +   +           +    +        Sl      +         ++   +           ++    +       -   ++      ++   +          +   ++        +   ++         ++   Gr. Trochanter  +          +  +          sl    +           +    +       ++    +          -   +         Sl    +         sl   +         sl   Sl        Sl    +          +    Sl        sl    Sl         +   Sl          sl     +       -   +         +    +         +   +         sl   ++           +   IT Band  +         sl   Sl        sl     -           -   -          -    Sl         -   Sl         -   Sl        -   Sl         -  sl          -   Sl          Sl      -          -     -           -   -            -    Sl        Sl    Sl       Sl     Sl       sl   +          Sl     +          ++   Quadratus Lumborum  ++        ++   ++        +    +        ++   ++        ++     +       ++   +          +  ++        ++   Sl        Sl    +       ++   +          ++    Sl       +     +         +   +          ++     Sl       +   ++       +    ++       +    +         +    ++       +   Ischial Tuberosity  ++         sl   -          -    -           -     -           -     -          -   -          -   -          -   -          -   -          -  -            -     -           -   -          -   -             -     Sl       -   Sl        Sl     -          -     -         -    Sl        Sl    Sacrococcygeal Ligs  ++          +   Sl        sl   +          +    +       +    Sl        Sl    Sl       Sl    +         sl  ++      +  +        Sl       Sl         +   Sl         Sl    Sl         Sl      -        Sl   not  tested    +         +    Sl       Sl    +          +   Paraspinals  +           +     +         sl    +    +          -   Sl         +   +         sl   +        sl  +         -   Sl          +   Sl          +   +          sl   ++         sl   +          -   +         Sl    ++        +     +       Sl    +         sl   Spinous Processes                                        C-spine rotated to                           C3-5  C2-5 C2-6         T-spine rotated to   -               -              T8-9  T4-5    T3-8          L-spine rotated to  L2-5        L1-5  L4-5  L3-5  L2-5            L3-5  L1-5  L2-5  L2-5              L1-5           L2-5 L2-5   L1-5 L3-5 L3-5  L2-5 L4-5 L1-5   Adductor Tony    +        +       +         +     -         +   -           Sl     -        sl   +          sl   -          -   -           -   -          -   -           -   -          -   -            sl  ++        +   +         sl   +         +   +         ++   ++        +  +            +   Iliopsoas  ++         +   ++        +   ++         +    +         ++  sl        Sl    Sl       Sl    Sl       Sl    Sl         -   Sl        -   Sl         +   +         Sl    +          sl  +          ++   +          +   +          +   +         ++   ++        +   ++         +   Quad Origin  sl          sl   Sl          sl    -          -   -           -    -         -   -          -   -         -   -          -   -          -   -           -    -         -   -            -   -         -   -         -   -         -   -            -   -          -   -         -   SI Joint  ++         +  ++        +    ++        +   +          +   +         +   +          ++   +         sl    +         sl   +        +  +          sl  +        Sl     +        Sl   +          ++   Sl       sl   +        +    + +        +  ++          +    ++        ++   Pubic Symphysis         ++          Not tested         ++      ++        ++            + Not tested Not tested          +             ++         +          +   Obturator externus          -          +   -          -   -          -    -           -   -          -   -          -   -         -    -            -   -          -   -            -   Rectus Diastasis   1/2 finger                                         Ant/middle scalenes                   ++        +   ++       +   Upper traps                  ++         +   +          +   Levator scap                   ++          +     +           sternocleidomastoid                   Sl        Sl      -         -                        sternocostals   +        +   +          +    +         -    +           +   Sl        Sl    +         Sl  Not tested     +         +     +         +   +         +   Sl      sl   +          +    +        +   +          +   +          +   All 4s Positioning: Pt is able to assume full lumbar extension in this  position  Leg Length:  Equal today        Monthly Objective Measurement/Functional Activity  Date: 01/08/2019 03/21/19 04/25/19 06/05/19 07/09/19 09/17/19 10/29/19 11/26/19 01/07/20 02/11/20 06/09/20 08/11/20 09/10/20    Oswestry Pain Score 52/100  46/100    48/100      48/100       50/100 48/100 52/100   50/100     50/100     50/100     39/100   46/100                                      AROM Lumbar Spine: Degrees   Degrees      Degrees      Degrees      Degrees      Degrees Degrees Degrees Degrees Degrees Degrees Degrees      Degrees    Degrees      Flexion 106 pain          99          83       75 pain          63 onset of pain         69 pain increased   57 pain       25        53       42    68 onset of pain        71      58       Extension 3 pain           17           19       11 pain           11  Pinches       16 pain increased 12  instant pain in right SI jt         12 pain lumbar spine       9   pain      22 pain    19 tight      24 stiff       Right Lat. Flexion 29 pain right trunk          24        25      25    23 pinch       19 pinch  14 24    20     20      27         32      31       Left Lat. Flexion 31         19         23     14  15 pinch     11 pinch  18 19 18 easier        14        26         17    25       Right Lat Shift Pelvis inc pain   Inc pain Left SI jt       Slight increase No change but can't do far Feels caught  Increased pain  Sl inc pain No change in pain  No change in lumbar but inc hip Discomfort in right buttock  Slight pain piriformis Tweaks on left SI    Slight pain        Left Lat Shift Pelvis  less inc pain       No change       No change/ slight cielo  No change but can't go far  Easier but not natural  Increased pain  Sl  Inc pain No change in pain  No change lumbar but sl inc hip  Inc pain left buttock  Sl pain piriformis  Not as easy   slight pain                     AROM Hips:  (degrees) Right      Left Right Left Right  Left Right  Left Right  Left Right  Left Right  Left Right Left Right Left Right Left Right Left Right left Right  Left Right  Left      Flexion 110       110  125    120    128      125 130       130 130      130 125      120 128     120  125      120    125   122  120      125  125     125 128      122 122       127       Abduction 37       41   45      45      42      44  43          45   45       45  45          45 43        45  40         45  43         45   45        45   45        45  48       46   45        45       Int. Rotation 32       22  30      25      31         28  32          30   34        32   30         28 32        31  30          33  31         35  30         35  35         40  34         32  35        30       Ext. Rotation  34       42  35      40       38        41   35         40   40        42   35         37  38        39  35           40  36          40  35         40   40      43   48       45  45        43                     Flexibility:   (degrees) Right    Left Right  Left Right  Left Right  Left Right  Left Right  Left Right  left Right Left Right Left Right Left Right Left Right Left Right  Left Right  Left      Hamstrings -32       -28  -30     -25   -25       -25   -28      -27  -30      -26   -28       -29  -25       -25  -25       -20   -25      -21    -18     -15  -20    -17  -22     -20       Quadriceps 39       42 40        45    not tested  40          42  45        47   50         52  not tested   55         55  48        59    55        58   65        60   65      65   65      68       HipExt/Rot(piriformis) 30       28   32      30  33         32   35         33   37        35   35        35  38       36  35        40   29        37  35         40    40        42  43       42  45         40       Hip Int/Rotators 27       9   25      10   28        15   27         16   25         18   20        20  22      18  18        23   20       25   25        25   30        31   33      32  30         29       Hip Flexors  (iliopsoas) -       -                   IT Band -       -                                 Reflexes: Right      Left Right  Left Right  Left Right  Left Right  Left Right  Left Right Left Right left Right left Right Left Right left Right Left Right  Left Right  Left      L4 (Quad) +1       +1                   S1 (Achilles) +1       +1                                 Root Level  - Motor Right      Left Right  Left Right  Left Right  Left Right  Left Right  Left Right Left Right Left Right Left Right Left Right Left Right Left Right  Left Right  Left     T12             Rectus Abdominus 4+/5     4+/5         5/5        5/5             5/5          5/5         5/5        5/5         5/5       L1              Paraspinals 5/5         5/5        5/5        5/5    5/5       5/5   5/5        5/5   5/5       5/5   5/5       5/5       L2              Hip Flexion 5/5          5/5         5/5        5/5    5/5        5/5  5/5         5/5  5/5        5/5   5/5       5/5       L3             Quads 5/5          5/5         5/5        5/5   5/5         5/5   5/5        5/5  5/5        5/5  5/5       5/5       L4              Anterior Tibialis 5/5         5/5       5/5         5/5   5/5         5/5   5/5        5/5  5/5        5/5  5/5        5/5       L5             Gr. Toe Extension 5/5           5/5         5/5        5/5   5/5        5/5   5/5        5/5  5/5        5/5  5/5        5/5       S1            Gastroc/Sol/Peroneals 5/5          5/5       5/5          5/5  5/5        5/5  5/5        5/5  5/5        5/5   5/5         5/5                     Functional Strength:                   Single LegStanceTime (seconds) R:30   L:30   R:      L: R:      L: R: 30 pain     L:30 pain  R:      L: R:      L: R:       L: R:       L: R:       L: R:       L: R: 30    L:30 R:       L:  R:      L: R:      L:     Pelvic Floor Isolation (seconds) -    10 sec 10 sec   > 10 sec   > 10 sec            Inner Unit  Isolation (seconds) -    10 sec  10 sec     > 10 sec   > 10 sec                          Heart Rate        (12/19/19)         Blood Pressure           81 bpm                    109/78                                           Functional Activities:        11/26/19            Sit w/o pain? Pain increases (minutes) No/ 30 min No/ 30  min No/ 30 min  No/  30 min No/ 30 min  No/ 30min  No/ 30 min No/ 10-15 min No/ 10-15 min  No/ 15 min No/ 30 min Yes/ 10 min Yes/ 10 min       Stand w/o pain? No/ 30 min No/ 30 min No/ 10 min  No/ 10 min No/ 10 min  No/ 15-20 min  No / 10 min No/10-15min, 1/4 mile No/ 10-15  No/ 15 min No/ 10-15 min Yes/ 10-15 min  Yes/ 30 min w/ support       Walk w/o pain? varies No/ 1 mile No/ 1/2 mile No/ 1 mile No/ 1mile, piriformis, B No/ a couple miles  No/ 1/2 mile No/ not much  No/ 1/2 mile  no/ 1/2 mile No/ 1/2 mile Yes/ 10--15  yes/ 1/2 mile       Steps w/o pain? 1 fl, avoids them 1 fl avoids  1 fl avoids  No/ avoids No/ doesn't do No/ 1 flight  No/ 1 fl  No/ 1 fl  No/ 1 fl   no/  1 fl No/ 1 fl Yes/ 1 fl   yes/ 1 fl       Drive w/o pain? No/ 10-15 min No/ 30-45 min No/ 30-45  Min  No/ 30-45 min No/ 35-45 No/ 35-45 No/ 30 min No/ 10 -15 min No/ 10-15 min    No/ 15 min No/ 30 min Yes/ 3 hrs     yes/ 2 hrs       Work w/o pain? No/ 30 min No/ 30 min No/ 30  No/ 30 min No/30 mi   No/ 30 min  No/ 30 min No/10-15 min No/ 10-15  no/ 15 min Has been off work Still off work   yes/ 10 min       # times wakes w/ pain? 4-5x/night, now taking meds at night she is able to sleep.  2x   2-3x  2x         1x         Several times   several times several times The last few nights 0x, a few times over last month   0x     0-1x  0x  x1        PLAN OF CARE:    ASSESSMENT:  Problem List:   1. SI joint dysfunction  2. Lack of spinal stabilization  3. Postural dysfunction     Discussion:  So pleased Tete has gotten a sit/stand desk for use in her home office.  She is using this correctly and can already see an improvement in her pain at the end of the  day.  She does need a better chair.  Gave her info on how to find a good ergonomic chair.  She will pursue this over the next few weeks.     Her Oswestry Pain Score has increased from 39/100 to 46/100 now that she has returned to full time work.  Hopeful that this will improve over the next 4 weeks as she has only had the sit stand desk for 1 week.      Spendt the entire visit on manual work for pain relief.  It took a little more manual work to correct her SI joint positioning and the cervical strain with C2-6 restricted in right rotation.  It may be possible that the pregnancy hormones increasing some ligamentous laxity are contributing to her pain symptoms today.      Hope to instruct in how to hold, lift, wash and change baby with minimal stress on the lumbar spine on next visit.     She is in need of manual work to augment her home program once every 2-3 weeks as she is not in as much pain, currently, but anticipate this to change as her pregnancy progresses.         Short Term Goals: (4-6 weeks)                                   Date Met:                1.   pt independent in postural correction         02/07/19  2.   pt independent in SI joint self-corrections        03/21/19  3.   pt independent in exer to decrease post. disc pressures      03/21/19  4.   pt able to sleep through night without pain        02/11/20  5.   pt able to sit 15 minutes without pain  6.   pt able to walk 10 minutes without pain        06/20/19    7.   Reduce pt pain to no greater than 7/10        03/21/19  8.   Decrease Oswestry Pain Score to no greater than 45/100      08/11/20  9.  Reduce pt pain to no greater than 6/10        07/16/19  10.  Pt able to isolate the pelvic floor in supine x10, 10 sec      03/28/19  11.  Pt able to isolate transverse abdom in all 4s, x10 sec, x10         03/28/19  12.  Pt able to isolate the multifidus in sitting x10 sec, x10                            03/28/19  13.  Pt able to engage inner unit,  "move from sit to stand &back to sit      04/04/19                                                                                                                                                                                                                                             14.  Pt able to engage inner unit and walk 4 steps without overflow to hamstrings   06/20/19  15.  Pt able to hip abduct x6 without engaging the piriformis      06/20/19     16.  Pt able to perform prone knee flexion without engaging piriformis x6          05/07/19       17.  Pt able to perform remedial lower abs with scissor arm work x6 w/o pain    04/18/19    18.  Pt able to perform \"Pre-cursor\" lower abs leg lift initiation x6 without pain          04/18/19   19.  Pt able to perform retro step without engaging piriformis x6, bilaterally    09/24/19    20.  Pt able to perform bridging x6 without engaging piriformis      04/25/19    21.  Pt able to perform \"scissors\" arm movement with inner unit w/o piriformis x6   05/02/19  22.  Pt able to perform PIC hip adductors without engaging the piriformis  x6    05/07/19  23.  Pt able to perform \"Scissors\" arm motion w/ inner unit with 1lb wts x6 w/o pain   05/07/19  24.  Pt able to perform prone glut max over stability ball x6 w/o engaging piriformis   25.  Pt able to reduce pain w/ self PIC to left psoas       05/14/19  26.  Pt able to move laterally on stability ball in sitting without pain x6     11/05/19  27.  Pt able to move A/P movement sitting on stability ball without pain x6    11/05/19  28.  Pt independent in pool exercises, to do without increasing pain     07/09/19  29.  Pt able to throw and catch stability ball in supine with inner unit on x6    06/11/19  30.  Pt able to hold medicine ball in stance and move away from trunk with IU on and w/o pain x6 06/11/19  31.  Pt independent in supine hamstring stretch, able to do 2x without pain.    07/16/19  32.  Pt independent in hip " adductor stretching, supine and stance     07/16/19  33.  Pt independent in quad stretching, in stance, without pain.      08/06/19  34. Pt independent in hip internal rotator stretch in supine, without pain      08/06/19  35.  Pt able to tolerate ADLs with leukotape on scapula for enhanced spinal stabilization x 1 day 07/25/19  36.  Pt able to perform prone walk out x6 without losing inner unit engagement    11/12/19  37. Pt able to perform prone arm lift x6, phase 1, without engaging the upper trap   08/06/19  38.  Pt able to perform supine obliques over stability ball x6 without losing inner unit engagement 08/06/19  39.  Pt able to perform prone arm lift, lifting elbow without engaging upper traps x6   11/12/19  40.  Pt able to perform upper quarter stretches without increasing pain     09/17/19  41.  Pt able to do adapted BKFO without engaging piriformis x6        42.  Pt able to do diaphragmatic breathing x6 without engaging piriformis       11/12/19    43.  Pt able to ride the [employer] x10 min without increasing pain  44.  Pt able to tolerate the inversion table, 20 sec down, 30 up x 6 cycles    06/09/20     45.  Pt independent in the use of TENS unit                                                                                                                                          Long Term Goals:(3-5 months)      Date Met:      1.  pt independent in self-management of symptoms   12/19/19 - for 4 weeks only       2.  pt independent in home program     12/19/19      3.  pt able to work 6 hours without pain      4.  pt able to sit 60 minutes without pain      5.  pt able to walk 45 min without pain      6.  Pt independent in use of inversion table      7.  Pt independent in swimming program to be done without pain    Progress Towards Goals:  As expected to a little slower than expected    Treatment Plan:   1.  Ultrasound to increase extensibility, decrease pain, if needed  2.  Electrical  stimulation for muscle relaxation, decrease pain, if needed, iontophoresis  3.  Manual therapy to increase function, decrease pain  4.  Therapeutic exercise to increase function, decrease pain  5.  Home programming and d/c planning to increase function and decrease pain,     Frequency & Duration:  Pt in need of more visits for neuro motor retraining and continued strengthening for the inner unit and instruction in adapted positioning with . Will see on a once/ every 3-4 wk basis for 3 more visits to complete the spinal stabilization instruction, meet remaining short and long term goals and be independent in her home program.      Patient Participated in and Agrees With This Plan of Care and Goals:  YES  Rehab Potential:  jarret Marcial, PT, MS  Physical Therapist  [place]# [id number]      TREATMENT TODAY:  Total Treatment Time: (time in clinic)   60  min  Timed Code Treatment Minutes:     60      Supplies given:      Manual Therapy:  (MA)  RX min:    55  Manual posterior rotation of left innominate    Positional Isometric contraction (PIC) of right iliopsoas    Positional Isometric contraction of (PIC) right gluteus minimus    Distraction of both SI jts in open pack hip position  Piriformis stretching, bilaterally    Quadratus lumborum stretching, bilaterally    Anterior/Inferior Mobilization of  right hip    Myofascial work trunk   Sternocostal and rib mobs   Manual spinal distraction  PIC C-spine  PIC T-spine  Stretching scalenes, upper traps, levator scap      Therapeutic Activities:  (TA)  RX min:  5    Neuromuscular Reeducation: (NMR)  RX min:       Ultrasound:  RX min:       1.6 vance/cm2       Location:      Iontophoresis:  6 hr patch                                Location:                           Ice:        ocedures:      Key:  i=instructed        r=review        c=corrected        a=adapted   Code Procedure/Instruction 10/29/19 11/05/19 11/12/19 11/26/19 12/19/19 01/07/20 01/21/20 02/11/20  03/03/20 06/09/20 08/11/20 09/10/20             TA         Sleeping Positions                         TA Sternum-Up Posture                         TA SI jt. self-correction     reviewed                    TA Lumbar Facet PIC                         TA   Order of Self-Corrections                         TA Use of lumbar pillow                         TA Use of cervical roll                         TA Use of orthotics                         TA Use of SI belt                         TA Use of Nada chair                         TA Use of Ice                         TA Use of [employer]/ heat                         TA Use of [employer] Relief                         TA Use of TENS unit                         TA Use of iontophoresis                         TA Decreasing Disc Pressures                         TA Use of sacro wedge                         TA Use of inversion table Used for 10 min  Used for 13 min  Used for 10 min  Used 14 min reviewed and used 15 min                    TA Use of sit/stand desk            instructed             NMR Use of airdyne w/ IU on/off       instructed                  NMR Leukotaping upper quarter        applied                 NMR Pelvic Floor Isolation                         NMR Transverse Abdominus                         NMR Multifidus Isolation                         NMR Diaphragmtic breathe supine  reviewed                       NMR  Diaphragmtic breath sit                         NMR Pelvic Clock in sitting                         NMR Kinesiotape   On right scapula                      NMR InnerUnit against Gravity     reviewed                    NMR Sit-stand w/ inner unit                         NMR Roll w/ inner unit                         NMR Walk w/ inner unit                          NMR Up/down steps w/ inner unit                         NMR Water Exer Instruction    instructed                     NMR Hip Abd w/o piriformis                         NMR Hip Add w/o  iliop/ham                          NMR Lower abs in supine                         NMR Inner unit challenge with scissor arm work                          NMR Abd obliques in supine  reviewed                       NMR Prone Knee Flexion                         NMR Supine glute w/ ball btwn knees                          NMR Prone glut amrita                         NMR Retro Step                         NMR Retro Walk                         NMR Retro walk on treadmill        instructed                 NMR Forward walk w/ inner unit  reviewed      reviewed                 NMR Stability ball multifidus bounce                         NMR Stability Ball A/P & Lateral  reviewed                       NMR Ball Catch/Throw /Supine                         NMR Ball movemt in /Stance                         NMR StabilityBall All 4's Arm Lift                         NMR StabilityBall Prone Walk Out                         NMR StabilityBall Supine Oblique                         NMR Stability Ball sit-supine-sit                         NMR Walking Prog Progression                         MNR Home program sequencing                                                   TA Hamstring stretch                         TA Hip Ext Rot Stretch                         TA Hip Int Rot Stretch                         TA Hip Flex/IT Stretch                         TA Hip Add Stretch                         TA Lats Dorsi Stretch                         TA Gastroc/soleus stretch                         TA QuadratusLumborm Stretch                            Supine                            Stance                         TA Quad Stretch                                                   NMR Wall Push Ups                         NMR Planking                         NMR BOSU Ball Exercises                         NMR Lateral Bridge Drop                         NMR [place]                         NMR O'Feldt Lat Pull Down                          NMR O'Feldt Hip Ext                         NMR O'Feldt Trunk Ext                                                   TA CervDiscExtSup/stnd    instructed/reviewed                     TA Cerv Stretches                         TA Self PIC Cervical Spine                         TA Neural Gliding                         TA Upper trap stretching                         TA Ant/Mid Scalene Strch                         TA Levator Scap strch                         TA Biceps stretch                         TA Rotator Cuff Stretch         instructed                TA Anterior Chest Stretch                         TA Wrist Ext Stretch                                                   NMR Prone Arm Lifts                         NMR Scapula Stabilization                         NMR Occulomotor Isometrics                         NMR Cervical Isometrics                                                   TA Shld AROM w/bar                         TA Shld Capsular Stretching                         TA Self PIC Thor Spine                         TA Rot Cuff Therabd, beginner         instructed                TA Rot Cuff Therabd, ruy Marcial, PT, MS  Physical Therapist  [place]# [id number]

## 2020-10-08 ENCOUNTER — TREATMENT (OUTPATIENT)
Dept: PHYSICAL THERAPY | Facility: CLINIC | Age: 35
End: 2020-10-08

## 2020-10-08 DIAGNOSIS — M35.7 FAMILIAL LIGAMENTOUS LAXITY: ICD-10-CM

## 2020-10-08 DIAGNOSIS — S39.012D STRAIN OF LUMBAR REGION, SUBSEQUENT ENCOUNTER: ICD-10-CM

## 2020-10-08 DIAGNOSIS — R29.3 POSTURE IMBALANCE: ICD-10-CM

## 2020-10-08 DIAGNOSIS — M53.3 SACROILIAC JOINT DYSFUNCTION: Primary | ICD-10-CM

## 2020-10-08 PROCEDURE — 97140 MANUAL THERAPY 1/> REGIONS: CPT | Performed by: PHYSICAL THERAPIST

## 2020-10-08 PROCEDURE — 97530 THERAPEUTIC ACTIVITIES: CPT | Performed by: PHYSICAL THERAPIST

## 2020-10-08 NOTE — PROGRESS NOTES
"Physical Therapy Note      SUBJECTIVE:   Changes since last seen:  Tete is now at week 21 of pregnancy.   Doing \"ok\", the sit to stand desk at home has really helped to decrease pain.  She c/o some piriformis pain on right, \"I'm having trouble stretching it as I get bigger with the pregnancy.   Some right QL pain last week.  She is now having trouble sleeping, secondary to her pregnancy; she is using a wedge under her belly.   She is planning on moving in mid November.       Current level of pain:    6/10  Low back/ right buttock        Neck pain   5/10   Lowest level of pain since last seen:  0/10               0/10  Highest level of pain since last seen:   6/10           5-6/10       Past Medical History:  1.  AA 2013  Treated with PT for left shoulder labral tear, no low back treatment   2.  Hx of two falls when mopping in socks, over the last 2 years  3.  Hx of regular, heavy, cramping menses  4.  T& A, 2006  5.  Allergic to Wellbutrin and mild allergy to contrast dye  6.  Chronic yeast infections  7.  Uterine fibroids removed, 2 yrs ago       Functional Limitations:  Sitting, standing, walking, stairs, driving, working, sleeping, squatting, housework and changing positions.   Patient Goals for Physical Therapy: \"Pain relief\"      OBJECTIVE:  Observation:     No lateral shift of pelvis      08/06/19 08/13/19 09/17/19 09/24/19 10/01/19 10/29/19 11/05/19 11/12/19 11/26/19 12/19/19 01/07/20 01/21/20 02/11/20 03/03/20 06/09/20 08/11/20 09/10/20 10/08/20   SI Joint Positioning  Right  Left Right  Left Right  Left Right  Left Right  Left Right  Left Right  Left Right  Left Right  Left Right  Left Right  Left Right  Left Right  Left Right Left Right Left Right Left Right  Left Right  Left       Innominate                            Anterior    x               x     x               x   X               x               x              sl               x                x                x Sl                sl     x sl   sl   "             sl              x          Posterior               x     x                x    x               x   x    x   sl    x   x   x              sl    sl                x                Sl              sl   sl  x      Sacrum                                    Unilateral rotation    x      x    x   x   x   x   x    x     x   x    x   x     sl   x    x   x   x   x                           SI Joint Testing  Right  Left Right  Left Right  Left Right  Left Right  Left Right  Left Right  Left Right  Left Right  Left Right  Left Right  Left Right  Left Right  Left Right Left Right Left Right Left Right  Left Right  Left           Distraction   +          +   +           +   +         +   +           +   +           +   +           +     +          +    +           +   +          +   +           +  +          +     +         +    +          +   +          +   +         +   +           +   +         +   +          +           Joanna's   +          +   +         +   +          +   +         +   -           +                        Compression   Sl        Sl    Sl         Sl    -          -      -          -                        Prone Press Up   -          -   -           -   -            -   -          -   Sl        Sl                                      Special Tests  Right   Left Right  Left Right  Left Right  Left Right  Left Right  Left Right Left  Right  Left Right  Left Right  Left Right  Left Right  Left Right  Left Right Left Right Left Right Left Right Left  Right  Left      Straight Leg Raise                             Active   -            -    -         -    -           -     -         -   -        -                    Passive   -           -    -          -   -          -     -        -   -          -                Hip Scour Test   sl           sl   Sl         Sl    +         sl    Sl        Sl    Sl       sl   Sl          sl   Sl         Sl    Sl        sl   Sl         sl   Sl         sl   +        sl    Sl       Sl   sl         sl    +          +   +        sl   +        sl   +         sl   +           sl                         Bakers Cyst   -            -     +          -     Sl        -   -           -   -         -   +         -   ++       -   +          sl   Sl          -  sl          -   +         -   -          -    Sl       sl   Sl         -   -           -    -          -        01/08/2019 02/07/19 03/21/19 03/28/19 04/04/19 04/11/19 04/18/19 04/25/19 05/02/19 05/07/19 05/14/19 06/05/19 06/11/19 06/20/19 06/25/19 07/09/19 07/16/19 07/23/19   SI Joint Positioning  Right  Left Right  Left Right  Left Right  Left Right  Left Right  Left Right  Left Right  Left Right  Left Right  Left Right  Left Right  Left Right  Left Right Left Right Left Right Left Right  Left Right  Left       Innominate                            Anterior    x   x    x  x                x                 x              X     x    x   x   x               x   X                    sl    x                x               x                x          Posterior                x               x        x              x     x     x    x               X                x               x              x                x               x   sl               x    x    x   x      Sacrum                               Unilateral rotation    x   x   x    x    x     x    x    x   x   x    x    x   x   sl   x    x     x                           SI Joint Testing  Right  Left Right  Left Right  Left Right  Left Right  Left Right  Left Right  Left Right  Left Right  Left Right  Left Right  Left Right  Left Right  Left Right Left Right Left Right Left Right  Left Right  Left           Distraction     +          +   +         +   +          +    +        +  +           +   +          +   +          +   +          +     +        +   +         +   +         +   +          +   +           + +           +   +          +    +          +   +          +   +           +            Joanna's     +          +   Sl         sl    +         -    Sl         -   Sl         -   Sl         -   -           -                      Compression     +          +   Sl          sl   Sl          Sl     Sl        Sl    -          -   -         -   -           -                      Prone Press Up           +         +         Sl          -         -        -         -                                   Special Tests  Right   Left Right  Left Right  Left Right  Left Right  Left Right  Left Right Left  Right  Left Right  Left Right  Left Right  Left Right  Left Right  Left Right Left Right Left Right Left Right Left  Right  Left      Straight Leg Raise                                     Active   -         -          -           -                      Passive   -         -             -             -                  Hip Scour Test   ++       Sl    ++       Sl    +           sl   Sl         Sl    Sl         sl Sl          Sl    Sl        sl   Sl       Sl    Sl         Sl    Sl         Sl     Sl         sl   Sl        Sl    Sl        Sl     -            -   Sl         sl   -           -   -          -   -            -                        Bakers Cyst       +         -     +        -    Sl        -   Sl         -     Sl        -   Sl        Sl   sl          sl   Sl         -   -           -   -          -   +           -   Sl      sl       Palpation:   Date 08/06/19 08/13/19 09/17/19 09/24/19 10/01/19 10/29/19 11/05/19 11/12/19 11/26/19 12/19/19 01/07/20 01/21/20 02/11/20 03/03/20 06/09/20 08/11/20 09/10/20 10/08/20    Right  Left Right  Left Right  Left Right  Left Right  Left Right  Left Right  Left Right  Left Right  Left Right  Left Right  Left Right  Left Right  Left Right Left Right Left Right Left Right  Left Right  Left   Piriformis   ++        ++   ++       ++   ++        ++   +         +    ++       +   ++        ++    ++        +   ++        sl   ++         Sl    +         +  +           +   +          sl   +         sl   ++        +    +       sl   -          -   +         sl    X        -   Gr. Trochanter    +        -   ++          +   ++      +   ++         +     Sl        -   ++        +    +         Sl    ++         sl   ++         sl   +          sl   +         sl   +         sl   Sl        +   ++        +      +     sl   -            -  sl           -     x          -   IT Band    Sl         Sl    ++        +   +         sl   Sl        Sl     +          Sl    ++         +    +         -    +         -   +           -   +         -   Sl         -   Tight    -   Sl         sl   +          sl    -            -   -           -     -           -   Quadratus Lumborum   ++         +   +         +   ++        +   +         Sl    ++        +   ++         +   +         ++     +         +   +         ++    +        +   +          +   +         + inst sl/ inst  sl   +          s;  +         Sl    -            -   Sl         sl inst +   Ischial Tuberosity    -         -   -          -    -        -   -            -     Sl       -   Sl         Sl     -          Sl      -         -   -           -   -          sl   -           -   -         -   -         -   +        -   +        Sl    -           -   -         -   -             -   Sacrococcygeal Ligs    +          +   +       +   +         sl    +         +     -          -   -          -   +         -   -           +   Sl        +   Sl        sl   Sl         Sl    Sl        Sl    -         -   Sl        _   Sl         sl   -             -   -          -   -            -   Paraspinals                     Spinous Processes   +           sl   ++        sl    +           -                       C-spine rotated to    C2-5 C2-6 C2-5   -             - C2-3  C2-4           C3-7  C2    C3-7       C2-4   C5-7 C2-6 C2-6 C3-6 C5-6 C2-6 C2-7 C2-5 C2-6 C2,4,5         T-spine rotated to  T4-6 T4-7 T3-8   T3-7  T4-6  T4-6 T4-5 T3-12  T3-7 T3-6 T5-9 T4-8 T4-8  T3-8 T1-4  T3-4 T3-6  T4-6          L-spine rotated to  L2-5 L2-5   L1-5  L3-5  L3-5  L2-5             L3-5 L2-5 L2-5  L2-5 L3-5 L4-5 L4-5  L3-5    -         - L1-5 L3          L1   -         -   Adductor Tony   +           +   ++      ++   +         ++   +        +   +           +   +           +   Sl        Sl    Sl        Sl    Sl       sl  sl         Sl   sl       Sl    -         -   -           -  -          -    -          -   Sl       sl   Iliopsoas   ++          +   +          sl   +         sl   +          Sl    ++        +  ++        +   +         +   -          +   -         sl   Sl          +   +        +   Sl        Sl    +         +    Sl       sl   Sl        Sl  Sl          sl   Sl       sl   Quad Origin    -          -   -          -   -        -   -          -    -          -   -           -   -          -   -          -   -           -   -         -   -           -   -          -   -           -   -           -    -           -   -          -   SI Joint   +        +   ++        ++  ++        +   ++       +   +            +   ++         +   ++      +    +         sl   ++       +  ++        +   +          sl   +         Sl   ++        -    +          +   Sl        sl   Sl       Sl     +         -    +         +   Pubic Symphysis         +        +        +       +  not tested        ++           +          +         +          +          +       +       Sl         +         sl         +           +          +   Obturator externus   +         +   +         +   Sl       sl   -           -   -          -    -          -   -         -   Sl       Sl    Sl        sl   Sl         sl   Sl        Sl    Sl       Sl     Sl         sl   Sl       Sl   -           -   -          -   Sl         Sl    Rectus Diastasis                                          Ant/middle scalenes   +        sl   +         +   +         +   +          +   +        sl   +          +   +        Sl    +        sl   +         +   +        +   ++        +   +        +   +          +   ++        +   +        sl   Sl         -   +           sl   +          +   Upper traps   +         Sl    +         +   +         +   +         sl   +        Sl    +          sl  sl         sl  +        sl   +        Sl     +        sl   +         sl   +       sl   +          sl  ++         +   +        Sl    Sl          -   +          sl   +         sl   Levator scap   +        Sl    +        +   +        sl   +         sl   +         sl    ++        sl   +          Sl   +         sl   +         s;   +         +   +          sl   +       sl   +          sl  ++         +   +         sl   Sl         -   +          sl   +            +   sternocleidomastoid   -         -   -            -   -          -    -          -   -          -   -            -    -         -   -          -   -          -   -           -   -         -   -         -   -          -   -           -   -         -    -         -                          sternocostals   +          +   +          +   +        Sl   ++          +   +          +   +             +   Sl       Sl    +          +  +          sl   Sll        sl   +          sl   +        +   +         +   +          +   Sl         Sl    +         +  ++         ++    +         +             Muscle/Bone Irritation  Date 01/08/2019 02/07/19 03/21/19 03/28/19 04/04/18 04/11/19 04/18/19 04/25/19 05/02/19 05/07/19 05/14/19 06/05/19 06/11/19 06/20/19 06/25/19 07/09/19 07/16/19 07/23/19    Right  Left Right  Left Right  Left Right  Left Right  Left Right  Left Right  Left Right  Left Right  Left Right  Left Right  Left Right  Left Right  Left Right Left Right Left Right Left Right  Left Right  Left   Piriformis  ++        +   ++          +   ++       +   ++        +    ++        Sl    ++       Sl   ++       +    ++      +   ++        +   +           +    +        Sl      +         ++   +           ++    +       -   ++      ++   +          +   ++        +   ++         ++   Gr.  Trochanter  +          +  +          sl    +           +    +       ++    +          -   +         Sl    +         sl   +         sl   Sl        Sl    +          +    Sl        sl    Sl         +   Sl          sl     +       -   +         +    +         +   +         sl   ++           +   IT Band  +         sl   Sl        sl     -          -   -          -    Sl         -   Sl         -   Sl        -   Sl         -  sl          -   Sl          Sl      -          -     -           -   -            -    Sl        Sl    Sl       Sl     Sl       sl   +          Sl     +          ++   Quadratus Lumborum  ++        ++   ++        +    +        ++   ++        ++     +       ++   +          +  ++        ++   Sl        Sl    +       ++   +          ++    Sl       +     +         +   +          ++     Sl       +   ++       +    ++       +    +         +    ++       +   Ischial Tuberosity  ++         sl   -          -    -           -     -           -     -          -   -          -   -          -   -          -   -          -  -            -     -           -   -          -   -             -     Sl       -   Sl        Sl     -          -     -         -    Sl        Sl    Sacrococcygeal Ligs  ++          +   Sl        sl   +          +    +       +    Sl        Sl    Sl       Sl    +         sl  ++      +  +        Sl       Sl         +   Sl         Sl    Sl         Sl      -        Sl   not  tested    +         +    Sl       Sl    +          +   Paraspinals  +           +     +         sl    +    +          -   Sl         +   +         sl   +        sl  +         -   Sl          +   Sl          +   +          sl   ++         sl   +          -   +         Sl    ++        +     +       Sl    +         sl   Spinous Processes                                        C-spine rotated to                           C3-5  C2-5 C2-6         T-spine rotated to   -               -              T8-9  T4-5    T3-8          L-spine rotated  to  L2-5        L1-5  L4-5  L3-5  L2-5            L3-5  L1-5  L2-5  L2-5              L1-5           L2-5 L2-5   L1-5 L3-5 L3-5  L2-5 L4-5 L1-5   Adductor Tony    +        +       +         +     -         +   -           Sl     -        sl   +          sl   -          -   -           -   -          -   -           -   -          -   -           sl  ++        +   +         sl   +         +   +         ++   ++        +  +            +   Iliopsoas  ++         +   ++        +   ++         +    +         ++  sl        Sl    Sl       Sl    Sl       Sl    Sl         -   Sl        -   Sl         +   +         Sl    +          sl  +          ++   +          +   +          +   +         ++   ++        +   ++         +   Quad Origin  sl          sl   Sl          sl    -          -   -           -    -         -   -          -   -         -   -          -   -          -   -           -    -         -   -            -   -         -   -         -   -         -   -            -   -          -   -         -   SI Joint  ++         +  ++        +    ++        +   +          +   +         +   +          ++   +         sl    +         sl   +        +  +          sl  +        Sl     +        Sl   +          ++   Sl       sl   +        +    + +        +  ++          +    ++        ++   Pubic Symphysis         ++          Not tested         ++      ++        ++            + Not tested Not tested          +             ++         +          +   Obturator externus          -          +   -          -   -          -    -           -   -          -   -          -   -         -    -            -   -          -   -            -   Rectus Diastasis   1/2 finger                                         Ant/middle scalenes                   ++        +   ++       +   Upper traps                  ++         +   +          +   Levator scap                   ++          +     +           sternocleidomastoid                   Sl        Sl      -          -                        sternocostals   +        +   +          +    +         -    +           +   Sl        Sl    +         Sl  Not tested     +         +     +         +   +         +   Sl      sl   +          +    +        +   +          +   +          +   All 4s Positioning: Pt is able to assume full lumbar extension in this position  Leg Length:  Equal today        Monthly Objective Measurement/Functional Activity  Date: 01/08/2019 03/21/19 04/25/19 06/05/19 07/09/19 09/17/19 10/29/19 11/26/19 01/07/20 02/11/20 06/09/20 08/11/20 09/10/20    Oswestry Pain Score 52/100  46/100    48/100      48/100       50/100 48/100 52/100   50/100     50/100     50/100     39/100   46/100                                      AROM Lumbar Spine: Degrees   Degrees      Degrees      Degrees      Degrees      Degrees Degrees Degrees Degrees Degrees Degrees Degrees      Degrees    Degrees      Flexion 106 pain          99          83       75 pain          63 onset of pain         69 pain increased   57 pain       25        53       42    68 onset of pain        71      58       Extension 3 pain           17           19       11 pain           11  Pinches       16 pain increased 12  instant pain in right SI jt         12 pain lumbar spine       9   pain      22 pain    19 tight      24 stiff       Right Lat. Flexion 29 pain right trunk          24        25      25    23 pinch       19 pinch  14 24    20     20      27         32      31       Left Lat. Flexion 31         19         23     14  15 pinch     11 pinch  18 19 18 easier        14        26         17    25       Right Lat Shift Pelvis inc pain   Inc pain Left SI jt       Slight increase No change but can't do far Feels caught  Increased pain  Sl inc pain No change in pain  No change in lumbar but inc hip Discomfort in right buttock  Slight pain piriformis Tweaks on left SI    Slight pain        Left Lat Shift Pelvis  less inc pain       No change       No change/  slight cielo  No change but can't go far  Easier but not natural  Increased pain  Sl  Inc pain No change in pain  No change lumbar but sl inc hip  Inc pain left buttock  Sl pain piriformis  Not as easy   slight pain                     AROM Hips:  (degrees) Right      Left Right Left Right  Left Right  Left Right  Left Right  Left Right Left Right Left Right Left Right Left Right Left Right left Right  Left Right  Left      Flexion 110       110  125    120    128      125 130       130 130      130 125      120 128     120  125      120    125   122  120      125  125     125 128      122 122       127       Abduction 37       41   45      45      42      44  43          45   45       45  45          45 43        45  40         45  43         45   45        45   45        45  48       46   45        45       Int. Rotation 32       22  30      25      31         28  32          30   34        32   30         28 32        31  30          33  31         35  30         35  35         40  34         32  35        30       Ext. Rotation  34       42  35      40       38        41   35         40   40        42   35         37  38        39  35           40  36          40  35         40   40      43   48       45  45        43                     Flexibility:   (degrees) Right    Left Right  Left Right  Left Right  Left Right  Left Right  Left Right  left Right Left Right Left Right Left Right Left Right Left Right  Left Right  Left      Hamstrings -32       -28  -30     -25   -25       -25   -28      -27  -30      -26   -28       -29  -25       -25  -25       -20   -25      -21    -18     -15  -20    -17  -22     -20       Quadriceps 39       42 40        45    not tested  40          42  45        47   50         52  not tested   55         55  48        59    55        58   65        60   65      65   65      68       HipExt/Rot(piriformis) 30       28   32      30  33         32   35         33   37        35   35         35  38       36  35        40   29        37  35         40    40        42  43       42  45         40       Hip Int/Rotators 27       9   25      10   28        15   27         16   25         18   20        20  22      18  18        23   20       25   25        25   30        31   33      32  30         29       Hip Flexors (iliopsoas) -       -                   IT Band -       -                                 Reflexes: Right      Left Right  Left Right  Left Right  Left Right  Left Right  Left Right Left Right left Right left Right Left Right left Right Left Right  Left Right  Left      L4 (Quad) +1       +1                   S1 (Achilles) +1       +1                                 Root Level  - Motor Right      Left Right  Left Right  Left Right  Left Right  Left Right  Left Right Left Right Left Right Left Right Left Right Left Right Left Right  Left Right  Left     T12             Rectus Abdominus 4+/5     4+/5         5/5        5/5             5/5          5/5         5/5        5/5         5/5       L1              Paraspinals 5/5         5/5        5/5        5/5    5/5       5/5   5/5        5/5   5/5       5/5   5/5       5/5       L2              Hip Flexion 5/5          5/5         5/5        5/5    5/5        5/5  5/5         5/5  5/5        5/5   5/5       5/5       L3             Quads 5/5          5/5         5/5        5/5   5/5         5/5   5/5        5/5  5/5        5/5  5/5       5/5       L4              Anterior Tibialis 5/5         5/5       5/5         5/5   5/5         5/5   5/5        5/5  5/5        5/5  5/5        5/5       L5             Gr. Toe Extension 5/5           5/5         5/5        5/5   5/5        5/5   5/5        5/5  5/5        5/5  5/5        5/5       S1            Gastroc/Sol/Peroneals 5/5          5/5       5/5          5/5  5/5        5/5  5/5        5/5  5/5        5/5   5/5         5/5                     Functional Strength:                   Single  LegStanceTime (seconds) R:30   L:30   R:      L: R:      L: R: 30 pain     L:30 pain  R:      L: R:      L: R:       L: R:       L: R:       L: R:       L: R: 30    L:30 R:       L:  R:      L: R:      L:     Pelvic Floor Isolation (seconds) -    10 sec 10 sec   > 10 sec   > 10 sec            Inner Unit  Isolation (seconds) -    10 sec 10 sec     > 10 sec   > 10 sec                          Heart Rate        (12/19/19)         Blood Pressure           81 bpm                    109/78                                           Functional Activities:        11/26/19            Sit w/o pain? Pain increases (minutes) No/ 30 min No/ 30  min No/ 30 min  No/  30 min No/ 30 min  No/ 30min  No/ 30 min No/ 10-15 min No/ 10-15 min  No/ 15 min No/ 30 min Yes/ 10 min Yes/ 10 min       Stand w/o pain? No/ 30 min No/ 30 min No/ 10 min  No/ 10 min No/ 10 min  No/ 15-20 min  No / 10 min No/10-15min, 1/4 mile No/ 10-15  No/ 15 min No/ 10-15 min Yes/ 10-15 min  Yes/ 30 min w/ support       Walk w/o pain? varies No/ 1 mile No/ 1/2 mile No/ 1 mile No/ 1mile, piriformis, B No/ a couple miles  No/ 1/2 mile No/ not much  No/ 1/2 mile  no/ 1/2 mile No/ 1/2 mile Yes/ 10--15  yes/ 1/2 mile       Steps w/o pain? 1 fl, avoids them 1 fl avoids  1 fl avoids  No/ avoids No/ doesn't do No/ 1 flight  No/ 1 fl  No/ 1 fl  No/ 1 fl   no/  1 fl No/ 1 fl Yes/ 1 fl   yes/ 1 fl       Drive w/o pain? No/ 10-15 min No/ 30-45 min No/ 30-45  Min  No/ 30-45 min No/ 35-45 No/ 35-45 No/ 30 min No/ 10 -15 min No/ 10-15 min    No/ 15 min No/ 30 min Yes/ 3 hrs     yes/ 2 hrs       Work w/o pain? No/ 30 min No/ 30 min No/ 30  No/ 30 min No/30 mi   No/ 30 min  No/ 30 min No/10-15 min No/ 10-15  no/ 15 min Has been off work Still off work   yes/ 10 min       # times wakes w/ pain? 4-5x/night, now taking meds at night she is able to sleep.  2x   2-3x  2x         1x         Several times   several times several times The last few nights 0x, a few times over last month    0x     0-1x  0x  x1        PLAN OF CARE:    ASSESSMENT:  Problem List:   1. SI joint dysfunction  2. Lack of spinal stabilization  3. Postural dysfunction     Discussion:  Tete's sit stand desk at home has really helped to minimize her low back pain.  She is using it in sitting with knees at 90 degrees and hips and 90 degrees and then also in standing position.  Recommend she get a stable counter stool to use as another option for sitting, giving her 3 positions to move in and out of during the day.   Showed Tete how to do a standing piriformis stretch but did caution her on doing this as she is axially loading the spine and putting a strain on the SI ligaments.  She is aware of this and will do this stretch carefull.     Recommended she assume the all 4s belly sag more often as this will unload the lumbar discs and allow for facet extension.    By the end of today's visit, she was still having some guarding in the left piriformis. Realized as soon as she stood up the left ilium would move back into an anterior rotation. Was only able to get her to independently turn off the left piriformis with self correction of the left innominate in sitting with left foot up on a stool and flexing forward from the hips.  She will use this technique at home to restore symmetry of the SI joints and decrease guarding of the left piriformis.     Tete wont be moving until mid Nov thus she would like to return for one more visit before she moves for instruction in how to lift, hold, wash and change baby with minimal stress on the lumbar spine.       Short Term Goals: (4-6 weeks)                                   Date Met:                1.   pt independent in postural correction         02/07/19  2.   pt independent in SI joint self-corrections        03/21/19  3.   pt independent in exer to decrease post. disc pressures      03/21/19  4.   pt able to sleep through night without pain        02/11/20  5.   pt able to sit 15 minutes  "without pain  6.   pt able to walk 10 minutes without pain        06/20/19    7.   Reduce pt pain to no greater than 7/10        03/21/19  8.   Decrease Oswestry Pain Score to no greater than 45/100      08/11/20  9.  Reduce pt pain to no greater than 6/10        07/16/19  10.  Pt able to isolate the pelvic floor in supine x10, 10 sec      03/28/19  11.  Pt able to isolate transverse abdom in all 4s, x10 sec, x10         03/28/19  12.  Pt able to isolate the multifidus in sitting x10 sec, x10                            03/28/19  13.  Pt able to engage inner unit, move from sit to stand &back to sit      04/04/19                                                                                                                                                                                                                                             14.  Pt able to engage inner unit and walk 4 steps without overflow to hamstrings   06/20/19  15.  Pt able to hip abduct x6 without engaging the piriformis      06/20/19     16.  Pt able to perform prone knee flexion without engaging piriformis x6          05/07/19       17.  Pt able to perform remedial lower abs with scissor arm work x6 w/o pain    04/18/19    18.  Pt able to perform \"Pre-cursor\" lower abs leg lift initiation x6 without pain          04/18/19   19.  Pt able to perform retro step without engaging piriformis x6, bilaterally    09/24/19    20.  Pt able to perform bridging x6 without engaging piriformis      04/25/19    21.  Pt able to perform \"scissors\" arm movement with inner unit w/o piriformis x6   05/02/19  22.  Pt able to perform PIC hip adductors without engaging the piriformis  x6    05/07/19  23.  Pt able to perform \"Scissors\" arm motion w/ inner unit with 1lb wts x6 w/o pain   05/07/19  24.  Pt able to perform prone glut max over stability ball x6 w/o engaging piriformis   25.  Pt able to reduce pain w/ self PIC to left psoas       05/14/19  26.  Pt " able to move laterally on stability ball in sitting without pain x6     11/05/19  27.  Pt able to move A/P movement sitting on stability ball without pain x6    11/05/19  28.  Pt independent in pool exercises, to do without increasing pain     07/09/19  29.  Pt able to throw and catch stability ball in supine with inner unit on x6    06/11/19  30.  Pt able to hold medicine ball in stance and move away from trunk with IU on and w/o pain x6 06/11/19  31.  Pt independent in supine hamstring stretch, able to do 2x without pain.    07/16/19  32.  Pt independent in hip adductor stretching, supine and stance     07/16/19  33.  Pt independent in quad stretching, in stance, without pain.      08/06/19  34. Pt independent in hip internal rotator stretch in supine, without pain      08/06/19  35.  Pt able to tolerate ADLs with leukotape on scapula for enhanced spinal stabilization x 1 day 07/25/19  36.  Pt able to perform prone walk out x6 without losing inner unit engagement    11/12/19  37. Pt able to perform prone arm lift x6, phase 1, without engaging the upper trap   08/06/19  38.  Pt able to perform supine obliques over stability ball x6 without losing inner unit engagement 08/06/19  39.  Pt able to perform prone arm lift, lifting elbow without engaging upper traps x6   11/12/19  40.  Pt able to perform upper quarter stretches without increasing pain     09/17/19  41.  Pt able to do adapted BKFO without engaging piriformis x6        42.  Pt able to do diaphragmatic breathing x6 without engaging piriformis       11/12/19    43.  Pt able to ride the [employer] x10 min without increasing pain  44.  Pt able to tolerate the inversion table, 20 sec down, 30 up x 6 cycles    06/09/20     45.  Pt independent in the use of TENS unit                                                                                                                                          Long Term Goals:(3-5 months)      Date Met:      1.  pt  independent in self-management of symptoms   19 - for 4 weeks only       2.  pt independent in home program     19      3.  pt able to work 6 hours without pain      4.  pt able to sit 60 minutes without pain      5.  pt able to walk 45 min without pain      6.  Pt independent in use of inversion table      7.  Pt independent in swimming program to be done without pain    Progress Towards Goals:  As expected to a little slower than expected    Treatment Plan:   1.  Ultrasound to increase extensibility, decrease pain, if needed  2.  Electrical stimulation for muscle relaxation, decrease pain, if needed, iontophoresis  3.  Manual therapy to increase function, decrease pain  4.  Therapeutic exercise to increase function, decrease pain  5.  Home programming and d/c planning to increase function and decrease pain,     Frequency & Duration:  Pt in need of more visits for neuro motor retraining and continued strengthening for the inner unit and instruction in adapted positioning with . Will see on a once/ every 3-4 wk basis for 3 more visits to complete the spinal stabilization instruction, meet remaining short and long term goals and be independent in her home program.      Patient Participated in and Agrees With This Plan of Care and Goals:  YES  Rehab Potential:  jarret Marcial, PT, MS  Physical Therapist  [place]# [id number]      TREATMENT TODAY:  Total Treatment Time: (time in clinic)   60  min  Timed Code Treatment Minutes:     60      Supplies given:      Manual Therapy:  (MA)  RX min:    40  Manual posterior rotation of left innominate    Positional Isometric contraction (PIC) of right iliopsoas    Positional Isometric contraction of (PIC) right gluteus minimus    Distraction of both SI jts in open pack hip position  Piriformis stretching, bilaterally    Quadratus lumborum stretching, bilaterally    Anterior/Inferior Mobilization of  right hip    Myofascial work trunk   Sternocostal and rib mobs    Manual spinal distraction  PIC C-spine  PIC T-spine  Stretching scalenes, upper traps, levator scap      Therapeutic Activities:  (TA)  RX min: 20    Neuromuscular Reeducation: (NMR)  RX min:       Ultrasound:  RX min:       1.6 vance/cm2       Location:      Iontophoresis:  6 hr patch                                Location:                           Ice:        ocedures:      Key:  i=instructed        r=review        c=corrected        a=adapted   Code Procedure/Instruction 10/29/19 11/05/19 11/12/19 11/26/19 12/19/19 01/07/20 01/21/20 02/11/20 03/03/20 06/09/20 08/11/20 09/10/20 10/08/20            TA         Sleeping Positions             adapted            TA Sternum-Up Posture                         TA SI jt. self-correction     reviewed        adapted            TA Lumbar Facet PIC                         TA   Order of Self-Corrections                         TA Use of lumbar pillow                         TA Use of cervical roll                         TA Use of orthotics                         TA Use of SI belt                         TA Use of Nada chair                         TA Use of Ice                         TA Use of [employer]/ heat                         TA Use of [employer] Relief                         TA Use of TENS unit                         TA Use of iontophoresis                         TA Decreasing Disc Pressures             In all 4s position            TA Use of sacro wedge                         TA Use of inversion table Used for 10 min  Used for 13 min  Used for 10 min  Used 14 min reviewed and used 15 min                    TA Use of sit/stand desk            instructed reviewed            NMR Use of airdyne w/ IU on/off       instructed                  NMR Leukotaping upper quarter        applied                 NMR Pelvic Floor Isolation                         NMR Transverse Abdominus                         NMR Multifidus Isolation                         NMR  Diaphragmtic breathe supine  reviewed                       NMR  Diaphragmtic breath sit                         NMR Pelvic Clock in sitting                         NMR Kinesiotape   On right scapula                      NMR InnerUnit against Gravity     reviewed                    NMR Sit-stand w/ inner unit                         NMR Roll w/ inner unit                         NMR Walk w/ inner unit                          NMR Up/down steps w/ inner unit                         NMR Water Exer Instruction    instructed                     NMR Hip Abd w/o piriformis                         NMR Hip Add w/o iliop/ham                          NMR Lower abs in supine                         NMR Inner unit challenge with scissor arm work                          NMR Abd obliques in supine  reviewed                       NMR Prone Knee Flexion                         NMR Supine glute w/ ball btwn knees                          NMR Prone glut amrita                         NMR Retro Step                         NMR Retro Walk                         NMR Retro walk on treadmill        instructed                 NMR Forward walk w/ inner unit  reviewed      reviewed                 NMR Stability ball multifidus bounce                         NMR Stability Ball A/P & Lateral  reviewed                       NMR Ball Catch/Throw /Supine                         NMR Ball movemt in /Stance                         NMR StabilityBall All 4's Arm Lift                         NMR StabilityBall Prone Walk Out                         NMR StabilityBall Supine Oblique                         NMR Stability Ball sit-supine-sit                         NMR Walking Prog Progression                         MNR Home program sequencing                                                   TA Hamstring stretch                         TA Hip Ext Rot Stretch                         TA Hip Int Rot Stretch                         TA Hip Flex/IT Stretch                          TA Hip Add Stretch                         TA Lats Dorsi Stretch                         TA Gastroc/soleus stretch                         TA QuadratusLumborm Stretch                            Supine                            Stance                         TA Quad Stretch                                                   NMR Wall Push Ups                         NMR Planking                         NMR BOSU Ball Exercises                         NMR Lateral Bridge Drop                         NMR Waiters Chep[place]t                         NMR O'Feldt Lat Pull Down                         NMR O'Feldt Hip Ext                         NMR O'Feldt Trunk Ext                                                   TA CervDiscExtSup/stnd    instructed/reviewed                     TA Cerv Stretches                         TA Self PIC Cervical Spine                         TA Neural Gliding                         TA Upper trap stretching                         TA Ant/Mid Scalene Strch                         TA Levator Scap strch                         TA Biceps stretch                         TA Rotator Cuff Stretch         instructed                TA Anterior Chest Stretch                         TA Wrist Ext Stretch                                                   NMR Prone Arm Lifts                         NMR Scapula Stabilization                         NMR Occulomotor Isometrics                         NMR Cervical Isometrics                                                   TA Shld AROM w/bar                         TA Shld Capsular Stretching                         TA Self PIC Thor Spine                         TA Rot Cuff Therabd, beginner         instructed                TA Rot Cuff Therabd, intermed                                                                                                                                                                                                                                                                                                                            Miracle Marcial, PT, MS  Physical Therapist  [place]# [id number]

## 2020-10-29 ENCOUNTER — TREATMENT (OUTPATIENT)
Dept: PHYSICAL THERAPY | Facility: CLINIC | Age: 35
End: 2020-10-29

## 2020-10-29 DIAGNOSIS — M53.3 SACROILIAC JOINT DYSFUNCTION: Primary | ICD-10-CM

## 2020-10-29 DIAGNOSIS — S39.012D STRAIN OF LUMBAR REGION, SUBSEQUENT ENCOUNTER: ICD-10-CM

## 2020-10-29 DIAGNOSIS — R29.3 POSTURE IMBALANCE: ICD-10-CM

## 2020-10-29 DIAGNOSIS — M35.7 FAMILIAL LIGAMENTOUS LAXITY: ICD-10-CM

## 2020-10-29 PROCEDURE — 97140 MANUAL THERAPY 1/> REGIONS: CPT | Performed by: PHYSICAL THERAPIST

## 2020-10-29 NOTE — PROGRESS NOTES
"Physical Therapy Note      SUBJECTIVE:   Changes since last seen:  Pain in upper right QL, and some right lateral piriformis   Pt thinks one to 2 more visit before she moves to [facility]  She is at 24 weeks gestation, sees her OB later today.  \"Everything is going well.\"   She is using pillows as discussed on last visit, but still having some difficulty sleeping, \"not sure if baby or low back pain.\"   She does c/o tightness in the upper quarter today.   She is back to work full time and continues to be pleased with the sit stand desk and how she has her work station set up.         Current level of pain:    5/10  Low back/ right buttock        Neck pain   0/10   Lowest level of pain since last seen:  3-4/10                0/10  Highest level of pain since last seen:   6/10  Last weekend         1-2/10       Past Medical History:  1.  AA 2013  Treated with PT for left shoulder labral tear, no low back treatment   2.  Hx of two falls when mopping in socks, over the last 2 years  3.  Hx of regular, heavy, cramping menses  4.  T& A, 2006  5.  Allergic to Wellbutrin and mild allergy to contrast dye  6.  Chronic yeast infections  7.  Uterine fibroids removed, 2 yrs ago       Functional Limitations:  Sitting, standing, walking, stairs, driving, working, sleeping, squatting, housework and changing positions.   Patient Goals for Physical Therapy: \"Pain relief\"      OBJECTIVE:  Observation:     No lateral shift of pelvis        10/29/20                    SI Joint Positioning  Right  Left Right  Left Right  Left Right  Left Right  Left Right  Left Right  Left Right  Left Right  Left Right  Left Right  Left Right  Left Right  Left Right Left Right Left Right Left Right  Left Right  Left       Innominate                            Anterior               x                           Posterior   x                       Sacrum                          Unilateral rotation   x                                         SI Joint Testing  " Right  Left Right  Left Right  Left Right  Left Right  Left Right  Left Right  Left Right  Left Right  Left Right  Left Right  Left Right  Left Right  Left Right Left Right Left Right Left Right  Left Right  Left           Distraction   +          +                            Joanna's   Sl         sl                            Compression   -          -                            Prone Press Up Unable to do                                         Special Tests  Right   Left Right  Left Right  Left Right  Left Right  Left Right  Left Right Left  Right  Left Right  Left Right  Left Right  Left Right  Left Right  Left Right Left Right Left Right Left Right Left  Right  Left      Straight Leg Raise                             Active   -         -                            Passive   -         -                        Hip Scour Test   Sl        sl                                         Bakers Cyst   -          -                         08/06/19 08/13/19 09/17/19 09/24/19 10/01/19 10/29/19 11/05/19 11/12/19 11/26/19 12/19/19 01/07/20 01/21/20 02/11/20 03/03/20 06/09/20 08/11/20 09/10/20 10/08/20   SI Joint Positioning  Right  Left Right  Left Right  Left Right  Left Right  Left Right  Left Right  Left Right  Left Right  Left Right  Left Right  Left Right  Left Right  Left Right Left Right Left Right Left Right  Left Right  Left       Innominate                            Anterior    x               x     x               x   X               x               x              sl               x                x                x Sl                sl     x sl   sl               sl              x          Posterior               x     x                x    x               x   x    x   sl    x   x   x              sl    sl                x                Sl              sl   sl  x      Sacrum                                    Unilateral rotation    x      x    x   x   x   x   x    x     x   x    x   x     sl   x    x   x   x   x                            SI Joint Testing  Right  Left Right  Left Right  Left Right  Left Right  Left Right  Left Right  Left Right  Left Right  Left Right  Left Right  Left Right  Left Right  Left Right Left Right Left Right Left Right  Left Right  Left           Distraction   +          +   +           +   +         +   +           +   +           +   +           +     +          +    +           +   +          +   +           +  +          +     +         +    +          +   +          +   +         +   +           +   +         +   +          +           Joanna's   +          +   +         +   +          +   +         +   -           +                        Compression   Sl        Sl    Sl         Sl    -          -      -          -                        Prone Press Up   -          -   -           -   -            -   -          -   Sl        Sl                                      Special Tests  Right   Left Right  Left Right  Left Right  Left Right  Left Right  Left Right Left  Right  Left Right  Left Right  Left Right  Left Right  Left Right  Left Right Left Right Left Right Left Right Left  Right  Left      Straight Leg Raise                             Active   -            -    -         -    -           -     -         -   -        -                    Passive   -           -    -          -   -          -     -        -   -          -                Hip Scour Test   sl           sl   Sl         Sl    +         sl    Sl        Sl    Sl       sl   Sl          sl   Sl         Sl    Sl        sl   Sl         sl   Sl         sl   +        sl   Sl       Sl   sl         sl    +          +   +        sl   +        sl   +         sl   +           sl                         Bakers Cyst   -            -     +          -     Sl        -   -           -   -         -   +         -   ++       -   +          sl   Sl          -  sl          -   +         -   -          -    Sl       sl   Sl         -   -           -     -          -         Muscle/Bone Irritation  Date 10/29/20                     Right  Left Right  Left Right  Left Right  Left Right  Left Right  Left Right  Left Right  Left Right  Left Right  Left Right  Left Right  Left Right  Left Right Left Right Left Right Left Right  Left Right  Left   Piriformis   ++       sl                    Gr. Trochanter  ++        sl                    IT Band   +         -                    Quadratus Lumborum   +        sl                    Ischial Tuberosity    -       -                    Sacrococcygeal Ligs Not tested                    Paraspinals                     Spinous Processes                           C-spine rotated to   C2     C3-7                          T-spine rotated to   T4-5                           L-spine rotated to  -         -                    Adductor Tony  sl        sl                    Iliopsoas   +         sl                    Quad Origin   -         -                    SI Joint  +        -                    Pubic Symphysis       +                    Obturator externus   +          -                                                                Ant/middle scalenes   ++        +                    Upper traps  ++        sl                    Levator scap   ++       sl                    sternocleidomastoid                                          sternocostals   ++        +                        Date 08/06/19 08/13/19 09/17/19 09/24/19 10/01/19 10/29/19 11/05/19 11/12/19 11/26/19 12/19/19 01/07/20 01/21/20 02/11/20 03/03/20 06/09/20 08/11/20 09/10/20 10/08/20    Right  Left Right  Left Right  Left Right  Left Right  Left Right  Left Right  Left Right  Left Right  Left Right  Left Right  Left Right  Left Right  Left Right Left Right Left Right Left Right  Left Right  Left   Piriformis   ++        ++   ++       ++   ++        ++   +         +    ++       +   ++        ++    ++        +   ++        sl   ++         Sl    +         +  +            +   +         sl   +         sl   ++        +    +       sl   -          -   +         sl    X        -   Gr. Trochanter    +        -   ++          +   ++      +   ++         +     Sl        -   ++        +    +         Sl    ++         sl   ++         sl   +          sl   +         sl   +         sl   Sl        +   ++        +      +     sl   -            -  sl           -     x          -   IT Band    Sl         Sl    ++        +   +         sl   Sl        Sl     +          Sl    ++         +    +         -    +         -   +           -   +         -   Sl         -   Tight    -   Sl         sl   +          sl    -            -   -           -     -           -   Quadratus Lumborum   ++         +   +         +   ++        +   +         Sl    ++        +   ++         +   +         ++     +         +   +         ++    +        +   +          +   +         + inst sl/ inst  sl   +          s;  +         Sl    -            -   Sl         sl inst +   Ischial Tuberosity    -         -   -          -    -        -   -            -     Sl       -   Sl         Sl     -          Sl      -         -   -           -   -          sl   -           -   -         -   -         -   +        -   +        Sl    -           -   -         -   -             -   Sacrococcygeal Ligs    +          +   +       +   +         sl    +         +     -          -   -          -   +         -   -           +   Sl        +   Sl        sl   Sl         Sl    Sl        Sl    -         -   Sl        _   Sl         sl   -             -   -          -   -            -   Paraspinals                     Spinous Processes   +           sl   ++        sl    +           -                       C-spine rotated to    C2-5 C2-6 C2-5   -             - C2-3  C2-4           C3-7  C2    C3-7       C2-4   C5-7 C2-6 C2-6 C3-6 C5-6 C2-6 C2-7 C2-5 C2-6 C2,4,5         T-spine rotated to  T4-6 T4-7 T3-8   T3-7  T4-6  T4-6 T4-5 T3-12  T3-7 T3-6 T5-9 T4-8 T4-8  T3-8 T1-4   T3-4 T3-6 T4-6          L-spine rotated to  L2-5 L2-5   L1-5  L3-5  L3-5  L2-5             L3-5 L2-5 L2-5  L2-5 L3-5 L4-5 L4-5  L3-5    -         - L1-5 L3          L1   -         -   Adductor Tony   +           +   ++      ++   +         ++   +        +   +           +   +           +   Sl        Sl    Sl        Sl    Sl       sl  sl         Sl   sl       Sl    -         -   -           -  -          -    -          -   Sl       sl   Iliopsoas   ++          +   +          sl   +         sl   +          Sl    ++        +  ++        +   +         +   -          +   -         sl   Sl          +   +        +   Sl        Sl    +         +    Sl       sl   Sl        Sl  Sl          sl   Sl       sl   Quad Origin    -          -   -          -   -        -   -          -    -          -   -           -   -          -   -          -   -           -   -         -   -           -   -          -   -           -   -           -    -           -   -          -   SI Joint   +        +   ++        ++  ++        +   ++       +   +            +   ++         +   ++      +    +         sl   ++       +  ++        +   +          sl   +         Sl   ++        -    +          +   Sl        sl   Sl       Sl     +         -    +         +   Pubic Symphysis         +        +        +       +  not tested        ++           +          +         +          +          +       +       Sl         +         sl         +           +          +   Obturator externus   +         +   +         +   Sl       sl   -           -   -          -    -          -   -         -   Sl       Sl    Sl        sl   Sl         sl   Sl        Sl    Sl       Sl     Sl         sl   Sl       Sl   -           -   -          -   Sl         Sl    Rectus Diastasis                                          Ant/middle scalenes   +        sl   +         +   +         +   +          +   +        sl   +          +   +        Sl    +        sl   +         +   +        +   ++         +   +       +   +          +   ++        +   +        sl   Sl         -   +           sl   +          +   Upper traps   +         Sl    +         +   +         +   +         sl   +        Sl    +          sl  sl         sl  +        sl   +        Sl     +        sl   +         sl   +       sl   +          sl  ++         +   +        Sl    Sl          -   +          sl   +         sl   Levator scap   +        Sl    +        +   +        sl   +         sl   +         sl    ++        sl   +          Sl   +         sl   +         s;   +         +   +          sl   +       sl   +          sl  ++         +   +         sl   Sl         -   +          sl   +            +   sternocleidomastoid   -         -   -            -   -          -    -          -   -          -   -            -    -         -   -          -   -          -   -           -   -         -   -         -   -          -   -           -   -         -    -         -                          sternocostals   +          +   +          +   +        Sl   ++          +   +          +   +             +   Sl       Sl    +          +  +          sl   Sll        sl   +          sl   +        +   +         +   +          +   Sl         Sl    +         +  ++         ++    +         +     All 4s Positioning: Pt is able to assume full lumbar extension in this position  Leg Length:  Equal today        Monthly Objective Measurement/Functional Activity  Date: 01/08/2019 03/21/19 04/25/19 06/05/19 07/09/19 09/17/19 10/29/19 11/26/19 01/07/20 02/11/20 06/09/20 08/11/20 09/10/20 10/29/20   Oswestry Pain Score 52/100  46/100    48/100      48/100       50/100 48/100 52/100   50/100     50/100     50/100     39/100   46/100     48/100                                     AROM Lumbar Spine: Degrees   Degrees      Degrees      Degrees      Degrees      Degrees Degrees Degrees Degrees Degrees Degrees Degrees      Degrees    Degrees      Flexion 106 pain          99          83        75 pain          63 onset of pain         69 pain increased   57 pain       25        53       42    68 onset of pain        71      58   42 pain in ribs       Extension 3 pain           17           19       11 pain           11  Pinches       16 pain increased 12  instant pain in right SI jt         12 pain lumbar spine       9   pain      22 pain    19 tight      24 stiff 15 pinch in low back      Right Lat. Flexion 29 pain right trunk          24        25      25    23 pinch       19 pinch  14 24    20     20      27         32      31    21      Left Lat. Flexion 31         19         23     14  15 pinch     11 pinch  18 19 18 easier        14        26         17    25     26      Right Lat Shift Pelvis inc pain   Inc pain Left SI jt       Slight increase No change but can't do far Feels caught  Increased pain  Sl inc pain No change in pain  No change in lumbar but inc hip Discomfort in right buttock  Slight pain piriformis Tweaks on left SI    Slight pain  No change      Left Lat Shift Pelvis  less inc pain       No change       No change/ slight cielo  No change but can't go far  Easier but not natural  Increased pain  Sl  Inc pain No change in pain  No change lumbar but sl inc hip  Inc pain left buttock  Sl pain piriformis  Not as easy   slight pain  no change                    AROM Hips:  (degrees) Right      Left Right Left Right  Left Right  Left Right  Left Right  Left Right Left Right Left Right Left Right Left Right Left Right left Right  Left Right  Left      Flexion 110       110  125    120    128      125 130       130 130      130 125      120 128     120  125      120    125   122  120      125  125     125 128      122 122       127   120     125      Abduction 37       41   45      45      42      44  43          45   45       45  45          45 43        45  40         45  43         45   45        45   45        45  48       46   45        45  43        45      Int. Rotation 32       22   30      25      31         28  32          30   34        32   30         28 32        31  30          33  31         35  30         35  35         40  34         32  35        30  35        34      Ext. Rotation  34       42  35      40       38        41   35         40   40        42   35         37  38        39  35           40  36          40  35         40   40      43   48       45  45        43  45        45                    Flexibility:   (degrees) Right    Left Right  Left Right  Left Right  Left Right  Left Right  Left Right  left Right Left Right Left Right Left Right Left Right Left Right  Left Right  Left      Hamstrings -32       -28  -30     -25   -25       -25   -28      -27  -30      -26   -28       -29  -25       -25  -25       -20   -25      -21    -18     -15  -20    -17  -22     -20   -20    -18      Quadriceps 39       42 40        45    not tested  40          42  45        47   50         52  not tested   55         55  48        59    55        58   65        60   65      65   65      68  not tested      HipExt/Rot(piriformis) 30       28   32      30  33         32   35         33   37        35   35        35  38       36  35        40   29        37  35         40    40        42  43       42  45         40 38         41      Hip Int/Rotators 27       9   25      10   28        15   27         16   25         18   20        20  22      18  18        23   20       25   25        25   30        31   33      32  30         29 29        25      Hip Flexors (iliopsoas) -       -                   IT Band -       -                                 Reflexes: Right      Left Right  Left Right  Left Right  Left Right  Left Right  Left Right Left Right left Right left Right Left Right left Right Left Right  Left Right  Left      L4 (Quad) +1       +1                   S1 (Achilles) +1       +1                                 Root Level  - Motor Right      Left Right  Left Right  Left Right  Left  Right  Left Right  Left Right Left Right Left Right Left Right Left Right Left Right Left Right  Left Right  Left     T12             Rectus Abdominus 4+/5     4+/5         5/5        5/5             5/5          5/5         5/5        5/5         5/5         5/5      L1              Paraspinals 5/5         5/5        5/5        5/5    5/5       5/5   5/5        5/5   5/5       5/5   5/5       5/5   5/5       5/5      L2              Hip Flexion 5/5          5/5         5/5        5/5    5/5        5/5  5/5         5/5  5/5        5/5   5/5       5/5  5/5        5/5      L3             Quads 5/5          5/5         5/5        5/5   5/5         5/5   5/5        5/5  5/5        5/5  5/5       5/5  5/5        5/5      L4              Anterior Tibialis 5/5         5/5       5/5         5/5   5/5         5/5   5/5        5/5  5/5        5/5  5/5        5/5  5/5        5/5      L5             Gr. Toe Extension 5/5           5/5         5/5        5/5   5/5        5/5   5/5        5/5  5/5        5/5  5/5        5/5  5/5        5/5      S1            Gastroc/Sol/Peroneals 5/5          5/5       5/5          5/5  5/5        5/5  5/5        5/5  5/5        5/5   5/5         5/5  5/5        5/5                    Functional Strength:                   Single LegStanceTime (seconds) R:30   L:30   R:      L: R:      L: R: 30 pain     L:30 pain  R:      L: R:      L: R:       L: R:       L: R:       L: R:       L: R: 30    L:30 R:       L:  R:      L: R:      L:     Pelvic Floor Isolation (seconds) -    10 sec 10 sec   > 10 sec   > 10 sec            Inner Unit  Isolation (seconds) -    10 sec 10 sec     > 10 sec   > 10 sec                          Heart Rate        (12/19/19)         Blood Pressure           81 bpm                    109/78                                           Functional Activities:        11/26/19            Sit w/o pain? Pain increases (minutes) No/ 30 min No/ 30  min No/ 30 min  No/  30 min No/ 30 min   No/ 30min  No/ 30 min No/ 10-15 min No/ 10-15 min  No/ 15 min No/ 30 min Yes/ 10 min Yes/ 10 min Yes/ 30 min       Stand w/o pain? No/ 30 min No/ 30 min No/ 10 min  No/ 10 min No/ 10 min  No/ 15-20 min  No / 10 min No/10-15min, 1/4 mile No/ 10-15  No/ 15 min No/ 10-15 min Yes/ 10-15 min  Yes/ 30 min w/ support Yes/ 15- 20 min       Walk w/o pain? varies No/ 1 mile No/ 1/2 mile No/ 1 mile No/ 1mile, piriformis, B No/ a couple miles  No/ 1/2 mile No/ not much  No/ 1/2 mile  no/ 1/2 mile No/ 1/2 mile Yes/ 10--15  yes/ 1/2 mile Yes/ 5 min       Steps w/o pain? 1 fl, avoids them 1 fl avoids  1 fl avoids  No/ avoids No/ doesn't do No/ 1 flight  No/ 1 fl  No/ 1 fl  No/ 1 fl   no/  1 fl No/ 1 fl Yes/ 1 fl   yes/ 1 fl  hasn't done      Drive w/o pain? No/ 10-15 min No/ 30-45 min No/ 30-45  Min  No/ 30-45 min No/ 35-45 No/ 35-45 No/ 30 min No/ 10 -15 min No/ 10-15 min    No/ 15 min No/ 30 min Yes/ 3 hrs     yes/ 2 hrs Yes/ 30 min       Work w/o pain? No/ 30 min No/ 30 min No/ 30  No/ 30 min No/30 mi   No/ 30 min  No/ 30 min No/10-15 min No/ 10-15  no/ 15 min Has been off work Still off work   yes/ 10 min Yes/ 30 min       # times wakes w/ pain? 4-5x/night, now taking meds at night she is able to sleep.  2x   2-3x  2x         1x         Several times   several times several times The last few nights 0x, a few times over last month   0x     0-1x  0x  x1 3-4x, but not sure if baby or pain        PLAN OF CARE:    ASSESSMENT:  Problem List:   1. SI joint dysfunction  2. Lack of spinal stabilization  3. Postural dysfunction     Discussion:  Tete's lumbar symptoms are minimal to none today!  She presents with persistent SI joint symptoms, significant pain and tightness in the right piriformis.   After much manual work on the Si joints, positional isometric contraction technique and stretching was able to decrease the right piriformis and QL pain.  As Tete continues in her pregnancy it would not be unusual for the SI ligament strain  to increase.  Discussed the use of various SI belts, the ones at [employer] etc.  The most effective SI belt that I have used is a custom one made by [facility], Maternity SI lumbo pelvic support.  If she should choose to go this route, I did measure her for this at 24 weeks gestation . She can call and order it as long as her OB is fine with it.      Tete wont be moving until mid Nov thus plans to return for one more visit before she moves for instruction in how to lift, hold, wash and change baby with minimal stress on the lumbar spine.       Short Term Goals: (4-6 weeks)                                   Date Met:                1.   pt independent in postural correction         02/07/19  2.   pt independent in SI joint self-corrections        03/21/19  3.   pt independent in exer to decrease post. disc pressures      03/21/19  4.   pt able to sleep through night without pain        02/11/20  5.   pt able to sit 15 minutes without pain         10/29/20  6.   pt able to walk 10 minutes without pain        06/20/19    7.   Reduce pt pain to no greater than 7/10        03/21/19  8.   Decrease Oswestry Pain Score to no greater than 45/100      08/11/20  9.  Reduce pt pain to no greater than 6/10        07/16/19  10.  Pt able to isolate the pelvic floor in supine x10, 10 sec      03/28/19  11.  Pt able to isolate transverse abdom in all 4s, x10 sec, x10         03/28/19  12.  Pt able to isolate the multifidus in sitting x10 sec, x10                            03/28/19  13.  Pt able to engage inner unit, move from sit to stand &back to sit      04/04/19                                                                                                                                                                                                                                       14.  Pt able to engage inner unit and walk 4 steps without overflow to hamstrings   06/20/19  15.  Pt able to hip abduct x6 without engaging the  "piriformis      06/20/19     16.  Pt able to perform prone knee flexion without engaging piriformis x6          05/07/19       17.  Pt able to perform remedial lower abs with scissor arm work x6 w/o pain    04/18/19    18.  Pt able to perform \"Pre-cursor\" lower abs leg lift initiation x6 without pain          04/18/19   19.  Pt able to perform retro step without engaging piriformis x6, bilaterally    09/24/19    20.  Pt able to perform bridging x6 without engaging piriformis      04/25/19    21.  Pt able to perform \"scissors\" arm movement with inner unit w/o piriformis x6   05/02/19  22.  Pt able to perform PIC hip adductors without engaging the piriformis  x6    05/07/19  23.  Pt able to perform \"Scissors\" arm motion w/ inner unit with 1lb wts x6 w/o pain   05/07/19  24.  Pt able to perform prone glut max over stability ball x6 w/o engaging piriformis   25.  Pt able to reduce pain w/ self PIC to left psoas       05/14/19  26.  Pt able to move laterally on stability ball in sitting without pain x6     11/05/19  27.  Pt able to move A/P movement sitting on stability ball without pain x6    11/05/19  28.  Pt independent in pool exercises, to do without increasing pain     07/09/19  29.  Pt able to throw and catch stability ball in supine with inner unit on x6    06/11/19  30.  Pt able to hold medicine ball in stance and move away from trunk with IU on and w/o pain x6 06/11/19  31.  Pt independent in supine hamstring stretch, able to do 2x without pain.    07/16/19  32.  Pt independent in hip adductor stretching, supine and stance     07/16/19  33.  Pt independent in quad stretching, in stance, without pain.      08/06/19  34. Pt independent in hip internal rotator stretch in supine, without pain      08/06/19  35.  Pt able to tolerate ADLs with leukotape on scapula for enhanced spinal stabilization x 1 day 07/25/19  36.  Pt able to perform prone walk out x6 without losing inner unit engagement    11/12/19  37. Pt able " to perform prone arm lift x6, phase 1, without engaging the upper trap   08/06/19  38.  Pt able to perform supine obliques over stability ball x6 without losing inner unit engagement 08/06/19  39.  Pt able to perform prone arm lift, lifting elbow without engaging upper traps x6   11/12/19  40.  Pt able to perform upper quarter stretches without increasing pain     09/17/19  41.  Pt able to do adapted BKFO without engaging piriformis x6        42.  Pt able to do diaphragmatic breathing x6 without engaging piriformis       11/12/19    43.  Pt able to ride the [employer] x10 min without increasing pain  44.  Pt able to tolerate the inversion table, 20 sec down, 30 up x 6 cycles    06/09/20     45.  Pt independent in the use of TENS unit                                                                                                                                          Long Term Goals:(3-5 months)      Date Met:      1.  pt independent in self-management of symptoms   12/19/19 - for 4 weeks only       2.  pt independent in home program     12/19/19      3.  pt able to work 6 hours without pain      4.  pt able to sit 60 minutes without pain      5.  pt able to walk 45 min without pain      6.  Pt independent in use of inversion table      7.  Pt independent in swimming program to be done without pain    Progress Towards Goals:  As expected to a little slower than expected    Treatment Plan:   1.  Ultrasound to increase extensibility, decrease pain, if needed  2.  Electrical stimulation for muscle relaxation, decrease pain, if needed, iontophoresis  3.  Manual therapy to increase function, decrease pain  4.  Therapeutic exercise to increase function, decrease pain  5.  Home programming and d/c planning to increase function and decrease pain,     Frequency & Duration:   Will see for one more visit prior to her move to [facility].     Patient Participated in and Agrees With This Plan of Care and Goals:  YES  Rehab  Potential:  jarret Marcial, PT, MS  Physical Therapist  [place]# [id number]      TREATMENT TODAY:  Total Treatment Time: (time in clinic)   60  min  Timed Code Treatment Minutes:     60      Supplies given:      Manual Therapy:  (MA)  RX min:    55  Manual posterior rotation of left innominate    Positional Isometric contraction (PIC) of right iliopsoas    Positional Isometric contraction of (PIC) right gluteus minimus    Distraction of both SI jts in open pack hip position  Piriformis stretching, bilaterally    Quadratus lumborum stretching, bilaterally    Anterior/Inferior Mobilization of  right hip    Myofascial work trunk   Sternocostal and rib mobs   Manual spinal distraction  PIC C-spine  Stretching scalenes, upper traps, levator scap      Therapeutic Activities:  (TA)  RX min:  5    Neuromuscular Reeducation: (NMR)  RX min:       Ultrasound:  RX min:       1.6 vance/cm2       Location:      Iontophoresis:  6 hr patch                                Location:                           Ice:        ocedures:      Key:  i=instructed        r=review        c=corrected        a=adapted   Code Procedure/Instruction 10/29/19 11/05/19 11/12/19 11/26/19 12/19/19 01/07/20 01/21/20 02/11/20 03/03/20 06/09/20 08/11/20 09/10/20 10/08/20 10/29/20           TA         Sleeping Positions             adapted reviewed           TA Sternum-Up Posture                         TA SI jt. self-correction     reviewed        adapted            TA Lumbar Facet PIC                         TA   Order of Self-Corrections                         TA Use of lumbar pillow                         TA Use of cervical roll                         TA Use of orthotics                         TA Use of SI belt                         TA Use of Nada chair                         TA Use of Ice                         TA Use of [employer]/ heat                         TA Use of [employer]raw[employer]x Relief                         TA Use of TENS unit                          TA Use of iontophoresis                         TA Decreasing Disc Pressures             In all 4s position            TA Use of sacro wedge                         TA Use of inversion table Used for 10 min  Used for 13 min  Used for 10 min  Used 14 min reviewed and used 15 min                    TA Use of sit/stand desk            instructed reviewed            NMR Use of airdyne w/ IU on/off       instructed                  NMR Leukotaping upper quarter        applied                 NMR Pelvic Floor Isolation                         NMR Transverse Abdominus                         NMR Multifidus Isolation                         NMR Diaphragmtic breathe supine  reviewed                       NMR  Diaphragmtic breath sit                         NMR Pelvic Clock in sitting                         NMR Kinesiotape   On right scapula                      NMR InnerUnit against Gravity     reviewed                    NMR Sit-stand w/ inner unit                         NMR Roll w/ inner unit                         NMR Walk w/ inner unit                          NMR Up/down steps w/ inner unit                         NMR Water Exer Instruction    instructed                     NMR Hip Abd w/o piriformis                         NMR Hip Add w/o iliop/ham                          NMR Lower abs in supine                         NMR Inner unit challenge with scissor arm work                          NMR Abd obliques in supine  reviewed                       NMR Prone Knee Flexion                         NMR Supine glute w/ ball btwn knees                          NMR Prone glut amrita                         NMR Retro Step                         NMR Retro Walk                         NMR Retro walk on treadmill        instructed                 NMR Forward walk w/ inner unit  reviewed      reviewed                 NMR Stability ball multifidus bounce                         NMR Stability Ball A/P & Lateral   reviewed                       NMR Ball Catch/Throw /Supine                         NMR Ball movemt in /Stance                         NMR StabilityBall All 4's Arm Lift                         NMR StabilityBall Prone Walk Out                         NMR StabilityBall Supine Oblique                         NMR Stability Ball sit-supine-sit                         NMR Walking Prog Progression                         MNR Home program sequencing                                                   TA Hamstring stretch                         TA Hip Ext Rot Stretch              reviewed           TA Hip Int Rot Stretch                         TA Hip Flex/IT Stretch                         TA Hip Add Stretch                         TA Lats Dorsi Stretch                         TA Gastroc/soleus stretch                         TA QuadratusLumborm Stretch                            Supine              reviewed              Stance                         TA Quad Stretch                                                   NMR Wall Push Ups                         NMR Planking                         NMR BOSU Ball Exercises                         NMR Lateral Bridge Drop                         NMR Waiters Minerva                         NMR O'Feldt Lat Pull Down                         NMR O'Feldt Hip Ext                         NMR O'Feldt Trunk Ext                                                   TA CervDiscExtSup/stnd    instructed/reviewed                     TA Cerv Stretches                         TA Self PIC Cervical Spine                         TA Neural Gliding                         TA Upper trap stretching                         TA Ant/Mid Scalene Strch                         TA Levator Scap strch                         TA Biceps stretch                         TA Rotator Cuff Stretch         instructed                TA Anterior Chest Stretch                         TA Wrist Ext Stretch                                                    NMR Prone Arm Lifts                         NMR Scapula Stabilization                         NMR Occulomotor Isometrics                         NMR Cervical Isometrics                                                   TA Shld AROM w/bar                         TA Shld Capsular Stretching                         TA Self PIC Thor Spine                         TA Rot Cuff Therabd, beginner         instructed                TA Rot Cuff Therabd, ruy Marcial, PT, MS  Physical Therapist  [place]# [id number]

## 2020-11-10 ENCOUNTER — TREATMENT (OUTPATIENT)
Dept: PHYSICAL THERAPY | Facility: CLINIC | Age: 35
End: 2020-11-10

## 2020-11-10 DIAGNOSIS — R29.3 POSTURE IMBALANCE: ICD-10-CM

## 2020-11-10 DIAGNOSIS — M35.7 FAMILIAL LIGAMENTOUS LAXITY: ICD-10-CM

## 2020-11-10 DIAGNOSIS — M53.3 SACROILIAC JOINT DYSFUNCTION: Primary | ICD-10-CM

## 2020-11-10 DIAGNOSIS — S39.012D STRAIN OF LUMBAR REGION, SUBSEQUENT ENCOUNTER: ICD-10-CM

## 2020-11-10 PROCEDURE — 97530 THERAPEUTIC ACTIVITIES: CPT | Performed by: PHYSICAL THERAPIST

## 2020-11-10 PROCEDURE — 97140 MANUAL THERAPY 1/> REGIONS: CPT | Performed by: PHYSICAL THERAPIST

## 2020-11-10 NOTE — PROGRESS NOTES
"Physical Therapy Note      SUBJECTIVE:   Changes since last seen:  \"Surpringlly, I've been feeling pretty good;  a little stiff.\"  She hasn't asked her OB about the SI belt yet, she will ask on her next visit.   She drove to [place] and back, worked remotely in [place] but without a sit stand desk!    She notes she slept on a much firmer matttress in [facility] which seemed to minimize her low back pain.     Current level of pain:    3/10  Low back/ right piriformis         Neck pain   0/10  Stiff , more upper thoracic pain now   Lowest level of pain since last seen:  3/10                0/10  Highest level of pain since last seen:   5/10  Last weekend         1-2/10       Past Medical History:  1.  AA 2013  Treated with PT for left shoulder labral tear, no low back treatment   2.  Hx of two falls when mopping in socks, over the last 2 years  3.  Hx of regular, heavy, cramping menses  4.  T& A, 2006  5.  Allergic to Wellbutrin and mild allergy to contrast dye  6.  Chronic yeast infections  7.  Uterine fibroids removed, 2 yrs ago       Functional Limitations:  Sitting, standing, walking, stairs, driving, working, sleeping, squatting, housework and changing positions.   Patient Goals for Physical Therapy: \"Pain relief\"      OBJECTIVE:  Observation:     Slight right  lateral shift of pelvis        10/29/20 11/10/20                   SI Joint Positioning  Right  Left Right  Left Right  Left Right  Left Right  Left Right  Left Right  Left Right  Left Right  Left Right  Left Right  Left Right  Left Right  Left Right Left Right Left Right Left Right  Left Right  Left       Innominate                            Anterior               x               x                          Posterior   x   x                      Sacrum                          Unilateral rotation   x   x                                        SI Joint Testing  Right  Left Right  Left Right  Left Right  Left Right  Left Right  Left Right  Left Right  " Left Right  Left Right  Left Right  Left Right  Left Right  Left Right Left Right Left Right Left Right  Left Right  Left           Distraction   +          +   +         +                           Joanna's   Sl         sl   Sl       Sl                            Compression   -          -   -          -                           Prone Press Up Unable to do Unable to do                                        Special Tests  Right   Left Right  Left Right  Left Right  Left Right  Left Right  Left Right Left  Right  Left Right  Left Right  Left Right  Left Right  Left Right  Left Right Left Right Left Right Left Right Left  Right  Left      Straight Leg Raise                             Active   -         -   -           -                           Passive   -         -   -             -                       Hip Scour Test   Sl        sl   Sl         sl                                        Bakers Cyst   -          -   -           -                        08/06/19 08/13/19 09/17/19 09/24/19 10/01/19 10/29/19 11/05/19 11/12/19 11/26/19 12/19/19 01/07/20 01/21/20 02/11/20 03/03/20 06/09/20 08/11/20 09/10/20 10/08/20   SI Joint Positioning  Right  Left Right  Left Right  Left Right  Left Right  Left Right  Left Right  Left Right  Left Right  Left Right  Left Right  Left Right  Left Right  Left Right Left Right Left Right Left Right  Left Right  Left       Innominate                            Anterior    x               x     x               x   X               x               x              sl               x                x                x Sl                sl     x sl   sl               sl              x          Posterior               x     x                x    x               x   x    x   sl    x   x   x              sl    sl                x                Sl              sl   sl  x      Sacrum                                    Unilateral rotation    x      x    x   x   x   x   x    x     x   x    x   x      sl   x    x   x   x   x                           SI Joint Testing  Right  Left Right  Left Right  Left Right  Left Right  Left Right  Left Right  Left Right  Left Right  Left Right  Left Right  Left Right  Left Right  Left Right Left Right Left Right Left Right  Left Right  Left           Distraction   +          +   +           +   +         +   +           +   +           +   +           +     +          +    +           +   +          +   +           +  +          +     +         +    +          +   +          +   +         +   +           +   +         +   +          +           Joanna's   +          +   +         +   +          +   +         +   -           +                        Compression   Sl        Sl    Sl         Sl    -          -      -          -                        Prone Press Up   -          -   -           -   -            -   -          -   Sl        Sl                                      Special Tests  Right   Left Right  Left Right  Left Right  Left Right  Left Right  Left Right Left  Right  Left Right  Left Right  Left Right  Left Right  Left Right  Left Right Left Right Left Right Left Right Left  Right  Left      Straight Leg Raise                             Active   -            -    -         -    -           -     -         -   -        -                    Passive   -           -    -          -   -          -     -        -   -          -                Hip Scour Test   sl           sl   Sl         Sl    +         sl    Sl        Sl    Sl       sl   Sl          sl   Sl         Sl    Sl        sl   Sl         sl   Sl         sl   +        sl   Sl       Sl   sl         sl    +          +   +        sl   +        sl   +         sl   +           sl                         Bakers Cyst   -            -     +          -     Sl        -   -           -   -         -   +         -   ++       -   +          sl   Sl          -  sl          -   +         -   -          -    Sl       sl   Sl          -   -           -    -          -         Muscle/Bone Irritation  Date 10/29/20 11/10/20                    Right  Left Right  Left Right  Left Right  Left Right  Left Right  Left Right  Left Right  Left Right  Left Right  Left Right  Left Right  Left Right  Left Right Left Right Left Right Left Right  Left Right  Left   Piriformis   ++       sl  sl        -                   Gr. Trochanter  ++        sl   Sl       -                   IT Band   +         -   -         -                   Quadratus Lumborum   +        sl   +       S                    Ischial Tuberosity    -       -   -          -                   Sacrococcygeal Ligs Not tested   -         -                   Paraspinals   sl        -                   Spinous Processes                           C-spine rotated to   C2     C3-7    C4                         T-spine rotated to   T4-5  T3-4                          L-spine rotated to  -         -   -         -                   Adductor Tony  sl        sl  sl         sl                   Iliopsoas   +         sl   +          -                   Quad Origin   -         -   -         -                   SI Joint  +        -   Sl         -                   Pubic Symphysis       +        -                      Obturator externus   +          -   -          -                                                               Ant/middle scalenes   ++        +  +        sl                   Upper traps  ++        sl   +        sl                   Levator scap   ++       sl   +        sl                   sternocleidomastoid    -         -                                        sternocostals   ++        +   +        Sl                        Date 08/06/19 08/13/19 09/17/19 09/24/19 10/01/19 10/29/19 11/05/19 11/12/19 11/26/19 12/19/19 01/07/20 01/21/20 02/11/20 03/03/20 06/09/20 08/11/20 09/10/20 10/08/20    Right  Left Right  Left Right  Left Right  Left Right  Left Right  Left Right  Left Right  Left  Right  Left Right  Left Right  Left Right  Left Right  Left Right Left Right Left Right Left Right  Left Right  Left   Piriformis   ++        ++   ++       ++   ++        ++   +         +    ++       +   ++        ++    ++        +   ++        sl   ++         Sl    +         +  +           +   +         sl   +         sl   ++        +    +       sl   -          -   +         sl    X        -   Gr. Trochanter    +        -   ++          +   ++      +   ++         +     Sl        -   ++        +    +         Sl    ++         sl   ++         sl   +          sl   +         sl   +         sl   Sl        +   ++        +      +     sl   -            -  sl           -     x          -   IT Band    Sl         Sl    ++        +   +         sl   Sl        Sl     +          Sl    ++         +    +         -    +         -   +           -   +         -   Sl         -   Tight    -   Sl         sl   +          sl    -            -   -           -     -           -   Quadratus Lumborum   ++         +   +         +   ++        +   +         Sl    ++        +   ++         +   +         ++     +         +   +         ++    +        +   +          +   +         + inst sl/ inst  sl   +          s;  +         Sl    -            -   Sl         sl inst +   Ischial Tuberosity    -         -   -          -    -        -   -            -     Sl       -   Sl         Sl     -          Sl      -         -   -           -   -          sl   -           -   -         -   -         -   +        -   +        Sl    -           -   -         -   -             -   Sacrococcygeal Ligs    +          +   +       +   +         sl    +         +     -          -   -          -   +         -   -           +   Sl        +   Sl        sl   Sl         Sl    Sl        Sl    -         -   Sl        _   Sl         sl   -             -   -          -   -            -   Paraspinals                     Spinous Processes   +           sl   ++        sl    +           -                        C-spine rotated to    C2-5 C2-6 C2-5   -             - C2-3  C2-4           C3-7  C2    C3-7       C2-4   C5-7 C2-6 C2-6 C3-6 C5-6 C2-6 C2-7 C2-5 C2-6 C2,4,5         T-spine rotated to  T4-6 T4-7 T3-8   T3-7  T4-6  T4-6 T4-5 T3-12  T3-7 T3-6 T5-9 T4-8 T4-8  T3-8 T1-4  T3-4 T3-6 T4-6          L-spine rotated to  L2-5 L2-5   L1-5  L3-5  L3-5  L2-5             L3-5 L2-5 L2-5  L2-5 L3-5 L4-5 L4-5  L3-5    -         - L1-5 L3          L1   -         -   Adductor Tony   +           +   ++      ++   +         ++   +        +   +           +   +           +   Sl        Sl    Sl        Sl    Sl       sl  sl         Sl   sl       Sl    -         -   -           -  -          -    -          -   Sl       sl   Iliopsoas   ++          +   +          sl   +         sl   +          Sl    ++        +  ++        +   +         +   -          +   -         sl   Sl          +   +        +   Sl        Sl    +         +    Sl       sl   Sl        Sl  Sl          sl   Sl       sl   Quad Origin    -          -   -          -   -        -   -          -    -          -   -           -   -          -   -          -   -           -   -         -   -           -   -          -   -           -   -           -    -           -   -          -   SI Joint   +        +   ++        ++  ++        +   ++       +   +            +   ++         +   ++      +    +         sl   ++       +  ++        +   +          sl   +         Sl   ++        -    +          +   Sl        sl   Sl       Sl     +         -    +         +   Pubic Symphysis         +        +        +       +  not tested        ++           +          +         +          +          +       +       Sl         +         sl         +           +          +   Obturator externus   +         +   +         +   Sl       sl   -           -   -          -    -          -   -         -   Sl       Sl    Sl        sl   Sl         sl   Sl        Sl    Sl       Sl     Sl         sl    Sl       Sl   -           -   -          -   Sl         Sl    Rectus Diastasis                                          Ant/middle scalenes   +        sl   +         +   +         +   +          +   +        sl   +          +   +        Sl    +        sl   +         +   +        +   ++        +   +       +   +          +   ++        +   +        sl   Sl         -   +           sl   +          +   Upper traps   +         Sl    +         +   +         +   +         sl   +        Sl    +          sl  sl         sl  +        sl   +        Sl     +        sl   +         sl   +       sl   +          sl  ++         +   +        Sl    Sl          -   +          sl   +         sl   Levator scap   +        Sl    +        +   +        sl   +         sl   +         sl    ++        sl   +          Sl   +         sl   +         s;   +         +   +          sl   +       sl   +          sl  ++         +   +         sl   Sl         -   +          sl   +            +   sternocleidomastoid   -         -   -            -   -          -    -          -   -          -   -            -    -         -   -          -   -          -   -           -   -         -   -         -   -          -   -           -   -         -    -         -                          sternocostals   +          +   +          +   +        Sl   ++          +   +          +   +             +   Sl       Sl    +          +  +          sl   Sll        sl   +          sl   +        +   +         +   +          +   Sl         Sl    +         +  ++         ++    +         +     All 4s Positioning: Pt is able to assume full lumbar extension in this position  Leg Length:  Equal today        Monthly Objective Measurement/Functional Activity  Date: 01/08/2019 03/21/19 04/25/19 06/05/19 07/09/19 09/17/19 10/29/19 11/26/19 01/07/20 02/11/20 06/09/20 08/11/20 09/10/20 10/29/20   Oswestry Pain Score 52/100  46/100    48/100      48/100       50/100 48/100 52/100   50/100     50/100      50/100     39/100   46/100     48/100                                     AROM Lumbar Spine: Degrees   Degrees      Degrees      Degrees      Degrees      Degrees Degrees Degrees Degrees Degrees Degrees Degrees      Degrees    Degrees      Flexion 106 pain          99          83       75 pain          63 onset of pain         69 pain increased   57 pain       25        53       42    68 onset of pain        71      58   42 pain in ribs       Extension 3 pain           17           19       11 pain           11  Pinches       16 pain increased 12  instant pain in right SI jt         12 pain lumbar spine       9   pain      22 pain    19 tight      24 stiff 15 pinch in low back      Right Lat. Flexion 29 pain right trunk          24        25      25    23 pinch       19 pinch  14 24    20     20      27         32      31    21      Left Lat. Flexion 31         19         23     14  15 pinch     11 pinch  18 19 18 easier        14        26         17    25     26      Right Lat Shift Pelvis inc pain   Inc pain Left SI jt       Slight increase No change but can't do far Feels caught  Increased pain  Sl inc pain No change in pain  No change in lumbar but inc hip Discomfort in right buttock  Slight pain piriformis Tweaks on left SI    Slight pain  No change      Left Lat Shift Pelvis  less inc pain       No change       No change/ slight cielo  No change but can't go far  Easier but not natural  Increased pain  Sl  Inc pain No change in pain  No change lumbar but sl inc hip  Inc pain left buttock  Sl pain piriformis  Not as easy   slight pain  no change                    AROM Hips:  (degrees) Right      Left Right Left Right  Left Right  Left Right  Left Right  Left Right Left Right Left Right Left Right Left Right Left Right left Right  Left Right  Left      Flexion 110       110  125    120    128      125 130       130 130      130 125      120 128     120  125      120    125   122  120      125  125     125  128      122 122       127   120     125      Abduction 37       41   45      45      42      44  43          45   45       45  45          45 43        45  40         45  43         45   45        45   45        45  48       46   45        45  43        45      Int. Rotation 32       22  30      25      31         28  32          30   34        32   30         28 32        31  30          33  31         35  30         35  35         40  34         32  35        30  35        34      Ext. Rotation  34       42  35      40       38        41   35         40   40        42   35         37  38        39  35           40  36          40  35         40   40      43   48       45  45        43  45        45                    Flexibility:   (degrees) Right    Left Right  Left Right  Left Right  Left Right  Left Right  Left Right  left Right Left Right Left Right Left Right Left Right Left Right  Left Right  Left      Hamstrings -32       -28  -30     -25   -25       -25   -28      -27  -30      -26   -28       -29  -25       -25  -25       -20   -25      -21    -18     -15  -20    -17  -22     -20   -20    -18      Quadriceps 39       42 40        45    not tested  40          42  45        47   50         52  not tested   55         55  48        59    55        58   65        60   65      65   65      68  not tested      HipExt/Rot(piriformis) 30       28   32      30  33         32   35         33   37        35   35        35  38       36  35        40   29        37  35         40    40        42  43       42  45         40 38         41      Hip Int/Rotators 27       9   25      10   28        15   27         16   25         18   20        20  22      18  18        23   20       25   25        25   30        31   33      32  30         29 29        25      Hip Flexors (iliopsoas) -       -                   IT Band -       -                                 Reflexes: Right      Left Right  Left Right  Left Right   Left Right  Left Right  Left Right Left Right left Right left Right Left Right left Right Left Right  Left Right  Left      L4 (Quad) +1       +1                   S1 (Achilles) +1       +1                                 Root Level  - Motor Right      Left Right  Left Right  Left Right  Left Right  Left Right  Left Right Left Right Left Right Left Right Left Right Left Right Left Right  Left Right  Left     T12             Rectus Abdominus 4+/5     4+/5         5/5        5/5             5/5          5/5         5/5        5/5         5/5         5/5      L1              Paraspinals 5/5         5/5        5/5        5/5    5/5       5/5   5/5        5/5   5/5       5/5   5/5       5/5   5/5       5/5      L2              Hip Flexion 5/5          5/5         5/5        5/5    5/5        5/5  5/5         5/5  5/5        5/5   5/5       5/5  5/5        5/5      L3             Quads 5/5          5/5         5/5        5/5   5/5         5/5   5/5        5/5  5/5        5/5  5/5       5/5  5/5        5/5      L4              Anterior Tibialis 5/5         5/5       5/5         5/5   5/5         5/5   5/5        5/5  5/5        5/5  5/5        5/5  5/5        5/5      L5             Gr. Toe Extension 5/5           5/5         5/5        5/5   5/5        5/5   5/5        5/5  5/5        5/5  5/5        5/5  5/5        5/5      S1            Gastroc/Sol/Peroneals 5/5          5/5       5/5          5/5  5/5        5/5  5/5        5/5  5/5        5/5   5/5         5/5  5/5        5/5                    Functional Strength:                   Single LegStanceTime (seconds) R:30   L:30   R:      L: R:      L: R: 30 pain     L:30 pain  R:      L: R:      L: R:       L: R:       L: R:       L: R:       L: R: 30    L:30 R:       L:  R:      L: R:      L:     Pelvic Floor Isolation (seconds) -    10 sec 10 sec   > 10 sec   > 10 sec            Inner Unit  Isolation (seconds) -    10 sec 10 sec     > 10 sec   > 10 sec                           Heart Rate        (12/19/19)         Blood Pressure           81 bpm                    109/78                                           Functional Activities:        11/26/19            Sit w/o pain? Pain increases (minutes) No/ 30 min No/ 30  min No/ 30 min  No/  30 min No/ 30 min  No/ 30min  No/ 30 min No/ 10-15 min No/ 10-15 min  No/ 15 min No/ 30 min Yes/ 10 min Yes/ 10 min Yes/ 30 min       Stand w/o pain? No/ 30 min No/ 30 min No/ 10 min  No/ 10 min No/ 10 min  No/ 15-20 min  No / 10 min No/10-15min, 1/4 mile No/ 10-15  No/ 15 min No/ 10-15 min Yes/ 10-15 min  Yes/ 30 min w/ support Yes/ 15- 20 min       Walk w/o pain? varies No/ 1 mile No/ 1/2 mile No/ 1 mile No/ 1mile, piriformis, B No/ a couple miles  No/ 1/2 mile No/ not much  No/ 1/2 mile  no/ 1/2 mile No/ 1/2 mile Yes/ 10--15  yes/ 1/2 mile Yes/ 5 min       Steps w/o pain? 1 fl, avoids them 1 fl avoids  1 fl avoids  No/ avoids No/ doesn't do No/ 1 flight  No/ 1 fl  No/ 1 fl  No/ 1 fl   no/  1 fl No/ 1 fl Yes/ 1 fl   yes/ 1 fl  hasn't done      Drive w/o pain? No/ 10-15 min No/ 30-45 min No/ 30-45  Min  No/ 30-45 min No/ 35-45 No/ 35-45 No/ 30 min No/ 10 -15 min No/ 10-15 min    No/ 15 min No/ 30 min Yes/ 3 hrs     yes/ 2 hrs Yes/ 30 min       Work w/o pain? No/ 30 min No/ 30 min No/ 30  No/ 30 min No/30 mi   No/ 30 min  No/ 30 min No/10-15 min No/ 10-15  no/ 15 min Has been off work Still off work   yes/ 10 min Yes/ 30 min       # times wakes w/ pain? 4-5x/night, now taking meds at night she is able to sleep.  2x   2-3x  2x         1x         Several times   several times several times The last few nights 0x, a few times over last month   0x     0-1x  0x  x1 3-4x, but not sure if baby or pain        PLAN OF CARE:    ASSESSMENT:  Problem List:   1. SI joint dysfunction  2. Lack of spinal stabilization  3. Postural dysfunction     Discussion:  Tete's low back symptoms were minimal today when she arrived however when she laid down in supine she  started to have a catching in the right SI joint.  When used PIC to hip adductors for sacral correction which typically reduces SI joint pain, instead it irritated her today.  This was resolved with posterior rotation of the left innominate and corresponding anterior rotation of the right innominate using PIC to the right psoas.  After much gently stretching of both piriformis was able to decrease her pain.  She was instructed in how to do this in supine with her feet up on a chair.      She was instructed in positioning to nurse her , positioning for changing diapers, washing, getting in and out of the crib and in and out of the car seat.  Went over how to hold her baby to minimize posterior lumbar disc pressures.  Also instructed in preferable use of a backpack to carry her baby once has head control versus carrying in the front of her chest.  Also went over positioning on a stool to play with her baby on the floor and minimize her low back pain.     Tete will return for one more visit before she moves to [place], [place].  Plan to review instruction for positioning with baby and review her entire home program.       Short Term Goals: (4-6 weeks)                                   Date Met:                1.   pt independent in postural correction         19  2.   pt independent in SI joint self-corrections        19  3.   pt independent in exer to decrease post. disc pressures      19  4.   pt able to sleep through night without pain        20  5.   pt able to sit 15 minutes without pain         10/29/20  6.   pt able to walk 10 minutes without pain        19    7.   Reduce pt pain to no greater than 7/10        19  8.   Decrease Oswestry Pain Score to no greater than 45/100      20  9.  Reduce pt pain to no greater than 6/10        19  10.  Pt able to isolate the pelvic floor in supine x10, 10 sec      19  11.  Pt able to isolate transverse abdom in all  "4s, x10 sec, x10         03/28/19  12.  Pt able to isolate the multifidus in sitting x10 sec, x10                            03/28/19  13.  Pt able to engage inner unit, move from sit to stand &back to sit      04/04/19                                                                                                                                                                                                                                       14.  Pt able to engage inner unit and walk 4 steps without overflow to hamstrings   06/20/19  15.  Pt able to hip abduct x6 without engaging the piriformis      06/20/19     16.  Pt able to perform prone knee flexion without engaging piriformis x6          05/07/19       17.  Pt able to perform remedial lower abs with scissor arm work x6 w/o pain    04/18/19    18.  Pt able to perform \"Pre-cursor\" lower abs leg lift initiation x6 without pain          04/18/19   19.  Pt able to perform retro step without engaging piriformis x6, bilaterally    09/24/19    20.  Pt able to perform bridging x6 without engaging piriformis      04/25/19    21.  Pt able to perform \"scissors\" arm movement with inner unit w/o piriformis x6   05/02/19  22.  Pt able to perform PIC hip adductors without engaging the piriformis  x6    05/07/19  23.  Pt able to perform \"Scissors\" arm motion w/ inner unit with 1lb wts x6 w/o pain   05/07/19  24.  Pt able to perform prone glut max over stability ball x6 w/o engaging piriformis   25.  Pt able to reduce pain w/ self PIC to left psoas       05/14/19  26.  Pt able to move laterally on stability ball in sitting without pain x6     11/05/19  27.  Pt able to move A/P movement sitting on stability ball without pain x6    11/05/19  28.  Pt independent in pool exercises, to do without increasing pain     07/09/19  29.  Pt able to throw and catch stability ball in supine with inner unit on x6    06/11/19  30.  Pt able to hold medicine ball in stance and move away from " trunk with IU on and w/o pain x6 06/11/19  31.  Pt independent in supine hamstring stretch, able to do 2x without pain.    07/16/19  32.  Pt independent in hip adductor stretching, supine and stance     07/16/19  33.  Pt independent in quad stretching, in stance, without pain.      08/06/19  34. Pt independent in hip internal rotator stretch in supine, without pain      08/06/19  35.  Pt able to tolerate ADLs with leukotape on scapula for enhanced spinal stabilization x 1 day 07/25/19  36.  Pt able to perform prone walk out x6 without losing inner unit engagement    11/12/19  37. Pt able to perform prone arm lift x6, phase 1, without engaging the upper trap   08/06/19  38.  Pt able to perform supine obliques over stability ball x6 without losing inner unit engagement 08/06/19  39.  Pt able to perform prone arm lift, lifting elbow without engaging upper traps x6   11/12/19  40.  Pt able to perform upper quarter stretches without increasing pain     09/17/19  41.  Pt able to do adapted BKFO without engaging piriformis x6        42.  Pt able to do diaphragmatic breathing x6 without engaging piriformis       11/12/19    43.  Pt able to ride the [employer] x10 min without increasing pain  44.  Pt able to tolerate the inversion table, 20 sec down, 30 up x 6 cycles    06/09/20     45.  Pt independent in the use of TENS unit   46.  Pt independent in adapted positioning to hold her baby for feeding  47.  Pt independent in adapted positioning to wash her baby  48.  Pt independent in adapted positioning to get baby in and out of crib.                                                                                                                                          Long Term Goals:(3-5 months)      Date Met:      1.  pt independent in self-management of symptoms   12/19/19 - for 4 weeks only       2.  pt independent in home program     12/19/19      3.  pt able to work 6 hours without pain      4.  pt able to sit 60  minutes without pain      5.  pt able to walk 45 min without pain      6.  Pt independent in use of inversion table      7.  Pt independent in swimming program to be done without pain    Progress Towards Goals:  As expected to a little slower than expected    Treatment Plan:   1.  Ultrasound to increase extensibility, decrease pain, if needed  2.  Electrical stimulation for muscle relaxation, decrease pain, if needed, iontophoresis  3.  Manual therapy to increase function, decrease pain  4.  Therapeutic exercise to increase function, decrease pain  5.  Home programming and d/c planning to increase function and decrease pain,     Frequency & Duration:   Will see for one more visit prior to her move to [place].     Patient Participated in and Agrees With This Plan of Care and Goals:  YES  Rehab Potential:  jarret Marcial, PT, MS  Physical Therapist  [place]# [id number]      TREATMENT TODAY:  Total Treatment Time: (time in clinic)   60  min  Timed Code Treatment Minutes:     60      Supplies given:      Manual Therapy:  (MA)  RX min:    40  Manual posterior rotation of left innominate    Positional Isometric contraction (PIC) of right iliopsoas    Positional Isometric contraction of (PIC) right gluteus minimus    Distraction of both SI jts in open pack hip position  Piriformis stretching, bilaterally    Quadratus lumborum stretching, bilaterally    Anterior/Inferior Mobilization of  right hip    Myofascial work trunk   Sternocostal and rib mobs   Manual spinal distraction  PIC C-spine  Stretching scalenes, upper traps, levator scap      Therapeutic Activities:  (TA)  RX min:   20    Neuromuscular Reeducation: (NMR)  RX min:       Ultrasound:  RX min:       1.6 vance/cm2       Location:      Iontophoresis:  6 hr patch                                Location:                           Ice:        ocedures:      Key:  i=instructed        r=review        c=corrected        a=adapted   Code Procedure/Instruction  10/29/19 11/05/19 11/12/19 11/26/19 12/19/19 01/07/20 01/21/20 02/11/20 03/03/20 06/09/20 08/11/20 09/10/20 10/08/20 10/29/20 11/10/20          TA         Sleeping Positions             adapted reviewed           TA Sternum-Up Posture               With adaptive positions for care of baby          TA SI jt. self-correction     reviewed        adapted            TA Lumbar Facet PIC                         TA   Order of Self-Corrections                         TA Use of lumbar pillow                         TA Use of cervical roll                         TA Use of orthotics                         TA Use of SI belt                         TA Use of Nada chair                         TA Use of Ice                         TA Use of [employer]/ heat                         TA Use of [employer] Relief                         TA Use of TENS unit                         TA Use of iontophoresis                         TA Decreasing Disc Pressures             In all 4s position            TA Use of sacro wedge                         TA Use of inversion table Used for 10 min  Used for 13 min  Used for 10 min  Used 14 min reviewed and used 15 min                    TA Use of sit/stand desk            instructed reviewed            NMR Use of airdyne w/ IU on/off       instructed                  NMR Leukotaping upper quarter        applied                 NMR Pelvic Floor Isolation                         NMR Transverse Abdominus                         NMR Multifidus Isolation                         NMR Diaphragmtic breathe supine  reviewed                       NMR  Diaphragmtic breath sit                         NMR Pelvic Clock in sitting                         NMR Kinesiotape   On right scapula                      NMR InnerUnit against Gravity     reviewed                    NMR Sit-stand w/ inner unit                         NMR Roll w/ inner unit                         NMR Walk w/ inner unit                           NMR Up/down steps w/ inner unit                         NMR Water Exer Instruction    instructed                     NMR Hip Abd w/o piriformis                         NMR Hip Add w/o iliop/ham                          NMR Lower abs in supine                         NMR Inner unit challenge with scissor arm work                          NMR Abd obliques in supine  reviewed                       NMR Prone Knee Flexion                         NMR Supine glute w/ ball btwn knees                          NMR Prone glut amrita                         NMR Retro Step                         NMR Retro Walk                         NMR Retro walk on treadmill        instructed                 NMR Forward walk w/ inner unit  reviewed      reviewed                 NMR Stability ball multifidus bounce                         NMR Stability Ball A/P & Lateral  reviewed                       NMR Ball Catch/Throw /Supine                         NMR Ball movemt in /Stance                         NMR StabilityBall All 4's Arm Lift                         NMR StabilityBall Prone Walk Out                         NMR StabilityBall Supine Oblique                         NMR Stability Ball sit-supine-sit                         NMR Walking Prog Progression                         MNR Home program sequencing                                                   TA Hamstring stretch                         TA Hip Ext Rot Stretch              reviewed           TA Hip Int Rot Stretch                         TA Hip Flex/IT Stretch                         TA Hip Add Stretch                         TA Lats Dorsi Stretch                         TA Gastroc/soleus stretch                         TA QuadratusLumborm Stretch                            Supine              reviewed              Stance                         TA Quad Stretch                                                   NMR Wall Push Ups                         NMR Planking                          NMR BOSU Ball Exercises                         NMR Lateral Bridge Drop                         NMR [place]                         NMR O'Feldt Lat Pull Down                         NMR O'Feldt Hip Ext                         NMR O'Feldt Trunk Ext                                                   TA CervDiscExtSup/stnd    instructed/reviewed                     TA Cerv Stretches                         TA Self PIC Cervical Spine                         TA Neural Gliding                         TA Upper trap stretching                         TA Ant/Mid Scalene Strch                         TA Levator Scap strch                         TA Biceps stretch                         TA Rotator Cuff Stretch         instructed                TA Anterior Chest Stretch                         TA Wrist Ext Stretch                                                   NMR Prone Arm Lifts                         NMR Scapula Stabilization                         NMR Occulomotor Isometrics                         NMR Cervical Isometrics                                                   TA Shld AROM w/bar                         TA Shld Capsular Stretching                         TA Self PIC Thor Spine                         TA Rot Cuff Therabd, beginner         instructed                TA Rot Cuff Therabd, intermed                                                                                                                                                                                                                                                                                                                           Miracle Marcial, PT, MS  Physical Therapist  [place]# [id number]

## 2020-11-17 ENCOUNTER — TREATMENT (OUTPATIENT)
Dept: PHYSICAL THERAPY | Facility: CLINIC | Age: 35
End: 2020-11-17

## 2020-11-17 DIAGNOSIS — M53.3 SACROILIAC JOINT DYSFUNCTION: Primary | ICD-10-CM

## 2020-11-17 DIAGNOSIS — S39.012D STRAIN OF LUMBAR REGION, SUBSEQUENT ENCOUNTER: ICD-10-CM

## 2020-11-17 DIAGNOSIS — M35.7 FAMILIAL LIGAMENTOUS LAXITY: ICD-10-CM

## 2020-11-17 DIAGNOSIS — R29.3 POSTURE IMBALANCE: ICD-10-CM

## 2020-11-17 PROCEDURE — 97530 THERAPEUTIC ACTIVITIES: CPT | Performed by: PHYSICAL THERAPIST

## 2020-11-17 PROCEDURE — 97140 MANUAL THERAPY 1/> REGIONS: CPT | Performed by: PHYSICAL THERAPIST

## 2020-11-17 NOTE — PROGRESS NOTES
"Physical Therapy Note      SUBJECTIVE:   Changes since last seen:  A lot of upper quarter pain and right piriformis pain today , she thinks its from tossing and turning in bed; \"I'm not not sleeping well secondary to the pregnancy.\"   Tete is moving some of her household items to [place], and will meet with her new OB in [place] next week.    Since she is going to be in [place] a lot, having not sold her house, she would like to continue with physical therapy here until get settled in [place] and finds a physical therapist there.  She has just started her last trimester of her pregnancy.    Current level of pain:    4-5/10  Low back/ right piriformis         Neck pain   4/10  Stiff , more upper thoracic pain now   Lowest level of pain since last seen:  3/10                0/10  Highest level of pain since last seen:   5-6/10           4/10       Past Medical History:  1.  AA 2013  Treated with PT for left shoulder labral tear, no low back treatment   2.  Hx of two falls when mopping in socks, over the last 2 years  3.  Hx of regular, heavy, cramping menses  4.  T& A, 2006  5.  Allergic to Wellbutrin and mild allergy to contrast dye  6.  Chronic yeast infections  7.  Uterine fibroids removed, 2 yrs ago       Functional Limitations:  Sitting, standing, walking, stairs, driving, working, sleeping, squatting, housework and changing positions.   Patient Goals for Physical Therapy: \"Pain relief\"      OBJECTIVE:  Observation:     Slight right  lateral shift of pelvis        10/29/20 11/10/20 11/17/20                  SI Joint Positioning  Right  Left Right  Left Right  Left Right  Left Right  Left Right  Left Right  Left Right  Left Right  Left Right  Left Right  Left Right  Left Right  Left Right Left Right Left Right Left Right  Left Right  Left       Innominate                            Anterior               x               x              Sl                          Posterior   x   x sl                     " Sacrum                          Unilateral rotation   x   x   sl                                       SI Joint Testing  Right  Left Right  Left Right  Left Right  Left Right  Left Right  Left Right  Left Right  Left Right  Left Right  Left Right  Left Right  Left Right  Left Right Left Right Left Right Left Right  Left Right  Left           Distraction   +          +   +         +   +         +                          Joanna's   Sl         sl   Sl       Sl    -         -                          Compression   -          -   -          -   -         -                          Prone Press Up Unable to do Unable to do  unable to do                                       Special Tests  Right   Left Right  Left Right  Left Right  Left Right  Left Right  Left Right Left  Right  Left Right  Left Right  Left Right  Left Right  Left Right  Left Right Left Right Left Right Left Right Left  Right  Left      Straight Leg Raise                             Active   -         -   -           -                           Passive   -         -   -             -                       Hip Scour Test   Sl        sl   Sl         sl   Sl        sl                                       Bakers Cyst   -          -   -           -                        08/06/19 08/13/19 09/17/19 09/24/19 10/01/19 10/29/19 11/05/19 11/12/19 11/26/19 12/19/19 01/07/20 01/21/20 02/11/20 03/03/20 06/09/20 08/11/20 09/10/20 10/08/20   SI Joint Positioning  Right  Left Right  Left Right  Left Right  Left Right  Left Right  Left Right  Left Right  Left Right  Left Right  Left Right  Left Right  Left Right  Left Right Left Right Left Right Left Right  Left Right  Left       Innominate                            Anterior    x               x     x               x   X               x               x              sl               x                x                x Sl                sl     x sl   sl               sl              x          Posterior               x      x                x    x               x   x    x   sl    x   x   x              sl    sl                x                Sl              sl   sl  x      Sacrum                                    Unilateral rotation    x      x    x   x   x   x   x    x     x   x    x   x     sl   x    x   x   x   x                           SI Joint Testing  Right  Left Right  Left Right  Left Right  Left Right  Left Right  Left Right  Left Right  Left Right  Left Right  Left Right  Left Right  Left Right  Left Right Left Right Left Right Left Right  Left Right  Left           Distraction   +          +   +           +   +         +   +           +   +           +   +           +     +          +    +           +   +          +   +           +  +          +     +         +    +          +   +          +   +         +   +           +   +         +   +          +           Joanna's   +          +   +         +   +          +   +         +   -           +                        Compression   Sl        Sl    Sl         Sl    -          -      -          -                        Prone Press Up   -          -   -           -   -            -   -          -   Sl        Sl                                      Special Tests  Right   Left Right  Left Right  Left Right  Left Right  Left Right  Left Right Left  Right  Left Right  Left Right  Left Right  Left Right  Left Right  Left Right Left Right Left Right Left Right Left  Right  Left      Straight Leg Raise                             Active   -            -    -         -    -           -     -         -   -        -                    Passive   -           -    -          -   -          -     -        -   -          -                Hip Scour Test   sl           sl   Sl         Sl    +         sl    Sl        Sl    Sl       sl   Sl          sl   Sl         Sl    Sl        sl   Sl         sl   Sl         sl   +        sl   Sl       Sl   sl         sl    +          +   +        sl   +         sl   +         sl   +           sl                         Bakers Cyst   -            -     +          -     Sl        -   -           -   -         -   +         -   ++       -   +          sl   Sl          -  sl          -   +         -   -          -    Sl       sl   Sl         -   -           -    -          -         Muscle/Bone Irritation  Date 10/29/20 11/10/20 11/17/20                   Right  Left Right  Left Right  Left Right  Left Right  Left Right  Left Right  Left Right  Left Right  Left Right  Left Right  Left Right  Left Right  Left Right Left Right Left Right Left Right  Left Right  Left   Piriformis   ++       sl  sl        -   Sl          -                  Gr. Trochanter  ++        sl   Sl       -    Sl         -                  IT Band   +         -   -         -    -           -                  Quadratus Lumborum   +        sl   +       S    +         +                  Ischial Tuberosity    -       -   -          -  -           -                  Sacrococcygeal Ligs Not tested   -         - Not tested                   Paraspinals   sl        -  sl         -                  Spinous Processes                           C-spine rotated to   C2     C3-7    C4          C2-6                        T-spine rotated to   T4-5  T3-4  T4-6                         L-spine rotated to  -         -   -         -  L5                         Adductor Tony  sl        sl  sl         sl  +          +                  Iliopsoas   +         sl   +          -   Sl          -                  Quad Origin   -         -   -         -   -          -                  SI Joint  +        -   Sl         -   Sl          -                  Pubic Symphysis       +        -    Not tested                  Obturator externus   +          -   -          -  sl        Sl                                                               Ant/middle scalenes   ++        +  +        sl  ++        +                  Upper traps  ++         sl   +        sl   ++        sl                  Levator scap   ++       sl   +        sl  ++         +                  sternocleidomastoid    -         -   -          -                                       sternocostals   ++        +   +        Sl    ++       +                      Date 08/06/19 08/13/19 09/17/19 09/24/19 10/01/19 10/29/19 11/05/19 11/12/19 11/26/19 12/19/19 01/07/20 01/21/20 02/11/20 03/03/20 06/09/20 08/11/20 09/10/20 10/08/20    Right  Left Right  Left Right  Left Right  Left Right  Left Right  Left Right  Left Right  Left Right  Left Right  Left Right  Left Right  Left Right  Left Right Left Right Left Right Left Right  Left Right  Left   Piriformis   ++        ++   ++       ++   ++        ++   +         +    ++       +   ++        ++    ++        +   ++        sl   ++         Sl    +         +  +           +   +         sl   +         sl   ++        +    +       sl   -          -   +         sl    X        -   Gr. Trochanter    +        -   ++          +   ++      +   ++         +     Sl        -   ++        +    +         Sl    ++         sl   ++         sl   +          sl   +         sl   +         sl   Sl        +   ++        +      +     sl   -            -  sl           -     x          -   IT Band    Sl         Sl    ++        +   +         sl   Sl        Sl     +          Sl    ++         +    +         -    +         -   +           -   +         -   Sl         -   Tight    -   Sl         sl   +          sl    -            -   -           -     -           -   Quadratus Lumborum   ++         +   +         +   ++        +   +         Sl    ++        +   ++         +   +         ++     +         +   +         ++    +        +   +          +   +         + inst sl/ inst  sl   +          s;  +         Sl    -            -   Sl         sl inst +   Ischial Tuberosity    -         -   -          -    -        -   -            -     Sl       -   Sl         Sl     -          Sl      -          -   -           -   -          sl   -           -   -         -   -         -   +        -   +        Sl    -           -   -         -   -             -   Sacrococcygeal Ligs    +          +   +       +   +         sl    +         +     -          -   -          -   +         -   -           +   Sl        +   Sl        sl   Sl         Sl    Sl        Sl    -         -   Sl        _   Sl         sl   -             -   -          -   -            -   Paraspinals                     Spinous Processes   +           sl   ++        sl    +           -                       C-spine rotated to    C2-5 C2-6 C2-5   -             - C2-3  C2-4           C3-7  C2    C3-7       C2-4   C5-7 C2-6 C2-6 C3-6 C5-6 C2-6 C2-7 C2-5 C2-6 C2,4,5         T-spine rotated to  T4-6 T4-7 T3-8   T3-7  T4-6  T4-6 T4-5 T3-12  T3-7 T3-6 T5-9 T4-8 T4-8  T3-8 T1-4  T3-4 T3-6 T4-6          L-spine rotated to  L2-5 L2-5   L1-5  L3-5  L3-5  L2-5             L3-5 L2-5 L2-5  L2-5 L3-5 L4-5 L4-5  L3-5    -         - L1-5 L3          L1   -         -   Adductor Tony   +           +   ++      ++   +         ++   +        +   +           +   +           +   Sl        Sl    Sl        Sl    Sl       sl  sl         Sl   sl       Sl    -         -   -           -  -          -    -          -   Sl       sl   Iliopsoas   ++          +   +          sl   +         sl   +          Sl    ++        +  ++        +   +         +   -          +   -         sl   Sl          +   +        +   Sl        Sl    +         +    Sl       sl   Sl        Sl  Sl          sl   Sl       sl   Quad Origin    -          -   -          -   -        -   -          -    -          -   -           -   -          -   -          -   -           -   -         -   -           -   -          -   -           -   -           -    -           -   -          -   SI Joint   +        +   ++        ++  ++        +   ++       +   +            +   ++         +   ++      +    +         sl   ++       +   ++        +   +          sl   +         Sl   ++        -    +          +   Sl        sl   Sl       Sl     +         -    +         +   Pubic Symphysis         +        +        +       +  not tested        ++           +          +         +          +          +       +       Sl         +         sl         +           +          +   Obturator externus   +         +   +         +   Sl       sl   -           -   -          -    -          -   -         -   Sl       Sl    Sl        sl   Sl         sl   Sl        Sl    Sl       Sl     Sl         sl   Sl       Sl   -           -   -          -   Sl         Sl    Rectus Diastasis                                          Ant/middle scalenes   +        sl   +         +   +         +   +          +   +        sl   +          +   +        Sl    +        sl   +         +   +        +   ++        +   +       +   +          +   ++        +   +        sl   Sl         -   +           sl   +          +   Upper traps   +         Sl    +         +   +         +   +         sl   +        Sl    +          sl  sl         sl  +        sl   +        Sl     +        sl   +         sl   +       sl   +          sl  ++         +   +        Sl    Sl          -   +          sl   +         sl   Levator scap   +        Sl    +        +   +        sl   +         sl   +         sl    ++        sl   +          Sl   +         sl   +         s;   +         +   +          sl   +       sl   +          sl  ++         +   +         sl   Sl         -   +          sl   +            +   sternocleidomastoid   -         -   -            -   -          -    -          -   -          -   -            -    -         -   -          -   -          -   -           -   -         -   -         -   -          -   -           -   -         -    -         -                          sternocostals   +          +   +          +   +        Sl   ++          +   +          +   +             +   Sl       Sl    +          +  +           sl   Sll        sl   +          sl   +        +   +         +   +          +   Sl         Sl    +         +  ++         ++    +         +     All 4s Positioning: Pt is able to assume full lumbar extension in this position  Leg Length:  Equal today        Monthly Objective Measurement/Functional Activity  Date: 01/08/2019 03/21/19 04/25/19 06/05/19 07/09/19 09/17/19 10/29/19 11/26/19 01/07/20 02/11/20 06/09/20 08/11/20 09/10/20 10/29/20   Oswestry Pain Score 52/100  46/100    48/100      48/100       50/100 48/100 52/100   50/100     50/100     50/100     39/100   46/100     48/100                                     AROM Lumbar Spine: Degrees   Degrees      Degrees      Degrees      Degrees      Degrees Degrees Degrees Degrees Degrees Degrees Degrees      Degrees    Degrees      Flexion 106 pain          99          83       75 pain          63 onset of pain         69 pain increased   57 pain       25        53       42    68 onset of pain        71      58   42 pain in ribs       Extension 3 pain           17           19       11 pain           11  Pinches       16 pain increased 12  instant pain in right SI jt         12 pain lumbar spine       9   pain      22 pain    19 tight      24 stiff 15 pinch in low back      Right Lat. Flexion 29 pain right trunk          24        25      25    23 pinch       19 pinch  14 24    20     20      27         32      31    21      Left Lat. Flexion 31         19         23     14  15 pinch     11 pinch  18 19 18 easier        14        26         17    25     26      Right Lat Shift Pelvis inc pain   Inc pain Left SI jt       Slight increase No change but can't do far Feels caught  Increased pain  Sl inc pain No change in pain  No change in lumbar but inc hip Discomfort in right buttock  Slight pain piriformis Tweaks on left SI    Slight pain  No change      Left Lat Shift Pelvis  less inc pain       No change       No change/ slight cielo  No change but can't go far   Easier but not natural  Increased pain  Sl  Inc pain No change in pain  No change lumbar but sl inc hip  Inc pain left buttock  Sl pain piriformis  Not as easy   slight pain  no change                    AROM Hips:  (degrees) Right      Left Right Left Right  Left Right  Left Right  Left Right  Left Right Left Right Left Right Left Right Left Right Left Right left Right  Left Right  Left      Flexion 110       110  125    120    128      125 130       130 130      130 125      120 128     120  125      120    125   122  120      125  125     125 128      122 122       127   120     125      Abduction 37       41   45      45      42      44  43          45   45       45  45          45 43        45  40         45  43         45   45        45   45        45  48       46   45        45  43        45      Int. Rotation 32       22  30      25      31         28  32          30   34        32   30         28 32        31  30          33  31         35  30         35  35         40  34         32  35        30  35        34      Ext. Rotation  34       42  35      40       38        41   35         40   40        42   35         37  38        39  35           40  36          40  35         40   40      43   48       45  45        43  45        45                    Flexibility:   (degrees) Right    Left Right  Left Right  Left Right  Left Right  Left Right  Left Right  left Right Left Right Left Right Left Right Left Right Left Right  Left Right  Left      Hamstrings -32       -28  -30     -25   -25       -25   -28      -27  -30      -26   -28       -29  -25       -25  -25       -20   -25      -21    -18     -15  -20    -17  -22     -20   -20    -18      Quadriceps 39       42 40        45    not tested  40          42  45        47   50         52  not tested   55         55  48        59    55        58   65        60   65      65   65      68  not tested      HipExt/Rot(piriformis) 30       28   32      30  33          32   35         33   37        35   35        35  38       36  35        40   29        37  35         40    40        42  43       42  45         40 38         41      Hip Int/Rotators 27       9   25      10   28        15   27         16   25         18   20        20  22      18  18        23   20       25   25        25   30        31   33      32  30         29 29        25      Hip Flexors (iliopsoas) -       -                   IT Band -       -                                 Reflexes: Right      Left Right  Left Right  Left Right  Left Right  Left Right  Left Right Left Right left Right left Right Left Right left Right Left Right  Left Right  Left      L4 (Quad) +1       +1                   S1 (Achilles) +1       +1                                 Root Level  - Motor Right      Left Right  Left Right  Left Right  Left Right  Left Right  Left Right Left Right Left Right Left Right Left Right Left Right Left Right  Left Right  Left     T12             Rectus Abdominus 4+/5     4+/5         5/5        5/5             5/5          5/5         5/5        5/5         5/5         5/5      L1              Paraspinals 5/5         5/5        5/5        5/5    5/5       5/5   5/5        5/5   5/5       5/5   5/5       5/5   5/5       5/5      L2              Hip Flexion 5/5          5/5         5/5        5/5    5/5        5/5  5/5         5/5  5/5        5/5   5/5       5/5  5/5        5/5      L3             Quads 5/5          5/5         5/5        5/5   5/5         5/5   5/5        5/5  5/5        5/5  5/5       5/5  5/5        5/5      L4              Anterior Tibialis 5/5         5/5       5/5         5/5   5/5         5/5   5/5        5/5  5/5        5/5  5/5        5/5  5/5        5/5      L5             Gr. Toe Extension 5/5           5/5         5/5        5/5   5/5        5/5   5/5        5/5  5/5        5/5  5/5        5/5  5/5        5/5      S1            Gastroc/Sol/Peroneals 5/5          5/5        5/5          5/5  5/5        5/5  5/5        5/5  5/5        5/5   5/5         5/5  5/5        5/5                    Functional Strength:                   Single LegStanceTime (seconds) R:30   L:30   R:      L: R:      L: R: 30 pain     L:30 pain  R:      L: R:      L: R:       L: R:       L: R:       L: R:       L: R: 30    L:30 R:       L:  R:      L: R:      L:     Pelvic Floor Isolation (seconds) -    10 sec 10 sec   > 10 sec   > 10 sec            Inner Unit  Isolation (seconds) -    10 sec 10 sec     > 10 sec   > 10 sec                          Heart Rate        (12/19/19)         Blood Pressure           81 bpm                    109/78                                           Functional Activities:        11/26/19            Sit w/o pain? Pain increases (minutes) No/ 30 min No/ 30  min No/ 30 min  No/  30 min No/ 30 min  No/ 30min  No/ 30 min No/ 10-15 min No/ 10-15 min  No/ 15 min No/ 30 min Yes/ 10 min Yes/ 10 min Yes/ 30 min       Stand w/o pain? No/ 30 min No/ 30 min No/ 10 min  No/ 10 min No/ 10 min  No/ 15-20 min  No / 10 min No/10-15min, 1/4 mile No/ 10-15  No/ 15 min No/ 10-15 min Yes/ 10-15 min  Yes/ 30 min w/ support Yes/ 15- 20 min       Walk w/o pain? varies No/ 1 mile No/ 1/2 mile No/ 1 mile No/ 1mile, piriformis, B No/ a couple miles  No/ 1/2 mile No/ not much  No/ 1/2 mile  no/ 1/2 mile No/ 1/2 mile Yes/ 10--15  yes/ 1/2 mile Yes/ 5 min       Steps w/o pain? 1 fl, avoids them 1 fl avoids  1 fl avoids  No/ avoids No/ doesn't do No/ 1 flight  No/ 1 fl  No/ 1 fl  No/ 1 fl   no/  1 fl No/ 1 fl Yes/ 1 fl   yes/ 1 fl  hasn't done      Drive w/o pain? No/ 10-15 min No/ 30-45 min No/ 30-45  Min  No/ 30-45 min No/ 35-45 No/ 35-45 No/ 30 min No/ 10 -15 min No/ 10-15 min    No/ 15 min No/ 30 min Yes/ 3 hrs     yes/ 2 hrs Yes/ 30 min       Work w/o pain? No/ 30 min No/ 30 min No/ 30  No/ 30 min No/30 mi   No/ 30 min  No/ 30 min No/10-15 min No/ 10-15  no/ 15 min Has been off work Still off work   yes/  10 min Yes/ 30 min       # times wakes w/ pain? 4-5x/night, now taking meds at night she is able to sleep.  2x   2-3x  2x         1x         Several times   several times several times The last few nights 0x, a few times over last month   0x     0-1x  0x  x1 3-4x, but not sure if baby or pain        PLAN OF CARE:    ASSESSMENT:  Problem List:   1. SI joint dysfunction  2. Lack of spinal stabilization  3. Postural dysfunction     Discussion:  Minimal lumbar facet restriction at L5 and very slight asymmetry in the SI joints; both easily corrected with manual work.  This is a little harder for Tete to do on her own at home now, secondary to her pregnancy.      Spent majority of visit on manual work for the upper quarter.  She had facet restriction at the level of C2-6 with facets restricted in extension on the left and in flexion on the right.  The right anterior/middle scalenes, upper trap and levator scapula were all extremely tight, much more than previously seen.  Reviewed the self stretching for the upper trap and scalenes which has been given previously.      When she sees her new OB in [place] next week, she will inquire about possible use of a custom SI belt and will discuss concerns with pushing in labor with the lumbar disc symptoms she has had.     Tete is getting a little concerned about the move to [place] and not having a physical therapist there yet.  I did give her some names of physical therapist in [place], that might be of help for her.  In the meantime, I am happy to see her when she is in town for manual work as needed until she gets established with a P.T. in [place].   We did go over positioning to minimize posterior disc pressures when working with her  on last visit but she may need review of this as the birth gets closter.       Short Term Goals: (4-6 weeks)                                   Date Met:                1.   pt independent in postural correction         19  2.  "  pt independent in SI joint self-corrections        03/21/19  3.   pt independent in exer to decrease post. disc pressures      03/21/19  4.   pt able to sleep through night without pain        02/11/20  5.   pt able to sit 15 minutes without pain         10/29/20  6.   pt able to walk 10 minutes without pain        06/20/19    7.   Reduce pt pain to no greater than 7/10        03/21/19  8.   Decrease Oswestry Pain Score to no greater than 45/100      08/11/20  9.  Reduce pt pain to no greater than 6/10        07/16/19  10.  Pt able to isolate the pelvic floor in supine x10, 10 sec      03/28/19  11.  Pt able to isolate transverse abdom in all 4s, x10 sec, x10         03/28/19  12.  Pt able to isolate the multifidus in sitting x10 sec, x10                            03/28/19  13.  Pt able to engage inner unit, move from sit to stand &back to sit      04/04/19                                                                                                                                                                                                                                       14.  Pt able to engage inner unit and walk 4 steps without overflow to hamstrings   06/20/19  15.  Pt able to hip abduct x6 without engaging the piriformis      06/20/19     16.  Pt able to perform prone knee flexion without engaging piriformis x6          05/07/19       17.  Pt able to perform remedial lower abs with scissor arm work x6 w/o pain    04/18/19    18.  Pt able to perform \"Pre-cursor\" lower abs leg lift initiation x6 without pain          04/18/19   19.  Pt able to perform retro step without engaging piriformis x6, bilaterally    09/24/19    20.  Pt able to perform bridging x6 without engaging piriformis      04/25/19    21.  Pt able to perform \"scissors\" arm movement with inner unit w/o piriformis x6   05/02/19  22.  Pt able to perform PIC hip adductors without engaging the piriformis  x6    05/07/19  23.  Pt able to " "perform \"Scissors\" arm motion w/ inner unit with 1lb wts x6 w/o pain   05/07/19  24.  Pt able to perform prone glut max over stability ball x6 w/o engaging piriformis   25.  Pt able to reduce pain w/ self PIC to left psoas       05/14/19  26.  Pt able to move laterally on stability ball in sitting without pain x6     11/05/19  27.  Pt able to move A/P movement sitting on stability ball without pain x6    11/05/19  28.  Pt independent in pool exercises, to do without increasing pain     07/09/19  29.  Pt able to throw and catch stability ball in supine with inner unit on x6    06/11/19  30.  Pt able to hold medicine ball in stance and move away from trunk with IU on and w/o pain x6 06/11/19  31.  Pt independent in supine hamstring stretch, able to do 2x without pain.    07/16/19  32.  Pt independent in hip adductor stretching, supine and stance     07/16/19  33.  Pt independent in quad stretching, in stance, without pain.      08/06/19  34. Pt independent in hip internal rotator stretch in supine, without pain      08/06/19  35.  Pt able to tolerate ADLs with leukotape on scapula for enhanced spinal stabilization x 1 day 07/25/19  36.  Pt able to perform prone walk out x6 without losing inner unit engagement    11/12/19  37. Pt able to perform prone arm lift x6, phase 1, without engaging the upper trap   08/06/19  38.  Pt able to perform supine obliques over stability ball x6 without losing inner unit engagement 08/06/19  39.  Pt able to perform prone arm lift, lifting elbow without engaging upper traps x6   11/12/19  40.  Pt able to perform upper quarter stretches without increasing pain     09/17/19  41.  Pt able to do adapted BKFO without engaging piriformis x6        42.  Pt able to do diaphragmatic breathing x6 without engaging piriformis       11/12/19    43.  Pt able to ride the [employer] x10 min without increasing pain  44.  Pt able to tolerate the inversion table, 20 sec down, 30 up x 6 " cycles    06/09/20     45.  Pt independent in the use of TENS unit   46.  Pt independent in adapted positioning to hold her baby for feeding  47.  Pt independent in adapted positioning to wash her baby  48.  Pt independent in adapted positioning to get baby in and out of crib.                                                                                                                                          Long Term Goals:(3-5 months)      Date Met:      1.  pt independent in self-management of symptoms   12/19/19 - for 4 weeks only       2.  pt independent in home program     12/19/19      3.  pt able to work 6 hours without pain      4.  pt able to sit 60 minutes without pain      5.  pt able to walk 45 min without pain      6.  Pt independent in use of inversion table      7.  Pt independent in swimming program to be done without pain    Progress Towards Goals:  As expected to a little slower than expected    Treatment Plan:   1.  Ultrasound to increase extensibility, decrease pain, if needed  2.  Electrical stimulation for muscle relaxation, decrease pain, if needed, iontophoresis  3.  Manual therapy to increase function, decrease pain  4.  Therapeutic exercise to increase function, decrease pain  5.  Home programming and d/c planning to increase function and decrease pain,     Frequency & Duration:   Will see Tete as needed, when she is in town, prior to getting established with a P.T. in [place].    Patient Participated in and Agrees With This Plan of Care and Goals:  YES  Rehab Potential:  jarret Marcial, PT, MS  Physical Therapist  [place]# [id number]      TREATMENT TODAY:  Total Treatment Time: (time in clinic)   60  min  Timed Code Treatment Minutes:     60      Supplies given:      Manual Therapy:  (MA)  RX min:    50  Manual posterior rotation of left innominate    Positional Isometric contraction (PIC) of right iliopsoas    Positional Isometric contraction of (PIC) right gluteus minimus     Distraction of both SI jts in open pack hip position  Piriformis stretching, bilaterally    Quadratus lumborum stretching, bilaterally    Anterior/Inferior Mobilization of  right hip    Myofascial work trunk   Sternocostal and rib mobs   Manual spinal distraction  PIC C-spine  Stretching scalenes, upper traps, levator scap      Therapeutic Activities:  (TA)  RX min:   10    Neuromuscular Reeducation: (NMR)  RX min:       Ultrasound:  RX min:       1.6 vance/cm2       Location:      Iontophoresis:  6 hr patch                                Location:                           Ice:        ocedures:      Key:  i=instructed        r=review        c=corrected        a=adapted   Code Procedure/Instruction 10/29/19 11/05/19 11/12/19 11/26/19 12/19/19 01/07/20 01/21/20 02/11/20 03/03/20 06/09/20 08/11/20 09/10/20 10/08/20 10/29/20 11/10/20 11/17/20         TA         Sleeping Positions             adapted reviewed           TA Sternum-Up Posture               With adaptive positions for care of baby          TA SI jt. self-correction     reviewed        adapted            TA Lumbar Facet PIC                         TA   Order of Self-Corrections                         TA Use of lumbar pillow                         TA Use of cervical roll                         TA Use of orthotics                         TA Use of SI belt                         TA Use of Nada chair                         TA Use of Ice                         TA Use of [employer]/ heat                         TA Use of [employer] Relief                         TA Use of TENS unit                         TA Use of iontophoresis                         TA Decreasing Disc Pressures             In all 4s position            TA Use of sacro wedge                         TA Use of inversion table Used for 10 min  Used for 13 min  Used for 10 min  Used 14 min reviewed and used 15 min                    TA Use of sit/stand desk            instructed reviewed             NMR Use of airdyne w/ IU on/off       instructed                  NMR Leukotaping upper quarter        applied                 NMR Pelvic Floor Isolation                         NMR Transverse Abdominus                         NMR Multifidus Isolation                         NMR Diaphragmtic breathe supine  reviewed                       NMR  Diaphragmtic breath sit                         NMR Pelvic Clock in sitting                         NMR Kinesiotape   On right scapula                      NMR InnerUnit against Gravity     reviewed                    NMR Sit-stand w/ inner unit                         NMR Roll w/ inner unit                         NMR Walk w/ inner unit                          NMR Up/down steps w/ inner unit                         NMR Water Exer Instruction    instructed                     NMR Hip Abd w/o piriformis                         NMR Hip Add w/o iliop/ham                          NMR Lower abs in supine                         NMR Inner unit challenge with scissor arm work                          NMR Abd obliques in supine  reviewed                       NMR Prone Knee Flexion                         NMR Supine glute w/ ball btwn knees                          NMR Prone glut amrita                         NMR Retro Step                         NMR Retro Walk                         NMR Retro walk on treadmill        instructed                 NMR Forward walk w/ inner unit  reviewed      reviewed                 NMR Stability ball multifidus bounce                         NMR Stability Ball A/P & Lateral  reviewed                       NMR Ball Catch/Throw /Supine                         NMR Ball movemt in /Stance                         NMR StabilityBall All 4's Arm Lift                         NMR StabilityBall Prone Walk Out                         NMR StabilityBall Supine Oblique                         NMR Stability Ball sit-supine-sit                         NMR  Walking Prog Progression                         MNR Home program sequencing                                                   TA Hamstring stretch                         TA Hip Ext Rot Stretch              reviewed           TA Hip Int Rot Stretch                         TA Hip Flex/IT Stretch                         TA Hip Add Stretch                         TA Lats Dorsi Stretch                         TA Gastroc/soleus stretch                         TA QuadratusLumborm Stretch                            Supine              reviewed              Stance                         TA Quad Stretch                                                   NMR Wall Push Ups                         NMR Planking                         NMR BOSU Ball Exercises                         NMR Lateral Bridge Drop                         NMR [place]                         NMR O'[place]dt Lat Pull Down                         NMR O'Feldt Hip Ext                         NMR O'Feldt Trunk Ext                                                   TA CervDiscExtSup/stnd    instructed/reviewed                     TA Cerv Stretches                         TA Self PIC Cervical Spine                         TA Neural Gliding                         TA Upper trap stretching                instructed         TA Ant/Mid Scalene Strch                instructed         TA Levator Scap strch                         TA Biceps stretch                         TA Rotator Cuff Stretch         instructed                TA Anterior Chest Stretch                         TA Wrist Ext Stretch                                                   NMR Prone Arm Lifts                         NMR Scapula Stabilization                         NMR Occulomotor Isometrics                         NMR Cervical Isometrics                                                   TA Shld AROM w/bar                         TA Shld Capsular Stretching                         TA Self PIC  Thor Spine                         TA Rot Cuff Therabd, beginner         instructed                TA Rot Cuff Therabd, ruy Marcial, PT, MS  Physical Therapist  [place]# [id number]

## 2024-02-27 NOTE — PROGRESS NOTES
"Physical Therapy Note      SUBJECTIVE:   Changes since last seen:  \"Life has been full time working and full time chasing my puppy, constantly bending over and pickling things up or picking the puppy up>\"  Tete states she hasn't been able to focus as much on exercises.   She had a period, it just ended; she notes her low back pain was bad right before she started.  However, she started spotting again today.   She is noticing more bruises on her arms and legs the last couple of months, she will bring this up to her doctor.  She's had 2 episodes of urinary incontinence, upon waking up, over the past year, both times was when she was having a flare up of bad back pain and notes she is prone to having a UTI once/ year.   She has a small pool open in her backyard and is asking if there are some exercises to do in it.   She reports she's getting better at lumbar facet PIC self corrections and she's using more ice for pain management.   She indicates her stability ball at home needs to be inflated more as it was too soft to sit on without pain to the the AP/lateral movement on it in sitting.     Current level of pain:    6/10  Across upper sacrum .   Lowest level of pain since last seen:  475/10   Highest level of pain since last seen:   7/10  Last week, right before period      Past Medical History:  1.  AA 2013  Treated with PT for left shoulder labral tear, no low back treatment   2.  Hx of two falls when mopping in socks, over the last 2 years  3.  Hx of regular, heavy, cramping menses  4.  T& A, 2006  5.  Allergic to Wellbutrin and mild allergy to contrast dye  6.  Chronic yeast infections  7.  Uterine fibroids removed, 2 yrs ago       Functional Limitations:  Sitting, standing, walking, stairs, driving, working, sleeping, squatting, housework and changing positions.   Patient Goals for Physical Therapy: \"Pain relief\"      OBJECTIVE:  Observation:  Slight ight lateral shift of pelvis.      01/08/2019 02/07/19 03/21/19 " Left voicemail with Ms.Shirley Rushing regarding her daughter's (Isi) HST performed 02/13/2024 and 02/14/2024.  The HST revealed that  studies were failed due to data acquisition  Ms.Shirley Rushing was advised to follow-up with the ordering provider, Mauricio Kaur M.D, and discuss the recommendations made by the interpreting physician.     03/28/19 04/04/19 04/11/19 04/18/19 04/25/19 05/02/19 05/07/19 05/14/19 06/05/19         SI Joint Positioning  Right  Left Right  Left Right  Left Right  Left Right  Left Right  Left Right  Left Right  Left Right  Left Right  Left Right  Left Right  Left Right  Left Right Left Right Left Right Left Right  Left Right  Left       Innominate                            Anterior    x   x    x  x                x                 x              X     x    x   x   x                           x                Posterior                x               x        x              x     x     x    x               X                x               x              x     x            Sacrum                               Unilateral rotation    x   x   x    x    x     x    x    x   x   x    x    x                                 SI Joint Testing  Right  Left Right  Left Right  Left Right  Left Right  Left Right  Left Right  Left Right  Left Right  Left Right  Left Right  Left Right  Left Right  Left Right Left Right Left Right Left Right  Left Right  Left           Distraction     +          +   +         +   +          +    +        +  +           +   +          +   +          +   +          +     +        +   +         +   +         +   +          +                 Joanna's     +          +   Sl         sl    +         -    Sl         -   Sl         -   Sl         -   -           -                      Compression     +          +   Sl          sl   Sl          Sl     Sl        Sl    -          -   -         -   -           -                      Prone Press Up           +         +         Sl          -         -        -         -                                   Special Tests  Right   Left Right  Left Right  Left Right  Left Right  Left Right  Left Right Left  Right  Left Right  Left Right  Left Right  Left Right  Left Right  Left Right Left Right Left Right Left Right Left  Right  Left      Straight Leg Raise                                      Active   -         -          -           -                      Passive   -         -             -             -                  Hip Scour Test   ++       Sl    ++       Sl    +           sl   Sl         Sl    Sl         sl Sl          Sl    Sl        sl   Sl       Sl    Sl         Sl    Sl         Sl     Sl         sl   Sl        Sl                               Bakers Cyst       +         -     +        -    Sl        -   Sl         -     Sl        -   Sl        Sl              Palpation:       Muscle/Bone Irritation  Date 01/08/2019 02/07/19 03/21/19 03/28/19 04/04/18 04/11/19 04/18/19 04/25/19 05/02/19 05/07/19 05/14/19 06/05/19          Right  Left Right  Left Right  Left Right  Left Right  Left Right  Left Right  Left Right  Left Right  Left Right  Left Right  Left Right  Left Right  Left Right Left Right Left Right Left Right  Left Right  Left   Piriformis  ++        +   ++          +   ++       +   ++        +    ++        Sl    ++       Sl   ++       +    ++      +   ++        +   +           +    +        Sl      +         ++         Gr. Trochanter  +          +  +          sl    +           +    +       ++    +          -   +         Sl    +         sl   +         sl   Sl        Sl    +          +    Sl        sl    Sl         +         IT Band  +         sl   Sl        sl     -          -   -          -    Sl         -   Sl         -   Sl        -   Sl         -  sl          -   Sl          Sl      -          -     -           -         Quadratus Lumborum  ++        ++   ++        +    +        ++   ++        ++     +       ++   +          +  ++        ++   Sl        Sl    +       ++   +          ++    Sl       +     +         +         Ischial Tuberosity  ++         sl   -          -    -           -     -           -     -          -   -          -   -          -   -          -   -          -  -            -     -           -   -          -         Sacrococcygeal Ligs  ++          +   Sl         sl   +          +    +       +    Sl        Sl    Sl       Sl    +         sl  ++      +  +        Sl       Sl         +   Sl         Sl          Paraspinals  +           +     +         sl    +    +          -   Sl         +   +         sl   +        sl  +         -   Sl          +   Sl          +   +          sl         Spinous Processes                                        T-spine rotated to   -               -                           L-spine rotated to  L2-5        L1-5  L4-5  L3-5  L2-5            L3-5  L1-5  L2-5  L2-5              L1-5           L2-5 L2-5           Adductor Tony    +        +       +         +     -         +   -           Sl     -        sl   +          sl   -          -   -           -   -          -   -           -   -          -   -           sl         Iliopsoas  ++         +   ++        +   ++         +    +         ++  sl        Sl    Sl       Sl    Sl       Sl    Sl         -   Sl        -   Sl         +   +         Sl    +          sl         Quad Origin  sl          sl   Sl          sl    -          -   -           -    -         -   -          -   -         -   -          -   -          -   -           -    -         -   -            -         SI Joint  ++         +  ++        +    ++        +   +          +   +         +   +          ++   +         sl    +         sl   +        +  +          sl  +        Sl     +        Sl          Pubic Symphysis         ++          Not tested         ++      ++        ++            + Not tested Not tested          +         Obturator externus          -          +   -          -   -          -    -           -         Rectus Diastasis   1/2 finger                    sternocostals   +        +   +          +    +         -    +           +   Sl        Sl    +         Sl  Not tested     +         +     +         +         All 4s Positioning: Pt not able to assume full lumbar extension in this position  Leg Length:  The right  lower  extremity is equal to the left        Monthly Objective Measurement/Functional Activity  Date: 01/08/2019 03/21/19 04/25/19 06/05/19       Oswestry Pain Score 52/100  46/100    48/100      48/100                             AROM Lumbar Spine: Degrees   Degrees      Degrees      Degrees      Degrees      Degrees      Degrees    Degrees      Flexion 106 pain          99          83       75 pain          Extension 3 pain           17           19       11 pain          Right Lat. Flexion 29 pain right trunk          24        25      25          Left Lat. Flexion 31         19         23     14          Right Lat Shift Pelvis inc pain   Inc pain Left SI jt       Slight increase No change but can't do far          Left Lat Shift Pelvis  less inc pain       No change       No change/ slight cielo  No change but can't go far                   AROM Hips:  (degrees) Right      Left Right Left Right  Left Right  Left Right  Left Right  Left Right  Left Right  Left      Flexion 110       110  125    120    128      125 130       130          Abduction 37       41   45      45      42      44  43          45          Int. Rotation 32       22  30      25      31         28  32          30          Ext. Rotation  34       42  35      40       38        41   35         40                  Flexibility:   (degrees) Right    Left Right  Left Right  Left Right  Left Right  Left Right  Left Right  Left Right  Left      Hamstrings -32       -28  -30     -25   -25       -25   -28      -27          Quadriceps 39       42 40        45    not tested  40          42          HipExt/Rot(piriformis) 30       28   32      30  33         32   35         33          Hip Int/Rotators 27       9   25      10   28        15   27         16          Hip Flexors (iliopsoas) -       -             IT Band -       -                     Reflexes: Right      Left Right  Left Right  Left Right  Left Right  Left Right  Left Right  Left Right  Left      L4  (Quad) +1       +1             S1 (Achilles) +1       +1                     Root Level  - Motor Right      Left Right  Left Right  Left Right  Left Right  Left Right  Left Right  Left Right  Left     T12             Rectus Abdominus 4+/5     4+/5         5/5        5/5          L1              Paraspinals 5/5         5/5             L2              Hip Flexion 5/5          5/5             L3             Quads 5/5          5/5             L4              Anterior Tibialis 5/5         5/5             L5             Gr. Toe Extension 5/5           5/5             S1            Gastroc/Sol/Peroneals 5/5          5/5                     Functional Strength:             Single LegStanceTime (seconds) R:30   L:30   R:      L: R:      L: R: 30 pain     L:30 pain  R:      L: R:      L: R:      L: R:      L:     Pelvic Floor Isolation (seconds) -    10 sec 10 sec          Inner Unit  Isolation (seconds) -    10 sec 10 sec                   Functional Activities:              Sit w/o pain? Pain increases (minutes) No/ 30 min No/ 30  min No/ 30 min  No/  30 min          Stand w/o pain? No/ 30 min No/ 30 min No/ 10 min  No/ 10 min          Walk w/o pain? varies No/ 1 mile No/ 1/2 mile No/ 1 mile          Steps w/o pain? 1 fl, avoids them 1 fl avoids  1 fl avoids  No/ avoids          Drive w/o pain? No/ 10-15 min No/ 30-45 min No/ 30-45  Min  No/ 30-45 min          Work w/o pain? No/ 30 min No/ 30 min No/ 30  No/ 30 min          # times wakes w/ pain? 4-5x/night, now taking meds at night she is able to sleep.  2x   2-3x  2x           PLAN OF CARE:    ASSESSMENT:  Problem List:   1. SI joint dysfunction  2. Lack of spinal stabilization  3. Postural dysfunction     Discussion:  Tete admits having had the new puppy for a couple of weeks now, has required bending over more and lifting more both of which have increased her low back pain.    She is hoping this phase of raising a puppy ends soon.  She was instructed in spinal  stabilization exercises to be done in the pool; she had a good understanding of this.  She was also progressed in stability ball exercises, supine ball toss/catch with inner unit on/off as well as standing moving medicine ball away from trunk with the inner unit on.      Tete sees her OB/GYN doctor next week.  She will discuss with possible impact of hormone fluctuation on her ligamentous laxity and thus her pain.  She will also discuss the urinary incontinent episodes, the spotting between cycles and the increased bruising.      Tete is in need of 4 more visits to complete the spinal stabilization program and neuro motor retraining of the outer unit.  Then she will attempt self management for one month and return for 2 more visits with instruction in stretching and advanced spinal stabilization.  I recommend Tete be seen by Sandi Mathew, PT,MS, pelvic health specialist to address internal pelvic floor musculature that I am not equipped to address here in our office.  Tete will call and get an appointment with her.          Short Term Goals: (4-6 weeks)                                 Date Met:                1.   pt independent in postural correction       02/07/19  2.   pt independent in SI joint self-corrections      03/21/19  3.   pt independent in exer to decrease post. disc pressures    03/21/19  4.   pt able to sleep through night without pain  5.   pt able to sit 15 minutes without pain  6.   pt able to walk 10minutes without pain  7.   Reduce pt pain to no greater than 7/10      03/21/19  8.   Decrease Oswestry Pain Score to no greater than 45/100  9.  Reduce pt pain to no greater than 6/10  10.  Pt able to isolate the pelvic floor in supine x10, 10 sec    03/28/19  11.  Pt able to isolate transverse abdom in all 4s, x10 sec, x10       03/28/19  12.  Pt able to isolate the multifidus in sitting x10 sec, x10                          03/28/19  13.  Pt able to engage inner unit, move from sit to stand &back  "to sit    04/04/19                                                                                                                                                                                                                                                                                                          14.  Pt able to engage inner unit and walk 4 steps without overflow to hamstrings   15.  Pt able to hip abduct x6 without engaging the piriformis  16.  Pt able to perform prone knee flexion without engaging piriformis x6        05/07/19       17.  Pt able to perform remedial lower abs with scissor arm work x6 w/o pain  04/18/19    18.  Pt able to perform \"Pre-cursor\" lower abs leg lift initiation x6 without pain        04/18/19   19.  Pt able to perform retro step without engaging piriformis x6, bilaterally    20.  Pt able to perform bridging x6 without engaging piriformis    04/25/19    21.  Pt able to perform \"scissors\" arm movement with inner unit w/o piriformis x6 05/02/19  22.  Pt able to perform PIC hip adductors without engaging the piriformis  x6  05/07/19  23.  Pt able to perform \"Scissors\" arm motion w/ inner unit with 1lb wts x6 w/o pain 05/07/19  24.  Pt able to perform prone glut max over stability ball x6 w/o engaging piriformis   25.  Pt able to reduce pain w/ self PIC to left psoas     05/14/19  26.  Pt able to move laterally on stability ball in sitting without pain x6  27.  Pt able to move A/P movement sitting on stability ball without pain x6  28.  Pt independent in pool exercises, to do without increasing pain  29.  Pt able to throw and catch stability ball in supine with inner unit on x6  30.  Pt able to hold medicine ball in stance and move away from trunk with IU on and w/o pain x6                                                                                                                                           Long Term Goals:(3-5 months)      Date Met:      1.  pt independent " "in self-management of symptoms       2.  pt independent in home program      3.  pt able to work 6 hours without pain      4.  pt able to sit 60 minutes without pain      5.  pt able to walk 45 min without pain    Progress Towards Goals:  As expected to a little slower than expected    Treatment Plan:   1.  Ultrasound to increase extensibility, decrease pain, if needed  2.  Electrical stimulation for muscle relaxation, decrease pain, if needed, iontophoresis  3.  Manual therapy to increase function, decrease pain  4.  Therapeutic exercise to increase function, decrease pain  5.  Home programming and d/c planning to increase function and decrease pain    Frequency & Duration:  As noted above in \"Discussionn\", pt will be seen on a once/week basis for  4 more visits then she will attempt one month of self management then return for 2 more visits to complete instruction and advanced spinal stabilization.     Patient Participated in and Agrees With This Plan of Care and Goals:  YES  Rehab Potential:  jarret Marcial, PT, MS  Physical Therapist  [place]# [id number]      TREATMENT TODAY:    Total Treatment Time: (time in clinic)    70  min  Timed Code Treatment Minutes:      70      Supplies given:      Manual Therapy:  (MA)  RX min:     35 min  Manual posterior rotation of left innominate    Positional Isometric contraction (PIC) of right iliopsoas    Positional Isometric contraction of (PIC) right gluteus minimus    Distraction of both SI jts in open pack hip position  Piriformis stretching, bilaterally    Quadratus lumborum stretching, bilaterally    Anterior/Inferior Mobilization of  right hip    Positional Isometric Contraction of multifidus  Myofascial work trunk   Sternocostal and rib mobs   Manual spinal distraction    Therapeutic Activities:  (TA)  RX min:         Neuromuscular Reeducation: (NMR)  RX min:   35    Ultrasound:  RX min:          1.6 vance/cm2       Location:     Iontophoresis:  6 hr patch             "                    Location:                               Procedures:      Key:  i=instructed        r=review        c=corrected        a=adapted   Code Procedure/Instruction 01/08/2019 02/07/19 03/21/19 03/28/19 04/04/19 04/11/19 04/18/19 04/25/19 05/02/19 05/07/19 05/14/19 06/05/19       TA         Sleeping Positions instructed               reviewed,correct                 TA Sternum-Up Posture instructed reviewed                 TA SI jt. self-correction instructed corrected corrected  reviewed              TA Lumbar Facet PIC instructed corrected corrected  reviewed              TA   Order of Self-Corrections instructed    reviewed              TA Use of lumbar pillow instructed                  TA Use of cervical roll instructed corrected                 TA Use of orthotics instructed                  TA Use of SI belt instructed                  TA Use of Nada chair                   TA Use of Ice instructed                  TA Use of [employer]/ heat instructed                  TA Use of Theraworx Relief instructed                  TA Use of TENS unit                   TA Use of iontophoresis                   TA Decreasing Disc Pressures  instructed                 TA Use of sacro wedge          attempted didnt work                             NMR Pelvic Floor Isolation   instructed corrected               NMR Transverse Abdominus   instructed corrected               NMR Multifidus Isolation   instructed                NMR Diaphragmtic breathe supine    instructed               NMR  Diaphragmtic breath sit                   NMR Pelvic Clock in sitting   instructed                NMR Kinesiotape    Applied to both QL               NMR InnerUnit against Gravity    instructed               NMR Sit-stand w/ inner unit    instructed               NMR Roll w/ inner unit    instructed               NMR Walk w/ inner unit     instructed               NMR Up/down steps w/ inner unit    instructed               NMR  Water Exer Instruction            Instructed        NMR Hip Abd w/o piriformis     instructed stopped             NMR Hip Add w/o iliop/ham      instructed stopped             NMR Lower abs in supine      Adapted to pre cursor, initiation Cont, no change   Still not able to lift foot w/o engaging piriformis   adapted with bolster         NMR Inner unit challenge with scissor arm work       instructed Added legs  Adapted w/ ball & belt progressed to 1lb weights Inc reps to 10, still use 1lb Added rectus abdom to this        NMR Abd obliques in supine      attempted             NMR Prone Knee Flexion     instructed  instructed corrected Made bolster higher. Dorsiflex left foot more reviewedstopped dorsiflex on Right        NMR Supine glute w/ ball btwn knees        instructed  Adapted to do w/ belt for abd Moved belt more proximal on thighs She will not bridge as high  continue        NMR Prone glut amrita          instructed         NMR Retro Step       instructed stopped           NMR Retro Walk                   NMR Retro walk on treadmill                   NMR Forward walk w/ inner unit                   NMR Balance Instruction                   NMR Stability Ball A/P & Lateral           instructed        NMR Ball Catch/Throw /Supine            instructed       NMR Ball movemt in /Stance            instructed       NMR StabilityBall All 4's Arm Lift                   NMR StabilityBall Prone Walk Out                   NMR StabilityBall Supine Oblique                   NMR Stability Ball sit-supine-sit                   NMR Walking Prog Progression                   MNR Home program sequencing                                       TA Hamstring stretch                   TA Hip Ext Rot Stretch                   TA Hip Int Rot Stretch                   TA Hip Flex/IT Stretch                   TA Hip Add Stretch                   TA Lats Dorsi Stretch                   TA Gastroc/soleus stretch                   TA  QuadratusLumborm Stretch                      Supine                      Stance                   TA Quad Stretch                                       NMR Wall Push Ups                   NMR Planking                   NMR BOSU Ball Exercises                   NMR Lateral Bridge Drop                   NMR [place]                   NMR O'Feldt Lat Pull Down                   NMR O'Feldt Hip Ext                   NMR O'Feldt Trunk Ext                                       TA CervDiscExtSup/stnd                   TA Cerv Stretches                   TA Self PIC Cervical Spine                   TA Neural Gliding                   TA Ant/Mid Scalene Strch                   TA Biceps stretch                   TA Rotator Cuff Stretch                   TA Anterior Chest Stretch                   TA Wrist Ext Stretch                                       NMR Prone Arm Lifts                   NMR Scapula Stabilization                   NMR Occulomotor Isometrics                   NMR Cervical Isometrics                                       TA Shld AROM w/bar                   TA Shld Capsular Stretching                   TA Self PIC Thor Spine                   TA Rot Cuff Therabd, beginner                   TA Rot Cuff Therabd, intermed                                                                                                                                                                                                                                                 Miracle Marcial, PT, MS  Physical Therapist  [place]# [id number]